# Patient Record
Sex: FEMALE | Race: WHITE | NOT HISPANIC OR LATINO | Employment: FULL TIME | URBAN - METROPOLITAN AREA
[De-identification: names, ages, dates, MRNs, and addresses within clinical notes are randomized per-mention and may not be internally consistent; named-entity substitution may affect disease eponyms.]

---

## 2023-02-18 PROBLEM — R12 HEARTBURN: Status: ACTIVE | Noted: 2023-02-18

## 2023-02-18 PROBLEM — H04.123 DRY EYES, BILATERAL: Status: ACTIVE | Noted: 2023-02-18

## 2023-02-18 PROBLEM — H90.3 ASYMMETRICAL SENSORINEURAL HEARING LOSS: Status: ACTIVE | Noted: 2023-02-18

## 2023-02-18 PROBLEM — H25.811 COMBINED FORM OF AGE-RELATED CATARACT, RIGHT EYE: Status: ACTIVE | Noted: 2023-02-18

## 2023-02-18 PROBLEM — H90.3 SENSORINEURAL HEARING LOSS OF BOTH EARS: Status: ACTIVE | Noted: 2023-02-18

## 2023-02-18 PROBLEM — H52.00 HYPEROPIA: Status: ACTIVE | Noted: 2023-02-18

## 2023-02-18 PROBLEM — S16.1XXA NECK STRAIN: Status: ACTIVE | Noted: 2023-02-18

## 2023-02-18 PROBLEM — S60.559A SPLINTER OF HAND: Status: ACTIVE | Noted: 2023-02-18

## 2023-02-18 PROBLEM — L30.9 ECZEMA: Status: ACTIVE | Noted: 2023-02-18

## 2023-02-18 PROBLEM — M25.561 BILATERAL KNEE PAIN: Status: ACTIVE | Noted: 2023-02-18

## 2023-02-18 PROBLEM — H52.209 ASTIGMATISM: Status: ACTIVE | Noted: 2023-02-18

## 2023-02-18 PROBLEM — H52.10 MYOPIA WITH PRESBYOPIA: Status: ACTIVE | Noted: 2023-02-18

## 2023-02-18 PROBLEM — E03.9 HYPOTHYROID: Status: ACTIVE | Noted: 2023-02-18

## 2023-02-18 PROBLEM — I10 BENIGN ESSENTIAL HYPERTENSION: Status: ACTIVE | Noted: 2023-02-18

## 2023-02-18 PROBLEM — I82.409 DVT (DEEP VENOUS THROMBOSIS) (MULTI): Status: ACTIVE | Noted: 2023-02-18

## 2023-02-18 PROBLEM — M25.511 RIGHT SHOULDER PAIN: Status: ACTIVE | Noted: 2023-02-18

## 2023-02-18 PROBLEM — G25.0 BENIGN ESSENTIAL TREMOR: Status: ACTIVE | Noted: 2023-02-18

## 2023-02-18 PROBLEM — J45.30 MILD PERSISTENT ASTHMA WITHOUT COMPLICATION (HHS-HCC): Status: ACTIVE | Noted: 2023-02-18

## 2023-02-18 PROBLEM — H25.812 COMBINED FORM OF AGE-RELATED CATARACT, LEFT EYE: Status: ACTIVE | Noted: 2023-02-18

## 2023-02-18 PROBLEM — R32 URINE INCONTINENCE: Status: ACTIVE | Noted: 2023-02-18

## 2023-02-18 PROBLEM — M25.562 BILATERAL KNEE PAIN: Status: ACTIVE | Noted: 2023-02-18

## 2023-02-18 PROBLEM — M17.12 ARTHRITIS OF KNEE, LEFT: Status: ACTIVE | Noted: 2023-02-18

## 2023-02-18 PROBLEM — J06.9 VIRAL URI WITH COUGH: Status: ACTIVE | Noted: 2023-02-18

## 2023-02-18 PROBLEM — F32.5 MAJOR DEPRESSION IN REMISSION (CMS-HCC): Status: ACTIVE | Noted: 2023-02-18

## 2023-02-18 PROBLEM — G62.9 POLYNEUROPATHY: Status: ACTIVE | Noted: 2023-02-18

## 2023-02-18 PROBLEM — R26.89 LOSS OF BALANCE: Status: ACTIVE | Noted: 2023-02-18

## 2023-02-18 PROBLEM — R68.89 HEAT INTOLERANCE: Status: ACTIVE | Noted: 2023-02-18

## 2023-02-18 PROBLEM — H47.399 CUP TO DISC ASYMMETRY: Status: ACTIVE | Noted: 2023-02-18

## 2023-02-18 PROBLEM — J30.9 ALLERGIC RHINITIS: Status: ACTIVE | Noted: 2023-02-18

## 2023-02-18 PROBLEM — S06.0XAA CONCUSSION: Status: ACTIVE | Noted: 2023-02-18

## 2023-02-18 PROBLEM — F32.9 MDD (MAJOR DEPRESSIVE DISORDER), SINGLE EPISODE: Status: ACTIVE | Noted: 2023-02-18

## 2023-02-18 PROBLEM — M19.049 CMC ARTHRITIS: Status: ACTIVE | Noted: 2023-02-18
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
## 2023-02-18 PROBLEM — S72.009A HIP FRACTURE (MULTI): Status: ACTIVE | Noted: 2023-02-18

## 2023-02-18 PROBLEM — M54.50 LOW BACK PAIN: Status: ACTIVE | Noted: 2023-02-18

## 2023-02-18 PROBLEM — S20.219A CONTUSION, CHEST WALL: Status: ACTIVE | Noted: 2023-02-18

## 2023-02-18 PROBLEM — F41.9 ANXIETY: Status: ACTIVE | Noted: 2023-02-18

## 2023-02-18 PROBLEM — E55.9 VITAMIN D DEFICIENCY: Status: ACTIVE | Noted: 2023-02-18

## 2023-02-18 PROBLEM — H52.4 MYOPIA WITH PRESBYOPIA: Status: ACTIVE | Noted: 2023-02-18

## 2023-02-18 PROBLEM — E78.5 HYPERLIPIDEMIA: Status: ACTIVE | Noted: 2023-02-18

## 2023-02-18 PROBLEM — M17.11 ARTHRITIS OF KNEE, RIGHT: Status: ACTIVE | Noted: 2023-02-18

## 2023-02-18 PROBLEM — H93.12 TINNITUS OF LEFT EAR: Status: ACTIVE | Noted: 2023-02-18

## 2023-02-18 PROBLEM — S69.90XA WRIST INJURY: Status: ACTIVE | Noted: 2023-02-18

## 2023-02-18 PROBLEM — H43.813 PVD (POSTERIOR VITREOUS DETACHMENT), BOTH EYES: Status: ACTIVE | Noted: 2023-02-18

## 2023-02-18 PROBLEM — M18.12 LOCALIZED PRIMARY OSTEOARTHRITIS OF CARPOMETACARPAL JOINT OF LEFT THUMB: Status: ACTIVE | Noted: 2023-02-18

## 2023-02-18 PROBLEM — S72.23XA: Status: ACTIVE | Noted: 2023-02-18

## 2023-02-18 RX ORDER — PROPRANOLOL HYDROCHLORIDE 10 MG/1
TABLET ORAL
COMMUNITY
Start: 2021-11-01 | End: 2023-04-17 | Stop reason: ALTCHOICE

## 2023-02-18 RX ORDER — AZELASTINE HYDROCHLORIDE, FLUTICASONE PROPIONATE 137; 50 UG/1; UG/1
SPRAY, METERED NASAL
COMMUNITY
End: 2023-04-17 | Stop reason: ALTCHOICE

## 2023-02-18 RX ORDER — TRIAMCINOLONE ACETONIDE 1 MG/G
CREAM TOPICAL
COMMUNITY
Start: 2021-11-08 | End: 2023-04-17 | Stop reason: ALTCHOICE

## 2023-02-18 RX ORDER — CITALOPRAM 20 MG/1
1 TABLET, FILM COATED ORAL DAILY
COMMUNITY
Start: 2014-11-05 | End: 2023-04-17 | Stop reason: SDUPTHER

## 2023-02-18 RX ORDER — BISOPROLOL FUMARATE AND HYDROCHLOROTHIAZIDE 10; 6.25 MG/1; MG/1
2 TABLET ORAL
COMMUNITY
Start: 2014-10-25 | End: 2023-04-17 | Stop reason: SDUPTHER

## 2023-02-18 RX ORDER — PHENOL 1.4 %
AEROSOL, SPRAY (ML) MUCOUS MEMBRANE
COMMUNITY
Start: 2016-09-01

## 2023-02-18 RX ORDER — SIMVASTATIN 20 MG/1
1 TABLET, FILM COATED ORAL DAILY
COMMUNITY
Start: 2014-11-05 | End: 2023-04-17 | Stop reason: ALTCHOICE

## 2023-02-18 RX ORDER — FLUTICASONE PROPIONATE 50 MCG
SPRAY, SUSPENSION (ML) NASAL
COMMUNITY
Start: 2021-08-23

## 2023-02-18 RX ORDER — BUDESONIDE AND FORMOTEROL FUMARATE DIHYDRATE 160; 4.5 UG/1; UG/1
2 AEROSOL RESPIRATORY (INHALATION)
COMMUNITY
End: 2024-04-15 | Stop reason: ALTCHOICE

## 2023-02-18 RX ORDER — HYALURONATE SODIUM 16.8MG/2ML
16.8 SYRINGE (ML) INTRAARTICULAR
COMMUNITY
Start: 2022-07-12 | End: 2024-04-12 | Stop reason: ALTCHOICE

## 2023-02-18 RX ORDER — LEVOTHYROXINE SODIUM 25 UG/1
1 TABLET ORAL DAILY
COMMUNITY
Start: 2018-07-16 | End: 2024-04-15 | Stop reason: SDUPTHER

## 2023-02-18 RX ORDER — HYDROGEN PEROXIDE 3 %
1 SOLUTION, NON-ORAL MISCELLANEOUS DAILY
COMMUNITY

## 2023-03-06 ENCOUNTER — APPOINTMENT (OUTPATIENT)
Dept: PRIMARY CARE | Facility: CLINIC | Age: 73
End: 2023-03-06
Payer: MEDICARE

## 2023-03-06 LAB
ALANINE AMINOTRANSFERASE (SGPT) (U/L) IN SER/PLAS: 10 U/L (ref 7–45)
ALBUMIN (G/DL) IN SER/PLAS: 4.2 G/DL (ref 3.4–5)
ALKALINE PHOSPHATASE (U/L) IN SER/PLAS: 92 U/L (ref 33–136)
ANION GAP IN SER/PLAS: 14 MMOL/L (ref 10–20)
ASPARTATE AMINOTRANSFERASE (SGOT) (U/L) IN SER/PLAS: 13 U/L (ref 9–39)
BASOPHILS (10*3/UL) IN BLOOD BY AUTOMATED COUNT: 0.08 X10E9/L (ref 0–0.1)
BASOPHILS/100 LEUKOCYTES IN BLOOD BY AUTOMATED COUNT: 1.3 % (ref 0–2)
BILIRUBIN TOTAL (MG/DL) IN SER/PLAS: 0.5 MG/DL (ref 0–1.2)
CALCIUM (MG/DL) IN SER/PLAS: 9.3 MG/DL (ref 8.6–10.6)
CARBON DIOXIDE, TOTAL (MMOL/L) IN SER/PLAS: 32 MMOL/L (ref 21–32)
CHLORIDE (MMOL/L) IN SER/PLAS: 102 MMOL/L (ref 98–107)
CHOLESTEROL (MG/DL) IN SER/PLAS: 191 MG/DL (ref 0–199)
CHOLESTEROL IN HDL (MG/DL) IN SER/PLAS: 53.8 MG/DL
CHOLESTEROL/HDL RATIO: 3.6
CREATININE (MG/DL) IN SER/PLAS: 0.68 MG/DL (ref 0.5–1.05)
EOSINOPHILS (10*3/UL) IN BLOOD BY AUTOMATED COUNT: 0.18 X10E9/L (ref 0–0.4)
EOSINOPHILS/100 LEUKOCYTES IN BLOOD BY AUTOMATED COUNT: 2.9 % (ref 0–6)
ERYTHROCYTE DISTRIBUTION WIDTH (RATIO) BY AUTOMATED COUNT: 14 % (ref 11.5–14.5)
ERYTHROCYTE MEAN CORPUSCULAR HEMOGLOBIN CONCENTRATION (G/DL) BY AUTOMATED: 30.3 G/DL (ref 32–36)
ERYTHROCYTE MEAN CORPUSCULAR VOLUME (FL) BY AUTOMATED COUNT: 88 FL (ref 80–100)
ERYTHROCYTES (10*6/UL) IN BLOOD BY AUTOMATED COUNT: 4.51 X10E12/L (ref 4–5.2)
GFR FEMALE: >90 ML/MIN/1.73M2
GLUCOSE (MG/DL) IN SER/PLAS: 97 MG/DL (ref 74–99)
HEMATOCRIT (%) IN BLOOD BY AUTOMATED COUNT: 39.6 % (ref 36–46)
HEMOGLOBIN (G/DL) IN BLOOD: 12 G/DL (ref 12–16)
IMMATURE GRANULOCYTES/100 LEUKOCYTES IN BLOOD BY AUTOMATED COUNT: 0.3 % (ref 0–0.9)
LDL: 115 MG/DL (ref 0–99)
LEUKOCYTES (10*3/UL) IN BLOOD BY AUTOMATED COUNT: 6.3 X10E9/L (ref 4.4–11.3)
LYMPHOCYTES (10*3/UL) IN BLOOD BY AUTOMATED COUNT: 1.36 X10E9/L (ref 0.8–3)
LYMPHOCYTES/100 LEUKOCYTES IN BLOOD BY AUTOMATED COUNT: 21.7 % (ref 13–44)
MONOCYTES (10*3/UL) IN BLOOD BY AUTOMATED COUNT: 0.5 X10E9/L (ref 0.05–0.8)
MONOCYTES/100 LEUKOCYTES IN BLOOD BY AUTOMATED COUNT: 8 % (ref 2–10)
NEUTROPHILS (10*3/UL) IN BLOOD BY AUTOMATED COUNT: 4.12 X10E9/L (ref 1.6–5.5)
NEUTROPHILS/100 LEUKOCYTES IN BLOOD BY AUTOMATED COUNT: 65.8 % (ref 40–80)
NRBC (PER 100 WBCS) BY AUTOMATED COUNT: 0 /100 WBC (ref 0–0)
PLATELETS (10*3/UL) IN BLOOD AUTOMATED COUNT: 326 X10E9/L (ref 150–450)
POTASSIUM (MMOL/L) IN SER/PLAS: 3.8 MMOL/L (ref 3.5–5.3)
PROTEIN TOTAL: 6.5 G/DL (ref 6.4–8.2)
SODIUM (MMOL/L) IN SER/PLAS: 144 MMOL/L (ref 136–145)
THYROTROPIN (MIU/L) IN SER/PLAS BY DETECTION LIMIT <= 0.05 MIU/L: 3.35 MIU/L (ref 0.44–3.98)
TRIGLYCERIDE (MG/DL) IN SER/PLAS: 109 MG/DL (ref 0–149)
UREA NITROGEN (MG/DL) IN SER/PLAS: 16 MG/DL (ref 6–23)
VLDL: 22 MG/DL (ref 0–40)

## 2023-04-17 ENCOUNTER — OFFICE VISIT (OUTPATIENT)
Dept: PRIMARY CARE | Facility: CLINIC | Age: 73
End: 2023-04-17
Payer: MEDICARE

## 2023-04-17 VITALS
WEIGHT: 219 LBS | DIASTOLIC BLOOD PRESSURE: 72 MMHG | OXYGEN SATURATION: 98 % | HEART RATE: 63 BPM | TEMPERATURE: 96.7 F | BODY MASS INDEX: 31.35 KG/M2 | SYSTOLIC BLOOD PRESSURE: 138 MMHG | HEIGHT: 70 IN

## 2023-04-17 DIAGNOSIS — M54.31 SCIATICA OF RIGHT SIDE: ICD-10-CM

## 2023-04-17 DIAGNOSIS — G89.29 CHRONIC LEFT SHOULDER PAIN: ICD-10-CM

## 2023-04-17 DIAGNOSIS — F32.5 MAJOR DEPRESSION IN REMISSION (CMS-HCC): ICD-10-CM

## 2023-04-17 DIAGNOSIS — Z00.00 ROUTINE GENERAL MEDICAL EXAMINATION AT HEALTH CARE FACILITY: Primary | ICD-10-CM

## 2023-04-17 DIAGNOSIS — M25.512 CHRONIC LEFT SHOULDER PAIN: ICD-10-CM

## 2023-04-17 DIAGNOSIS — E78.5 HYPERLIPIDEMIA, UNSPECIFIED HYPERLIPIDEMIA TYPE: ICD-10-CM

## 2023-04-17 DIAGNOSIS — I10 BENIGN ESSENTIAL HYPERTENSION: ICD-10-CM

## 2023-04-17 DIAGNOSIS — E03.9 HYPOTHYROIDISM, UNSPECIFIED TYPE: ICD-10-CM

## 2023-04-17 DIAGNOSIS — G62.9 POLYNEUROPATHY: ICD-10-CM

## 2023-04-17 DIAGNOSIS — F41.9 ANXIETY: ICD-10-CM

## 2023-04-17 DIAGNOSIS — G25.0 BENIGN ESSENTIAL TREMOR: ICD-10-CM

## 2023-04-17 DIAGNOSIS — Z12.31 BREAST CANCER SCREENING BY MAMMOGRAM: ICD-10-CM

## 2023-04-17 PROBLEM — S72.23XA: Status: RESOLVED | Noted: 2023-02-18 | Resolved: 2023-04-17

## 2023-04-17 PROBLEM — S72.009A HIP FRACTURE (MULTI): Status: RESOLVED | Noted: 2023-02-18 | Resolved: 2023-04-17

## 2023-04-17 PROBLEM — I82.409 DVT (DEEP VENOUS THROMBOSIS) (MULTI): Status: RESOLVED | Noted: 2023-02-18 | Resolved: 2023-04-17

## 2023-04-17 PROBLEM — F32.9 MDD (MAJOR DEPRESSIVE DISORDER), SINGLE EPISODE: Status: RESOLVED | Noted: 2023-02-18 | Resolved: 2023-04-17

## 2023-04-17 PROCEDURE — 1170F FXNL STATUS ASSESSED: CPT | Performed by: FAMILY MEDICINE

## 2023-04-17 PROCEDURE — 1159F MED LIST DOCD IN RCRD: CPT | Performed by: FAMILY MEDICINE

## 2023-04-17 PROCEDURE — 3075F SYST BP GE 130 - 139MM HG: CPT | Performed by: FAMILY MEDICINE

## 2023-04-17 PROCEDURE — 1157F ADVNC CARE PLAN IN RCRD: CPT | Performed by: FAMILY MEDICINE

## 2023-04-17 PROCEDURE — G0439 PPPS, SUBSEQ VISIT: HCPCS | Performed by: FAMILY MEDICINE

## 2023-04-17 PROCEDURE — 1160F RVW MEDS BY RX/DR IN RCRD: CPT | Performed by: FAMILY MEDICINE

## 2023-04-17 PROCEDURE — 99397 PER PM REEVAL EST PAT 65+ YR: CPT | Performed by: FAMILY MEDICINE

## 2023-04-17 PROCEDURE — 3078F DIAST BP <80 MM HG: CPT | Performed by: FAMILY MEDICINE

## 2023-04-17 PROCEDURE — 1036F TOBACCO NON-USER: CPT | Performed by: FAMILY MEDICINE

## 2023-04-17 PROCEDURE — 99214 OFFICE O/P EST MOD 30 MIN: CPT | Performed by: FAMILY MEDICINE

## 2023-04-17 RX ORDER — GABAPENTIN 600 MG/1
TABLET ORAL EVERY 8 HOURS
COMMUNITY
End: 2024-04-15 | Stop reason: SDUPTHER

## 2023-04-17 RX ORDER — BISOPROLOL FUMARATE AND HYDROCHLOROTHIAZIDE 10; 6.25 MG/1; MG/1
2 TABLET ORAL
Qty: 180 TABLET | Refills: 1 | Status: SHIPPED | OUTPATIENT
Start: 2023-04-17 | End: 2024-03-11 | Stop reason: SDUPTHER

## 2023-04-17 RX ORDER — CITALOPRAM 20 MG/1
20 TABLET, FILM COATED ORAL DAILY
Qty: 90 TABLET | Refills: 1 | Status: SHIPPED | OUTPATIENT
Start: 2023-04-17 | End: 2024-04-15 | Stop reason: SDUPTHER

## 2023-04-17 RX ORDER — GABAPENTIN 600 MG/1
TABLET ORAL
Qty: 270 TABLET | Refills: 1 | Status: SHIPPED | OUTPATIENT
Start: 2023-04-17 | End: 2023-04-17 | Stop reason: SDUPTHER

## 2023-04-17 RX ORDER — FLUTICASONE FUROATE, UMECLIDINIUM BROMIDE AND VILANTEROL TRIFENATATE 100; 62.5; 25 UG/1; UG/1; UG/1
POWDER RESPIRATORY (INHALATION)
COMMUNITY
Start: 2023-03-30

## 2023-04-17 RX ORDER — GABAPENTIN 600 MG/1
TABLET ORAL
COMMUNITY
Start: 2023-03-22 | End: 2023-04-17 | Stop reason: SDUPTHER

## 2023-04-17 ASSESSMENT — PATIENT HEALTH QUESTIONNAIRE - PHQ9
1. LITTLE INTEREST OR PLEASURE IN DOING THINGS: NOT AT ALL
2. FEELING DOWN, DEPRESSED OR HOPELESS: NOT AT ALL
SUM OF ALL RESPONSES TO PHQ9 QUESTIONS 1 AND 2: 0

## 2023-04-17 ASSESSMENT — ACTIVITIES OF DAILY LIVING (ADL)
MANAGING_FINANCES: INDEPENDENT
GROCERY_SHOPPING: INDEPENDENT
DRESSING: INDEPENDENT
DOING_HOUSEWORK: INDEPENDENT
TAKING_MEDICATION: INDEPENDENT
BATHING: INDEPENDENT

## 2023-04-17 ASSESSMENT — ENCOUNTER SYMPTOMS
LOSS OF SENSATION IN FEET: 0
OCCASIONAL FEELINGS OF UNSTEADINESS: 1
DEPRESSION: 1

## 2023-04-17 NOTE — PROGRESS NOTES
Subjective   Reason for Visit: Yaritza Bernal is an 72 y.o. female here for a Medicare Wellness visit.     Past Medical, Surgical, and Family History reviewed and updated in chart.    Reviewed all medications by prescribing practitioner or clinical pharmacist (such as prescriptions, OTCs, herbal therapies and supplements) and documented in the medical record.  Medicare Wellness Billing Compliance Satisfied    Review all medications by prescribing practitioner or clinical pharmacist (such as prescriptions, OTCs, herbal therapies and supplements) documented in the medical record    Past Medical, Surgical, and Family History reviewed and updated in chart    Tobacco Use Reviewed    Alcohol Use Reviewed    Illicit Drug Use Reviewed    PHQ2/9    Falls in Last Year Reviewed    Home Safety Risk Factors Reviewed    Cognitive Impairment Reviewed    Patient Self Assessment and Health Status    Current Diet Reviewed    Exercise Frequency    ADL - Hearing Impairment    ADL - Bathing    ADL - Dressing    ADL - Walks in Home    IADL - Managing Finances    IADL - Grocery Shopping    IADL - Taking Medications    IADL - Doing Housework      HPI  She lives by herself with her cat. She works 5 hour shifts for 20-25 hours weekly at [employer].     She requests refills for citalopram, gabapentin, and bisopropolol - hctz. She does not voice any side effects. Bp good range. Anxiety controlled     She recently saw her podiatrist. She also saw her pulmonologist who switched her from symbicort to Trelegy.     She is up to date on blood work. Her last mammogram was in 2021. She declines colonoscopy. She did not complete the Cologuard, as she needed to print and was unable to complete due to tremor and will not asked friends to do it, she declines    She complains of sciatic pain on the right side. She also notes that she has left shoulder pain due to the repetitive nature of her work. She would like to go back to PT for  "both areas.  No falls or injuries, she stands as  a lot at grocery store and pushes items through causing her pain    She drinks mini cans of coke, and notices lower extremity edema. If she goes a couple of days without, she notices it resolves. Ankles get puffy if she drinks coke    She has scaly skin on her ankles. She plans to follow up with dermatology.       Patient Care Team:  Madyson Sandoval DO as PCP - General  Madyson Sandoval DO as PCP - [employer] PCP     Review of Systems    Objective   Vitals:  /72   Pulse 63   Temp 35.9 °C (96.7 °F)   Ht 1.778 m (5' 10\")   Wt 99.3 kg (219 lb)   SpO2 98%   BMI 31.42 kg/m²       Physical Exam  Constitutional:       Appearance: Normal appearance.   Cardiovascular:      Rate and Rhythm: Normal rate and regular rhythm.      Pulses: Normal pulses.      Heart sounds: Normal heart sounds.   Pulmonary:      Effort: Pulmonary effort is normal.      Breath sounds: Normal breath sounds.   Abdominal:      General: Bowel sounds are normal.      Palpations: Abdomen is soft.      Tenderness: There is no abdominal tenderness.   Musculoskeletal:         General: No tenderness.      Cervical back: Normal range of motion and neck supple.      Comments: Uses walking stick, has knee pain and wants to wait for total knee   Skin:     General: Skin is warm and dry.   Neurological:      Mental Status: She is alert and oriented to person, place, and time.   Psychiatric:         Mood and Affect: Mood normal.         Thought Content: Thought content normal.         Judgment: Judgment normal.         Assessment/Plan   Problem List Items Addressed This Visit    Essential Tremor  Continue on Gabapention 600 mg three times daily. Prescription sent to mail order.   OARRS checked. Risk and benefit ratio assessed. No Suspicious activity.     Hypertension   Continue on Bisopropolol - hctz 10 - 6.25 twice daily. Prescription sent to mail order. "     Anxiety and Depression  Continue on Citalopram 20 mg daily. Prescription sent to mail order.     Right Sciatic Pain and Left Shoulder pain.  Provided order for physical therapy.     Health Maintenance  Provided order for Mammogram. Will call with results.   Problem List Items Addressed This Visit          Nervous    Benign essential tremor    Relevant Medications    bisoproloL-hydrochlorothiazide (Ziac) 10-6.25 mg tablet    Polyneuropathy       Circulatory    Benign essential hypertension       Endocrine/Metabolic    Hypothyroid       Other    Anxiety    Relevant Medications    citalopram (CeleXA) 20 mg tablet    Hyperlipidemia    Major depression in remission (CMS/HCC)     On medication, in remission          Other Visit Diagnoses       Routine general medical examination at health care facility    -  Primary    Sciatica of right side        Relevant Orders    Referral to Physical Therapy    Breast cancer screening by mammogram        Relevant Orders    BI mammo bilateral screening tomosynthesis    Chronic left shoulder pain        Relevant Orders    Referral to Physical Therapy          Follow up in 6 months unless a visit is needed prior.      Scribe Attestation  By signing my name below, IAdilene Scribe   attest that this documentation has been prepared under the direction and in the presence of Madyson Sandoval DO.

## 2023-07-20 ENCOUNTER — HOSPITAL ENCOUNTER (OUTPATIENT)
Dept: DATA CONVERSION | Facility: HOSPITAL | Age: 73
End: 2023-07-20
Attending: OPHTHALMOLOGY | Admitting: OPHTHALMOLOGY
Payer: MEDICARE

## 2023-07-20 DIAGNOSIS — H25.812 COMBINED FORMS OF AGE-RELATED CATARACT, LEFT EYE: ICD-10-CM

## 2023-07-20 DIAGNOSIS — Z88.2 ALLERGY STATUS TO SULFONAMIDES: ICD-10-CM
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
## 2023-09-14 ENCOUNTER — OFFICE VISIT (OUTPATIENT)
Dept: PRIMARY CARE | Facility: CLINIC | Age: 73
End: 2023-09-14
Payer: MEDICARE

## 2023-09-14 VITALS
HEART RATE: 97 BPM | BODY MASS INDEX: 30.56 KG/M2 | WEIGHT: 213 LBS | OXYGEN SATURATION: 97 % | TEMPERATURE: 97.9 F | DIASTOLIC BLOOD PRESSURE: 73 MMHG | SYSTOLIC BLOOD PRESSURE: 134 MMHG

## 2023-09-14 DIAGNOSIS — H91.91 HEARING LOSS OF RIGHT EAR, UNSPECIFIED HEARING LOSS TYPE: ICD-10-CM

## 2023-09-14 DIAGNOSIS — S20.211A CONTUSION OF RIB ON RIGHT SIDE, INITIAL ENCOUNTER: Primary | ICD-10-CM

## 2023-09-14 DIAGNOSIS — E03.9 HYPOTHYROIDISM, UNSPECIFIED TYPE: ICD-10-CM

## 2023-09-14 DIAGNOSIS — E78.5 HYPERLIPIDEMIA, UNSPECIFIED HYPERLIPIDEMIA TYPE: ICD-10-CM

## 2023-09-14 DIAGNOSIS — I10 BENIGN ESSENTIAL HYPERTENSION: ICD-10-CM

## 2023-09-14 PROBLEM — Z96.1 PSEUDOPHAKIA OF LEFT EYE: Status: ACTIVE | Noted: 2023-09-14

## 2023-09-14 PROBLEM — M25.562 KNEE PAIN, BILATERAL: Status: ACTIVE | Noted: 2023-09-14

## 2023-09-14 PROBLEM — R07.9 CHEST PAIN: Status: ACTIVE | Noted: 2023-09-14

## 2023-09-14 PROBLEM — L81.4 OTHER MELANIN HYPERPIGMENTATION: Status: ACTIVE | Noted: 2018-10-17

## 2023-09-14 PROBLEM — D48.5 NEOPLASM OF UNCERTAIN BEHAVIOR OF SKIN: Status: ACTIVE | Noted: 2018-10-17

## 2023-09-14 PROBLEM — M25.561 KNEE PAIN, BILATERAL: Status: ACTIVE | Noted: 2023-09-14

## 2023-09-14 PROBLEM — L82.0 INFLAMED SEBORRHEIC KERATOSIS: Status: ACTIVE | Noted: 2018-10-17

## 2023-09-14 PROBLEM — D18.01 HEMANGIOMA OF SKIN AND SUBCUTANEOUS TISSUE: Status: ACTIVE | Noted: 2018-10-17

## 2023-09-14 PROBLEM — M25.562 LEFT KNEE PAIN: Status: ACTIVE | Noted: 2023-09-14

## 2023-09-14 PROBLEM — L82.1 OTHER SEBORRHEIC KERATOSIS: Status: ACTIVE | Noted: 2018-10-17

## 2023-09-14 PROBLEM — F32.9 MDD (MAJOR DEPRESSIVE DISORDER), SINGLE EPISODE: Status: ACTIVE | Noted: 2023-09-14

## 2023-09-14 PROBLEM — L57.0 ACTINIC KERATOSIS: Status: ACTIVE | Noted: 2018-10-17

## 2023-09-14 PROBLEM — T14.8XXA FRACTURE: Status: ACTIVE | Noted: 2023-09-14

## 2023-09-14 PROBLEM — L57.9 SKIN CHANGES DUE TO CHRONIC EXPOSURE TO NONIONIZING RADIATION, UNSPECIFIED: Status: ACTIVE | Noted: 2018-10-17

## 2023-09-14 PROCEDURE — 1159F MED LIST DOCD IN RCRD: CPT | Performed by: FAMILY MEDICINE

## 2023-09-14 PROCEDURE — 1157F ADVNC CARE PLAN IN RCRD: CPT | Performed by: FAMILY MEDICINE

## 2023-09-14 PROCEDURE — 1036F TOBACCO NON-USER: CPT | Performed by: FAMILY MEDICINE

## 2023-09-14 PROCEDURE — 99214 OFFICE O/P EST MOD 30 MIN: CPT | Performed by: FAMILY MEDICINE

## 2023-09-14 PROCEDURE — 1125F AMNT PAIN NOTED PAIN PRSNT: CPT | Performed by: FAMILY MEDICINE

## 2023-09-14 PROCEDURE — 3075F SYST BP GE 130 - 139MM HG: CPT | Performed by: FAMILY MEDICINE

## 2023-09-14 PROCEDURE — 1160F RVW MEDS BY RX/DR IN RCRD: CPT | Performed by: FAMILY MEDICINE

## 2023-09-14 PROCEDURE — 3078F DIAST BP <80 MM HG: CPT | Performed by: FAMILY MEDICINE

## 2023-09-14 ASSESSMENT — ENCOUNTER SYMPTOMS
OCCASIONAL FEELINGS OF UNSTEADINESS: 0
DEPRESSION: 0
LOSS OF SENSATION IN FEET: 0

## 2023-09-14 NOTE — LETTER
September 14, 2023     Patient: Yaritza Bernal   YOB: 1950   Date of Visit: 9/14/2023       To Whom It May Concern:    Yaritza Bernal was seen in my clinic on 9/14/2023 at 9:00 am. Please excuse Yaritza for her absence from work on this day to make the appointment. She will need to be off of work from 9/14/23-9/15/23, can return 9/16/23.    If you have any questions or concerns, please don't hesitate to call.         Sincerely,         Madyson Sandoval,         CC: No Recipients

## 2023-09-18 ENCOUNTER — APPOINTMENT (OUTPATIENT)
Dept: PRIMARY CARE | Facility: CLINIC | Age: 73
End: 2023-09-18
Payer: MEDICARE

## 2023-09-30 NOTE — H&P
History of Present Illness:   History Present Illness:  Reason for surgery: cataract left eye   HPI:    visually significant cataract left eye    Allergies:        Allergies:  ·  sulfa drugs : Hives/Urticaria (Mild)    Home Medication Review:   Home Medications Reviewed: yes     Impression/Procedure:   ·  Impression and Planned Procedure: cataract extraction and intraocular lens insertion left eye       ERAS (Enhanced Recovery After Surgery):  ·  ERAS Patient: no       Physical Exam by System:    Constitutional: Well developed, awake/alert/oriented  x3, no distress, alert and cooperative   Respiratory/Thorax: Patent airways, CTAB, normal  breath sounds with good chest expansion, thorax symmetric   Cardiovascular: Regular, rate and rhythm, no murmurs,  2+ equal pulses of the extremities, normal S 1and S 2     Consent:   COVID-19 Consent:  ·  COVID-19 Risk Consent Surgeon has reviewed key risks related to the risk of dexter COVID-19 and if they contract COVID-19 what the risks are.     Attestation:   Note Completion:  I am a:  Resident/Fellow   Attending Attestation I saw and evaluated the patient.  I personally obtained the key and critical portions of the history and physical exam or was physically present for key and  critical portions performed by the resident/fellow. I reviewed the resident/fellow?s documentation and discussed the patient with the resident/fellow.  I agree with the resident/fellow?s medical decision making as documented in the note.     I personally evaluated the patient on 20-Jul-2023         Electronic Signatures:  Maria Esther Frazier (Resident))  (Signed 20-Jul-2023 04:42)   Authored: History of Present Illness, Allergies, Home  Medication Review, Impression/Procedure, ERAS, Physical Exam, Consent, Note Completion  Celina Carter)  (Signed 20-Jul-2023 08:33)   Authored: Note Completion   Co-Signer: History of Present Illness, Allergies, Home Medication Review,  Impression/Procedure, ERAS, Physical Exam, Consent, Note Completion      Last Updated: 20-Jul-2023 08:33 by Celina Carter)

## 2023-10-02 NOTE — OP NOTE
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 Phacoemulsification of cataract with  intraocular lens implant - LEFT Eye  2. Anterior vitrectomy - Left eye   Surgeon: Celina Carter MD   Resident/Fellow/Other Assistant: Judy Frazier MD   Estimated Blood Loss (mL): none   Specimen: no   Complications: Open posterior capsule   Findings: As above     Operative Report Dictated:  Dictation: not applicable - note contains Operative  Report   Note Recipients: Madyson Sandoval DO  - [phone] [Preferred]   Operative Report:    Patient name: Yaritza Bernal  YOB: 1950  MRN: [id number]  Date of surgery: 07/20/2023  Preoperative diagnosis: Combined cataract of the LEFT eye  Postoperative diagnosis: Combined cataract of the LEFT eye  Procedure: 1. Phacoemulsification of cataract with insertion of intraocular lens, LEFT eye. 2. Anterior vitrectomy, LEFT eye  Surgeon: Celina Carter MD  Resident: Freddie Angulo MD  Anesthesia: MAC  Complications: Open posterior capsule  Lens Inserted: Triston MA60AC with a power of +16.00 D, serial # [id number]. Exp: 04/25/2026.     Procedure description: After the risks, benefits, and alternatives of the planned procedure were discussed with the patient, informed consent was obtained at the preoperative evaluation. On the day of surgery, there were no updates to the consent form  and any patient questions were answered. The patient was correctly identified in the preoperative area and the LEFT eye was marked as the operative eye. Dilating drops were instilled into the LEFT eye in the preoperative area. The patient was then taken  back to the operating room and placed under sedation. The patient was prepped and draped in the standard, sterile ophthalmic fashion in preparation for intraocular surgery.    A lid speculum was placed into the LEFT palpebral  fissure and the operating microscope was brought into position. A paracentesis was made in inferotemporal cornea at the limbus with a 1.0mm side port blade using a cotton tipped applicator to provide countertraction.  Intracameral lidocaine was injected into the anterior chamber followed by Viscoat viscoelastic. Using 0.12 forceps to stabilize the globe, a 2.4mm keratome blade was used to create a limbal clear-corneal incision superotemporally. A bent-needle cystotome  and Utrata forceps were used to create a continuous curvilinear capsulorrhexis. Balanced salt saline solution on a blunt-tipped cannula was used to achieve hydrodissection.    A phacoemulsification device and a Cmaeron spatula were used to remove the nucleus using a divide-and-conquer technique. Residual cortical material was removed with the irrigation and aspiration handpiece. Following cortical removal, a rent in the posterior  capsule was noted. Vitreous was noted to be presenting anteriorly. Viscoat viscoelastic was placed into the capsular bag and posteriorly in order to tamponade the vitreous. A weckcel and vannas scissors were used to check the wounds and excise vitreous  prolapsing external to the wounds. A 10-0 vicryl suture was placed at the main wound, and a second paracentesis was created in the superotemporal cornea.  A vitrectomy tray was assembled, and using cut-IA, a limited anterior vitrectomy was performed.  Once sufficient vitreous was cleared, Provisc viscoelastic was then injected into the eye to reform the anterior chamber and to open the ciliary sulcus. The initial suture at the main wound was removed to allow insertion of an intraocular lens. The intraocular  lens, an Triston MA60AC with a power of +16.00 D was injected into the ciliary sulcus with limited anterior optic capture. A lens positioner was used to center the lens and ensure good position within the sulcus. The remaining viscoelastic was removed using  the vitrector  and additional vitrectomy was performed to remove as much vitreous as possible from the anterior chamber and out of the wounds. Balanced salt saline solution on a blunt-tipped cannula was then used to hydrate the corneal stroma adjacent  to the main wound and paracentesis site as well as to reform the anterior chamber. The temporal main incision was then closed with a single 10-0 vicryl suture in an interrupted fashion. The wound was checked and found to be watertight with normal intraocular  pressure verified using digital palpation. Miostat was instilled into the eye. At the conclusion of the case, a well-centered intraocular lens with a good red reflex was observed. The pupil was noted to be round. Care was taken to make sure no vitreous  was presenting outside the wounds, and a small amount of provisc viscoelastic was injected into every wound opening in an attempt to push any vitreous back into the eye if incarcerated in the wound. Tetracaine, betadine, BSS, and prednisolone acetate  drops were instilled into the LEFT eye. The lid speculum and drapes were removed. A clear plastic shield was then taped over the eye. The patient was taken to the recovery room in stable condition, having tolerated the procedure well.       Attestation:   Note Completion:  Attending Attestation I performed the procedure without a resident   Comments/ Additional Findings    I performed the surgery with the resident as an observer        Electronic Signatures:  Celina Carter)  (Signed 20-Jul-2023 23:55)   Authored: Post Operative Note, Note Completion      Last Updated: 20-Jul-2023 23:55 by Celina Carter)

## 2023-10-13 ENCOUNTER — HOSPITAL ENCOUNTER (OUTPATIENT)
Dept: RADIOLOGY | Facility: EXTERNAL LOCATION | Age: 73
Discharge: HOME | End: 2023-10-13
Payer: MEDICARE

## 2023-10-13 DIAGNOSIS — M25.562 LEFT KNEE PAIN, UNSPECIFIED CHRONICITY: ICD-10-CM

## 2023-10-19 ENCOUNTER — OFFICE VISIT (OUTPATIENT)
Dept: OTOLARYNGOLOGY | Facility: CLINIC | Age: 73
End: 2023-10-19
Payer: MEDICARE

## 2023-10-19 VITALS — BODY MASS INDEX: 31.14 KG/M2 | WEIGHT: 217 LBS

## 2023-10-19 DIAGNOSIS — H91.91 HEARING LOSS OF RIGHT EAR, UNSPECIFIED HEARING LOSS TYPE: ICD-10-CM

## 2023-10-19 DIAGNOSIS — H93.19 TINNITUS, UNSPECIFIED LATERALITY: ICD-10-CM

## 2023-10-19 DIAGNOSIS — H61.21 IMPACTED CERUMEN OF RIGHT EAR: Primary | ICD-10-CM

## 2023-10-19 PROCEDURE — 99203 OFFICE O/P NEW LOW 30 MIN: CPT | Performed by: GENERAL PRACTICE

## 2023-10-19 PROCEDURE — 1160F RVW MEDS BY RX/DR IN RCRD: CPT | Performed by: GENERAL PRACTICE

## 2023-10-19 PROCEDURE — 1125F AMNT PAIN NOTED PAIN PRSNT: CPT | Performed by: GENERAL PRACTICE

## 2023-10-19 PROCEDURE — 1159F MED LIST DOCD IN RCRD: CPT | Performed by: GENERAL PRACTICE

## 2023-10-19 PROCEDURE — 1036F TOBACCO NON-USER: CPT | Performed by: GENERAL PRACTICE

## 2023-10-19 PROCEDURE — 69210 REMOVE IMPACTED EAR WAX UNI: CPT | Performed by: GENERAL PRACTICE
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
## 2023-10-20 ENCOUNTER — OFFICE VISIT (OUTPATIENT)
Dept: OPHTHALMOLOGY | Facility: CLINIC | Age: 73
End: 2023-10-20
Payer: MEDICARE

## 2023-10-20 DIAGNOSIS — H25.811 COMBINED FORM OF AGE-RELATED CATARACT, RIGHT EYE: ICD-10-CM

## 2023-10-20 DIAGNOSIS — H52.12 MYOPIA OF LEFT EYE: ICD-10-CM

## 2023-10-20 DIAGNOSIS — H52.4 PRESBYOPIA: ICD-10-CM

## 2023-10-20 DIAGNOSIS — H47.399 PATHOLOGICAL CUPPING OF OPTIC DISC, UNSPECIFIED LATERALITY: ICD-10-CM

## 2023-10-20 DIAGNOSIS — H43.813 PVD (POSTERIOR VITREOUS DETACHMENT), BOTH EYES: ICD-10-CM

## 2023-10-20 DIAGNOSIS — H52.203 ASTIGMATISM OF BOTH EYES, UNSPECIFIED TYPE: ICD-10-CM

## 2023-10-20 DIAGNOSIS — Z96.1 PSEUDOPHAKIA: Primary | ICD-10-CM

## 2023-10-20 DIAGNOSIS — H52.01 HYPEROPIA OF RIGHT EYE: ICD-10-CM

## 2023-10-20 ASSESSMENT — SLIT LAMP EXAM - LIDS: COMMENTS: GOOD POSITION, DERMATOCHALASIS

## 2023-10-20 ASSESSMENT — EXTERNAL EXAM - LEFT EYE: OS_EXAM: NORMAL

## 2023-10-20 ASSESSMENT — EXTERNAL EXAM - RIGHT EYE: OD_EXAM: NORMAL

## 2023-10-20 NOTE — PROGRESS NOTES
Pseudophakia of left eyeZ96.1 - 7/20/23 - Open capsule, sulcus IOL.  -Doing well. Good vision. IOP good. Residual vs rebound iritis. Stopped prednisolone several days ago.   Restart Prednisolone acetate 1% - QID x 2 weeks, then TID until next visit.   Artificial tears 3-4x/day  -Risk of RD/RT with vitreous prolapse, as well as higher risk of CME. Will refer to retina service as needed.   -F/u 9/27 for iritis check.     Combined form of age-related cataract, right eyeH25.811  -Scheduled for CEIOL OD 10/19/23.     Dry eyes, glvrhqhurD79.123  -Eyes feel dry when air blowing in eyes at work. Currently works at [employer].   -Advised to start warm compresses and aritificial tears PRN    Cup to disc ysschjiwxK44.399  -No FH of glaucoma  -OCT RNFL (12/14/22) - SS: 7/10 OD and 8/10 OS. WNL OU. 93/91.   -Low suspicion for glaucoma given OCT RNFL WNL. Monitor with annual dilated exams.     PVD (posterior vitreous detachment), both eyesH43.813  -OCT macula (8/30/23) - SS: 6/10 and 9/10 OS. Normal thickness and contour OU. Intact IS-OS. No edema OU. 238/245. Stable from 12/14/22.  -No retinal tear or detachment seen on exam. Retinal detachment symptoms discussed.     QfpqvvgjdM52.00  PfmbocfbacfQ93.209  ZvcmofG44.10  PhsszrllfpD88.4  -F/u as scheduled for iritis check.       No history of refractive surgery.   No FH of AMD/glaucoma      Rx 4/20/22:  OD: +1.00  -1.50  x 085 add +2.50    OS: -1.00  -1.00  x 080 add +2.50

## 2023-10-24 ASSESSMENT — CUP TO DISC RATIO
OD_RATIO: 0.35
OS_RATIO: 0.45

## 2023-10-24 ASSESSMENT — SLIT LAMP EXAM - LIDS: COMMENTS: GOOD POSITION, DERMATOCHALASIS

## 2023-10-24 ASSESSMENT — EXTERNAL EXAM - RIGHT EYE: OD_EXAM: NORMAL

## 2023-10-24 ASSESSMENT — EXTERNAL EXAM - LEFT EYE: OS_EXAM: NORMAL

## 2023-10-24 NOTE — PROGRESS NOTES
Pseudophakia of left eyeZ96.1 - 7/20/23 - Open capsule, sulcus IOL.  -Doing well. Good vision. IOP good. Residual vs rebound iritis. History of stopping prednisolone prematurely.   -Stopped prednisolone acetate 1% on own about 2 weeks ago  -Iritis appears to have resolved, patient without redness or pain. Photophobia has improved.   -Recommend observation. Advised to call if any redness, pain, photophobia or change in vision.   -Continue artificial tears 3-4x/day  -Risk of RD/RT with vitreous prolapse, as well as higher risk of CME. Will refer to retina service as needed. No retinal tear or detachment seen on exam. Retinal detachment symptoms discussed.    -Patient has been seeing a silver disc intermittently temporally x 3-4 weeks  -2-3 weeks ago she fell (knee gave out) and hit her head on gravel driveway. No significant ocular sequelae noted except that intraocular lens (IOL) OS may be slightly decentered inferiorly, but optic is still in visual axis and haptics still appear to be in the sulcus. Intraocular lens (IOL) is not tilted or shifted posteriorly or anteriorly.  -F/u 4-6 months - refract OU, dilate OU for cataract evaluation OD, check OCT RNFL - would consider repeat Lenstar/Pentacam/OCT macula when patient is ready to move forward with cataract surgery OD.     Combined form of age-related cataract, right eyeH25.811  -Patient wishes to defer surgery for now. F/u 4-6 months - refract OU, dilate OU for cataract evaluation OD, check OCT RNFL - would consider repeat Lenstar/Pentacam/OCT macula when patient is ready to move forward with cataract surgery OD.     Dry eyes, rxwuwcswzK42.123  -Eyes feel dry when air blowing in eyes at work. Currently works at [employer].   -May use warm compresses and aritificial tears PRN    Cup to disc ozcbikudcI90.399  -No FH of glaucoma  -OCT RNFL (12/14/22) - SS: 7/10 OD and 8/10 OS. WNL OU. 93/91.   -Low suspicion for glaucoma given OCT RNFL WNL. Monitor with annual  dilated exams.     PVD (posterior vitreous detachment), both eyesH43.813  -OCT macula (8/30/23) - SS: 6/10 and 9/10 OS. Normal thickness and contour OU. Intact IS-OS. No edema OU. 238/245. Stable from 12/14/22.  -No retinal tear or detachment seen on exam. Retinal detachment symptoms discussed.     DjnowgoczT32.00  PdgvtjziprlA69.209  IayjpfP30.10  FfhbhgwxjwT03.4  -F/u 4-6 months - refract OU, dilate OU for cataract evaluation OD, check OCT RNFL - would consider repeat Lenstar/Pentacam/OCT macula when patient is ready to move forward with cataract surgery OD.       No history of refractive surgery.   No FH of AMD/glaucoma      Rx 4/20/22:  OD: +1.00  -1.50  x 085 add +2.50    OS: -1.00  -1.00  x 080 add +2.50

## 2023-10-25 ENCOUNTER — OFFICE VISIT (OUTPATIENT)
Dept: OPHTHALMOLOGY | Facility: CLINIC | Age: 73
End: 2023-10-25
Payer: MEDICARE

## 2023-10-25 DIAGNOSIS — H52.203 ASTIGMATISM OF BOTH EYES, UNSPECIFIED TYPE: ICD-10-CM

## 2023-10-25 DIAGNOSIS — Z96.1 PSEUDOPHAKIA: Primary | ICD-10-CM

## 2023-10-25 DIAGNOSIS — H52.01 HYPEROPIA OF RIGHT EYE: ICD-10-CM

## 2023-10-25 DIAGNOSIS — H52.12 MYOPIA OF LEFT EYE: ICD-10-CM

## 2023-10-25 DIAGNOSIS — H47.399 PATHOLOGICAL CUPPING OF OPTIC DISC, UNSPECIFIED LATERALITY: ICD-10-CM

## 2023-10-25 DIAGNOSIS — H43.813 PVD (POSTERIOR VITREOUS DETACHMENT), BOTH EYES: ICD-10-CM

## 2023-10-25 DIAGNOSIS — H25.811 COMBINED FORM OF AGE-RELATED CATARACT, RIGHT EYE: ICD-10-CM

## 2023-10-25 DIAGNOSIS — H52.4 PRESBYOPIA: ICD-10-CM

## 2023-10-25 DIAGNOSIS — H04.123 DRY EYES, BILATERAL: ICD-10-CM

## 2023-10-25 PROCEDURE — 92014 COMPRE OPH EXAM EST PT 1/>: CPT | Performed by: OPHTHALMOLOGY

## 2023-10-25 ASSESSMENT — VISUAL ACUITY
OS_SC: 20/40
METHOD: SNELLEN - LINEAR
OD_SC: 20/60

## 2023-10-25 ASSESSMENT — REFRACTION_MANIFEST
OD_AXIS: 100
OD_CYLINDER: -1.75
OS_AXIS: 080
OS_CYLINDER: -0.50
OD_ADD: +2.50
OS_SPHERE: -0.50
OD_SPHERE: +0.75
OS_ADD: +2.50

## 2023-10-25 ASSESSMENT — ENCOUNTER SYMPTOMS: EYES NEGATIVE: 1

## 2023-10-25 ASSESSMENT — TONOMETRY
OD_IOP_MMHG: 16
IOP_METHOD: GOLDMANN APPLANATION
OS_IOP_MMHG: 15

## 2023-12-22 ENCOUNTER — HOSPITAL ENCOUNTER (OUTPATIENT)
Dept: RADIOLOGY | Facility: EXTERNAL LOCATION | Age: 73
Discharge: HOME | End: 2023-12-22

## 2023-12-22 DIAGNOSIS — M25.562 LEFT KNEE PAIN, UNSPECIFIED CHRONICITY: ICD-10-CM

## 2024-02-26 NOTE — PROGRESS NOTES
Pseudophakia of left eyeZ96.1 - 7/20/23 - Open capsule, sulcus IOL.  -Doing well. Good vision. IOP good. Residual vs rebound iritis. History of stopping prednisolone prematurely.   -Stopped prednisolone acetate 1% on own about 2 weeks ago  -Iritis appears to have resolved, patient without redness or pain. Photophobia has improved.   -Recommend observation. Advised to call if any redness, pain, photophobia or change in vision.   -Continue artificial tears 3-4x/day  -Risk of RD/RT with vitreous prolapse, as well as higher risk of CME. Will refer to retina service as needed. No retinal tear or detachment seen on exam. Retinal detachment symptoms discussed.    -Patient has been seeing a silver disc intermittently temporally x 3-4 weeks  -2-3 weeks ago she fell (knee gave out) and hit her head on gravel driveway. No significant ocular sequelae noted except that intraocular lens (IOL) OS may be slightly decentered inferiorly, but optic is still in visual axis and haptics still appear to be in the sulcus. Intraocular lens (IOL) is not tilted or shifted posteriorly or anteriorly.  -F/u 4-6 months - refract OU, dilate OU for cataract evaluation OD, check OCT RNFL - would consider repeat Lenstar/Pentacam/OCT macula when patient is ready to move forward with cataract surgery OD.     Combined form of age-related cataract, right eyeH25.811  -Patient wishes to defer surgery for now. F/u 4-6 months - refract OU, dilate OU for cataract evaluation OD, check OCT RNFL - would consider repeat Lenstar/Pentacam/OCT macula when patient is ready to move forward with cataract surgery OD.     Dry eyes, dqwympyslV56.123  -Eyes feel dry when air blowing in eyes at work. Currently works at [employer].   -May use warm compresses and aritificial tears PRN    Cup to disc mrtxpugqaG23.399  -No FH of glaucoma  -OCT RNFL (12/14/22) - SS: 7/10 OD and 8/10 OS. WNL OU. 93/91.   -Low suspicion for glaucoma given OCT RNFL WNL. Monitor with annual  dilated exams.     PVD (posterior vitreous detachment), both eyesH43.813  -OCT macula (8/30/23) - SS: 6/10 and 9/10 OS. Normal thickness and contour OU. Intact IS-OS. No edema OU. 238/245. Stable from 12/14/22.  -No retinal tear or detachment seen on exam. Retinal detachment symptoms discussed.     SmbbgnduiF01.00  HyzfjsmnbpbC46.209  GvtksfE52.10  XafozpwiajI41.4  -F/u 4-6 months - refract OU, dilate OU for cataract evaluation OD, check OCT RNFL - would consider repeat Lenstar/Pentacam/OCT macula when patient is ready to move forward with cataract surgery OD.       No history of refractive surgery.   No FH of AMD/glaucoma      Rx 4/20/22:  OD: +1.00  -1.50  x 085 add +2.50    OS: -1.00  -1.00  x 080 add +2.50

## 2024-02-28 ENCOUNTER — APPOINTMENT (OUTPATIENT)
Dept: OPHTHALMOLOGY | Facility: CLINIC | Age: 74
End: 2024-02-28
Payer: MEDICARE

## 2024-03-11 ENCOUNTER — TELEPHONE (OUTPATIENT)
Dept: PRIMARY CARE | Facility: CLINIC | Age: 74
End: 2024-03-11
Payer: MEDICARE

## 2024-03-11 DIAGNOSIS — G25.0 BENIGN ESSENTIAL TREMOR: ICD-10-CM

## 2024-03-11 RX ORDER — BISOPROLOL FUMARATE AND HYDROCHLOROTHIAZIDE 10; 6.25 MG/1; MG/1
2 TABLET ORAL
Qty: 60 TABLET | Refills: 0 | Status: SHIPPED | OUTPATIENT
Start: 2024-03-11 | End: 2024-04-15 | Stop reason: SDUPTHER

## 2024-03-11 NOTE — TELEPHONE ENCOUNTER
Patient needs a short refills (2 weeks) of her Blood pressure medication     She took the last one this morning   (zero left)    Pharmacy : [facility] - [phone]     Next appointment : 03-    Thanks...

## 2024-03-15 ENCOUNTER — OFFICE VISIT (OUTPATIENT)
Dept: PRIMARY CARE | Facility: CLINIC | Age: 74
End: 2024-03-15
Payer: MEDICARE

## 2024-03-15 VITALS
OXYGEN SATURATION: 95 % | WEIGHT: 209 LBS | SYSTOLIC BLOOD PRESSURE: 130 MMHG | TEMPERATURE: 98 F | HEART RATE: 67 BPM | BODY MASS INDEX: 30.96 KG/M2 | DIASTOLIC BLOOD PRESSURE: 80 MMHG | HEIGHT: 69 IN

## 2024-03-15 DIAGNOSIS — R73.9 HYPERGLYCEMIA: ICD-10-CM

## 2024-03-15 DIAGNOSIS — M25.562 CHRONIC PAIN OF BOTH KNEES: ICD-10-CM

## 2024-03-15 DIAGNOSIS — G89.29 CHRONIC BILATERAL LOW BACK PAIN WITHOUT SCIATICA: ICD-10-CM

## 2024-03-15 DIAGNOSIS — M25.561 CHRONIC PAIN OF BOTH KNEES: ICD-10-CM

## 2024-03-15 DIAGNOSIS — S69.92XS INJURY OF LEFT WRIST, SEQUELA: ICD-10-CM

## 2024-03-15 DIAGNOSIS — E78.5 HYPERLIPIDEMIA, UNSPECIFIED HYPERLIPIDEMIA TYPE: ICD-10-CM

## 2024-03-15 DIAGNOSIS — E03.9 HYPOTHYROIDISM, UNSPECIFIED TYPE: ICD-10-CM

## 2024-03-15 DIAGNOSIS — Z12.31 ENCOUNTER FOR SCREENING MAMMOGRAM FOR MALIGNANT NEOPLASM OF BREAST: ICD-10-CM

## 2024-03-15 DIAGNOSIS — R26.89 LOSS OF BALANCE: ICD-10-CM

## 2024-03-15 DIAGNOSIS — M54.50 CHRONIC BILATERAL LOW BACK PAIN WITHOUT SCIATICA: ICD-10-CM

## 2024-03-15 DIAGNOSIS — G89.29 CHRONIC PAIN OF BOTH KNEES: ICD-10-CM

## 2024-03-15 DIAGNOSIS — Z00.00 MEDICARE ANNUAL WELLNESS VISIT, SUBSEQUENT: Primary | ICD-10-CM

## 2024-03-15 DIAGNOSIS — E55.9 VITAMIN D DEFICIENCY: ICD-10-CM

## 2024-03-15 DIAGNOSIS — Z78.0 POSTMENOPAUSAL: ICD-10-CM

## 2024-03-15 DIAGNOSIS — Z00.00 ROUTINE GENERAL MEDICAL EXAMINATION AT HEALTH CARE FACILITY: ICD-10-CM

## 2024-03-15 DIAGNOSIS — I10 BENIGN ESSENTIAL HYPERTENSION: ICD-10-CM

## 2024-03-15 PROCEDURE — 3079F DIAST BP 80-89 MM HG: CPT | Performed by: FAMILY MEDICINE

## 2024-03-15 PROCEDURE — 1158F ADVNC CARE PLAN TLK DOCD: CPT | Performed by: FAMILY MEDICINE

## 2024-03-15 PROCEDURE — 1036F TOBACCO NON-USER: CPT | Performed by: FAMILY MEDICINE

## 2024-03-15 PROCEDURE — 1157F ADVNC CARE PLAN IN RCRD: CPT | Performed by: FAMILY MEDICINE

## 2024-03-15 PROCEDURE — 99397 PER PM REEVAL EST PAT 65+ YR: CPT | Performed by: FAMILY MEDICINE

## 2024-03-15 PROCEDURE — 1170F FXNL STATUS ASSESSED: CPT | Performed by: FAMILY MEDICINE

## 2024-03-15 PROCEDURE — 1123F ACP DISCUSS/DSCN MKR DOCD: CPT | Performed by: FAMILY MEDICINE

## 2024-03-15 PROCEDURE — 3075F SYST BP GE 130 - 139MM HG: CPT | Performed by: FAMILY MEDICINE

## 2024-03-15 PROCEDURE — 99214 OFFICE O/P EST MOD 30 MIN: CPT | Performed by: FAMILY MEDICINE

## 2024-03-15 PROCEDURE — 1160F RVW MEDS BY RX/DR IN RCRD: CPT | Performed by: FAMILY MEDICINE

## 2024-03-15 PROCEDURE — 1126F AMNT PAIN NOTED NONE PRSNT: CPT | Performed by: FAMILY MEDICINE

## 2024-03-15 PROCEDURE — 1159F MED LIST DOCD IN RCRD: CPT | Performed by: FAMILY MEDICINE

## 2024-03-15 PROCEDURE — G0439 PPPS, SUBSEQ VISIT: HCPCS | Performed by: FAMILY MEDICINE

## 2024-03-15 ASSESSMENT — ANXIETY QUESTIONNAIRES
3. WORRYING TOO MUCH ABOUT DIFFERENT THINGS: NOT AT ALL
6. BECOMING EASILY ANNOYED OR IRRITABLE: NOT AT ALL
2. NOT BEING ABLE TO STOP OR CONTROL WORRYING: NOT AT ALL
5. BEING SO RESTLESS THAT IT IS HARD TO SIT STILL: NOT AT ALL
IF YOU CHECKED OFF ANY PROBLEMS ON THIS QUESTIONNAIRE, HOW DIFFICULT HAVE THESE PROBLEMS MADE IT FOR YOU TO DO YOUR WORK, TAKE CARE OF THINGS AT HOME, OR GET ALONG WITH OTHER PEOPLE: NOT DIFFICULT AT ALL
GAD7 TOTAL SCORE: 0
1. FEELING NERVOUS, ANXIOUS, OR ON EDGE: NOT AT ALL
7. FEELING AFRAID AS IF SOMETHING AWFUL MIGHT HAPPEN: NOT AT ALL
4. TROUBLE RELAXING: NOT AT ALL

## 2024-03-15 ASSESSMENT — PATIENT HEALTH QUESTIONNAIRE - PHQ9
10. IF YOU CHECKED OFF ANY PROBLEMS, HOW DIFFICULT HAVE THESE PROBLEMS MADE IT FOR YOU TO DO YOUR WORK, TAKE CARE OF THINGS AT HOME, OR GET ALONG WITH OTHER PEOPLE: NOT DIFFICULT AT ALL
2. FEELING DOWN, DEPRESSED OR HOPELESS: NOT AT ALL
8. MOVING OR SPEAKING SO SLOWLY THAT OTHER PEOPLE COULD HAVE NOTICED. OR THE OPPOSITE, BEING SO FIGETY OR RESTLESS THAT YOU HAVE BEEN MOVING AROUND A LOT MORE THAN USUAL: NOT AT ALL
3. TROUBLE FALLING OR STAYING ASLEEP OR SLEEPING TOO MUCH: MORE THAN HALF THE DAYS
6. FEELING BAD ABOUT YOURSELF - OR THAT YOU ARE A FAILURE OR HAVE LET YOURSELF OR YOUR FAMILY DOWN: NOT AT ALL
SUM OF ALL RESPONSES TO PHQ9 QUESTIONS 1 AND 2: 0
1. LITTLE INTEREST OR PLEASURE IN DOING THINGS: NOT AT ALL
7. TROUBLE CONCENTRATING ON THINGS, SUCH AS READING THE NEWSPAPER OR WATCHING TELEVISION: NOT AT ALL
5. POOR APPETITE OR OVEREATING: NOT AT ALL
SUM OF ALL RESPONSES TO PHQ QUESTIONS 1-9: 3
9. THOUGHTS THAT YOU WOULD BE BETTER OFF DEAD, OR OF HURTING YOURSELF: NOT AT ALL
4. FEELING TIRED OR HAVING LITTLE ENERGY: SEVERAL DAYS

## 2024-03-15 ASSESSMENT — ENCOUNTER SYMPTOMS
LOSS OF SENSATION IN FEET: 1
DEPRESSION: 0
OCCASIONAL FEELINGS OF UNSTEADINESS: 1

## 2024-03-15 ASSESSMENT — ACTIVITIES OF DAILY LIVING (ADL)
MANAGING_FINANCES: INDEPENDENT
BATHING: INDEPENDENT
DRESSING: INDEPENDENT
TAKING_MEDICATION: INDEPENDENT
GROCERY_SHOPPING: INDEPENDENT
DOING_HOUSEWORK: INDEPENDENT

## 2024-03-15 ASSESSMENT — PAIN SCALES - GENERAL: PAINLEVEL: 0-NO PAIN
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
## 2024-03-18 ENCOUNTER — APPOINTMENT (OUTPATIENT)
Dept: RADIOLOGY | Facility: CLINIC | Age: 74
End: 2024-03-18
Payer: MEDICARE

## 2024-03-20 ENCOUNTER — HOSPITAL ENCOUNTER (OUTPATIENT)
Dept: RADIOLOGY | Facility: CLINIC | Age: 74
Discharge: HOME | End: 2024-03-20
Payer: MEDICARE

## 2024-03-20 DIAGNOSIS — Z78.0 POSTMENOPAUSAL: ICD-10-CM

## 2024-03-20 PROCEDURE — 77080 DXA BONE DENSITY AXIAL: CPT

## 2024-03-20 PROCEDURE — 77080 DXA BONE DENSITY AXIAL: CPT | Performed by: RADIOLOGY

## 2024-03-22 ENCOUNTER — HOSPITAL ENCOUNTER (OUTPATIENT)
Dept: RADIOLOGY | Facility: CLINIC | Age: 74
Discharge: HOME | End: 2024-03-22
Payer: MEDICARE

## 2024-03-22 ENCOUNTER — TELEPHONE (OUTPATIENT)
Dept: PRIMARY CARE | Facility: CLINIC | Age: 74
End: 2024-03-22
Payer: MEDICARE

## 2024-03-22 VITALS — BODY MASS INDEX: 30.96 KG/M2 | WEIGHT: 209 LBS | HEIGHT: 69 IN

## 2024-03-22 DIAGNOSIS — Z12.31 ENCOUNTER FOR SCREENING MAMMOGRAM FOR MALIGNANT NEOPLASM OF BREAST: ICD-10-CM

## 2024-03-22 PROCEDURE — 77067 SCR MAMMO BI INCL CAD: CPT | Performed by: RADIOLOGY

## 2024-03-22 PROCEDURE — 77067 SCR MAMMO BI INCL CAD: CPT

## 2024-03-22 PROCEDURE — 77063 BREAST TOMOSYNTHESIS BI: CPT | Performed by: RADIOLOGY

## 2024-03-22 NOTE — TELEPHONE ENCOUNTER
----- Message from Madyson Sandoval DO sent at 3/20/2024  8:46 PM EDT -----  Report to pt her bone density shows osteoporosis, needs to stay on D3 and calcium  can discuss medication for osteoposis as well.  Weight bearing exercise helps also

## 2024-03-27 DIAGNOSIS — R92.8 ABNORMAL MAMMOGRAM OF RIGHT BREAST: Primary | ICD-10-CM

## 2024-03-28 ENCOUNTER — TELEPHONE (OUTPATIENT)
Dept: PRIMARY CARE | Facility: CLINIC | Age: 74
End: 2024-03-28
Payer: MEDICARE

## 2024-03-28 NOTE — TELEPHONE ENCOUNTER
RICHARDM FOR PATIENT WITH HER MAMMOGRAM RESULTS ASKED HER TO CALL TO SCHEDULE MORE VIEWS AND IF SHE HAS ANY QUESTIONS TO PLEASE CALL THE OFFICE.

## 2024-03-28 NOTE — TELEPHONE ENCOUNTER
----- Message from Madyson Sandoval DO sent at 3/27/2024  6:51 PM EDT -----  Report to pt her mammogram has abnormality r breast and radiologist is requesting more views and US to evaluate.  Orders in chart

## 2024-03-29 ENCOUNTER — APPOINTMENT (OUTPATIENT)
Dept: RADIOLOGY | Facility: CLINIC | Age: 74
End: 2024-03-29
Payer: MEDICARE

## 2024-04-06 ASSESSMENT — SLIT LAMP EXAM - LIDS: COMMENTS: GOOD POSITION, DERMATOCHALASIS

## 2024-04-06 ASSESSMENT — EXTERNAL EXAM - RIGHT EYE: OD_EXAM: NORMAL

## 2024-04-06 ASSESSMENT — CUP TO DISC RATIO
OD_RATIO: 0.35
OS_RATIO: 0.45

## 2024-04-06 ASSESSMENT — EXTERNAL EXAM - LEFT EYE: OS_EXAM: NORMAL

## 2024-04-07 NOTE — PROGRESS NOTES
Combined form of age-related cataract, right eyeH25.811  -Visually significant cataract OD. BCVA: 20/60. Symptoms: Gradual worsening of vision over the past few years.  Harder to read very small print. More difficulty seeing small words/numbers on TV. Having more trouble seeing road signs when driving. A change in glasses prescription will not result in significant visual improvement at this time.  Indication/anticipated outcome for cataract surgery: To potentially improve visual acuity and improve quality of life/reduce symptoms. To obtain a better view of the retina/optic nerve. To reduce anisometropia.  Based on a comprehensive eye exam performed 04/10/2024, a visually significant cataract appears to be the source of decreased vision, diminished quality of life, and impairment of activities of daily living. Discussed option of cataract surgery vs observation. Patient can no longer function adequately with current best corrected visual acuity and wishes to have cataract surgery at this time. Discussed surgical procedure with patient. Discussed potential risks, benefits, and complications of cataract surgery including but not limited to pain, bleeding, infection, inflammation, edema, increased eye pressure, retinal tear/detachment, lens dislocation, ptosis, iris damage, need for additional surgery, need for glasses after surgery, loss of vision/loss of eye. Patient understands and wishes to proceed. All questions were answered. Will schedule cataract surgery OD. Lenstar done 12/14/22 and repeated 4/10/24.   -Pentacam (12/14/22) - OD: Regular ATR. 39.1/40.2 @ 179.9. OS: Mildly irregular. 39.5/40.4 @ 162.6.   -Pentacam (4/10/24) - OD: Regular ATR. 39.3/40.2 @ 5.0. OS: Mildly irregular. 39.8/40.3 @ 152.7.   Discussed IOL options (standard monofocal, toric, multifocal). Lens chosen: Standard monofocal. Defer/decline toric/multifocal lens at this time.  Aim: [place] to -0.50. Had thorough discussion with patient re: aim.  Discussed that may potentially need glasses for best vision both at distance and at near. Patient currently keeps glasses on for distance and reading, is not utilizing monovision. Due to patient unstable with walking, recommended similar aim OU to avoid long term anisometropia and loss of depth perception. Patient is amenable to plan. Understands she may need glasses for best vision at distance and near (especially intermediate/near).  Special Considerations: Will plan to place 10-0 vicryl at end of case. Patient with tremor - d/w patient this will likely dampen with anesthesia. No R.   Best contact number: [phone] (home - answering machine)  Drops:   -Ketorolac and Ofloxacin 4x/day starting 1 day prior to surgery; Prednisolone acetate 1% 4x/day starting after surgery    Pseudophakia of left eyeZ96.1 - 7/20/23 - Open capsule, sulcus IOL.  -Doing well. Good vision. IOP good. Residual vs rebound iritis. History of stopping prednisolone prematurely.   -Iritis appears to have resolved, patient without redness or pain. Photophobia has improved.   -Recommend observation. Advised to call if any redness, pain, photophobia or change in vision.   -Continue artificial tears 3-4x/day  -Risk of RD/RT with vitreous prolapse, as well as higher risk of CME. Will refer to retina service as needed. No retinal tear or detachment seen on exam. Retinal detachment symptoms discussed.    -Patient has been seeing a silver disc intermittently temporally x 3-4 weeks  -Oct 2023 - she fell (knee gave out) and hit her head on gravel driveway. No significant ocular sequelae noted except that intraocular lens (IOL) OS may be slightly decentered inferiorly, but optic is still in visual axis and haptics still appear to be in the sulcus. Intraocular lens (IOL) is not tilted or shifted posteriorly or anteriorly.    Dry eyes, orlxiynlaZ21.123  -Eyes feel dry when air blowing in eyes at work. Currently works at [employer].   -May use warm  compresses and aritificial tears PRN    Cup to disc yhrgnaiwrO31.399  -No FH of glaucoma  -OCT RNFL (12/14/22) - SS: 7/10 OD and 8/10 OS. WNL OU. 93/91.   -OCT RNFL (4/10/24) - SS: 3/10 OD and 7/10 OS. OD: Bord S. OS: Thickening. 83/131. Poor images OU. Unreliable.   -Low suspicion for glaucoma given OCT RNFL WNL. Monitor with annual dilated exams.     PVD (posterior vitreous detachment), both eyesH43.813  Epiretinal membrane, left eye  -OCT macula (4/10/24) - SS: 10/10 OS. OD: Unable. OS: Normal thickness and contour. Tr ERM. Intact IS-OS. No edema. 278. ERM OS more apparent compared to 12/14/22.   -No retinal tear or detachment seen on exam. Retinal detachment symptoms discussed.     IuaybrzfgQ93.00  CdwosniiiwtP59.209  DwqjsyC62.10  BvqkefzbmwV30.4  -Defer new Rx. No significant improvement in vision with refraction at this time.       No history of refractive surgery.   No FH of AMD/glaucoma      Rx 4/20/22:  OD: +1.00  -1.50  x 085 add +2.50    OS: -1.00  -1.00  x 080 add +2.50

## 2024-04-10 ENCOUNTER — OFFICE VISIT (OUTPATIENT)
Dept: OPHTHALMOLOGY | Facility: CLINIC | Age: 74
End: 2024-04-10
Payer: MEDICARE

## 2024-04-10 ENCOUNTER — HOSPITAL ENCOUNTER (OUTPATIENT)
Dept: RADIOLOGY | Facility: CLINIC | Age: 74
Discharge: HOME | End: 2024-04-10
Payer: MEDICARE

## 2024-04-10 ENCOUNTER — TELEPHONE (OUTPATIENT)
Dept: PRIMARY CARE | Facility: CLINIC | Age: 74
End: 2024-04-10

## 2024-04-10 DIAGNOSIS — H25.811 COMBINED FORM OF AGE-RELATED CATARACT, RIGHT EYE: ICD-10-CM

## 2024-04-10 DIAGNOSIS — H35.372 EPIRETINAL MEMBRANE, LEFT EYE: ICD-10-CM

## 2024-04-10 DIAGNOSIS — H52.01 HYPEROPIA OF RIGHT EYE: ICD-10-CM

## 2024-04-10 DIAGNOSIS — Z96.1 PSEUDOPHAKIA: Primary | ICD-10-CM

## 2024-04-10 DIAGNOSIS — H52.12 MYOPIA OF LEFT EYE: ICD-10-CM

## 2024-04-10 DIAGNOSIS — R92.8 ABNORMAL MAMMOGRAM OF RIGHT BREAST: ICD-10-CM

## 2024-04-10 DIAGNOSIS — H52.4 PRESBYOPIA: ICD-10-CM

## 2024-04-10 DIAGNOSIS — H47.399 PATHOLOGICAL CUPPING OF OPTIC DISC, UNSPECIFIED LATERALITY: ICD-10-CM

## 2024-04-10 DIAGNOSIS — H43.813 PVD (POSTERIOR VITREOUS DETACHMENT), BOTH EYES: ICD-10-CM

## 2024-04-10 DIAGNOSIS — H04.123 DRY EYES, BILATERAL: ICD-10-CM

## 2024-04-10 DIAGNOSIS — H52.203 ASTIGMATISM OF BOTH EYES, UNSPECIFIED TYPE: ICD-10-CM

## 2024-04-10 PROCEDURE — 1157F ADVNC CARE PLAN IN RCRD: CPT | Performed by: OPHTHALMOLOGY

## 2024-04-10 PROCEDURE — 1159F MED LIST DOCD IN RCRD: CPT | Performed by: OPHTHALMOLOGY

## 2024-04-10 PROCEDURE — 92136 OPHTHALMIC BIOMETRY: CPT | Mod: BILATERAL PROCEDURE | Performed by: OPHTHALMOLOGY

## 2024-04-10 PROCEDURE — 99214 OFFICE O/P EST MOD 30 MIN: CPT | Performed by: OPHTHALMOLOGY

## 2024-04-10 PROCEDURE — 92136 OPHTHALMIC BIOMETRY: CPT | Performed by: OPHTHALMOLOGY

## 2024-04-10 PROCEDURE — 1160F RVW MEDS BY RX/DR IN RCRD: CPT | Performed by: OPHTHALMOLOGY

## 2024-04-10 PROCEDURE — 76642 ULTRASOUND BREAST LIMITED: CPT | Mod: RIGHT SIDE | Performed by: STUDENT IN AN ORGANIZED HEALTH CARE EDUCATION/TRAINING PROGRAM

## 2024-04-10 PROCEDURE — 92134 CPTRZ OPH DX IMG PST SGM RTA: CPT | Performed by: OPHTHALMOLOGY

## 2024-04-10 PROCEDURE — 1123F ACP DISCUSS/DSCN MKR DOCD: CPT | Performed by: OPHTHALMOLOGY

## 2024-04-10 PROCEDURE — 76642 ULTRASOUND BREAST LIMITED: CPT | Mod: RT

## 2024-04-10 RX ORDER — CYCLOPENTOLATE HYDROCHLORIDE 10 MG/ML
1 SOLUTION/ DROPS OPHTHALMIC
Status: CANCELLED | OUTPATIENT
Start: 2024-04-10 | End: 2024-04-10

## 2024-04-10 RX ORDER — TETRACAINE HYDROCHLORIDE 5 MG/ML
1 SOLUTION OPHTHALMIC ONCE
Status: CANCELLED | OUTPATIENT
Start: 2024-04-10 | End: 2024-04-10

## 2024-04-10 RX ORDER — PHENYLEPHRINE HYDROCHLORIDE 100 MG/ML
1 SOLUTION/ DROPS OPHTHALMIC
Status: CANCELLED | OUTPATIENT
Start: 2024-04-10 | End: 2024-04-10

## 2024-04-10 RX ORDER — BETAMETHASONE VALERATE 1 MG/G
CREAM TOPICAL
COMMUNITY
Start: 2023-11-08

## 2024-04-10 RX ORDER — BENZONATATE 200 MG/1
CAPSULE ORAL
COMMUNITY
Start: 2023-12-01 | End: 2024-04-12 | Stop reason: ALTCHOICE

## 2024-04-10 ASSESSMENT — ENCOUNTER SYMPTOMS
ALLERGIC/IMMUNOLOGIC NEGATIVE: 0
CONSTITUTIONAL NEGATIVE: 0
HEMATOLOGIC/LYMPHATIC NEGATIVE: 0
PSYCHIATRIC NEGATIVE: 0
EYES NEGATIVE: 1
GASTROINTESTINAL NEGATIVE: 0
RESPIRATORY NEGATIVE: 0
NEUROLOGICAL NEGATIVE: 0
CARDIOVASCULAR NEGATIVE: 0
ENDOCRINE NEGATIVE: 0
MUSCULOSKELETAL NEGATIVE: 0

## 2024-04-10 ASSESSMENT — REFRACTION_MANIFEST
OS_SPHERE: PLANO
OS_ADD: +2.50
OS_AXIS: 080
OD_AXIS: 100
OD_CYLINDER: -1.75
OD_ADD: +2.50
OD_SPHERE: +0.75
OS_CYLINDER: -0.50

## 2024-04-10 ASSESSMENT — VISUAL ACUITY
METHOD: SNELLEN - LINEAR
OD_SC: 20/100
OS_SC: 20/30

## 2024-04-10 ASSESSMENT — TONOMETRY
IOP_METHOD: GOLDMANN APPLANATION
OS_IOP_MMHG: 13
OD_IOP_MMHG: 13

## 2024-04-10 NOTE — TELEPHONE ENCOUNTER
----- Message from Madyson Sandoval DO sent at 4/10/2024  3:10 PM EDT -----  Report to pt her follow up breast studies are wnl and she can repeat screening mamm in 1 year

## 2024-04-11 PROBLEM — H35.372 EPIRETINAL MEMBRANE, LEFT EYE: Status: ACTIVE | Noted: 2024-04-11

## 2024-04-11 RX ORDER — OFLOXACIN 3 MG/ML
SOLUTION/ DROPS OPHTHALMIC
Qty: 5 ML | Refills: 2 | Status: SHIPPED | OUTPATIENT
Start: 2024-04-11

## 2024-04-11 RX ORDER — PREDNISOLONE ACETATE 10 MG/ML
SUSPENSION/ DROPS OPHTHALMIC
Qty: 5 ML | Refills: 2 | Status: SHIPPED | OUTPATIENT
Start: 2024-04-11

## 2024-04-11 RX ORDER — KETOROLAC TROMETHAMINE 5 MG/ML
SOLUTION OPHTHALMIC
Qty: 5 ML | Refills: 2 | Status: SHIPPED | OUTPATIENT
Start: 2024-04-11

## 2024-04-15 ENCOUNTER — OFFICE VISIT (OUTPATIENT)
Dept: PRIMARY CARE | Facility: CLINIC | Age: 74
End: 2024-04-15
Payer: MEDICARE

## 2024-04-15 VITALS
DIASTOLIC BLOOD PRESSURE: 76 MMHG | SYSTOLIC BLOOD PRESSURE: 136 MMHG | HEIGHT: 69 IN | BODY MASS INDEX: 30.96 KG/M2 | RESPIRATION RATE: 18 BRPM | TEMPERATURE: 97.9 F | HEART RATE: 74 BPM | OXYGEN SATURATION: 93 % | WEIGHT: 209 LBS

## 2024-04-15 DIAGNOSIS — J45.30 MILD PERSISTENT ASTHMA WITHOUT COMPLICATION (HHS-HCC): ICD-10-CM

## 2024-04-15 DIAGNOSIS — E78.5 HYPERLIPIDEMIA, UNSPECIFIED HYPERLIPIDEMIA TYPE: ICD-10-CM

## 2024-04-15 DIAGNOSIS — F32.5 MAJOR DEPRESSION IN REMISSION (CMS-HCC): ICD-10-CM

## 2024-04-15 DIAGNOSIS — G62.9 POLYNEUROPATHY: ICD-10-CM

## 2024-04-15 DIAGNOSIS — E55.9 VITAMIN D DEFICIENCY: ICD-10-CM

## 2024-04-15 DIAGNOSIS — F41.9 ANXIETY: ICD-10-CM

## 2024-04-15 DIAGNOSIS — G25.0 BENIGN ESSENTIAL TREMOR: ICD-10-CM

## 2024-04-15 DIAGNOSIS — E03.9 HYPOTHYROIDISM, UNSPECIFIED TYPE: Primary | ICD-10-CM

## 2024-04-15 DIAGNOSIS — I10 BENIGN ESSENTIAL HYPERTENSION: ICD-10-CM

## 2024-04-15 PROCEDURE — 1123F ACP DISCUSS/DSCN MKR DOCD: CPT | Performed by: FAMILY MEDICINE

## 2024-04-15 PROCEDURE — 1036F TOBACCO NON-USER: CPT | Performed by: FAMILY MEDICINE

## 2024-04-15 PROCEDURE — 3078F DIAST BP <80 MM HG: CPT | Performed by: FAMILY MEDICINE

## 2024-04-15 PROCEDURE — 1126F AMNT PAIN NOTED NONE PRSNT: CPT | Performed by: FAMILY MEDICINE

## 2024-04-15 PROCEDURE — 1159F MED LIST DOCD IN RCRD: CPT | Performed by: FAMILY MEDICINE

## 2024-04-15 PROCEDURE — 1160F RVW MEDS BY RX/DR IN RCRD: CPT | Performed by: FAMILY MEDICINE

## 2024-04-15 PROCEDURE — 1158F ADVNC CARE PLAN TLK DOCD: CPT | Performed by: FAMILY MEDICINE

## 2024-04-15 PROCEDURE — 3075F SYST BP GE 130 - 139MM HG: CPT | Performed by: FAMILY MEDICINE

## 2024-04-15 PROCEDURE — 1157F ADVNC CARE PLAN IN RCRD: CPT | Performed by: FAMILY MEDICINE

## 2024-04-15 PROCEDURE — 99214 OFFICE O/P EST MOD 30 MIN: CPT | Performed by: FAMILY MEDICINE

## 2024-04-15 RX ORDER — GABAPENTIN 600 MG/1
600 TABLET ORAL 3 TIMES DAILY
Qty: 270 TABLET | Refills: 0 | Status: SHIPPED | OUTPATIENT
Start: 2024-04-15

## 2024-04-15 RX ORDER — LEVOTHYROXINE SODIUM 25 UG/1
25 TABLET ORAL DAILY
Qty: 30 TABLET | Refills: 0 | Status: SHIPPED | OUTPATIENT
Start: 2024-04-15

## 2024-04-15 RX ORDER — BISOPROLOL FUMARATE AND HYDROCHLOROTHIAZIDE 10; 6.25 MG/1; MG/1
2 TABLET ORAL
Qty: 180 TABLET | Refills: 1 | Status: SHIPPED | OUTPATIENT
Start: 2024-04-15 | End: 2024-06-03 | Stop reason: SDUPTHER

## 2024-04-15 RX ORDER — BISOPROLOL FUMARATE AND HYDROCHLOROTHIAZIDE 10; 6.25 MG/1; MG/1
2 TABLET ORAL
Qty: 60 TABLET | Refills: 0 | Status: SHIPPED | OUTPATIENT
Start: 2024-04-15 | End: 2024-04-15 | Stop reason: SDUPTHER

## 2024-04-15 RX ORDER — CITALOPRAM 20 MG/1
20 TABLET, FILM COATED ORAL DAILY
Qty: 30 TABLET | Refills: 0 | Status: SHIPPED | OUTPATIENT
Start: 2024-04-15 | End: 2024-04-15 | Stop reason: SDUPTHER

## 2024-04-15 RX ORDER — CITALOPRAM 20 MG/1
20 TABLET, FILM COATED ORAL DAILY
Qty: 90 TABLET | Refills: 1 | Status: SHIPPED | OUTPATIENT
Start: 2024-04-15

## 2024-04-15 ASSESSMENT — PATIENT HEALTH QUESTIONNAIRE - PHQ9
SUM OF ALL RESPONSES TO PHQ9 QUESTIONS 1 AND 2: 0
2. FEELING DOWN, DEPRESSED OR HOPELESS: NOT AT ALL
1. LITTLE INTEREST OR PLEASURE IN DOING THINGS: NOT AT ALL

## 2024-04-15 ASSESSMENT — PAIN SCALES - GENERAL: PAINLEVEL: 0-NO PAIN

## 2024-04-15 NOTE — PROGRESS NOTES
Subjective   Patient ID: Yaritza Bernal is a 73 y.o. female who presents for one month f/up.    HPI   The patient presents to the clinic for a 1-month follow-up to review her labs.. She has past medical history of hypertension, hyperlipidemia, chest pain, sensorineural hearing loss (bilateral), hypothyroidism, vitamin D deficiency, astigmatism, urinary incontinence, anxiety, depression, arthritis (bilateral knees), tremors, polyneuropathy, persistent asthma, eczema, seborrheic keratosis, and heat intolerance.    The patient recalls that she had an ultrasound of the right breast on 04/10/2024 due to concerns with a right breast mass. Imaging revealed that the mass was a benign sebaceous cyst. She was advised to have a repeat mammogram screening in 1 year.    She is scheduled for cataract surgery (right eye) on 04/25/2024.    She did not have labs drawn.  She needs meds refilled so decided to keep this appt.    The patient reports chronic sinus drainage, prior onset several months ago. She has not been taking any medication to treat this condition. She does note that her cat sleeps on her bed (she has mild allergies to cats). She denies any acid reflux since nexium controls it.    The patient reports symptoms of fatigue.    Her blood pressure (136/76) was slightly elevated when checked within the clinic today. She continues on bisprolol-HCTZ 10-6.25 mg (2 times daily) for treatment of hypertension. She denies any side-effects to her bisprolol-HCTZ medication.    She continues on citalopram 20 mg daily for treatment of depression/anxiety. She states that her mood has been good. Her anxiety/depression symptoms have been controlled with medication. She denies any side-effects to citalopram medication.    She also continues on gabapentin 600 mg (3 times daily) for treatment of neuropathy/back pain. She states that gabapentin medication is working well to control her symptoms. She denies any side-effects to her gabapentin  "medication.    She has been taking levothyroxine 25 mcg for treatment of hypothyroidism. She has been tolerating this dose of levothyroxine medication well. She denies any side-effects to levothyroxine medication.    She is off symbicort and now on Trelegy from pulmonology, she states works even better, rare use of rescue mdi    Will have labs drawn tomorrow morning    Review of Systems    Objective   /76   Pulse 74   Temp 36.6 °C (97.9 °F)   Resp 18   Ht 1.753 m (5' 9\")   Wt 94.8 kg (209 lb)   SpO2 93%   BMI 30.86 kg/m²     Physical Exam  HENT:      Head: Normocephalic.      Right Ear: Tympanic membrane normal.      Left Ear: Tympanic membrane normal.      Nose: Nose normal.      Mouth/Throat:      Comments: PND, no erythema  Neck:      Vascular: No carotid bruit.   Cardiovascular:      Rate and Rhythm: Normal rate and regular rhythm.      Pulses: Normal pulses.      Heart sounds: Normal heart sounds.   Pulmonary:      Effort: Pulmonary effort is normal.      Breath sounds: Normal breath sounds.   Musculoskeletal:         General: Normal range of motion.      Cervical back: Normal range of motion.      Right lower leg: No edema.      Left lower leg: No edema.   Lymphadenopathy:      Cervical: No cervical adenopathy.   Skin:     General: Skin is warm and dry.   Neurological:      Mental Status: She is alert and oriented to person, place, and time.   Psychiatric:         Mood and Affect: Mood normal.         Thought Content: Thought content normal.         Judgment: Judgment normal.         Assessment/Plan   Problem List Items Addressed This Visit             ICD-10-CM    Anxiety F41.9    Relevant Medications    citalopram (CeleXA) 20 mg tablet    Benign essential hypertension I10    Benign essential tremor G25.0    Relevant Medications    bisoproloL-hydrochlorothiazide (Ziac) 10-6.25 mg tablet    Hyperlipidemia E78.5    Hypothyroid - Primary E03.9    Relevant Medications    levothyroxine (Synthroid, " Levoxyl) 25 mcg tablet    Major depression in remission ([facility]) F32.5    Mild persistent asthma without complication ([facility]) J45.30    Polyneuropathy G62.9    Relevant Medications    gabapentin (Neurontin) 600 mg tablet    Vitamin D deficiency E55.9          Labs (CBC, CMP, A1C, lipid panel, TSH, vitamin D) were ordered for the patient on 03/15/2024. She intends to complete her labs soon. The clinic will contact the patient upon receiving her lab results.    The patient received refills for current medication (bisprolol-HCTZ 10-6.25 mg, citalopram 20 mg, gabapentin 600 mg, levothyroxine 25 mcg). She was instructed to continue taking medication as previously directed. In regards to her gabapentin prescription, OARRS were reviewed. Risk and benefit ratio was assessed. No suspicious activity was observed.    In regards to concerns with chronic sinus drainage, the patient was informed that she is likely suffering from allergies. The patient was advised to try Flonase Sensimist (2 puffs per nostril in the AM) for treatment of this condition. If her symptoms continue to persist, she was advised to add Xyzal 5 mg (once daily in the PM). Continue monitoring symptoms for   improvement/exacerbation.    In regards to concerns with fatigue, the patient was advised that she will be re-evaluated upon receiving her lab results. Until then, continue monitoring symptoms for improvement/exacerbation.  She is working a lot of hours at [employer] and thinks that may be source of fatigue    Follow up 2-3 months or prn sooner    Scribe Attestation  By signing my name below, I, Faisal Benítez   attest that this documentation has been prepared under the direction and in the presence of Madyson Sandoval DO.

## 2024-04-19 ENCOUNTER — LAB (OUTPATIENT)
Dept: LAB | Facility: LAB | Age: 74
End: 2024-04-19
Payer: MEDICARE

## 2024-04-19 DIAGNOSIS — I10 BENIGN ESSENTIAL HYPERTENSION: ICD-10-CM

## 2024-04-19 DIAGNOSIS — E55.9 VITAMIN D DEFICIENCY: ICD-10-CM

## 2024-04-19 DIAGNOSIS — R73.9 HYPERGLYCEMIA: ICD-10-CM

## 2024-04-19 DIAGNOSIS — E78.5 HYPERLIPIDEMIA, UNSPECIFIED HYPERLIPIDEMIA TYPE: ICD-10-CM

## 2024-04-19 DIAGNOSIS — E03.9 HYPOTHYROIDISM, UNSPECIFIED TYPE: ICD-10-CM

## 2024-04-19 LAB
25(OH)D3 SERPL-MCNC: 38 NG/ML (ref 30–100)
ALBUMIN SERPL BCP-MCNC: 3.9 G/DL (ref 3.4–5)
ALP SERPL-CCNC: 90 U/L (ref 33–136)
ALT SERPL W P-5'-P-CCNC: 10 U/L (ref 7–45)
ANION GAP SERPL CALC-SCNC: 12 MMOL/L (ref 10–20)
AST SERPL W P-5'-P-CCNC: 14 U/L (ref 9–39)
BASOPHILS # BLD AUTO: 0.07 X10*3/UL (ref 0–0.1)
BASOPHILS NFR BLD AUTO: 1.1 %
BILIRUB SERPL-MCNC: 0.4 MG/DL (ref 0–1.2)
BUN SERPL-MCNC: 24 MG/DL (ref 6–23)
CALCIUM SERPL-MCNC: 9.2 MG/DL (ref 8.6–10.6)
CHLORIDE SERPL-SCNC: 104 MMOL/L (ref 98–107)
CHOLEST SERPL-MCNC: 184 MG/DL (ref 0–199)
CHOLESTEROL/HDL RATIO: 3.6
CO2 SERPL-SCNC: 32 MMOL/L (ref 21–32)
CREAT SERPL-MCNC: 0.77 MG/DL (ref 0.5–1.05)
EGFRCR SERPLBLD CKD-EPI 2021: 81 ML/MIN/1.73M*2
EOSINOPHIL # BLD AUTO: 0.46 X10*3/UL (ref 0–0.4)
EOSINOPHIL NFR BLD AUTO: 7.2 %
ERYTHROCYTE [DISTWIDTH] IN BLOOD BY AUTOMATED COUNT: 13.5 % (ref 11.5–14.5)
EST. AVERAGE GLUCOSE BLD GHB EST-MCNC: 120 MG/DL
GLUCOSE SERPL-MCNC: 108 MG/DL (ref 74–99)
HBA1C MFR BLD: 5.8 %
HCT VFR BLD AUTO: 39.6 % (ref 36–46)
HDLC SERPL-MCNC: 51 MG/DL
HGB BLD-MCNC: 12.3 G/DL (ref 12–16)
IMM GRANULOCYTES # BLD AUTO: 0.01 X10*3/UL (ref 0–0.5)
IMM GRANULOCYTES NFR BLD AUTO: 0.2 % (ref 0–0.9)
LDLC SERPL CALC-MCNC: 111 MG/DL
LYMPHOCYTES # BLD AUTO: 2.19 X10*3/UL (ref 0.8–3)
LYMPHOCYTES NFR BLD AUTO: 34.3 %
MCH RBC QN AUTO: 27.2 PG (ref 26–34)
MCHC RBC AUTO-ENTMCNC: 31.1 G/DL (ref 32–36)
MCV RBC AUTO: 87 FL (ref 80–100)
MONOCYTES # BLD AUTO: 0.67 X10*3/UL (ref 0.05–0.8)
MONOCYTES NFR BLD AUTO: 10.5 %
NEUTROPHILS # BLD AUTO: 2.98 X10*3/UL (ref 1.6–5.5)
NEUTROPHILS NFR BLD AUTO: 46.7 %
NON HDL CHOLESTEROL: 133 MG/DL (ref 0–149)
NRBC BLD-RTO: 0 /100 WBCS (ref 0–0)
PLATELET # BLD AUTO: 292 X10*3/UL (ref 150–450)
POTASSIUM SERPL-SCNC: 4.4 MMOL/L (ref 3.5–5.3)
PROT SERPL-MCNC: 6.4 G/DL (ref 6.4–8.2)
RBC # BLD AUTO: 4.53 X10*6/UL (ref 4–5.2)
SODIUM SERPL-SCNC: 144 MMOL/L (ref 136–145)
T4 FREE SERPL-MCNC: 1.03 NG/DL (ref 0.78–1.48)
TRIGL SERPL-MCNC: 111 MG/DL (ref 0–149)
TSH SERPL-ACNC: 5.24 MIU/L (ref 0.44–3.98)
VLDL: 22 MG/DL (ref 0–40)
WBC # BLD AUTO: 6.4 X10*3/UL (ref 4.4–11.3)

## 2024-04-19 PROCEDURE — 80061 LIPID PANEL: CPT

## 2024-04-19 PROCEDURE — 85025 COMPLETE CBC W/AUTO DIFF WBC: CPT

## 2024-04-19 PROCEDURE — 80053 COMPREHEN METABOLIC PANEL: CPT

## 2024-04-19 PROCEDURE — 83036 HEMOGLOBIN GLYCOSYLATED A1C: CPT

## 2024-04-19 PROCEDURE — 82306 VITAMIN D 25 HYDROXY: CPT

## 2024-04-19 PROCEDURE — 84443 ASSAY THYROID STIM HORMONE: CPT

## 2024-04-19 PROCEDURE — 36415 COLL VENOUS BLD VENIPUNCTURE: CPT

## 2024-04-19 PROCEDURE — 84439 ASSAY OF FREE THYROXINE: CPT

## 2024-04-23 ENCOUNTER — ANESTHESIA EVENT (OUTPATIENT)
Dept: OPERATING ROOM | Facility: CLINIC | Age: 74
End: 2024-04-23
Payer: MEDICARE

## 2024-04-25 ENCOUNTER — HOSPITAL ENCOUNTER (OUTPATIENT)
Facility: CLINIC | Age: 74
Setting detail: OUTPATIENT SURGERY
Discharge: HOME | End: 2024-04-25
Attending: OPHTHALMOLOGY | Admitting: OPHTHALMOLOGY
Payer: MEDICARE

## 2024-04-25 ENCOUNTER — ANESTHESIA (OUTPATIENT)
Dept: OPERATING ROOM | Facility: CLINIC | Age: 74
End: 2024-04-25
Payer: MEDICARE

## 2024-04-25 DIAGNOSIS — Z96.1 PSEUDOPHAKIA: Primary | ICD-10-CM

## 2024-04-25 PROCEDURE — 2500000001 HC RX 250 WO HCPCS SELF ADMINISTERED DRUGS (ALT 637 FOR MEDICARE OP): Performed by: OPHTHALMOLOGY

## 2024-04-25 RX ORDER — METOCLOPRAMIDE HYDROCHLORIDE 5 MG/ML
10 INJECTION INTRAMUSCULAR; INTRAVENOUS ONCE AS NEEDED
Status: DISCONTINUED | OUTPATIENT
Start: 2024-04-25 | End: 2024-04-29 | Stop reason: HOSPADM

## 2024-04-25 RX ORDER — ONDANSETRON HYDROCHLORIDE 2 MG/ML
4 INJECTION, SOLUTION INTRAVENOUS ONCE AS NEEDED
Status: DISCONTINUED | OUTPATIENT
Start: 2024-04-25 | End: 2024-04-29 | Stop reason: HOSPADM

## 2024-04-25 RX ORDER — FENTANYL CITRATE 50 UG/ML
25 INJECTION, SOLUTION INTRAMUSCULAR; INTRAVENOUS EVERY 5 MIN PRN
Status: DISCONTINUED | OUTPATIENT
Start: 2024-04-25 | End: 2024-04-29 | Stop reason: HOSPADM

## 2024-04-25 RX ORDER — LIDOCAINE IN NACL,ISO-OSMOT/PF 30 MG/3 ML
0.1 SYRINGE (ML) INJECTION ONCE
Status: DISCONTINUED | OUTPATIENT
Start: 2024-04-25 | End: 2024-04-29 | Stop reason: HOSPADM

## 2024-04-25 RX ORDER — FENTANYL CITRATE 50 UG/ML
50 INJECTION, SOLUTION INTRAMUSCULAR; INTRAVENOUS EVERY 5 MIN PRN
Status: DISCONTINUED | OUTPATIENT
Start: 2024-04-25 | End: 2024-04-29 | Stop reason: HOSPADM

## 2024-04-25 RX ORDER — ACETAMINOPHEN 325 MG/1
650 TABLET ORAL EVERY 4 HOURS PRN
Status: DISCONTINUED | OUTPATIENT
Start: 2024-04-25 | End: 2024-04-29 | Stop reason: HOSPADM

## 2024-04-25 RX ORDER — ALBUTEROL SULFATE 0.83 MG/ML
2.5 SOLUTION RESPIRATORY (INHALATION) ONCE AS NEEDED
Status: DISCONTINUED | OUTPATIENT
Start: 2024-04-25 | End: 2024-04-29 | Stop reason: HOSPADM

## 2024-04-25 RX ORDER — CYCLOPENTOLATE HYDROCHLORIDE 10 MG/ML
1 SOLUTION/ DROPS OPHTHALMIC
Status: COMPLETED | OUTPATIENT
Start: 2024-04-25 | End: 2024-04-25

## 2024-04-25 RX ORDER — PHENYLEPHRINE HYDROCHLORIDE 100 MG/ML
1 SOLUTION/ DROPS OPHTHALMIC
Status: COMPLETED | OUTPATIENT
Start: 2024-04-25 | End: 2024-04-25

## 2024-04-25 RX ORDER — LABETALOL HYDROCHLORIDE 5 MG/ML
5 INJECTION, SOLUTION INTRAVENOUS ONCE AS NEEDED
Status: DISCONTINUED | OUTPATIENT
Start: 2024-04-25 | End: 2024-04-29 | Stop reason: HOSPADM

## 2024-04-25 RX ORDER — TETRACAINE HYDROCHLORIDE 5 MG/ML
1 SOLUTION OPHTHALMIC ONCE
Status: COMPLETED | OUTPATIENT
Start: 2024-04-25 | End: 2024-04-25

## 2024-04-25 RX ADMIN — PHENYLEPHRINE HYDROCHLORIDE 1 DROP: 100 SOLUTION/ DROPS OPHTHALMIC at 11:40

## 2024-04-25 RX ADMIN — CYCLOPENTOLATE HYDROCHLORIDE 1 DROP: 10 SOLUTION/ DROPS OPHTHALMIC at 11:30

## 2024-04-25 RX ADMIN — CYCLOPENTOLATE HYDROCHLORIDE 1 DROP: 10 SOLUTION/ DROPS OPHTHALMIC at 11:25

## 2024-04-25 RX ADMIN — TETRACAINE HYDROCHLORIDE 1 DROP: 5 SOLUTION OPHTHALMIC at 11:25

## 2024-04-25 RX ADMIN — PHENYLEPHRINE HYDROCHLORIDE 1 DROP: 100 SOLUTION/ DROPS OPHTHALMIC at 11:25

## 2024-04-25 RX ADMIN — PHENYLEPHRINE HYDROCHLORIDE 1 DROP: 100 SOLUTION/ DROPS OPHTHALMIC at 11:30

## 2024-04-25 RX ADMIN — CYCLOPENTOLATE HYDROCHLORIDE 1 DROP: 10 SOLUTION/ DROPS OPHTHALMIC at 11:40

## 2024-04-25 ASSESSMENT — COLUMBIA-SUICIDE SEVERITY RATING SCALE - C-SSRS
1. IN THE PAST MONTH, HAVE YOU WISHED YOU WERE DEAD OR WISHED YOU COULD GO TO SLEEP AND NOT WAKE UP?: NO
6. HAVE YOU EVER DONE ANYTHING, STARTED TO DO ANYTHING, OR PREPARED TO DO ANYTHING TO END YOUR LIFE?: NO
2. HAVE YOU ACTUALLY HAD ANY THOUGHTS OF KILLING YOURSELF?: NO

## 2024-04-26 ENCOUNTER — APPOINTMENT (OUTPATIENT)
Dept: OPHTHALMOLOGY | Facility: CLINIC | Age: 74
End: 2024-04-26
Payer: MEDICARE

## 2024-04-29 ENCOUNTER — DOCUMENTATION (OUTPATIENT)
Dept: OPHTHALMOLOGY | Facility: CLINIC | Age: 74
End: 2024-04-29
Payer: MEDICARE

## 2024-04-29 ENCOUNTER — TELEPHONE (OUTPATIENT)
Dept: PRIMARY CARE | Facility: CLINIC | Age: 74
End: 2024-04-29
Payer: MEDICARE

## 2024-04-29 NOTE — TELEPHONE ENCOUNTER
----- Message from Madyson Sandoval DO sent at 4/28/2024  9:15 PM EDT -----  Report to patient her blood count is normal her chemistries are normal except her blood sugar is high and hemoglobin A1c is elevated into prediabetic range.  Her cholesterol is mildly elevated with LDL/bad cholesterol at 111 and ideally should be under 100.  Vitamin D level is in good range and she can stay on same D3 supplement.  TSH for thyroid is abnormal, please find out if she missed doses of medication prior to her lab draw and if not I need to increase her medication.

## 2024-05-01 ENCOUNTER — APPOINTMENT (OUTPATIENT)
Dept: OPHTHALMOLOGY | Facility: CLINIC | Age: 74
End: 2024-05-01
Payer: MEDICARE

## 2024-05-01 NOTE — TELEPHONE ENCOUNTER
LVM FOR PATIENT TO CALL ME BACK TO LET ME KNOW IF SHE MISSED ANY DOSES OR DO WE NEED TO INCREASE DOSE

## 2024-05-08 ENCOUNTER — TELEPHONE (OUTPATIENT)
Dept: PRIMARY CARE | Facility: CLINIC | Age: 74
End: 2024-05-08
Payer: MEDICARE

## 2024-05-23 ENCOUNTER — HOSPITAL ENCOUNTER (EMERGENCY)
Facility: HOSPITAL | Age: 74
Discharge: HOME | End: 2024-05-23
Attending: EMERGENCY MEDICINE
Payer: MEDICARE

## 2024-05-23 ENCOUNTER — APPOINTMENT (OUTPATIENT)
Dept: RADIOLOGY | Facility: HOSPITAL | Age: 74
End: 2024-05-23
Payer: MEDICARE

## 2024-05-23 VITALS
TEMPERATURE: 98.1 F | HEART RATE: 59 BPM | DIASTOLIC BLOOD PRESSURE: 83 MMHG | SYSTOLIC BLOOD PRESSURE: 160 MMHG | WEIGHT: 200 LBS | OXYGEN SATURATION: 97 % | BODY MASS INDEX: 29.53 KG/M2

## 2024-05-23 DIAGNOSIS — I82.812 GREATER SAPHENOUS VEIN EMBOLISM, LEFT: Primary | ICD-10-CM

## 2024-05-23 LAB
ALBUMIN SERPL BCP-MCNC: 3.8 G/DL (ref 3.4–5)
ALP SERPL-CCNC: 86 U/L (ref 33–136)
ALT SERPL W P-5'-P-CCNC: 5 U/L (ref 7–45)
ANION GAP SERPL CALC-SCNC: 14 MMOL/L (ref 10–20)
APTT PPP: 30 SECONDS (ref 27–38)
AST SERPL W P-5'-P-CCNC: 11 U/L (ref 9–39)
BASOPHILS # BLD AUTO: 0.05 X10*3/UL (ref 0–0.1)
BASOPHILS NFR BLD AUTO: 0.8 %
BILIRUB SERPL-MCNC: 0.6 MG/DL (ref 0–1.2)
BUN SERPL-MCNC: 22 MG/DL (ref 6–23)
CALCIUM SERPL-MCNC: 8.5 MG/DL (ref 8.6–10.3)
CHLORIDE SERPL-SCNC: 102 MMOL/L (ref 98–107)
CO2 SERPL-SCNC: 28 MMOL/L (ref 21–32)
CREAT SERPL-MCNC: 0.67 MG/DL (ref 0.5–1.05)
EGFRCR SERPLBLD CKD-EPI 2021: >90 ML/MIN/1.73M*2
EOSINOPHIL # BLD AUTO: 0.23 X10*3/UL (ref 0–0.4)
EOSINOPHIL NFR BLD AUTO: 3.6 %
ERYTHROCYTE [DISTWIDTH] IN BLOOD BY AUTOMATED COUNT: 13.1 % (ref 11.5–14.5)
GLUCOSE SERPL-MCNC: 101 MG/DL (ref 74–99)
HCT VFR BLD AUTO: 38.6 % (ref 36–46)
HGB BLD-MCNC: 12.4 G/DL (ref 12–16)
IMM GRANULOCYTES # BLD AUTO: 0.01 X10*3/UL (ref 0–0.5)
IMM GRANULOCYTES NFR BLD AUTO: 0.2 % (ref 0–0.9)
INR PPP: 1 (ref 0.9–1.1)
LYMPHOCYTES # BLD AUTO: 1.59 X10*3/UL (ref 0.8–3)
LYMPHOCYTES NFR BLD AUTO: 24.9 %
MCH RBC QN AUTO: 27.7 PG (ref 26–34)
MCHC RBC AUTO-ENTMCNC: 32.1 G/DL (ref 32–36)
MCV RBC AUTO: 86 FL (ref 80–100)
MONOCYTES # BLD AUTO: 0.52 X10*3/UL (ref 0.05–0.8)
MONOCYTES NFR BLD AUTO: 8.1 %
NEUTROPHILS # BLD AUTO: 3.99 X10*3/UL (ref 1.6–5.5)
NEUTROPHILS NFR BLD AUTO: 62.4 %
NRBC BLD-RTO: 0 /100 WBCS (ref 0–0)
PLATELET # BLD AUTO: 264 X10*3/UL (ref 150–450)
POTASSIUM SERPL-SCNC: 3.2 MMOL/L (ref 3.5–5.3)
PROT SERPL-MCNC: 6.5 G/DL (ref 6.4–8.2)
PROTHROMBIN TIME: 11.7 SECONDS (ref 9.8–12.8)
RBC # BLD AUTO: 4.48 X10*6/UL (ref 4–5.2)
SODIUM SERPL-SCNC: 141 MMOL/L (ref 136–145)
WBC # BLD AUTO: 6.4 X10*3/UL (ref 4.4–11.3)

## 2024-05-23 PROCEDURE — 85025 COMPLETE CBC W/AUTO DIFF WBC: CPT | Performed by: PHYSICIAN ASSISTANT

## 2024-05-23 PROCEDURE — 85730 THROMBOPLASTIN TIME PARTIAL: CPT | Performed by: PHYSICIAN ASSISTANT

## 2024-05-23 PROCEDURE — 93971 EXTREMITY STUDY: CPT | Performed by: RADIOLOGY

## 2024-05-23 PROCEDURE — 93971 EXTREMITY STUDY: CPT

## 2024-05-23 PROCEDURE — 2500000001 HC RX 250 WO HCPCS SELF ADMINISTERED DRUGS (ALT 637 FOR MEDICARE OP): Performed by: PHYSICIAN ASSISTANT

## 2024-05-23 PROCEDURE — 80053 COMPREHEN METABOLIC PANEL: CPT | Performed by: PHYSICIAN ASSISTANT

## 2024-05-23 PROCEDURE — 99284 EMERGENCY DEPT VISIT MOD MDM: CPT | Mod: 25

## 2024-05-23 PROCEDURE — 85610 PROTHROMBIN TIME: CPT | Performed by: PHYSICIAN ASSISTANT

## 2024-05-23 PROCEDURE — 36415 COLL VENOUS BLD VENIPUNCTURE: CPT | Performed by: PHYSICIAN ASSISTANT

## 2024-05-23 RX ADMIN — APIXABAN 10 MG: 5 TABLET, FILM COATED ORAL at 19:41

## 2024-05-23 ASSESSMENT — COLUMBIA-SUICIDE SEVERITY RATING SCALE - C-SSRS
2. HAVE YOU ACTUALLY HAD ANY THOUGHTS OF KILLING YOURSELF?: NO
1. IN THE PAST MONTH, HAVE YOU WISHED YOU WERE DEAD OR WISHED YOU COULD GO TO SLEEP AND NOT WAKE UP?: NO
6. HAVE YOU EVER DONE ANYTHING, STARTED TO DO ANYTHING, OR PREPARED TO DO ANYTHING TO END YOUR LIFE?: NO

## 2024-05-23 ASSESSMENT — PAIN - FUNCTIONAL ASSESSMENT: PAIN_FUNCTIONAL_ASSESSMENT: 0-10

## 2024-05-23 ASSESSMENT — PAIN SCALES - GENERAL: PAINLEVEL_OUTOF10: 6

## 2024-05-23 NOTE — ED PROVIDER NOTES
HPI   Chief Complaint   Patient presents with    Calf Tenderness       73-year-old female presents with several days of left calf pain now radiating into the medial thigh.  No trauma or injury.  Prior history of femur fracture with titanium anum ORIF.  Had postop DVT was anticoagulated 2 years ago.  No longer on anticoagulants.  No chest pain or shortness of breath.  No pleuritic pain.  No palpitations.      History provided by:  Patient   used: No                        No data recorded                   Patient History   Past Medical History:   Diagnosis Date    Bladder disorder, unspecified 10/21/2015    Bladder disorder    Cataract     Cutaneous abscess, unspecified 03/16/2017    Abscess    Disorder of the skin and subcutaneous tissue, unspecified 07/14/2016    Hand lesion    Dry eyes     DVT (deep venous thrombosis) (Multi)     after femur fx surgery    Encounter for immunization 02/17/2017    Need for Tdap vaccination    Encounter for immunization 10/04/2016    Need for vaccination with 13-polyvalent pneumococcal conjugate vaccine    Hyperlipidemia     Hypertension     Hypothyroidism     Impacted cerumen, bilateral 10/21/2015    Impacted cerumen of both ears    Impaired fasting glucose 09/17/2018    Abnormal fasting glucose    Medial epicondylitis, right elbow 10/04/2016    Medial epicondylitis, right    Other conditions influencing health status 03/16/2017    Cyst    Other specified abnormal findings of blood chemistry 10/04/2018    Abnormal TSH    Other specified abnormal findings of blood chemistry 09/17/2018    Abnormal TSH    Other specified symptoms and signs involving the circulatory and respiratory systems 09/28/2017    Chest congestion    Pain in left hand 09/28/2017    Pain of left hand    Pain in left hip 10/04/2018    Left hip pain    Pain in right hip 11/01/2017    Bilateral hip pain    Pain in right knee 03/13/2017    Right knee pain    Pain in unspecified knee 03/13/2017     Knee pain    Personal history of other diseases of the circulatory system 05/09/2018    History of hypertension    Personal history of other diseases of the circulatory system 06/27/2018    History of hypertension    Personal history of other diseases of the circulatory system 01/18/2018    History of hypertension    Personal history of other diseases of the circulatory system 08/17/2017    History of hypertension    Personal history of other diseases of the nervous system and sense organs 09/17/2018    History of benign essential tremor    Personal history of other diseases of the nervous system and sense organs 10/20/2016    History of acute otitis media    Personal history of other diseases of the respiratory system 03/09/2018    History of acute bacterial sinusitis    Personal history of other drug therapy 05/09/2018    History of pneumococcal vaccination    Personal history of other endocrine, nutritional and metabolic disease 10/30/2017    History of hyperlipidemia    Personal history of other mental and behavioral disorders 07/16/2018    History of anxiety    Personal history of other specified conditions 06/27/2018    History of fatigue    Strain of muscle, fascia and tendon of the posterior muscle group at thigh level, unspecified thigh, initial encounter 01/15/2019    Hamstring strain, initial encounter    Strain of unspecified muscles, fascia and tendons at thigh level, left thigh, initial encounter 05/01/2018    Muscle strain of left thigh    Unspecified injury of unspecified elbow, initial encounter 01/15/2019    Elbow injury, initial encounter     Past Surgical History:   Procedure Laterality Date    BLADDER SURGERY  10/21/2015    Bladder Surgery    BREAST BIOPSY Left     LF benign biopsy, >20 years ago    EXTRACORPOREAL SHOCK WAVE LITHOTRIPSY      FEMUR FRACTURE SURGERY      KNEE SURGERY      VEIN LIGATION AND STRIPPING       Family History   Problem Relation Name Age of Onset    Other (cardiac  disorder) Mother      Other (emphysema lung) Father      Breast cancer Neg Hx       Social History     Tobacco Use    Smoking status: Never    Smokeless tobacco: Never    Tobacco comments:     No history of anesthesia reactions. No family history of MH.     A bronchial infection 3 weeks ago, only a lingering cough.     Does not get SOB with a flight of stairs.     Uses 2 walking sticks post knee surgery.     Is able to lie flat for 30 minutes.    Vaping Use    Vaping status: Never Used   Substance Use Topics    Alcohol use: Not Currently    Drug use: Never       Physical Exam   ED Triage Vitals [05/23/24 1442]   Temperature Heart Rate Resp BP   36.7 °C (98.1 °F) 71 -- 166/90      Pulse Ox Temp src Heart Rate Source Patient Position   95 % -- -- --      BP Location FiO2 (%)     -- --       Physical Exam  Vitals and nursing note reviewed.   Constitutional:       General: She is not in acute distress.     Appearance: Normal appearance. She is obese. She is not ill-appearing, toxic-appearing or diaphoretic.   Cardiovascular:      Rate and Rhythm: Normal rate.   Pulmonary:      Effort: Pulmonary effort is normal.   Musculoskeletal:         General: Normal range of motion.      Comments: Left lower extremity with chronic swelling unchanged per patient.  Mild TTP posterior calf and medial thigh.  No palpable cords.   Skin:     General: Skin is warm and dry.      Capillary Refill: Capillary refill takes less than 2 seconds.   Neurological:      General: No focal deficit present.      Mental Status: She is alert and oriented to person, place, and time.   Psychiatric:         Mood and Affect: Mood normal.         Behavior: Behavior normal.         Thought Content: Thought content normal.         Judgment: Judgment normal.         ED Course & MDM   Diagnoses as of 05/23/24 1913   Greater saphenous vein embolism, left     Lower extremity venous duplex left   Final Result   Thrombosis of the course of left greater saphenous vein  which is   believed to be both acute and chronic.        The deep venous system is patent.        MACRO:   None        Signed by: Ivan Lyon 5/23/2024 4:57 PM   Dictation workstation:   [id number]         Labs Reviewed   COMPREHENSIVE METABOLIC PANEL - Abnormal       Result Value    Glucose 101 (*)     Sodium 141      Potassium 3.2 (*)     Chloride 102      Bicarbonate 28      Anion Gap 14      Urea Nitrogen 22      Creatinine 0.67      eGFR >90      Calcium 8.5 (*)     Albumin 3.8      Alkaline Phosphatase 86      Total Protein 6.5      AST 11      Bilirubin, Total 0.6      ALT 5 (*)    APTT - Normal    aPTT 30      Narrative:     The APTT is no longer used for monitoring Unfractionated Heparin Therapy. For monitoring Heparin Therapy, use the Heparin Assay.   PROTIME-INR - Normal    Protime 11.7      INR 1.0     CBC WITH AUTO DIFFERENTIAL    WBC 6.4      nRBC 0.0      RBC 4.48      Hemoglobin 12.4      Hematocrit 38.6      MCV 86      MCH 27.7      MCHC 32.1      RDW 13.1      Platelets 264      Neutrophils % 62.4      Immature Granulocytes %, Automated 0.2      Lymphocytes % 24.9      Monocytes % 8.1      Eosinophils % 3.6      Basophils % 0.8      Neutrophils Absolute 3.99      Immature Granulocytes Absolute, Automated 0.01      Lymphocytes Absolute 1.59      Monocytes Absolute 0.52      Eosinophils Absolute 0.23      Basophils Absolute 0.05        Medical Decision Making  History of DVT left lower extremity after orthopedic surgery.  No longer on anticoagulants.  Complaining of pain left calf and medial thigh area.  Will rule out DVT.  Ultrasound: Report reviewed.  EMR  Labs: Per EMR    The patient has also been seen and evaluated by the ER physician.  Will reevaluate and consider outpatient anticoagulation.  1905: Prescribe Eliquis for outpatient management.        Amount and/or Complexity of Data Reviewed  Labs: ordered. Decision-making details documented in ED Course.     Details: As above  Radiology:  ordered. Decision-making details documented in ED Course.     Details: As above    Risk  Prescription drug management.        Procedure  Procedures     Jeramy Reed PA-C  05/23/24 1918

## 2024-05-28 ENCOUNTER — TELEPHONE (OUTPATIENT)
Dept: PRIMARY CARE | Facility: CLINIC | Age: 74
End: 2024-05-28
Payer: MEDICARE

## 2024-05-28 NOTE — TELEPHONE ENCOUNTER
PATIENT CALLED WAS IN ER SHE WILL NEED TO GET ASAP WITH YOU FOR F/UP SHE HAS A BLOOD CLOT AND SHE WILL RUN OUT OF HER ELIQUIS .  PLEASE ADVISE WHERE TO ADD HER.

## 2024-05-31 ENCOUNTER — PREP FOR PROCEDURE (OUTPATIENT)
Dept: OPHTHALMOLOGY | Facility: CLINIC | Age: 74
End: 2024-05-31
Payer: MEDICARE

## 2024-05-31 DIAGNOSIS — H25.811 COMBINED FORM OF AGE-RELATED CATARACT, RIGHT EYE: Primary | ICD-10-CM

## 2024-05-31 RX ORDER — PHENYLEPHRINE HYDROCHLORIDE 100 MG/ML
1 SOLUTION/ DROPS OPHTHALMIC
Status: CANCELLED | OUTPATIENT
Start: 2024-05-31 | End: 2024-05-31

## 2024-05-31 RX ORDER — TETRACAINE HYDROCHLORIDE 5 MG/ML
1 SOLUTION OPHTHALMIC ONCE
Status: CANCELLED | OUTPATIENT
Start: 2024-05-31 | End: 2024-05-31

## 2024-05-31 RX ORDER — CYCLOPENTOLATE HYDROCHLORIDE 10 MG/ML
1 SOLUTION/ DROPS OPHTHALMIC
Status: CANCELLED | OUTPATIENT
Start: 2024-05-31 | End: 2024-05-31

## 2024-06-03 ENCOUNTER — OFFICE VISIT (OUTPATIENT)
Dept: PRIMARY CARE | Facility: CLINIC | Age: 74
End: 2024-06-03
Payer: MEDICARE

## 2024-06-03 VITALS
TEMPERATURE: 98.3 F | WEIGHT: 206 LBS | RESPIRATION RATE: 18 BRPM | DIASTOLIC BLOOD PRESSURE: 65 MMHG | SYSTOLIC BLOOD PRESSURE: 106 MMHG | OXYGEN SATURATION: 92 % | HEIGHT: 70 IN | HEART RATE: 72 BPM | BODY MASS INDEX: 29.49 KG/M2

## 2024-06-03 DIAGNOSIS — E78.5 HYPERLIPIDEMIA, UNSPECIFIED HYPERLIPIDEMIA TYPE: ICD-10-CM

## 2024-06-03 DIAGNOSIS — G25.0 BENIGN ESSENTIAL TREMOR: ICD-10-CM

## 2024-06-03 DIAGNOSIS — Z79.899 LONG TERM USE OF DRUG: ICD-10-CM

## 2024-06-03 DIAGNOSIS — I10 BENIGN ESSENTIAL HYPERTENSION: ICD-10-CM

## 2024-06-03 DIAGNOSIS — I82.402 ACUTE DEEP VEIN THROMBOSIS (DVT) OF LEFT LOWER EXTREMITY, UNSPECIFIED VEIN (MULTI): Primary | ICD-10-CM

## 2024-06-03 DIAGNOSIS — G62.9 NEUROPATHY: ICD-10-CM

## 2024-06-03 DIAGNOSIS — E03.9 HYPOTHYROIDISM, UNSPECIFIED TYPE: ICD-10-CM

## 2024-06-03 DIAGNOSIS — I82.812 GREATER SAPHENOUS VEIN EMBOLISM, LEFT: ICD-10-CM

## 2024-06-03 PROCEDURE — 80307 DRUG TEST PRSMV CHEM ANLYZR: CPT

## 2024-06-03 PROCEDURE — 3078F DIAST BP <80 MM HG: CPT | Performed by: FAMILY MEDICINE

## 2024-06-03 PROCEDURE — 3074F SYST BP LT 130 MM HG: CPT | Performed by: FAMILY MEDICINE

## 2024-06-03 PROCEDURE — 99214 OFFICE O/P EST MOD 30 MIN: CPT | Performed by: FAMILY MEDICINE

## 2024-06-03 PROCEDURE — 1157F ADVNC CARE PLAN IN RCRD: CPT | Performed by: FAMILY MEDICINE

## 2024-06-03 PROCEDURE — 1159F MED LIST DOCD IN RCRD: CPT | Performed by: FAMILY MEDICINE

## 2024-06-03 PROCEDURE — 1125F AMNT PAIN NOTED PAIN PRSNT: CPT | Performed by: FAMILY MEDICINE

## 2024-06-03 PROCEDURE — 1123F ACP DISCUSS/DSCN MKR DOCD: CPT | Performed by: FAMILY MEDICINE

## 2024-06-03 PROCEDURE — 1036F TOBACCO NON-USER: CPT | Performed by: FAMILY MEDICINE

## 2024-06-03 RX ORDER — BISOPROLOL FUMARATE AND HYDROCHLOROTHIAZIDE 10; 6.25 MG/1; MG/1
2 TABLET ORAL
Qty: 180 TABLET | Refills: 1 | Status: SHIPPED | OUTPATIENT
Start: 2024-06-03

## 2024-06-03 ASSESSMENT — PAIN SCALES - GENERAL: PAINLEVEL: 2

## 2024-06-04 LAB
AMPHETAMINES UR QL SCN: NORMAL
BARBITURATES UR QL SCN: NORMAL
BENZODIAZ UR QL SCN: NORMAL
BZE UR QL SCN: NORMAL
CANNABINOIDS UR QL SCN: NORMAL
FENTANYL+NORFENTANYL UR QL SCN: NORMAL
METHADONE UR QL SCN: NORMAL
OPIATES UR QL SCN: NORMAL
OXYCODONE+OXYMORPHONE UR QL SCN: NORMAL
PCP UR QL SCN: NORMAL

## 2024-06-05 ENCOUNTER — ANESTHESIA EVENT (OUTPATIENT)
Dept: OPERATING ROOM | Facility: CLINIC | Age: 74
End: 2024-06-05
Payer: MEDICARE

## 2024-06-05 RX ORDER — LIDOCAINE IN NACL,ISO-OSMOT/PF 30 MG/3 ML
0.1 SYRINGE (ML) INJECTION ONCE
Status: CANCELLED | OUTPATIENT
Start: 2024-06-05 | End: 2024-06-05

## 2024-06-05 RX ORDER — ONDANSETRON HYDROCHLORIDE 2 MG/ML
4 INJECTION, SOLUTION INTRAVENOUS ONCE AS NEEDED
Status: CANCELLED | OUTPATIENT
Start: 2024-06-05

## 2024-06-05 RX ORDER — SODIUM CHLORIDE, SODIUM LACTATE, POTASSIUM CHLORIDE, CALCIUM CHLORIDE 600; 310; 30; 20 MG/100ML; MG/100ML; MG/100ML; MG/100ML
100 INJECTION, SOLUTION INTRAVENOUS CONTINUOUS
Status: CANCELLED | OUTPATIENT
Start: 2024-06-05

## 2024-06-06 ENCOUNTER — HOSPITAL ENCOUNTER (OUTPATIENT)
Facility: CLINIC | Age: 74
Setting detail: OUTPATIENT SURGERY
Discharge: HOME | End: 2024-06-06
Attending: OPHTHALMOLOGY | Admitting: OPHTHALMOLOGY
Payer: MEDICARE

## 2024-06-06 ENCOUNTER — ANESTHESIA (OUTPATIENT)
Dept: OPERATING ROOM | Facility: CLINIC | Age: 74
End: 2024-06-06
Payer: MEDICARE

## 2024-06-06 VITALS
HEIGHT: 70 IN | RESPIRATION RATE: 16 BRPM | SYSTOLIC BLOOD PRESSURE: 147 MMHG | TEMPERATURE: 97.3 F | OXYGEN SATURATION: 96 % | DIASTOLIC BLOOD PRESSURE: 73 MMHG | BODY MASS INDEX: 30.02 KG/M2 | WEIGHT: 209.66 LBS | HEART RATE: 61 BPM

## 2024-06-06 DIAGNOSIS — H25.811 COMBINED FORM OF AGE-RELATED CATARACT, RIGHT EYE: Primary | ICD-10-CM

## 2024-06-06 DIAGNOSIS — R26.89 LOSS OF BALANCE: ICD-10-CM

## 2024-06-06 LAB — GABAPENTIN UR-MCNC: >500 UG/ML

## 2024-06-06 PROCEDURE — 3700000002 HC GENERAL ANESTHESIA TIME - EACH INCREMENTAL 1 MINUTE: Performed by: OPHTHALMOLOGY

## 2024-06-06 PROCEDURE — 3600000003 HC OR TIME - INITIAL BASE CHARGE - PROCEDURE LEVEL THREE: Performed by: OPHTHALMOLOGY

## 2024-06-06 PROCEDURE — 7100000010 HC PHASE TWO TIME - EACH INCREMENTAL 1 MINUTE: Performed by: OPHTHALMOLOGY

## 2024-06-06 PROCEDURE — 2500000001 HC RX 250 WO HCPCS SELF ADMINISTERED DRUGS (ALT 637 FOR MEDICARE OP): Performed by: OPHTHALMOLOGY

## 2024-06-06 PROCEDURE — 66984 XCAPSL CTRC RMVL W/O ECP: CPT | Performed by: OPHTHALMOLOGY

## 2024-06-06 PROCEDURE — 3700000001 HC GENERAL ANESTHESIA TIME - INITIAL BASE CHARGE: Performed by: OPHTHALMOLOGY

## 2024-06-06 PROCEDURE — 7100000009 HC PHASE TWO TIME - INITIAL BASE CHARGE: Performed by: OPHTHALMOLOGY

## 2024-06-06 PROCEDURE — 2500000005 HC RX 250 GENERAL PHARMACY W/O HCPCS: Performed by: OPHTHALMOLOGY

## 2024-06-06 PROCEDURE — C1780 LENS, INTRAOCULAR (NEW TECH): HCPCS | Performed by: OPHTHALMOLOGY

## 2024-06-06 PROCEDURE — 2500000004 HC RX 250 GENERAL PHARMACY W/ HCPCS (ALT 636 FOR OP/ED): Performed by: OPHTHALMOLOGY

## 2024-06-06 PROCEDURE — 2500000004 HC RX 250 GENERAL PHARMACY W/ HCPCS (ALT 636 FOR OP/ED)

## 2024-06-06 PROCEDURE — 3600000008 HC OR TIME - EACH INCREMENTAL 1 MINUTE - PROCEDURE LEVEL THREE: Performed by: OPHTHALMOLOGY

## 2024-06-06 DEVICE — STERILE UV AND BLUE LIGHT FILTERING ACRYLIC FOLDABLE SINGLE-PIECE POSTERIOR CHAMBER LENSES WITH THE ULTRASERT® PRE-LOADED DELIVERY SYSTEM
Type: IMPLANTABLE DEVICE | Site: EYE | Status: FUNCTIONAL
Brand: ACRYSOF® ULTRASERT®

## 2024-06-06 RX ORDER — PHENYLEPHRINE HYDROCHLORIDE 100 MG/ML
1 SOLUTION/ DROPS OPHTHALMIC
Status: COMPLETED | OUTPATIENT
Start: 2024-06-06 | End: 2024-06-06

## 2024-06-06 RX ORDER — POVIDONE-IODINE 5 %
SOLUTION, NON-ORAL OPHTHALMIC (EYE) AS NEEDED
Status: DISCONTINUED | OUTPATIENT
Start: 2024-06-06 | End: 2024-06-06 | Stop reason: HOSPADM

## 2024-06-06 RX ORDER — TETRACAINE HYDROCHLORIDE 5 MG/ML
SOLUTION OPHTHALMIC AS NEEDED
Status: DISCONTINUED | OUTPATIENT
Start: 2024-06-06 | End: 2024-06-06 | Stop reason: HOSPADM

## 2024-06-06 RX ORDER — SODIUM CHLORIDE, SODIUM LACTATE, POTASSIUM CHLORIDE, CALCIUM CHLORIDE 600; 310; 30; 20 MG/100ML; MG/100ML; MG/100ML; MG/100ML
100 INJECTION, SOLUTION INTRAVENOUS CONTINUOUS
Status: DISCONTINUED | OUTPATIENT
Start: 2024-06-06 | End: 2024-06-06 | Stop reason: HOSPADM

## 2024-06-06 RX ORDER — TETRACAINE HYDROCHLORIDE 5 MG/ML
1 SOLUTION OPHTHALMIC ONCE
Status: COMPLETED | OUTPATIENT
Start: 2024-06-06 | End: 2024-06-06

## 2024-06-06 RX ORDER — PREDNISOLONE ACETATE 10 MG/ML
1 SUSPENSION/ DROPS OPHTHALMIC 4 TIMES DAILY
Qty: 5 ML | Refills: 1 | Status: SHIPPED | OUTPATIENT
Start: 2024-06-06 | End: 2024-07-06

## 2024-06-06 RX ORDER — FENTANYL CITRATE 50 UG/ML
INJECTION, SOLUTION INTRAMUSCULAR; INTRAVENOUS AS NEEDED
Status: DISCONTINUED | OUTPATIENT
Start: 2024-06-06 | End: 2024-06-06

## 2024-06-06 RX ORDER — CYCLOPENTOLATE HYDROCHLORIDE 10 MG/ML
1 SOLUTION/ DROPS OPHTHALMIC
Status: COMPLETED | OUTPATIENT
Start: 2024-06-06 | End: 2024-06-06

## 2024-06-06 RX ORDER — LIDOCAINE HYDROCHLORIDE 10 MG/ML
INJECTION, SOLUTION EPIDURAL; INFILTRATION; INTRACAUDAL; PERINEURAL AS NEEDED
Status: DISCONTINUED | OUTPATIENT
Start: 2024-06-06 | End: 2024-06-06 | Stop reason: HOSPADM

## 2024-06-06 RX ORDER — MIDAZOLAM HYDROCHLORIDE 1 MG/ML
INJECTION, SOLUTION INTRAMUSCULAR; INTRAVENOUS AS NEEDED
Status: DISCONTINUED | OUTPATIENT
Start: 2024-06-06 | End: 2024-06-06

## 2024-06-06 RX ORDER — EPINEPHRINE 1 MG/ML
INJECTION, SOLUTION, CONCENTRATE INTRAVENOUS AS NEEDED
Status: DISCONTINUED | OUTPATIENT
Start: 2024-06-06 | End: 2024-06-06 | Stop reason: HOSPADM

## 2024-06-06 RX ADMIN — PHENYLEPHRINE HYDROCHLORIDE 1 DROP: 100 SOLUTION/ DROPS OPHTHALMIC at 10:55

## 2024-06-06 RX ADMIN — CYCLOPENTOLATE HYDROCHLORIDE 1 DROP: 10 SOLUTION/ DROPS OPHTHALMIC at 10:50

## 2024-06-06 RX ADMIN — FENTANYL CITRATE 50 MCG: 50 INJECTION, SOLUTION INTRAMUSCULAR; INTRAVENOUS at 12:17

## 2024-06-06 RX ADMIN — TETRACAINE HYDROCHLORIDE 1 DROP: 5 SOLUTION OPHTHALMIC at 10:45

## 2024-06-06 RX ADMIN — MIDAZOLAM 2 MG: 1 INJECTION INTRAMUSCULAR; INTRAVENOUS at 12:02

## 2024-06-06 RX ADMIN — FENTANYL CITRATE 50 MCG: 50 INJECTION, SOLUTION INTRAMUSCULAR; INTRAVENOUS at 12:05

## 2024-06-06 RX ADMIN — PHENYLEPHRINE HYDROCHLORIDE 1 DROP: 100 SOLUTION/ DROPS OPHTHALMIC at 10:45

## 2024-06-06 RX ADMIN — PHENYLEPHRINE HYDROCHLORIDE 1 DROP: 100 SOLUTION/ DROPS OPHTHALMIC at 10:50

## 2024-06-06 RX ADMIN — CYCLOPENTOLATE HYDROCHLORIDE 1 DROP: 10 SOLUTION/ DROPS OPHTHALMIC at 10:55

## 2024-06-06 RX ADMIN — CYCLOPENTOLATE HYDROCHLORIDE 1 DROP: 10 SOLUTION/ DROPS OPHTHALMIC at 10:45

## 2024-06-06 SDOH — HEALTH STABILITY: MENTAL HEALTH: CURRENT SMOKER: 0

## 2024-06-06 ASSESSMENT — PAIN - FUNCTIONAL ASSESSMENT
PAIN_FUNCTIONAL_ASSESSMENT: 0-10
PAIN_FUNCTIONAL_ASSESSMENT: 0-10

## 2024-06-06 ASSESSMENT — ENCOUNTER SYMPTOMS
RESPIRATORY NEGATIVE: 1
GASTROINTESTINAL NEGATIVE: 1
CONSTITUTIONAL NEGATIVE: 1
CARDIOVASCULAR NEGATIVE: 1
PSYCHIATRIC NEGATIVE: 1

## 2024-06-06 ASSESSMENT — COLUMBIA-SUICIDE SEVERITY RATING SCALE - C-SSRS
2. HAVE YOU ACTUALLY HAD ANY THOUGHTS OF KILLING YOURSELF?: NO
6. HAVE YOU EVER DONE ANYTHING, STARTED TO DO ANYTHING, OR PREPARED TO DO ANYTHING TO END YOUR LIFE?: NO
1. IN THE PAST MONTH, HAVE YOU WISHED YOU WERE DEAD OR WISHED YOU COULD GO TO SLEEP AND NOT WAKE UP?: NO

## 2024-06-06 ASSESSMENT — PAIN SCALES - GENERAL
PAINLEVEL_OUTOF10: 4
PAINLEVEL_OUTOF10: 0 - NO PAIN
PAINLEVEL_OUTOF10: 0 - NO PAIN

## 2024-06-06 NOTE — ANESTHESIA PREPROCEDURE EVALUATION
Patient: Yaritza Bernal    Procedure Information       Date/Time: 06/06/24 1200    Procedure: Phacoemulsification Cataract with Insertion Intraocular Lens (Right: Eye)    Location: [facility] [place] [facility] OR 03 / [facility] [place] [facility]    Surgeons: Celina Carter MD          Vitals:    06/06/24 1044   BP: 177/79   Pulse: 59   Resp: 16   Temp: 36.2 °C (97.2 °F)   SpO2: 97%       Past Surgical History:   Procedure Laterality Date   • BLADDER SURGERY  10/21/2015    Bladder Surgery   • BREAST BIOPSY Left     LF benign biopsy, >20 years ago   • EXTRACORPOREAL SHOCK WAVE LITHOTRIPSY     • FEMUR FRACTURE SURGERY     • KNEE SURGERY     • VEIN LIGATION AND STRIPPING       Past Medical History:   Diagnosis Date   • Bladder disorder, unspecified 10/21/2015    Bladder disorder   • Cataract    • Clotting disorder (Multi)    • Cutaneous abscess, unspecified 03/16/2017    Abscess   • Disorder of the skin and subcutaneous tissue, unspecified 07/14/2016    Hand lesion   • Dry eyes    • DVT (deep venous thrombosis) (Multi)     after femur fx surgery   • Encounter for immunization 02/17/2017    Need for Tdap vaccination   • Encounter for immunization 10/04/2016    Need for vaccination with 13-polyvalent pneumococcal conjugate vaccine   • GERD (gastroesophageal reflux disease)    • HL (hearing loss)    • Hyperlipidemia    • Hypertension    • Hypothyroidism    • Impacted cerumen, bilateral 10/21/2015    Impacted cerumen of both ears   • Impaired fasting glucose 09/17/2018    Abnormal fasting glucose   • Irritable bowel syndrome    • Medial epicondylitis, right elbow 10/04/2016    Medial epicondylitis, right   • Nephrolithiasis    • Other conditions influencing health status 03/16/2017    Cyst   • Other specified abnormal findings of blood chemistry 10/04/2018    Abnormal TSH   • Other specified abnormal findings of blood chemistry 09/17/2018    Abnormal TSH   • Other specified symptoms and signs involving the circulatory and respiratory systems  09/28/2017    Chest congestion   • Pain in left hand 09/28/2017    Pain of left hand   • Pain in left hip 10/04/2018    Left hip pain   • Pain in right hip 11/01/2017    Bilateral hip pain   • Pain in right knee 03/13/2017    Right knee pain   • Pain in unspecified knee 03/13/2017    Knee pain   • Personal history of other diseases of the circulatory system 05/09/2018    History of hypertension   • Personal history of other diseases of the circulatory system 06/27/2018    History of hypertension   • Personal history of other diseases of the circulatory system 01/18/2018    History of hypertension   • Personal history of other diseases of the circulatory system 08/17/2017    History of hypertension   • Personal history of other diseases of the nervous system and sense organs 09/17/2018    History of benign essential tremor   • Personal history of other diseases of the nervous system and sense organs 10/20/2016    History of acute otitis media   • Personal history of other diseases of the respiratory system 03/09/2018    History of acute bacterial sinusitis   • Personal history of other drug therapy 05/09/2018    History of pneumococcal vaccination   • Personal history of other endocrine, nutritional and metabolic disease 10/30/2017    History of hyperlipidemia   • Personal history of other mental and behavioral disorders 07/16/2018    History of anxiety   • Personal history of other specified conditions 06/27/2018    History of fatigue   • Strain of muscle, fascia and tendon of the posterior muscle group at thigh level, unspecified thigh, initial encounter 01/15/2019    Hamstring strain, initial encounter   • Strain of unspecified muscles, fascia and tendons at thigh level, left thigh, initial encounter 05/01/2018    Muscle strain of left thigh   • Unspecified injury of unspecified elbow, initial encounter 01/15/2019    Elbow injury, initial encounter       Current Facility-Administered Medications:   •  lactated  Ringer's infusion, 100 mL/hr, intravenous, Continuous, Hanane Gorman MD  •  povidone-iodine 5 % ophthalmic solution, , , PRN, Celina Carter MD, 1 Application at 06/06/24 1207  •  tetracaine (PF) 0.5 % ophthalmic solution, , , PRN, Celina Carter MD, 2 drop at 06/06/24 1207    Facility-Administered Medications Ordered in Other Encounters:   •  midazolam (Versed) injection, , intravenous, PRN, NATHAN Mackey, 2 mg at 06/06/24 1202  Prior to Admission medications    Medication Sig Start Date End Date Taking? Authorizing Provider   apixaban (Eliquis) 5 mg tablet Take 1 tablet (5 mg) by mouth 2 times a day. 6/3/24  Yes Madyson Sandoval DO   bisoproloL-hydrochlorothiazide (Ziac) 10-6.25 mg tablet Take 2 tablets by mouth once daily with breakfast. 6/3/24  Yes Madyson Sandoval DO   citalopram (CeleXA) 20 mg tablet Take 1 tablet (20 mg) by mouth once daily. 4/15/24  Yes Madyson Sandoval DO   esomeprazole (NexIUM) 20 mg DR capsule Take 1 capsule (20 mg) by mouth once daily.   Yes Historical Provider, MD   fluticasone (Flonase) 50 mcg/actuation nasal spray Administer into affected nostril(s). 8/23/21  Yes Historical Provider, MD   gabapentin (Neurontin) 600 mg tablet Take 1 tablet (600 mg) by mouth 3 times a day. 4/15/24  Yes Madyson Sandoval DO   ketorolac (Acular) 0.5 % ophthalmic solution 1 drop to surgical eye 4 times a day starting 1 day before surgery 4/11/24  Yes Celina Carter MD   levothyroxine (Synthroid, Levoxyl) 25 mcg tablet Take 1 tablet (25 mcg) by mouth once daily. 4/15/24  Yes Madyson Sandoval DO   mv-mn-iron-FA-Ca carb-vit K (Women's Multivitamin) 18 mg-400 mcg- 500 mg-50 mcg tablet Take by mouth. 9/1/16  Yes Historical Provider, MD   ofloxacin (Ocuflox) 0.3 % ophthalmic solution 1 drop to operative eye 4 times a day starting 1 day before surgery 4/11/24  Yes MD Violette Escalante Ellipta 100-62.5-25 mcg blister with  device  3/30/23  Yes Historical Provider, MD   apixaban (Eliquis) 5 mg (74 tabs) tablet Take 2 tablets (10 mg) by mouth 2 times a day for 7 days, then take 1 tablet (5 mg) by mouth 2 times a day. 5/23/24 6/3/24 Yes Jeramy Reed PA-C   bisoproloL-hydrochlorothiazide (Ziac) 10-6.25 mg tablet Take 2 tablets by mouth once daily with breakfast. 4/15/24 6/3/24 Yes Madyson Sandoval DO   betamethasone valerate (Valisone) 0.1 % cream Apply twice daily to rash on legs 11/8/23   Historical Provider, MD   prednisoLONE acetate (Pred-Forte) 1 % ophthalmic suspension 1 drop to operative eye 4 times a day starting the day of surgery (after surgery is done)  Patient not taking: Reported on 6/6/2024 4/11/24   Celina Carter MD     Allergies   Allergen Reactions   • Amoxicillin Other   • Erythromycin Unknown   • Lisinopril Cough   • Sulfa (Sulfonamide Antibiotics) Unknown     Social History     Tobacco Use   • Smoking status: Never   • Smokeless tobacco: Never   • Tobacco comments:     Denies any illness in past 30 days     Denies personal/family reaction or problems with anesthesia     Denies any SOB with stairs or daily activity     Uses bilat walking sticks to ambulate     Able to lay flat x 30 minutes             Substance Use Topics   • Alcohol use: Not Currently         Chemistry    Lab Results   Component Value Date/Time     05/23/2024 1612    K 3.2 (L) 05/23/2024 1612     05/23/2024 1612    CO2 28 05/23/2024 1612    BUN 22 05/23/2024 1612    CREATININE 0.67 05/23/2024 1612    Lab Results   Component Value Date/Time    CALCIUM 8.5 (L) 05/23/2024 1612    ALKPHOS 86 05/23/2024 1612    AST 11 05/23/2024 1612    ALT 5 (L) 05/23/2024 1612    BILITOT 0.6 05/23/2024 1612          Lab Results   Component Value Date/Time    WBC 6.4 05/23/2024 1612    HGB 12.4 05/23/2024 1612    HCT 38.6 05/23/2024 1612     05/23/2024 1612     Lab Results   Component Value Date/Time    PROTIME 11.7 05/23/2024  1612    INR 1.0 05/23/2024 1612     No results found for this or any previous visit (from the past 4464 hour(s)).  No results found for this or any previous visit from the past 1095 days.        Relevant Problems   Cardiac   (+) Benign essential hypertension   (+) Chest pain   (+) Hyperlipidemia   (+) Hypertension      Pulmonary   (+) Mild persistent asthma without complication (HHS-HCC)      Neuro   (+) Anxiety   (+) MDD (major depressive disorder), single episode   (+) Major depression in remission (CMS-HCC)   (+) Polyneuropathy      Endocrine   (+) Hypothyroid      Musculoskeletal   (+) Localized primary osteoarthritis of carpometacarpal joint of left thumb      HEENT   (+) Asymmetrical sensorineural hearing loss   (+) Sensorineural hearing loss of both ears      ID   (+) Viral URI with cough      Skin   (+) Eczema       Clinical information reviewed:   Tobacco  Allergies  Meds   Med Hx  Surg Hx   Fam Hx  Soc Hx        NPO Detail:  NPO/Void Status  Date of Last Liquid: 06/06/24  Time of Last Liquid: 0900  Date of Last Solid: 06/05/24  Time of Last Solid: 2355         Physical Exam    Airway  Mallampati: III  Neck ROM: full     Cardiovascular   Rhythm: regular  Rate: normal     Dental - normal exam     Pulmonary   Breath sounds clear to auscultation     Abdominal        Anesthesia Plan    History of general anesthesia?: yes  History of complications of general anesthesia?: no    ASA 3     MAC   (DVT on Eliquis)  The patient is not a current smoker.    intravenous induction   Anesthetic plan and risks discussed with patient.  Use of blood products discussed with patient who.    Plan discussed with CAA and attending.

## 2024-06-06 NOTE — ANESTHESIA POSTPROCEDURE EVALUATION
Patient: Yaritza Bernal    Procedure Summary       Date: 06/06/24 Room / Location: [facility] [place] [facility] 03 / [facility] [place] [facility]    Anesthesia Start: 1202 Anesthesia Stop: 1236    Procedure: Phacoemulsification Cataract with Insertion Intraocular Lens (Right: Eye) Diagnosis:       Combined form of age-related cataract, right eye      (Combined form of age-related cataract, right eye [H25.811])    Surgeons: Celina Carter MD Responsible Provider: Hanane Gorman MD    Anesthesia Type: MAC ASA Status: 3            Anesthesia Type: MAC    Vitals Value Taken Time   /73 06/06/24 1300   Temp 36.3 °C (97.3 °F) 06/06/24 1300   Pulse 61 06/06/24 1300   Resp 16 06/06/24 1300   SpO2 96 % 06/06/24 1300       Anesthesia Post Evaluation    Patient participation: complete - patient participated  Level of consciousness: awake and alert  Pain management: adequate  Airway patency: patent  Cardiovascular status: acceptable  Respiratory status: acceptable  Hydration status: acceptable  Postoperative Nausea and Vomiting: none    There were no known notable events for this encounter.

## 2024-06-06 NOTE — H&P
History Of Present Illness  Yaritza Bernal is a 74 y.o. female presenting with right eye combined cataract. Here for right eye cataract extraction and intraocular lens placement.     Past Medical History  Past Medical History:   Diagnosis Date    Bladder disorder, unspecified 10/21/2015    Bladder disorder    Cataract     Clotting disorder (Multi)     Cutaneous abscess, unspecified 03/16/2017    Abscess    Disorder of the skin and subcutaneous tissue, unspecified 07/14/2016    Hand lesion    Dry eyes     DVT (deep venous thrombosis) (Multi)     after femur fx surgery    Encounter for immunization 02/17/2017    Need for Tdap vaccination    Encounter for immunization 10/04/2016    Need for vaccination with 13-polyvalent pneumococcal conjugate vaccine    GERD (gastroesophageal reflux disease)     HL (hearing loss)     Hyperlipidemia     Hypertension     Hypothyroidism     Impacted cerumen, bilateral 10/21/2015    Impacted cerumen of both ears    Impaired fasting glucose 09/17/2018    Abnormal fasting glucose    Irritable bowel syndrome     Medial epicondylitis, right elbow 10/04/2016    Medial epicondylitis, right    Nephrolithiasis     Other conditions influencing health status 03/16/2017    Cyst    Other specified abnormal findings of blood chemistry 10/04/2018    Abnormal TSH    Other specified abnormal findings of blood chemistry 09/17/2018    Abnormal TSH    Other specified symptoms and signs involving the circulatory and respiratory systems 09/28/2017    Chest congestion    Pain in left hand 09/28/2017    Pain of left hand    Pain in left hip 10/04/2018    Left hip pain    Pain in right hip 11/01/2017    Bilateral hip pain    Pain in right knee 03/13/2017    Right knee pain    Pain in unspecified knee 03/13/2017    Knee pain    Personal history of other diseases of the circulatory system 05/09/2018    History of hypertension    Personal history of other diseases of the circulatory system 06/27/2018    History of  hypertension    Personal history of other diseases of the circulatory system 01/18/2018    History of hypertension    Personal history of other diseases of the circulatory system 08/17/2017    History of hypertension    Personal history of other diseases of the nervous system and sense organs 09/17/2018    History of benign essential tremor    Personal history of other diseases of the nervous system and sense organs 10/20/2016    History of acute otitis media    Personal history of other diseases of the respiratory system 03/09/2018    History of acute bacterial sinusitis    Personal history of other drug therapy 05/09/2018    History of pneumococcal vaccination    Personal history of other endocrine, nutritional and metabolic disease 10/30/2017    History of hyperlipidemia    Personal history of other mental and behavioral disorders 07/16/2018    History of anxiety    Personal history of other specified conditions 06/27/2018    History of fatigue    Strain of muscle, fascia and tendon of the posterior muscle group at thigh level, unspecified thigh, initial encounter 01/15/2019    Hamstring strain, initial encounter    Strain of unspecified muscles, fascia and tendons at thigh level, left thigh, initial encounter 05/01/2018    Muscle strain of left thigh    Unspecified injury of unspecified elbow, initial encounter 01/15/2019    Elbow injury, initial encounter       Surgical History  Past Surgical History:   Procedure Laterality Date    BLADDER SURGERY  10/21/2015    Bladder Surgery    BREAST BIOPSY Left     LF benign biopsy, >20 years ago    EXTRACORPOREAL SHOCK WAVE LITHOTRIPSY      FEMUR FRACTURE SURGERY      KNEE SURGERY      VEIN LIGATION AND STRIPPING          Social History  She reports that she has never smoked. She has never used smokeless tobacco. She reports that she does not currently use alcohol. She reports that she does not use drugs.    Family History  Family History   Problem Relation Name Age of  Onset    Other (cardiac disorder) Mother      Other (emphysema lung) Father      Breast cancer Neg Hx          Allergies  Amoxicillin, Erythromycin, Lisinopril, and Sulfa (sulfonamide antibiotics)    Review of Systems   Constitutional: Negative.    Respiratory: Negative.     Cardiovascular: Negative.    Gastrointestinal: Negative.    Psychiatric/Behavioral: Negative.          Physical Exam  Constitutional:       Appearance: Normal appearance.   HENT:      Head: Normocephalic and atraumatic.   Cardiovascular:      Rate and Rhythm: Regular rhythm.   Pulmonary:      Effort: Pulmonary effort is normal.   Abdominal:      General: Abdomen is flat.      Palpations: Abdomen is soft.   Neurological:      Mental Status: She is alert.          Last Recorded Vitals  Weight 90.5 kg (199 lb 6.5 oz).    Relevant Results        right eye combined cataract. Here for right eye cataract extraction and intraocular lens placement.     Assessment/Plan   Principal Problem:    Combined form of age-related cataract, right eye      right eye combined cataract. Here for right eye cataract extraction and intraocular lens placement.           Antonio Kumar MD

## 2024-06-06 NOTE — OP NOTE
Phacoemulsification Cataract with Insertion Intraocular Lens (R) Operative Note     Date: 2024  OR Location: [place] OR    Name: Yaritza Bernal, : 1950, Age: 74 y.o., MRN: [id number], Sex: female    Diagnosis  Pre-op Diagnosis     * Combined form of age-related cataract, right eye [H25.811] Post-op Diagnosis     * Combined form of age-related cataract, right eye [H25.811]     Procedures  Phacoemulsification Cataract with Insertion Intraocular Lens  50107 - NV XCAPSL CTRC RMVL INSJ IO LENS PROSTH W/O ECP      Surgeons      * Celina Carter - Primary    Resident/Fellow/Other Assistant:  Surgeons and Role:  * No surgeons found with a matching role *    Procedure Summary  Anesthesia: Monitor Anesthesia Care  ASA: III  Anesthesia Staff: Anesthesiologist: MD GLEN Bond-AA: NATHAN Mackey  Estimated Blood Loss: 0 mL  Intra-op Medications:   Administrations occurring from 1200 to 1250 on 24:   Medication Name Total Dose   lidocaine PF (Xylocaine) 10 mg/mL (1 %) injection 0.5 mL   povidone-iodine 5 % ophthalmic solution 1 Application   tetracaine (PF) 0.5 % ophthalmic solution 4 drop   EPINEPHrine HCl (PF) (Adrenalin) injection 0.3 mg   chondroitin sulf-sod hyaluron (Duovisc) intraocular kit 0.5 mL              Anesthesia Record               Intraprocedure I/O Totals       None           Specimen: No specimens collected     Staff:   Circulator: Amber  Scrub Person: Mariya  Scrub Person: Srinivas  Relief Circulator: Srinivas         Drains and/or Catheters: * None in log *    Tourniquet Times:         Implants:  Implants       Type Name Action Serial No.       18.5 DIOPTER, ACRYSOF IQ UV AND BLUE LIGHT FILTERING ACRYLIC FOLDABLE SINGLE-PIECE POSTERIOR CHAMBER LENSES W/ THE ULTRASERT PRE-LOADED DELIVERY SYSTEM, MODEL  Implanted [id number]              Findings: as below    Indications: Yaritza Bernal is an 74 y.o. female who is having surgery for Combined form of age-related cataract,  right eye [H25.811].     Procedure Details: Patient name: Yaritza Bernal   YOB: 1950   MRN: [id number]   Date of surgery: June 6, 2024  Preoperative diagnosis: Combined cataract of the RIGHT eye  Postoperative diagnosis: Combined cataract of the RIGHT eye  Procedure: Phacoemulsification of cataract with insertion of intraocular lens, RIGHT eye   Surgeon: Celina Carter MD  Resident: Antonio Kumar MD  Anesthesia: MAC  Complications: None  Lens Inserted: Triston [id number] with a power of +18.50 D. Serial #: [id number]. Exp date: 10/02/2025.    Procedure description: After the risks, benefits, and alternatives of the planned procedure were discussed with the patient, informed consent was obtained at the preoperative evaluation. On the day of surgery, there were no updates to the consent form and any patient questions were answered. The patient was correctly identified in the preoperative area and the RIGHT eye was marked as the operative eye. Dilating drops were instilled into the RIGHT eye in the preoperative area. The patient was then taken back to the operating room and placed under sedation. The patient was prepped and draped in the standard, sterile ophthalmic fashion in preparation for intraocular surgery.    A lid speculum was placed into the RIGHT palpebral fissure and the operating microscope was brought into position. A paracentesis was made in superotemporal cornea at the limbus with a 1.0mm side port blade using a cotton tipped applicator to provide countertraction. Intracameral lidocaine was injected into the anterior chamber followed by Viscoat viscoelastic. Using 0.12 forceps to stabilize the globe, a 2.4mm keratome blade was used to create a limbal clear-corneal incision inferotemporally. A bent-needle cystotome and Utrata forceps were used to create a continuous curvilinear capsulorrhexis. Balanced salt saline solution on a blunt-tipped cannula was used to achieve hydrodissection.    A  phacoemulsification device and a [place]burnett spatula were used to remove the nucleus using a divide-and-conquer technique. Residual cortical material was removed with the irrigation and aspiration handpiece. Provisc viscoelastic was then injected into the eye to reform the anterior chamber and to open the capsular bag. The posterior capsule was inspected and found to be clean and intact. The intraocular lens, Triston [id number] with a power of +18.50 diopters was injected into the capsular bag. A lens positioner was used to center the lens and ensure good position within the bag. The remaining viscoelastic was removed using irrigation and aspiration. Balanced salt saline solution on a blunt-tipped cannula was then used to hydrate the corneal stroma adjacent to the main wound and paracentesis site as well as to reform the anterior chamber. The temporal main incision was then closed with a single 10-0 vicryl suture in an interrupted fashion. The wound was checked and found to be watertight with normal intraocular pressure verified using digital palpation. At the conclusion of the case, a well-centered intraocular lens with a good red reflex was observed. Tetracaine, betadine, and BSS were instilled into the RIGHT eye. The lid speculum and drapes were removed. A clear plastic shield was then taped over the eye. The patient was taken to the recovery room in stable condition, having tolerated the procedure well. There were no complications.      Complications:  None; patient tolerated the procedure well.    Disposition: PACU - hemodynamically stable.  Condition: stable         Additional Details: none    Attending Attestation: I performed the procedure.    Celina Carter  Phone Number: [phone]

## 2024-06-06 NOTE — DISCHARGE INSTRUCTIONS
Surgeon: Celina Carter MD     Patient name: Yaritza Bernal    Date of surgery: June 6, 2024        DROP INSTRUCTIONS:    Prednisolone acetate 1% (pink or white cap) - One drop 4 times a day to operative eye    Ofloxacin (tan cap) - One drop 4 times a day to operative eye    Ketorolac (gray cap) - One drop 4 times a day to operative eye    When putting different drops in around the same administration time, please wait 1-2 minutes between drops  Avoid sleeping on side of operative eye  No heavy lifting over 10-15lbs and no bending over  Do not get eye wet, no eye rubbing  Wear sunglasses or glasses during the day and the shield when sleeping at night  Please call immediately if you develop any redness, pain, decreased vision, flashes, or floaters      During office hours (8:30a-4:30p): [phone]  After office hours: [phone]  Hospital : [phone]   Pager: [phone] for Dr. Rashid

## 2024-06-07 ENCOUNTER — OFFICE VISIT (OUTPATIENT)
Dept: OPHTHALMOLOGY | Facility: CLINIC | Age: 74
End: 2024-06-07
Payer: MEDICARE

## 2024-06-07 DIAGNOSIS — H35.372 EPIRETINAL MEMBRANE, LEFT EYE: ICD-10-CM

## 2024-06-07 DIAGNOSIS — H52.203 ASTIGMATISM OF BOTH EYES, UNSPECIFIED TYPE: ICD-10-CM

## 2024-06-07 DIAGNOSIS — Z96.1 PSEUDOPHAKIA: Primary | ICD-10-CM

## 2024-06-07 DIAGNOSIS — H52.4 PRESBYOPIA: ICD-10-CM

## 2024-06-07 DIAGNOSIS — H47.399 PATHOLOGICAL CUPPING OF OPTIC DISC, UNSPECIFIED LATERALITY: ICD-10-CM

## 2024-06-07 DIAGNOSIS — H52.12 MYOPIA OF LEFT EYE: ICD-10-CM

## 2024-06-07 DIAGNOSIS — H04.123 DRY EYES, BILATERAL: ICD-10-CM

## 2024-06-07 DIAGNOSIS — H43.813 PVD (POSTERIOR VITREOUS DETACHMENT), BOTH EYES: ICD-10-CM

## 2024-06-07 DIAGNOSIS — H52.01 HYPEROPIA OF RIGHT EYE: ICD-10-CM

## 2024-06-07 PROCEDURE — 99024 POSTOP FOLLOW-UP VISIT: CPT | Performed by: OPHTHALMOLOGY

## 2024-06-07 ASSESSMENT — TONOMETRY
OD_IOP_MMHG: 22
OD_IOP_MMHG: 18
IOP_METHOD: GOLDMANN APPLANATION
IOP_METHOD: GOLDMANN APPLANATION

## 2024-06-07 ASSESSMENT — ENCOUNTER SYMPTOMS
CONSTITUTIONAL NEGATIVE: 0
CARDIOVASCULAR NEGATIVE: 0
RESPIRATORY NEGATIVE: 0
ENDOCRINE NEGATIVE: 0
NEUROLOGICAL NEGATIVE: 0
PSYCHIATRIC NEGATIVE: 0
ALLERGIC/IMMUNOLOGIC NEGATIVE: 0
EYES NEGATIVE: 1
GASTROINTESTINAL NEGATIVE: 0
HEMATOLOGIC/LYMPHATIC NEGATIVE: 0
MUSCULOSKELETAL NEGATIVE: 0

## 2024-06-07 ASSESSMENT — VISUAL ACUITY
METHOD: SNELLEN - LINEAR
OD_SC: 20/50

## 2024-06-07 ASSESSMENT — SLIT LAMP EXAM - LIDS: COMMENTS: GOOD POSITION, DERMATOCHALASIS

## 2024-06-07 ASSESSMENT — EXTERNAL EXAM - RIGHT EYE: OD_EXAM: NORMAL

## 2024-06-07 ASSESSMENT — EXTERNAL EXAM - LEFT EYE: OS_EXAM: NORMAL

## 2024-06-07 NOTE — PATIENT INSTRUCTIONS
Surgeon: Celina Carter MD      Patient name: Yaritza Bernal    Date of visit: June 7, 2024      right eye    Prednisolone acetate (pink or white cap) - 4 times a day    Ofloxacin (tan cap) - 4 times a day    Ketorolac (gray cap) - 4 times a day      No heavy lifting over 10-15 lbs, no bending over, do not get eye wet, no eye rubbing. Wear eye shield at bedtime and sunglasses/glasses during the day. Please call immediately if you develop any redness, pain, decreased vision, flashes, floaters.       Surgical Scheduler (during office hours): [place]: [phone]  Office phone (after hours): [phone] [phone]                     Pager: [phone] for Dr. Rashid

## 2024-06-07 NOTE — PROGRESS NOTES
Pseudophakia of right eyeZ96.1 - 6/6/24  -Doing well. Good vision. IOP good.  Prednisolone acetate 1% - 4 times a day  Ofloxacin - 4 times a day  Ketorolac - 4 times a day  -Took off shield when sleeping last night because made eye hurt more - pressure of shield on globe? Discussed how to position shield so it is not touching eye.   No heavy lifting over 10-15 lbs, no bending over, do not get eye wet, no eye rubbing. Wear eye shield at bedtime and sunglasses/glasses during the day. Advised to call if any redness, pain, decreased vision, flashes, floaters. F/u 1 week - refract both eyes (autorefract first).     Pseudophakia of left eyeZ96.1 - 7/20/23 - Open capsule, sulcus IOL.  -Doing well. Good vision. IOP good. Residual vs rebound iritis. History of stopping prednisolone prematurely.   -Iritis appears to have resolved, patient without redness or pain. Photophobia has improved.   -Recommend observation. Advised to call if any redness, pain, photophobia or change in vision.   -Continue artificial tears 3-4x/day  -Risk of RD/RT with vitreous prolapse, as well as higher risk of CME. Will refer to retina service as needed. No retinal tear or detachment seen on exam. Retinal detachment symptoms discussed.    -Patient has been seeing a silver disc intermittently temporally x 3-4 weeks  -Oct 2023 - she fell (knee gave out) and hit her head on gravel driveway. No significant ocular sequelae noted except that intraocular lens (IOL) OS may be slightly decentered inferiorly, but optic is still in visual axis and haptics still appear to be in the sulcus. Intraocular lens (IOL) is not tilted or shifted posteriorly or anteriorly.    Dry eyes, kumsmrzsuC76.123  -Eyes feel dry when air blowing in eyes at work. Currently works at [employer].   -May use warm compresses and aritificial tears PRN    Cup to disc qyfncrnofS57.399  -No FH of glaucoma  -OCT RNFL (12/14/22) - SS: 7/10 OD and 8/10 OS. WNL OU. 93/91.   -OCT RNFL  (4/10/24) - SS: 3/10 OD and 7/10 OS. OD: Bord S. OS: Thickening. 83/131. Poor images OU. Unreliable.   -Low suspicion for glaucoma given OCT RNFL WNL. Monitor with annual dilated exams.     PVD (posterior vitreous detachment), both eyesH43.813  Epiretinal membrane, left eye  -OCT macula (4/10/24) - SS: 10/10 OS. OD: Unable. OS: Normal thickness and contour. Tr ERM. Intact IS-OS. No edema. 278. ERM OS more apparent compared to 12/14/22.   -No retinal tear or detachment seen on exam. Retinal detachment symptoms discussed.     DcenokzeuP94.00  UerkrgekzztF19.209  XmwulmB70.10  CcizgqfdvvA59.4  -F/u 1 week - refract both eyes (autorefract first).       No history of refractive surgery.   No FH of AMD/glaucoma      Rx 4/20/22:  OD: +1.00  -1.50  x 085 add +2.50    OS: -1.00  -1.00  x 080 add +2.50      Attending Attestation Statement for Evaluation and Management Services:    I was physically present and/or personally examined the patient during the evaluation of this patient. I reviewed the documentation and confirm the resident's note.

## 2024-06-10 ASSESSMENT — SLIT LAMP EXAM - LIDS: COMMENTS: GOOD POSITION, DERMATOCHALASIS

## 2024-06-10 ASSESSMENT — EXTERNAL EXAM - LEFT EYE: OS_EXAM: NORMAL

## 2024-06-10 ASSESSMENT — EXTERNAL EXAM - RIGHT EYE: OD_EXAM: NORMAL

## 2024-06-10 NOTE — PROGRESS NOTES
Pseudophakia of right eyeZ96.1 - 6/6/24  -Doing well. Good vision. IOP good.  Prednisolone acetate 1% - 4/3/2/1 weekly taper  Ofloxacin - 4 times a day x 7 more days, then stop  Ketorolac - 4 times a day x 7 more days, then stop  -No heavy lifting over 15-20 lbs, do not get eye wet, no eye rubbing. Discontinue eye shield at bedtime but wear sunglasses/glasses during the day. Advised to call if any redness, pain, decreased vision, flashes, floaters. F/u 6-8 weeks - refract both eyes and dilate right eye.     Pseudophakia of left eyeZ96.1 - 7/20/23 - Open capsule, sulcus IOL.  -Doing well. Good vision. IOP good. Residual vs rebound iritis. History of stopping prednisolone prematurely.   -Iritis appears to have resolved, patient without redness or pain. Photophobia has improved.   -Recommend observation. Advised to call if any redness, pain, photophobia or change in vision.   -Continue artificial tears 3-4x/day  -Risk of RD/RT with vitreous prolapse, as well as higher risk of CME. Will refer to retina service as needed. No retinal tear or detachment seen on exam. Retinal detachment symptoms discussed.    -Patient has been seeing a silver disc intermittently temporally x 3-4 weeks  -Oct 2023 - she fell (knee gave out) and hit her head on gravel driveway. No significant ocular sequelae noted except that intraocular lens (IOL) OS may be slightly decentered inferiorly, but optic is still in visual axis and haptics still appear to be in the sulcus. Intraocular lens (IOL) is not tilted or shifted posteriorly or anteriorly.    Dry eyes, mawbjkfnsB84.123  -Eyes feel dry when air blowing in eyes at work. Currently works at [employer].   -May use warm compresses and aritificial tears PRN    Cup to disc vnnmswzrdP79.399  -No FH of glaucoma  -OCT RNFL (12/14/22) - SS: 7/10 OD and 8/10 OS. WNL OU. 93/91.   -OCT RNFL (4/10/24) - SS: 3/10 OD and 7/10 OS. OD: Bord S. OS: Thickening. 83/131. Poor images OU. Unreliable.   -Low  suspicion for glaucoma given OCT RNFL WNL. Monitor with annual dilated exams.     PVD (posterior vitreous detachment), both eyesH43.813  Epiretinal membrane, left eye  -OCT macula (4/10/24) - SS: 10/10 OS. OD: Unable. OS: Normal thickness and contour. Tr ERM. Intact IS-OS. No edema. 278. ERM OS more apparent compared to 12/14/22.   -No retinal tear or detachment seen on exam. Retinal detachment symptoms discussed.     YzxuaqwrdP62.00  LpmfhkjdzuqZ06.209  HcrzjgD19.10  NpnfedwmnvE17.4  -F/u 6-8 weeks - refract both eyes and dilate right eye.       No history of refractive surgery.   No FH of AMD/glaucoma      Rx 4/20/22:  OD: +1.00  -1.50  x 085 add +2.50    OS: -1.00  -1.00  x 080 add +2.50

## 2024-06-10 NOTE — PATIENT INSTRUCTIONS
Surgeon: Celina Carter MD                   [phone]      Patient name: Yaritza Bernal    Date of visit: 6/12/24      right eye    Prednisolone acetate (pink or white cap) - 4 times a day for 1 week, 3 times a day for 1 week, 2 times a day for 1 week, once a day for 1 week, then stop    Ofloxacin (tan cap) - 4 times a day for 1 more week, then stop    Ketorolac (gray cap) - 4 times a day for 1 more week, then stop      No heavy lifting over 15-20 lbs, try not to get eye wet, no eye rubbing. You may stop wearing the eye shield at night. Please continue wearing glasses or sunglasses during the day to protect your eyes. Please call immediately if you develop any redness, pain, decreased vision, flashes, floaters.     Surgical Scheduler (during office hours) - [place]: [phone]

## 2024-06-12 ENCOUNTER — OFFICE VISIT (OUTPATIENT)
Dept: OPHTHALMOLOGY | Facility: CLINIC | Age: 74
End: 2024-06-12
Payer: MEDICARE

## 2024-06-12 DIAGNOSIS — Z96.1 PSEUDOPHAKIA: Primary | ICD-10-CM

## 2024-06-12 DIAGNOSIS — H43.813 PVD (POSTERIOR VITREOUS DETACHMENT), BOTH EYES: ICD-10-CM

## 2024-06-12 DIAGNOSIS — H35.372 EPIRETINAL MEMBRANE, LEFT EYE: ICD-10-CM

## 2024-06-12 DIAGNOSIS — H52.01 HYPEROPIA OF RIGHT EYE: ICD-10-CM

## 2024-06-12 DIAGNOSIS — H04.123 DRY EYES, BILATERAL: ICD-10-CM

## 2024-06-12 DIAGNOSIS — H47.399 PATHOLOGICAL CUPPING OF OPTIC DISC, UNSPECIFIED LATERALITY: ICD-10-CM

## 2024-06-12 DIAGNOSIS — H52.203 ASTIGMATISM OF BOTH EYES, UNSPECIFIED TYPE: ICD-10-CM

## 2024-06-12 DIAGNOSIS — H52.4 PRESBYOPIA: ICD-10-CM

## 2024-06-12 DIAGNOSIS — H52.12 MYOPIA OF LEFT EYE: ICD-10-CM

## 2024-06-12 PROCEDURE — 99024 POSTOP FOLLOW-UP VISIT: CPT | Performed by: OPHTHALMOLOGY

## 2024-06-12 ASSESSMENT — TONOMETRY
OS_IOP_MMHG: 15
IOP_METHOD: GOLDMANN APPLANATION
OD_IOP_MMHG: 16

## 2024-06-12 ASSESSMENT — REFRACTION_MANIFEST
OD_ADD: +2.75
OD_CYLINDER: -1.50
OD_SPHERE: +0.25
OS_CYLINDER: -1.00
OD_CYLINDER: -1.25
OS_SPHERE: +0.50
OD_AXIS: 095
OS_AXIS: 070
OS_ADD: +2.75
OS_CYLINDER: -1.25
METHOD_AUTOREFRACTION: 1
OD_SPHERE: +0.50
OS_SPHERE: +0.75
OS_AXIS: 070
OD_AXIS: 095

## 2024-06-12 ASSESSMENT — VISUAL ACUITY
OD_SC: 20/30
METHOD: SNELLEN - LINEAR
OS_SC+: +1
OD_SC+: +1
OS_SC: 20/30

## 2024-06-12 ASSESSMENT — CONF VISUAL FIELD
OS_INFERIOR_NASAL_RESTRICTION: 0
OS_SUPERIOR_TEMPORAL_RESTRICTION: 0
OS_SUPERIOR_NASAL_RESTRICTION: 0
OS_INFERIOR_TEMPORAL_RESTRICTION: 0

## 2024-06-12 ASSESSMENT — ENCOUNTER SYMPTOMS
HEMATOLOGIC/LYMPHATIC NEGATIVE: 0
ENDOCRINE NEGATIVE: 0
ALLERGIC/IMMUNOLOGIC NEGATIVE: 0
CONSTITUTIONAL NEGATIVE: 0
MUSCULOSKELETAL NEGATIVE: 0
EYES NEGATIVE: 1
CARDIOVASCULAR NEGATIVE: 0
PSYCHIATRIC NEGATIVE: 0
GASTROINTESTINAL NEGATIVE: 0
NEUROLOGICAL NEGATIVE: 0
RESPIRATORY NEGATIVE: 0

## 2024-06-12 ASSESSMENT — CUP TO DISC RATIO: OD_RATIO: 0.35

## 2024-06-28 ENCOUNTER — APPOINTMENT (OUTPATIENT)
Dept: OPHTHALMOLOGY | Facility: CLINIC | Age: 74
End: 2024-06-28
Payer: MEDICARE

## 2024-07-15 ENCOUNTER — APPOINTMENT (OUTPATIENT)
Dept: PRIMARY CARE | Facility: CLINIC | Age: 74
End: 2024-07-15
Payer: MEDICARE

## 2024-07-22 ENCOUNTER — PATIENT MESSAGE (OUTPATIENT)
Dept: PRIMARY CARE | Facility: CLINIC | Age: 74
End: 2024-07-22
Payer: MEDICARE

## 2024-07-24 ENCOUNTER — APPOINTMENT (OUTPATIENT)
Dept: HEMATOLOGY/ONCOLOGY | Facility: CLINIC | Age: 74
End: 2024-07-24
Payer: MEDICARE

## 2024-07-24 ENCOUNTER — APPOINTMENT (OUTPATIENT)
Dept: OPHTHALMOLOGY | Facility: CLINIC | Age: 74
End: 2024-07-24
Payer: MEDICARE

## 2024-07-24 DIAGNOSIS — G62.9 NEUROPATHY: Primary | ICD-10-CM

## 2024-07-24 DIAGNOSIS — R26.89 LOSS OF BALANCE: ICD-10-CM

## 2024-07-24 DIAGNOSIS — M54.50 CHRONIC BILATERAL LOW BACK PAIN WITHOUT SCIATICA: ICD-10-CM

## 2024-07-24 DIAGNOSIS — G89.29 CHRONIC BILATERAL LOW BACK PAIN WITHOUT SCIATICA: ICD-10-CM

## 2024-07-24 RX ORDER — CALCIUM CARBONATE 160(400)MG
TABLET,CHEWABLE ORAL
Qty: 1 EACH | Refills: 0 | Status: SHIPPED | OUTPATIENT
Start: 2024-07-24

## 2024-07-25 ENCOUNTER — TELEPHONE (OUTPATIENT)
Dept: PRIMARY CARE | Facility: CLINIC | Age: 74
End: 2024-07-25
Payer: MEDICARE

## 2024-08-14 ENCOUNTER — APPOINTMENT (OUTPATIENT)
Dept: OPHTHALMOLOGY | Facility: CLINIC | Age: 74
End: 2024-08-14
Payer: MEDICARE

## 2024-09-03 ENCOUNTER — OFFICE VISIT (OUTPATIENT)
Dept: HEMATOLOGY/ONCOLOGY | Facility: CLINIC | Age: 74
End: 2024-09-03
Payer: MEDICARE

## 2024-09-03 VITALS
SYSTOLIC BLOOD PRESSURE: 110 MMHG | HEIGHT: 70 IN | TEMPERATURE: 97.7 F | RESPIRATION RATE: 16 BRPM | BODY MASS INDEX: 29.83 KG/M2 | OXYGEN SATURATION: 96 % | DIASTOLIC BLOOD PRESSURE: 72 MMHG | WEIGHT: 208.34 LBS | HEART RATE: 61 BPM

## 2024-09-03 DIAGNOSIS — D50.9 NORMOCYTIC HYPOCHROMIC ANEMIA: ICD-10-CM

## 2024-09-03 DIAGNOSIS — I82.402 ACUTE DEEP VEIN THROMBOSIS (DVT) OF LEFT LOWER EXTREMITY, UNSPECIFIED VEIN (MULTI): ICD-10-CM

## 2024-09-03 LAB
ALBUMIN SERPL BCP-MCNC: 3.9 G/DL (ref 3.4–5)
ALP SERPL-CCNC: 72 U/L (ref 33–136)
ALT SERPL W P-5'-P-CCNC: 10 U/L (ref 7–45)
ANION GAP SERPL CALC-SCNC: 12 MMOL/L (ref 10–20)
APTT PPP: 35 SECONDS (ref 27–38)
AST SERPL W P-5'-P-CCNC: 10 U/L (ref 9–39)
B2 GLYCOPROT1 IGA SER-ACNC: 11.6 U/ML
B2 GLYCOPROT1 IGG SER-ACNC: <1.4 U/ML
B2 GLYCOPROT1 IGM SER-ACNC: 0.6 U/ML
BASOPHILS # BLD AUTO: 0.06 X10*3/UL (ref 0–0.1)
BASOPHILS NFR BLD AUTO: 1 %
BILIRUB SERPL-MCNC: 0.4 MG/DL (ref 0–1.2)
BUN SERPL-MCNC: 22 MG/DL (ref 6–23)
CALCIUM SERPL-MCNC: 8.4 MG/DL (ref 8.6–10.3)
CARDIOLIPIN IGA SERPL-ACNC: 14.7 APL U/ML
CARDIOLIPIN IGG SER IA-ACNC: <1.6 GPL U/ML
CARDIOLIPIN IGM SER IA-ACNC: 0.8 MPL U/ML
CHLORIDE SERPL-SCNC: 101 MMOL/L (ref 98–107)
CO2 SERPL-SCNC: 31 MMOL/L (ref 21–32)
CREAT SERPL-MCNC: 0.69 MG/DL (ref 0.5–1.05)
EGFRCR SERPLBLD CKD-EPI 2021: >90 ML/MIN/1.73M*2
EOSINOPHIL # BLD AUTO: 0.19 X10*3/UL (ref 0–0.4)
EOSINOPHIL NFR BLD AUTO: 3.1 %
ERYTHROCYTE [DISTWIDTH] IN BLOOD BY AUTOMATED COUNT: 12.6 % (ref 11.5–14.5)
FERRITIN SERPL-MCNC: 14 NG/ML (ref 8–150)
GLUCOSE SERPL-MCNC: 91 MG/DL (ref 74–99)
HCT VFR BLD AUTO: 39 % (ref 36–46)
HGB BLD-MCNC: 12.1 G/DL (ref 12–16)
IMM GRANULOCYTES # BLD AUTO: 0.01 X10*3/UL (ref 0–0.5)
IMM GRANULOCYTES NFR BLD AUTO: 0.2 % (ref 0–0.9)
INR PPP: 1.3 (ref 0.9–1.1)
IRON SATN MFR SERPL: 8 % (ref 25–45)
IRON SERPL-MCNC: 30 UG/DL (ref 35–150)
LYMPHOCYTES # BLD AUTO: 1.61 X10*3/UL (ref 0.8–3)
LYMPHOCYTES NFR BLD AUTO: 26.2 %
MCH RBC QN AUTO: 27.1 PG (ref 26–34)
MCHC RBC AUTO-ENTMCNC: 31 G/DL (ref 32–36)
MCV RBC AUTO: 87 FL (ref 80–100)
MONOCYTES # BLD AUTO: 0.51 X10*3/UL (ref 0.05–0.8)
MONOCYTES NFR BLD AUTO: 8.3 %
NEUTROPHILS # BLD AUTO: 3.76 X10*3/UL (ref 1.6–5.5)
NEUTROPHILS NFR BLD AUTO: 61.2 %
PLATELET # BLD AUTO: 299 X10*3/UL (ref 150–450)
POTASSIUM SERPL-SCNC: 3.3 MMOL/L (ref 3.5–5.3)
PROT SERPL-MCNC: 6.7 G/DL (ref 6.4–8.2)
PROTHROMBIN TIME: 15.2 SECONDS (ref 9.8–12.8)
RBC # BLD AUTO: 4.46 X10*6/UL (ref 4–5.2)
SODIUM SERPL-SCNC: 141 MMOL/L (ref 136–145)
TIBC SERPL-MCNC: 393 UG/DL (ref 240–445)
UIBC SERPL-MCNC: 363 UG/DL (ref 110–370)
VIT B12 SERPL-MCNC: 302 PG/ML (ref 211–911)
WBC # BLD AUTO: 6.1 X10*3/UL (ref 4.4–11.3)

## 2024-09-03 PROCEDURE — 83540 ASSAY OF IRON: CPT | Performed by: PHYSICIAN ASSISTANT

## 2024-09-03 PROCEDURE — 3074F SYST BP LT 130 MM HG: CPT | Performed by: PHYSICIAN ASSISTANT

## 2024-09-03 PROCEDURE — 99205 OFFICE O/P NEW HI 60 MIN: CPT | Performed by: PHYSICIAN ASSISTANT

## 2024-09-03 PROCEDURE — 86146 BETA-2 GLYCOPROTEIN ANTIBODY: CPT | Mod: GEALAB | Performed by: PHYSICIAN ASSISTANT

## 2024-09-03 PROCEDURE — 36415 COLL VENOUS BLD VENIPUNCTURE: CPT | Performed by: PHYSICIAN ASSISTANT

## 2024-09-03 PROCEDURE — 80053 COMPREHEN METABOLIC PANEL: CPT | Performed by: PHYSICIAN ASSISTANT

## 2024-09-03 PROCEDURE — 99215 OFFICE O/P EST HI 40 MIN: CPT | Performed by: PHYSICIAN ASSISTANT

## 2024-09-03 PROCEDURE — 82607 VITAMIN B-12: CPT | Mod: GEALAB | Performed by: PHYSICIAN ASSISTANT

## 2024-09-03 PROCEDURE — 86147 CARDIOLIPIN ANTIBODY EA IG: CPT | Mod: GEALAB | Performed by: PHYSICIAN ASSISTANT

## 2024-09-03 PROCEDURE — 3078F DIAST BP <80 MM HG: CPT | Performed by: PHYSICIAN ASSISTANT

## 2024-09-03 PROCEDURE — 85613 RUSSELL VIPER VENOM DILUTED: CPT | Mod: GEALAB | Performed by: PHYSICIAN ASSISTANT

## 2024-09-03 PROCEDURE — 85610 PROTHROMBIN TIME: CPT | Performed by: PHYSICIAN ASSISTANT

## 2024-09-03 PROCEDURE — 82728 ASSAY OF FERRITIN: CPT | Performed by: PHYSICIAN ASSISTANT

## 2024-09-03 PROCEDURE — 81240 F2 GENE: CPT | Performed by: PHYSICIAN ASSISTANT

## 2024-09-03 PROCEDURE — 1123F ACP DISCUSS/DSCN MKR DOCD: CPT | Performed by: PHYSICIAN ASSISTANT

## 2024-09-03 PROCEDURE — 1157F ADVNC CARE PLAN IN RCRD: CPT | Performed by: PHYSICIAN ASSISTANT

## 2024-09-03 PROCEDURE — G0452 MOLECULAR PATHOLOGY INTERPR: HCPCS | Performed by: PHYSICIAN ASSISTANT

## 2024-09-03 PROCEDURE — 85730 THROMBOPLASTIN TIME PARTIAL: CPT | Performed by: PHYSICIAN ASSISTANT

## 2024-09-03 PROCEDURE — 85302 CLOT INHIBIT PROT C ANTIGEN: CPT | Performed by: PHYSICIAN ASSISTANT

## 2024-09-03 PROCEDURE — 85025 COMPLETE CBC W/AUTO DIFF WBC: CPT | Performed by: PHYSICIAN ASSISTANT

## 2024-09-03 PROCEDURE — 81241 F5 GENE: CPT | Performed by: PHYSICIAN ASSISTANT

## 2024-09-03 PROCEDURE — 1126F AMNT PAIN NOTED NONE PRSNT: CPT | Performed by: PHYSICIAN ASSISTANT

## 2024-09-03 PROCEDURE — 85306 CLOT INHIBIT PROT S FREE: CPT | Mod: GEALAB | Performed by: PHYSICIAN ASSISTANT

## 2024-09-03 ASSESSMENT — PAIN SCALES - GENERAL: PAINLEVEL: 0-NO PAIN

## 2024-09-04 ENCOUNTER — APPOINTMENT (OUTPATIENT)
Dept: PRIMARY CARE | Facility: CLINIC | Age: 74
End: 2024-09-04
Payer: MEDICARE

## 2024-09-04 VITALS
BODY MASS INDEX: 29.78 KG/M2 | HEART RATE: 62 BPM | HEIGHT: 70 IN | OXYGEN SATURATION: 97 % | TEMPERATURE: 97.3 F | RESPIRATION RATE: 18 BRPM | DIASTOLIC BLOOD PRESSURE: 68 MMHG | WEIGHT: 208 LBS | SYSTOLIC BLOOD PRESSURE: 122 MMHG

## 2024-09-04 DIAGNOSIS — G89.29 CHRONIC BILATERAL LOW BACK PAIN WITHOUT SCIATICA: ICD-10-CM

## 2024-09-04 DIAGNOSIS — Z12.11 COLON CANCER SCREENING: ICD-10-CM

## 2024-09-04 DIAGNOSIS — R26.89 LOSS OF BALANCE: ICD-10-CM

## 2024-09-04 DIAGNOSIS — G89.29 CHRONIC PAIN OF BOTH KNEES: ICD-10-CM

## 2024-09-04 DIAGNOSIS — S29.011D INTERCOSTAL MUSCLE STRAIN, SUBSEQUENT ENCOUNTER: ICD-10-CM

## 2024-09-04 DIAGNOSIS — J45.30 MILD PERSISTENT ASTHMA WITHOUT COMPLICATION (HHS-HCC): ICD-10-CM

## 2024-09-04 DIAGNOSIS — G25.0 BENIGN ESSENTIAL TREMOR: ICD-10-CM

## 2024-09-04 DIAGNOSIS — E03.9 HYPOTHYROIDISM, UNSPECIFIED TYPE: Primary | ICD-10-CM

## 2024-09-04 DIAGNOSIS — M25.561 CHRONIC PAIN OF BOTH KNEES: ICD-10-CM

## 2024-09-04 DIAGNOSIS — M25.562 CHRONIC PAIN OF BOTH KNEES: ICD-10-CM

## 2024-09-04 DIAGNOSIS — G62.9 POLYNEUROPATHY: ICD-10-CM

## 2024-09-04 DIAGNOSIS — F41.9 ANXIETY: ICD-10-CM

## 2024-09-04 DIAGNOSIS — M54.50 CHRONIC BILATERAL LOW BACK PAIN WITHOUT SCIATICA: ICD-10-CM

## 2024-09-04 DIAGNOSIS — F32.5 MAJOR DEPRESSION IN REMISSION (CMS-HCC): ICD-10-CM

## 2024-09-04 DIAGNOSIS — E78.5 HYPERLIPIDEMIA, UNSPECIFIED HYPERLIPIDEMIA TYPE: ICD-10-CM

## 2024-09-04 DIAGNOSIS — I10 BENIGN ESSENTIAL HYPERTENSION: ICD-10-CM

## 2024-09-04 PROCEDURE — 1157F ADVNC CARE PLAN IN RCRD: CPT | Performed by: FAMILY MEDICINE

## 2024-09-04 PROCEDURE — 3078F DIAST BP <80 MM HG: CPT | Performed by: FAMILY MEDICINE

## 2024-09-04 PROCEDURE — 1123F ACP DISCUSS/DSCN MKR DOCD: CPT | Performed by: FAMILY MEDICINE

## 2024-09-04 PROCEDURE — 1159F MED LIST DOCD IN RCRD: CPT | Performed by: FAMILY MEDICINE

## 2024-09-04 PROCEDURE — 3074F SYST BP LT 130 MM HG: CPT | Performed by: FAMILY MEDICINE

## 2024-09-04 PROCEDURE — G2211 COMPLEX E/M VISIT ADD ON: HCPCS | Performed by: FAMILY MEDICINE

## 2024-09-04 PROCEDURE — 1036F TOBACCO NON-USER: CPT | Performed by: FAMILY MEDICINE

## 2024-09-04 PROCEDURE — 99214 OFFICE O/P EST MOD 30 MIN: CPT | Performed by: FAMILY MEDICINE

## 2024-09-04 PROCEDURE — 1160F RVW MEDS BY RX/DR IN RCRD: CPT | Performed by: FAMILY MEDICINE

## 2024-09-04 PROCEDURE — 1126F AMNT PAIN NOTED NONE PRSNT: CPT | Performed by: FAMILY MEDICINE

## 2024-09-04 RX ORDER — LEVOTHYROXINE SODIUM 25 UG/1
25 TABLET ORAL DAILY
Qty: 90 TABLET | Refills: 0 | Status: SHIPPED | OUTPATIENT
Start: 2024-09-04

## 2024-09-04 RX ORDER — CITALOPRAM 20 MG/1
20 TABLET, FILM COATED ORAL DAILY
Qty: 90 TABLET | Refills: 0 | Status: SHIPPED | OUTPATIENT
Start: 2024-09-04

## 2024-09-04 RX ORDER — GABAPENTIN 600 MG/1
600 TABLET ORAL 3 TIMES DAILY
Qty: 270 TABLET | Refills: 0 | Status: SHIPPED | OUTPATIENT
Start: 2024-09-04

## 2024-09-04 ASSESSMENT — PATIENT HEALTH QUESTIONNAIRE - PHQ9
SUM OF ALL RESPONSES TO PHQ9 QUESTIONS 1 AND 2: 0
1. LITTLE INTEREST OR PLEASURE IN DOING THINGS: NOT AT ALL
2. FEELING DOWN, DEPRESSED OR HOPELESS: NOT AT ALL

## 2024-09-04 ASSESSMENT — PAIN SCALES - GENERAL: PAINLEVEL: 0-NO PAIN

## 2024-09-04 NOTE — PROGRESS NOTES
Subjective   Patient ID: Yaritza Bernal is a 74 y.o. female who presents for 3 MONTH FR/UP.    HPI   The patient presents to the clinic for a 3-month follow-up. She has past medical history of HTN, chest pain, HLD, SNHL bilateral ears, allergic rhinitis, hypothyroidism, vitamin D deficiency, heartburn, urine incontinence, anxiety, MDD, bilateral knee arthritis, benign essential tremor, polyneuropathy, mild persistent asthma, and eczema.    Pt states she bought a [employer] rollator walker, was able to walk 4.5 hours at one of the fairs recently and feels more secure when going to places with uneven terrain.  Still uses wlaking sticks when short walks when she is out    Her blood pressure (122/68) was within good range when checked in the clinic today. She continues on bisoprolol-HCTZ 10-6.25 mg (2 tablet daily) for treatment of hypertension. She denies any side-effects to her bisoprolol-HCTZ medication. She denies any chest pain and/or SOB symptoms.    She is on levothyroxine 25 mcg daily for treatment of hypothyroidism. She is tolerating this dose of levothyroxine medication well. Her most recent TSH (5.24), drawn in April 2024, was slightly elevated.  She has not done recheck.  She states does not want to do today since often misses her medication    The patient inquires about receiving a referral to physical therapy sessions for strained intercostal muscles. She has been receiving additional treatment (pulsed ultrasound towards the rib cage) for this condition. She also continues working on balance and strength so can walk more and help her with being fall risk    The patient reports that she has been suffering from sinus drainage for ~1 year. She has been using a steroid nasal spray (1 spray per nostril) for treatment of this condition.  She is allergic to cats and cat sleeps in her bed.  She has not tried other allergy meds.  She has not had any problems with asthma    Her most recent mammogram screening and DEXA  "bone density scan were done in March 2024.    She declines to have a colonoscopy screening at this time. However, she is open to having a Cologuard screening at this time.    Review of Systems    Objective   /68   Pulse 62   Temp 36.3 °C (97.3 °F)   Resp 18   Ht 1.778 m (5' 10\")   Wt 94.3 kg (208 lb)   SpO2 97%   BMI 29.84 kg/m²     Physical Exam  Neck:      Vascular: No carotid bruit.   Cardiovascular:      Rate and Rhythm: Normal rate and regular rhythm.      Pulses: Normal pulses.      Heart sounds: Normal heart sounds.   Pulmonary:      Effort: Pulmonary effort is normal.      Breath sounds: Normal breath sounds.   Musculoskeletal:         General: Normal range of motion.      Cervical back: Normal range of motion.      Right lower leg: No edema.      Left lower leg: No edema.   Lymphadenopathy:      Cervical: No cervical adenopathy.   Skin:     General: Skin is warm and dry.   Neurological:      Mental Status: She is alert and oriented to person, place, and time.   Psychiatric:         Mood and Affect: Mood normal.         Thought Content: Thought content normal.         Judgment: Judgment normal.         Assessment/Plan   Problem List Items Addressed This Visit             ICD-10-CM    Anxiety F41.9    Benign essential hypertension I10    Benign essential tremor G25.0    Bilateral knee pain M25.561, M25.562    Hyperlipidemia E78.5    Hypothyroid - Primary E03.9    Relevant Orders    TSH with reflex to Free T4 if abnormal    Loss of balance R26.89    Relevant Orders    Referral to Physical Therapy    Low back pain M54.50    Relevant Orders    Referral to Physical Therapy    Major depression in remission ([facility]) F32.5    Mild persistent asthma without complication ([facility]) J45.30     Other Visit Diagnoses         Codes    Intercostal muscle strain, subsequent encounter     S29.011D    Relevant Orders    Referral to Physical Therapy    Colon cancer screening     Z12.11    Relevant Orders    " Cologuard® colon cancer screening               A Cologuard cancer screening was ordered for the patient. The clinic will contact the patient upon receiving her screening results.    Meds needed refilled today    In regards to concerns with hypothyroidism, a repeat TSH was ordered for the patient. The patient intends to complete this screening in the near future after taking med regularly for 4-6 weeks. The clinic will contact the patient upon receiving her TSH result. Her levothyroxine medication may be adjusted depending on her TSH result. For now, she was instructed to continue taking levothyroxine 25 mcg daily  as previously directed.    In regards to concerns with strained intercostal muscles, the patient received a referral to physical therapy for further treatment of this condition. Continue monitoring symptoms for improvement.    In regards to concerns with sinus drainage, the patient was instructed to increase use of steroid nasal spray to 2 puffs per nostril once daily (rather than 1 puff per nostril daily). In addition, she was advised to try taking either OTC Xyzal or Zyrtec at night for further treatment of this condition. Continue monitoring symptoms for improvement/exacerbation.    She will follow-up in 3 months, unless otherwise needed.    Scribe Attestation  By signing my name below, IVirginia Scribe   attest that this documentation has been prepared under the direction and in the presence of Madyson Sandoval DO.

## 2024-09-05 ENCOUNTER — SOCIAL WORK (OUTPATIENT)
Dept: CASE MANAGEMENT | Facility: HOSPITAL | Age: 74
End: 2024-09-05
Payer: MEDICARE

## 2024-09-05 LAB
DRVVT SCREEN TO CONFIRM RATIO: 1.28 RATIO
DRVVT/DRVVT CFM NRMLZD PPP-RTO: 1.8 RATIO
DRVVT/DRVVT CFM P DOAC NEUT NORM PPP-RTO: 0.71 RATIO
LA 2 SCREEN W REFLEX-IMP: NORMAL
NORMALIZED SCT PPP-RTO: 0.8 RATIO
SILICA CLOTTING TIME CONFIRMATION: 1.2 RATIO
SILICA CLOTTING TIME SCREEN: 0.96 RATIO

## 2024-09-05 NOTE — PROGRESS NOTES
Received referral from BEBE Archibald saying pt was having a hard time affording her Eliquis prescription. Called pt to discuss. Pt reports she is paying about $200 a month for her Eliquis which is difficult for her to afford. Per pt, she meets the income requirement for [facility] free drug program. Pt wished to apply for free drug. She is also having trouble affording her asthma medicine. She was on Trelegy but was switched by her Pulmonologist to Symbicort as it is slightly cheaper. Writer found that Trelegy does have a free-drug program through [employer], but since pt's Pulmonologist is an outside provider, pt would have to follow up with them to get a script on her own. Pt indicated understanding and asked that applications be emailed to her at [email or web address]. Writer emailed both applications to pt along with instructions on getting pharmacy print out from [facility] and prescription from her Pulmonologist. Pt denied any other SW needs at this time. Awaiting completion of applications and collecting of documents by pt. Encouraged pt to reach out should additional questions or concerns arise.   Cherelle Hunter, MSW, KEVIN

## 2024-09-06 LAB — PROT C AG ACT/NOR PPP IA: 93 % (ref 63–153)

## 2024-09-08 NOTE — PROGRESS NOTES
Reason for Visit  Yaritza Bernal is a 74 y.o. female with PMH s/f LLE DVT after femur fracture referred by Dr. 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    PMH/PSH: HTN, HLD, SNHL bilateral ears, allergic rhinitis, hypothyroidism, vitamin D deficiency, heartburn, urine incontinence, anxiety, MDD, bilateral knee arthritis, benign essential tremor, polyneuropathy, mild persistent asthma, eczema, left leg vein stripping for varicose vein,   FH: Thyroid cancer  Soc Hx: Denies smoking, ETOH, illicit drugs; Works part time at [employer] as , , 1 son.    History of Present Illness:  Patient with a history of provoked of LLE DVT s/p fracture repair after mechanical fall in 2022. Patient states that she only received 2 months of Eliquis.     In 5/2024, presented to ED with left calf pain. The pain in her left leg started several days prior to her hospital visit. She had severe swelling in her left leg during this time. Ultrasound c/w thrombosis of left greater saphenous vein (believed to be both acute and chronic). Consequently, she was started on Eliquis BID.     On assessment, she notes her left leg swelling has improved, does report intermittent leg swelling. She has had left leg stripping for varicose veins years ago. She has no family history of clots. She notes no provoking factor prior to this clot. She denies traveling, surgeries, illness/COVID, or any form of immobility. She is up to date with her mammograms but had c-scope years ago. She continues on Eliquis 5 mg BID, denies any bleeding. Feels well aside from fatigue. Denies B symptoms, SOB/VOGT/CP, n/v/d/c/abd pain, rash or any other complaints at this time.     Review of Systems: All of the systems have been reviewed and are negative for complaints except what is stated in the HPI and/or past medical history.    Allergies and  Intolerances:  Allergies   Allergen Reactions    Amoxicillin Other    Erythromycin Unknown    Lisinopril Cough    Sulfa (Sulfonamide Antibiotics) Unknown          Outpatient Medication Profile:  Current Outpatient Medications   Medication Sig Dispense Refill    apixaban (Eliquis) 5 mg tablet Take 1 tablet (5 mg) by mouth 2 times a day. 180 tablet 1    betamethasone valerate (Valisone) 0.1 % cream Apply twice daily to rash on legs      bisoproloL-hydrochlorothiazide (Ziac) 10-6.25 mg tablet Take 2 tablets by mouth once daily with breakfast. 180 tablet 1    esomeprazole (NexIUM) 20 mg DR capsule Take 1 capsule (20 mg) by mouth once daily.      fluticasone (Flonase) 50 mcg/actuation nasal spray Administer into affected nostril(s).      ketorolac (Acular) 0.5 % ophthalmic solution 1 drop to surgical eye 4 times a day starting 1 day before surgery 5 mL 2    mv-mn-iron-FA-Ca carb-vit K (Women's Multivitamin) 18 mg-400 mcg- 500 mg-50 mcg tablet Take by mouth.      ofloxacin (Ocuflox) 0.3 % ophthalmic solution 1 drop to operative eye 4 times a day starting 1 day before surgery 5 mL 2    prednisoLONE acetate (Pred-Forte) 1 % ophthalmic suspension 1 drop to operative eye 4 times a day starting the day of surgery (after surgery is done) 5 mL 2    Trelegy Ellipta 100-62.5-25 mcg blister with device       walker (Ultra-Light Rollator) misc Use when walking to prevent falls 1 each 0    citalopram (CeleXA) 20 mg tablet Take 1 tablet (20 mg) by mouth once daily. 90 tablet 0    gabapentin (Neurontin) 600 mg tablet Take 1 tablet (600 mg) by mouth 3 times a day. 270 tablet 0    levothyroxine (Synthroid, Levoxyl) 25 mcg tablet Take 1 tablet (25 mcg) by mouth once daily. 90 tablet 0     No current facility-administered medications for this visit.      Vitals and Measurements:       6/6/2024    10:44 AM 6/6/2024    12:35 PM 6/6/2024     1:00 PM 7/2/2024    12:45 PM 7/22/2024     3:46 PM 9/3/2024    12:04 PM 9/4/2024     4:02 PM   Vitals  "  Systolic 177 137 147 164 132 110 122   Diastolic 79 79 73 78 82 72 68   Heart Rate 59 58 61 67 64 61 62   Temp 36.2 °C (97.2 °F) 36.3 °C (97.3 °F) 36.3 °C (97.3 °F)  36.8 °C (98.2 °F) 36.5 °C (97.7 °F) 36.3 °C (97.3 °F)   Resp 16 16 16 16 16 16 18   Height (in) 1.778 m (5' 10\")    1.778 m (5' 10\") 1.778 m (5' 10\") 1.778 m (5' 10\")   Weight (lb) 209.66   199.96 199.96 208.34 208   BMI 30.08 kg/m2   28.69 kg/m2 28.69 kg/m2 29.89 kg/m2 29.84 kg/m2   BSA (m2) 2.17 m2   2.12 m2 2.12 m2 2.16 m2 2.16 m2   Visit Report      Report Report     Physical Exam:   Constitutional: alert, awake and oriented, not in acute distress   HEENT: moist mucous membranes, normal nose   Neck: supple, no lymphadenopathy   EYES: PERRL, EOM intact, conjunctiva normal  Skin: no jaundice, rash or erythema  Neurological: AAOx3, no gross focal deficit   Psychiatric: normal mood and behavior   Ext: left leg swollen >right; no pitting edema    Lab Results:  Lab Results   Component Value Date    WBC 6.1 09/03/2024    NEUTROABS 3.76 09/03/2024    IGABSOL 0.01 09/03/2024    LYMPHSABS 1.61 09/03/2024    MONOSABS 0.51 09/03/2024    EOSABS 0.19 09/03/2024    BASOSABS 0.06 09/03/2024    RBC 4.46 09/03/2024    MCV 87 09/03/2024    MCHC 31.0 (L) 09/03/2024    HGB 12.1 09/03/2024    HCT 39.0 09/03/2024     09/03/2024     Lab Results   Component Value Date    CREATININE 0.69 09/03/2024    BUN 22 09/03/2024    EGFR >90 09/03/2024     09/03/2024    K 3.3 (L) 09/03/2024     09/03/2024    CO2 31 09/03/2024      Lab Results   Component Value Date    ALT 10 09/03/2024    AST 10 09/03/2024    ALKPHOS 72 09/03/2024    BILITOT 0.4 09/03/2024      Lab Results   Component Value Date    TSH 5.24 (H) 04/19/2024     Lab Results   Component Value Date    TSH 5.24 (H) 04/19/2024    THYROIDPAB 172 (H) 01/18/2018     Lab Results   Component Value Date    IRON 30 (L) 09/03/2024    TIBC 393 09/03/2024    FERRITIN 14 09/03/2024      Lab Results   Component Value " Date    [id number] 302 09/03/2024      Assessment:    74 y.o. 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      Plan:    Reviewed and discussed lab, imaging, and pathology results with patient in detail as well as diagnosis, prognosis, and treatment options.    Discussed that recurrent unprovoked DVT would require lifelong AC.     Discussed sending hypercoagulable work up to ensure no APS.    Recommend c-scope to complete screening studies.    Will refer to  for cost    Nutritional studies for fatigue    F/U in 2-3 weeks    Patient verbalized understanding, and all her questions were answered to her satisfaction    60 min spent with patient greater than 50 % of which was spent in consultation and coordination of care.

## 2024-09-09 LAB
ELECTRONICALLY SIGNED BY: NORMAL
ELECTRONICALLY SIGNED BY: NORMAL
FACTOR II-PROTHROMBIN INTERPRETATION: NORMAL
FACTOR II-PROTHROMBIN RESULT: NORMAL
FACTOR V LEIDEN INTERPRETATION: NORMAL
FACTOR V LEIDEN RESULT: NORMAL

## 2024-09-10 LAB — PROT S FREE AG ACT/NOR PPP IA: 75 % (ref 55–124)

## 2024-09-23 ENCOUNTER — OFFICE VISIT (OUTPATIENT)
Dept: HEMATOLOGY/ONCOLOGY | Facility: CLINIC | Age: 74
End: 2024-09-23
Payer: MEDICARE

## 2024-09-23 ENCOUNTER — SOCIAL WORK (OUTPATIENT)
Dept: CASE MANAGEMENT | Facility: HOSPITAL | Age: 74
End: 2024-09-23

## 2024-09-23 VITALS
OXYGEN SATURATION: 96 % | WEIGHT: 208.78 LBS | TEMPERATURE: 97.5 F | RESPIRATION RATE: 16 BRPM | SYSTOLIC BLOOD PRESSURE: 152 MMHG | BODY MASS INDEX: 29.89 KG/M2 | HEIGHT: 70 IN | HEART RATE: 74 BPM | DIASTOLIC BLOOD PRESSURE: 83 MMHG

## 2024-09-23 DIAGNOSIS — D50.8 IRON DEFICIENCY ANEMIA SECONDARY TO INADEQUATE DIETARY IRON INTAKE: ICD-10-CM

## 2024-09-23 DIAGNOSIS — K90.9 IDIOPATHIC STEATORRHEA (HHS-HCC): Primary | ICD-10-CM

## 2024-09-23 DIAGNOSIS — I82.402 ACUTE DEEP VEIN THROMBOSIS (DVT) OF LEFT LOWER EXTREMITY, UNSPECIFIED VEIN (MULTI): ICD-10-CM

## 2024-09-23 PROCEDURE — 1159F MED LIST DOCD IN RCRD: CPT | Performed by: PHYSICIAN ASSISTANT

## 2024-09-23 PROCEDURE — 1123F ACP DISCUSS/DSCN MKR DOCD: CPT | Performed by: PHYSICIAN ASSISTANT

## 2024-09-23 PROCEDURE — 99214 OFFICE O/P EST MOD 30 MIN: CPT | Performed by: PHYSICIAN ASSISTANT

## 2024-09-23 PROCEDURE — 3077F SYST BP >= 140 MM HG: CPT | Performed by: PHYSICIAN ASSISTANT

## 2024-09-23 PROCEDURE — 1157F ADVNC CARE PLAN IN RCRD: CPT | Performed by: PHYSICIAN ASSISTANT

## 2024-09-23 PROCEDURE — 3079F DIAST BP 80-89 MM HG: CPT | Performed by: PHYSICIAN ASSISTANT

## 2024-09-23 PROCEDURE — 1126F AMNT PAIN NOTED NONE PRSNT: CPT | Performed by: PHYSICIAN ASSISTANT

## 2024-09-23 RX ORDER — EPINEPHRINE 0.3 MG/.3ML
0.3 INJECTION SUBCUTANEOUS EVERY 5 MIN PRN
OUTPATIENT
Start: 2024-10-02

## 2024-09-23 RX ORDER — ALBUTEROL SULFATE 0.83 MG/ML
3 SOLUTION RESPIRATORY (INHALATION) AS NEEDED
OUTPATIENT
Start: 2024-10-02

## 2024-09-23 RX ORDER — HEPARIN 100 UNIT/ML
500 SYRINGE INTRAVENOUS AS NEEDED
OUTPATIENT
Start: 2024-09-23

## 2024-09-23 RX ORDER — HEPARIN SODIUM,PORCINE/PF 10 UNIT/ML
50 SYRINGE (ML) INTRAVENOUS AS NEEDED
OUTPATIENT
Start: 2024-09-23

## 2024-09-23 RX ORDER — DIPHENHYDRAMINE HYDROCHLORIDE 50 MG/ML
50 INJECTION INTRAMUSCULAR; INTRAVENOUS AS NEEDED
OUTPATIENT
Start: 2024-10-02

## 2024-09-23 RX ORDER — FAMOTIDINE 10 MG/ML
20 INJECTION INTRAVENOUS ONCE AS NEEDED
OUTPATIENT
Start: 2024-10-02

## 2024-09-23 ASSESSMENT — PAIN SCALES - GENERAL: PAINLEVEL: 0-NO PAIN

## 2024-09-23 NOTE — PROGRESS NOTES
Writer met with pt in exam room to follow up on Eliquis application. Pt was unable to recall speaking with writer a few weeks ago or what she was supposed to do to apply for free drug. Discussed Eliquis free drug program through [facility] [employer] [facility] and had pt sign the application. Provided pt with instructions on getting pharmacy print outs to submit with application and asked her to have the pharmacy's fax them directly to writer; pt indicated understanding. Also provided pt with copy of Trelegy free drug and instructed her to follow up with her allergist on this, as they are not a  provider. Pt denied any other SW needs at this time. Awaiting pharmacy printouts to complete pt's application. SW will continue to follow.   YUE Gatica, KEVIN    9/30/24: Called pt to follow up on Eliquis application as writer has not yet received pt's pharmacy print outs. Pt did not answer the phone, left voicemail asking for call back. SW will remain available.   YUE Gatica LISW

## 2024-09-23 NOTE — PROGRESS NOTES
Reason for Visit  Yaritza Bernal is a 74 y.o. female with PMH s/f LLE DVT after femur fracture referred by Dr. 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    Patient with a history of provoked of LLE DVT s/p fracture repair after mechanical fall in 2022. Patient states that she only received 2 months of Eliquis.     In 5/2024, presented to ED with left calf pain. The pain in her left leg started several days prior to her hospital visit. She had severe swelling in her left leg during this time. Ultrasound c/w thrombosis of left greater saphenous vein (believed to be both acute and chronic). Consequently, she was started on Eliquis BID.     On assessment, she notes her left leg swelling has improved, does report intermittent leg swelling. She has had left leg stripping for varicose veins years ago. She has no family history of clots. She notes no provoking factor prior to this clot. She denies traveling, surgeries, illness/COVID, or any form of immobility. She is up to date with her mammograms but had c-scope years ago. She continues on Eliquis 5 mg BID, denies any bleeding. Feels well aside from fatigue. Denies B symptoms, SOB/VOGT/CP, n/v/d/c/abd pain, rash or any other complaints at this time.     PMH/PSH: HTN, HLD, SNHL bilateral ears, allergic rhinitis, hypothyroidism, vitamin D deficiency, heartburn, urine incontinence, anxiety, MDD, bilateral knee arthritis, benign essential tremor, polyneuropathy, mild persistent asthma, eczema, left leg vein stripping for varicose vein,   FH: Thyroid cancer  Soc Hx: Denies smoking, ETOH, illicit drugs; Works part time at [employer] as , , 1 son.    History of Present Illness:  Continues to have fatigue. Continues on Eliquis 5 mg BID. Denies any bleeding from any source. Otherwise doing well.     Review of Systems: All of the systems have been reviewed and are  negative for complaints except what is stated in the HPI and/or past medical history.    Allergies and Intolerances:  Allergies   Allergen Reactions    Amoxicillin Other    Erythromycin Unknown    Lisinopril Cough    Sulfa (Sulfonamide Antibiotics) Unknown          Outpatient Medication Profile:  Current Outpatient Medications   Medication Sig Dispense Refill    apixaban (Eliquis) 5 mg tablet Take 1 tablet (5 mg) by mouth 2 times a day. 180 tablet 1    betamethasone valerate (Valisone) 0.1 % cream Apply twice daily to rash on legs      bisoproloL-hydrochlorothiazide (Ziac) 10-6.25 mg tablet Take 2 tablets by mouth once daily with breakfast. 180 tablet 1    citalopram (CeleXA) 20 mg tablet Take 1 tablet (20 mg) by mouth once daily. 90 tablet 0    esomeprazole (NexIUM) 20 mg DR capsule Take 1 capsule (20 mg) by mouth once daily.      fluticasone (Flonase) 50 mcg/actuation nasal spray Administer into affected nostril(s).      gabapentin (Neurontin) 600 mg tablet Take 1 tablet (600 mg) by mouth 3 times a day. 270 tablet 0    ketorolac (Acular) 0.5 % ophthalmic solution 1 drop to surgical eye 4 times a day starting 1 day before surgery 5 mL 2    levothyroxine (Synthroid, Levoxyl) 25 mcg tablet Take 1 tablet (25 mcg) by mouth once daily. 90 tablet 0    mv-mn-iron-FA-Ca carb-vit K (Women's Multivitamin) 18 mg-400 mcg- 500 mg-50 mcg tablet Take by mouth.      ofloxacin (Ocuflox) 0.3 % ophthalmic solution 1 drop to operative eye 4 times a day starting 1 day before surgery 5 mL 2    prednisoLONE acetate (Pred-Forte) 1 % ophthalmic suspension 1 drop to operative eye 4 times a day starting the day of surgery (after surgery is done) 5 mL 2    Trelegy Ellipta 100-62.5-25 mcg blister with device       walker (Ultra-Light Rollator) misc Use when walking to prevent falls 1 each 0     No current facility-administered medications for this visit.      Vitals and Measurements:       6/6/2024    12:35 PM 6/6/2024     1:00 PM 7/2/2024     "12:45 PM 7/22/2024     3:46 PM 9/3/2024    12:04 PM 9/4/2024     4:02 PM 9/23/2024    11:46 AM   Vitals   Systolic 137 147 164 132 110 122 152   Diastolic 79 73 78 82 72 68 83   Heart Rate 58 61 67 64 61 62 74   Temp 36.3 °C (97.3 °F) 36.3 °C (97.3 °F)  36.8 °C (98.2 °F) 36.5 °C (97.7 °F) 36.3 °C (97.3 °F) 36.4 °C (97.5 °F)   Resp 16 16 16 16 16 18 16   Height (in)    1.778 m (5' 10\") 1.778 m (5' 10\") 1.778 m (5' 10\") 1.773 m (5' 9.8\")    Weight (lb)   199.96 199.96 208.34 208 208.78   BMI   28.69 kg/m2 28.69 kg/m2 29.89 kg/m2 29.84 kg/m2 30.13 kg/m2   BSA (m2)   2.12 m2 2.12 m2 2.16 m2 2.16 m2 2.16 m2   Visit Report     Report Report Report       Significant value     Physical Exam:   Constitutional: alert, awake and oriented, not in acute distress   HEENT: moist mucous membranes, normal nose   Neck: supple, no lymphadenopathy   EYES: PERRL, EOM intact, conjunctiva normal  Skin: no jaundice, rash or erythema  Neurological: AAOx3, no gross focal deficit   Psychiatric: normal mood and behavior   Ext: left leg swollen >right; no pitting edema    Lab Results:  Lab Results   Component Value Date    WBC 6.1 09/03/2024    NEUTROABS 3.76 09/03/2024    IGABSOL 0.01 09/03/2024    LYMPHSABS 1.61 09/03/2024    MONOSABS 0.51 09/03/2024    EOSABS 0.19 09/03/2024    BASOSABS 0.06 09/03/2024    RBC 4.46 09/03/2024    MCV 87 09/03/2024    MCHC 31.0 (L) 09/03/2024    HGB 12.1 09/03/2024    HCT 39.0 09/03/2024     09/03/2024     Lab Results   Component Value Date    CREATININE 0.69 09/03/2024    BUN 22 09/03/2024    EGFR >90 09/03/2024     09/03/2024    K 3.3 (L) 09/03/2024     09/03/2024    CO2 31 09/03/2024      Lab Results   Component Value Date    ALT 10 09/03/2024    AST 10 09/03/2024    ALKPHOS 72 09/03/2024    BILITOT 0.4 09/03/2024      Lab Results   Component Value Date    TSH 5.24 (H) 04/19/2024     Lab Results   Component Value Date    TSH 5.24 (H) 04/19/2024    THYROIDPAB 172 (H) 01/18/2018     Lab " Results   Component Value Date    IRON 30 (L) 09/03/2024    TIBC 393 09/03/2024    FERRITIN 14 09/03/2024      Lab Results   Component Value Date    [id number] 302 09/03/2024     Assessment:    74 y.o. 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    Fatigue    Plan:    Reviewed and discussed lab, imaging, and pathology results with patient in detail as well as diagnosis, prognosis, and treatment options.    Discussed that recurrent unprovoked DVT would require lifelong AC.     Hypercoagulable work up to ensure no APS unremarkable    Noted to have significant EUGENIA; discussed infusional iron will precert for Venofer x 3 doses. Notes diet not very rich in iron or nutritional.    Recommend oral vitamin B12    Recommend c-scope to complete screening studies and now for EUGENIA but prefers to do cologuard.     F/U w/PCP    RTC in 2 months    Patient verbalized understanding, and all her questions were answered to her satisfaction    30 min spent with patient greater than 50 % of which was spent in consultation and coordination of care.

## 2024-10-01 ENCOUNTER — INFUSION (OUTPATIENT)
Dept: HEMATOLOGY/ONCOLOGY | Facility: CLINIC | Age: 74
End: 2024-10-01
Payer: MEDICARE

## 2024-10-01 ENCOUNTER — SOCIAL WORK (OUTPATIENT)
Dept: CASE MANAGEMENT | Facility: HOSPITAL | Age: 74
End: 2024-10-01
Payer: MEDICARE

## 2024-10-01 VITALS
BODY MASS INDEX: 30.16 KG/M2 | OXYGEN SATURATION: 97 % | SYSTOLIC BLOOD PRESSURE: 122 MMHG | RESPIRATION RATE: 16 BRPM | DIASTOLIC BLOOD PRESSURE: 77 MMHG | TEMPERATURE: 97.5 F | WEIGHT: 209 LBS | HEART RATE: 66 BPM

## 2024-10-01 DIAGNOSIS — G25.0 BENIGN ESSENTIAL TREMOR: ICD-10-CM

## 2024-10-01 DIAGNOSIS — K90.9 IDIOPATHIC STEATORRHEA (HHS-HCC): ICD-10-CM

## 2024-10-01 DIAGNOSIS — D50.8 IRON DEFICIENCY ANEMIA SECONDARY TO INADEQUATE DIETARY IRON INTAKE: ICD-10-CM

## 2024-10-01 PROCEDURE — 2500000004 HC RX 250 GENERAL PHARMACY W/ HCPCS (ALT 636 FOR OP/ED): Performed by: PHYSICIAN ASSISTANT

## 2024-10-01 PROCEDURE — 96365 THER/PROPH/DIAG IV INF INIT: CPT | Mod: INF

## 2024-10-01 RX ORDER — HEPARIN SODIUM,PORCINE/PF 10 UNIT/ML
50 SYRINGE (ML) INTRAVENOUS AS NEEDED
Status: DISCONTINUED | OUTPATIENT
Start: 2024-10-01 | End: 2024-10-01 | Stop reason: HOSPADM

## 2024-10-01 RX ORDER — EPINEPHRINE 0.3 MG/.3ML
0.3 INJECTION SUBCUTANEOUS EVERY 5 MIN PRN
Status: DISCONTINUED | OUTPATIENT
Start: 2024-10-01 | End: 2024-10-01 | Stop reason: HOSPADM

## 2024-10-01 RX ORDER — FAMOTIDINE 10 MG/ML
20 INJECTION INTRAVENOUS ONCE AS NEEDED
Status: DISCONTINUED | OUTPATIENT
Start: 2024-10-01 | End: 2024-10-01 | Stop reason: HOSPADM

## 2024-10-01 RX ORDER — ALBUTEROL SULFATE 0.83 MG/ML
3 SOLUTION RESPIRATORY (INHALATION) AS NEEDED
Status: DISCONTINUED | OUTPATIENT
Start: 2024-10-01 | End: 2024-10-01 | Stop reason: HOSPADM

## 2024-10-01 RX ORDER — EPINEPHRINE 0.3 MG/.3ML
0.3 INJECTION SUBCUTANEOUS EVERY 5 MIN PRN
OUTPATIENT
Start: 2024-10-08

## 2024-10-01 RX ORDER — HEPARIN 100 UNIT/ML
500 SYRINGE INTRAVENOUS AS NEEDED
OUTPATIENT
Start: 2024-10-01

## 2024-10-01 RX ORDER — DIPHENHYDRAMINE HYDROCHLORIDE 50 MG/ML
50 INJECTION INTRAMUSCULAR; INTRAVENOUS AS NEEDED
Status: DISCONTINUED | OUTPATIENT
Start: 2024-10-01 | End: 2024-10-01 | Stop reason: HOSPADM

## 2024-10-01 RX ORDER — HEPARIN 100 UNIT/ML
500 SYRINGE INTRAVENOUS AS NEEDED
Status: DISCONTINUED | OUTPATIENT
Start: 2024-10-01 | End: 2024-10-01 | Stop reason: HOSPADM

## 2024-10-01 RX ORDER — HEPARIN SODIUM,PORCINE/PF 10 UNIT/ML
50 SYRINGE (ML) INTRAVENOUS AS NEEDED
OUTPATIENT
Start: 2024-10-01

## 2024-10-01 RX ORDER — DIPHENHYDRAMINE HYDROCHLORIDE 50 MG/ML
50 INJECTION INTRAMUSCULAR; INTRAVENOUS AS NEEDED
OUTPATIENT
Start: 2024-10-08

## 2024-10-01 RX ORDER — FAMOTIDINE 10 MG/ML
20 INJECTION INTRAVENOUS ONCE AS NEEDED
OUTPATIENT
Start: 2024-10-08

## 2024-10-01 RX ORDER — ALBUTEROL SULFATE 0.83 MG/ML
3 SOLUTION RESPIRATORY (INHALATION) AS NEEDED
OUTPATIENT
Start: 2024-10-08

## 2024-10-01 ASSESSMENT — PAIN SCALES - GENERAL: PAINLEVEL: 0-NO PAIN

## 2024-10-01 NOTE — PROGRESS NOTES
Met with pt in infusion room to follow up on Eliquis application. Pt is getting her first dose of Venofer today and reports being quite anxious about it; provided supportive counseling.  Pt says she has not yet gotten the pharmacy print outs, saying she has been busy at work and dealing with fatigue from her anemia. Assured pt that the application is on her schedule and can wait as long as she needs it to. Pt denied any other SW needs at this time. Encouraged pt to reach out should additional questions or concerns arise.   Cheerlle Hunter, MSW, KEVIN

## 2024-10-01 NOTE — PROGRESS NOTES
Yaritza Bernal tolerated iron infusion without incident, observed upon completion of infusion for 30 minutes. Patient is aware of follow-up appointments.

## 2024-10-09 ENCOUNTER — INFUSION (OUTPATIENT)
Dept: HEMATOLOGY/ONCOLOGY | Facility: CLINIC | Age: 74
End: 2024-10-09
Payer: MEDICARE

## 2024-10-09 VITALS
WEIGHT: 211.86 LBS | HEART RATE: 68 BPM | TEMPERATURE: 97.5 F | SYSTOLIC BLOOD PRESSURE: 134 MMHG | DIASTOLIC BLOOD PRESSURE: 79 MMHG | RESPIRATION RATE: 18 BRPM | BODY MASS INDEX: 30.57 KG/M2 | OXYGEN SATURATION: 95 %

## 2024-10-09 DIAGNOSIS — D50.8 IRON DEFICIENCY ANEMIA SECONDARY TO INADEQUATE DIETARY IRON INTAKE: ICD-10-CM

## 2024-10-09 DIAGNOSIS — K90.9 IDIOPATHIC STEATORRHEA (HHS-HCC): ICD-10-CM

## 2024-10-09 PROCEDURE — 96365 THER/PROPH/DIAG IV INF INIT: CPT | Mod: INF

## 2024-10-09 PROCEDURE — 2500000004 HC RX 250 GENERAL PHARMACY W/ HCPCS (ALT 636 FOR OP/ED): Performed by: PHYSICIAN ASSISTANT

## 2024-10-09 RX ORDER — HEPARIN SODIUM,PORCINE/PF 10 UNIT/ML
50 SYRINGE (ML) INTRAVENOUS AS NEEDED
OUTPATIENT
Start: 2024-10-09

## 2024-10-09 RX ORDER — EPINEPHRINE 0.3 MG/.3ML
0.3 INJECTION SUBCUTANEOUS EVERY 5 MIN PRN
OUTPATIENT
Start: 2024-10-15

## 2024-10-09 RX ORDER — ALBUTEROL SULFATE 0.83 MG/ML
3 SOLUTION RESPIRATORY (INHALATION) AS NEEDED
OUTPATIENT
Start: 2024-10-15

## 2024-10-09 RX ORDER — FAMOTIDINE 10 MG/ML
20 INJECTION INTRAVENOUS ONCE AS NEEDED
OUTPATIENT
Start: 2024-10-15

## 2024-10-09 RX ORDER — DIPHENHYDRAMINE HYDROCHLORIDE 50 MG/ML
50 INJECTION INTRAMUSCULAR; INTRAVENOUS AS NEEDED
OUTPATIENT
Start: 2024-10-15

## 2024-10-09 RX ORDER — HEPARIN 100 UNIT/ML
500 SYRINGE INTRAVENOUS AS NEEDED
OUTPATIENT
Start: 2024-10-09

## 2024-10-09 ASSESSMENT — PAIN SCALES - GENERAL: PAINLEVEL: 0-NO PAIN

## 2024-10-09 NOTE — PROGRESS NOTES
Patient received dose 2 of 3 Venofer, tolerated treatment well, left ambulatory in stable condition.

## 2024-10-11 ENCOUNTER — PATIENT MESSAGE (OUTPATIENT)
Dept: PRIMARY CARE | Facility: CLINIC | Age: 74
End: 2024-10-11
Payer: MEDICARE

## 2024-10-11 RX ORDER — BISOPROLOL FUMARATE AND HYDROCHLOROTHIAZIDE 10; 6.25 MG/1; MG/1
2 TABLET ORAL
Qty: 180 TABLET | Refills: 0 | Status: SHIPPED | OUTPATIENT
Start: 2024-10-11

## 2024-10-14 ENCOUNTER — HOSPITAL ENCOUNTER (EMERGENCY)
Facility: HOSPITAL | Age: 74
Discharge: HOME | End: 2024-10-14
Payer: MEDICARE

## 2024-10-14 ENCOUNTER — APPOINTMENT (OUTPATIENT)
Dept: RADIOLOGY | Facility: HOSPITAL | Age: 74
End: 2024-10-14
Payer: MEDICARE

## 2024-10-14 VITALS
RESPIRATION RATE: 18 BRPM | SYSTOLIC BLOOD PRESSURE: 101 MMHG | DIASTOLIC BLOOD PRESSURE: 70 MMHG | OXYGEN SATURATION: 98 % | HEART RATE: 60 BPM | HEIGHT: 70 IN | TEMPERATURE: 97.6 F | WEIGHT: 208 LBS | BODY MASS INDEX: 29.78 KG/M2

## 2024-10-14 DIAGNOSIS — M79.604 LEG PAIN, RIGHT: Primary | ICD-10-CM

## 2024-10-14 PROCEDURE — 99284 EMERGENCY DEPT VISIT MOD MDM: CPT | Mod: 25

## 2024-10-14 PROCEDURE — 93971 EXTREMITY STUDY: CPT

## 2024-10-14 PROCEDURE — 93971 EXTREMITY STUDY: CPT | Performed by: RADIOLOGY

## 2024-10-14 ASSESSMENT — PAIN DESCRIPTION - ORIENTATION: ORIENTATION: RIGHT

## 2024-10-14 ASSESSMENT — COLUMBIA-SUICIDE SEVERITY RATING SCALE - C-SSRS
6. HAVE YOU EVER DONE ANYTHING, STARTED TO DO ANYTHING, OR PREPARED TO DO ANYTHING TO END YOUR LIFE?: NO
1. IN THE PAST MONTH, HAVE YOU WISHED YOU WERE DEAD OR WISHED YOU COULD GO TO SLEEP AND NOT WAKE UP?: NO
2. HAVE YOU ACTUALLY HAD ANY THOUGHTS OF KILLING YOURSELF?: NO

## 2024-10-14 ASSESSMENT — PAIN DESCRIPTION - DESCRIPTORS: DESCRIPTORS: DULL

## 2024-10-14 ASSESSMENT — PAIN DESCRIPTION - PROGRESSION: CLINICAL_PROGRESSION: GRADUALLY IMPROVING

## 2024-10-14 ASSESSMENT — PAIN - FUNCTIONAL ASSESSMENT: PAIN_FUNCTIONAL_ASSESSMENT: 0-10

## 2024-10-14 ASSESSMENT — PAIN SCALES - GENERAL: PAINLEVEL_OUTOF10: 1

## 2024-10-14 ASSESSMENT — PAIN DESCRIPTION - LOCATION: LOCATION: LEG

## 2024-10-14 ASSESSMENT — PAIN DESCRIPTION - FREQUENCY: FREQUENCY: CONSTANT/CONTINUOUS

## 2024-10-14 NOTE — ED PROVIDER NOTES
HPI   Chief Complaint   Patient presents with   • Leg Pain       Ms. Bernal is a 74-year-old female with a past medical history of clotting disorder and previous left lower extremity DVT on Eliquis, GERD, hyperlipidemia, hypertension, hypothyroidism, IBS who presents to the emergency department with pain in her right calf and behind her right knee that began yesterday evening.  No obvious trauma or overuse.  Patient denies chest pain or shortness of breath.  She has not missed doses of her Eliquis.  She went to her regularly scheduled physical therapy session for her shoulders today, and her physical therapist noticed that she was tender in her right calf.  The leg has not been red hot or swollen.  No associated fever, chills, nausea, vomiting.  Physical therapist recommended patient come in to rule out DVT.        History provided by:  Patient          Patient History   Past Medical History:   Diagnosis Date   • Bladder disorder, unspecified 10/21/2015    Bladder disorder   • Cataract    • Clotting disorder (Multi)    • Cutaneous abscess, unspecified 03/16/2017    Abscess   • Disorder of the skin and subcutaneous tissue, unspecified 07/14/2016    Hand lesion   • Dry eyes    • DVT (deep venous thrombosis) (Multi)     after femur fx surgery   • Encounter for immunization 02/17/2017    Need for Tdap vaccination   • Encounter for immunization 10/04/2016    Need for vaccination with 13-polyvalent pneumococcal conjugate vaccine   • GERD (gastroesophageal reflux disease)    • HL (hearing loss)    • Hyperlipidemia    • Hypertension    • Hypothyroidism    • Impacted cerumen, bilateral 10/21/2015    Impacted cerumen of both ears   • Impaired fasting glucose 09/17/2018    Abnormal fasting glucose   • Irritable bowel syndrome    • Medial epicondylitis, right elbow 10/04/2016    Medial epicondylitis, right   • Nephrolithiasis    • Other conditions influencing health status 03/16/2017    Cyst   • Other specified abnormal findings  of blood chemistry 10/04/2018    Abnormal TSH   • Other specified abnormal findings of blood chemistry 09/17/2018    Abnormal TSH   • Other specified symptoms and signs involving the circulatory and respiratory systems 09/28/2017    Chest congestion   • Pain in left hand 09/28/2017    Pain of left hand   • Pain in left hip 10/04/2018    Left hip pain   • Pain in right hip 11/01/2017    Bilateral hip pain   • Pain in right knee 03/13/2017    Right knee pain   • Pain in unspecified knee 03/13/2017    Knee pain   • Personal history of other diseases of the circulatory system 05/09/2018    History of hypertension   • Personal history of other diseases of the circulatory system 06/27/2018    History of hypertension   • Personal history of other diseases of the circulatory system 01/18/2018    History of hypertension   • Personal history of other diseases of the circulatory system 08/17/2017    History of hypertension   • Personal history of other diseases of the nervous system and sense organs 09/17/2018    History of benign essential tremor   • Personal history of other diseases of the nervous system and sense organs 10/20/2016    History of acute otitis media   • Personal history of other diseases of the respiratory system 03/09/2018    History of acute bacterial sinusitis   • Personal history of other drug therapy 05/09/2018    History of pneumococcal vaccination   • Personal history of other endocrine, nutritional and metabolic disease 10/30/2017    History of hyperlipidemia   • Personal history of other mental and behavioral disorders 07/16/2018    History of anxiety   • Personal history of other specified conditions 06/27/2018    History of fatigue   • Strain of muscle, fascia and tendon of the posterior muscle group at thigh level, unspecified thigh, initial encounter 01/15/2019    Hamstring strain, initial encounter   • Strain of unspecified muscles, fascia and tendons at thigh level, left thigh, initial  encounter 05/01/2018    Muscle strain of left thigh   • Unspecified injury of unspecified elbow, initial encounter 01/15/2019    Elbow injury, initial encounter     Past Surgical History:   Procedure Laterality Date   • BLADDER SURGERY  10/21/2015    Bladder Surgery   • BREAST BIOPSY Left     LF benign biopsy, >20 years ago   • CATARACT EXTRACTION W/  INTRAOCULAR LENS IMPLANT Right 06/06/2024    Dr. Rashid   • CATARACT EXTRACTION W/  INTRAOCULAR LENS IMPLANT Left 07/20/2023    Dr. Rashid   • EXTRACORPOREAL SHOCK WAVE LITHOTRIPSY     • FEMUR FRACTURE SURGERY     • KNEE SURGERY     • VEIN LIGATION AND STRIPPING       Family History   Problem Relation Name Age of Onset   • Other (cardiac disorder) Mother     • Other (emphysema lung) Father     • Breast cancer Neg Hx       Social History     Tobacco Use   • Smoking status: Never   • Smokeless tobacco: Never   • Tobacco comments:     Denies any illness in past 30 days     Denies personal/family reaction or problems with anesthesia     Denies any SOB with stairs or daily activity     Uses bilat walking sticks to ambulate     Able to lay flat x 30 minutes             Vaping Use   • Vaping status: Never Used   Substance Use Topics   • Alcohol use: Not Currently   • Drug use: Never       Physical Exam   ED Triage Vitals [10/14/24 1545]   Temperature Heart Rate Respirations BP   36.4 °C (97.6 °F) 60 18 101/70      Pulse Ox Temp src Heart Rate Source Patient Position   98 % -- -- --      BP Location FiO2 (%)     -- --       Physical Exam  Constitutional:       Appearance: Normal appearance.   HENT:      Head: Normocephalic and atraumatic.   Eyes:      Pupils: Pupils are equal, round, and reactive to light.   Cardiovascular:      Rate and Rhythm: Normal rate and regular rhythm.   Pulmonary:      Comments: Clear to auscultation without wheezing rhonchi rales  Musculoskeletal:      Comments: Patient has chronic swelling in her left lower extremity in comparison to the right.  Left  lower extremity is 3+ pitting compared to 1+ pitting in the right lower extremity.  Pedal pulses 2+ bounding symmetric.  No redness or significant skin change in the bilateral lower extremities.  Patient reports pain when I palpate the right posterior calf and right posterior knee.  Full range of motion of the right knee, ankle, foot.  Good sensation in all dermatomes of the right lower extremity.   Skin:     General: Skin is warm and dry.   Neurological:      General: No focal deficit present.      Mental Status: She is alert and oriented to person, place, and time.   Psychiatric:         Mood and Affect: Mood normal.           ED Course & MDM   Diagnoses as of 10/14/24 1738   Leg pain, right                 No data recorded                                 Medical Decision Making  Patient presents to the emergency department to rule out DVT of the right lower extremity under the recommendation of her physical therapist.  Patient has not missed doses of her Eliquis and therefore the chances of new DVT and Eliquis failure are low.  Patient has no history of CHF and lung fields are clear.  She does not have chest pain or shortness of breath.  Vital signs are stable.  No evidence of cellulitis.  Patient does have pain with palpation of the right calf muscle.  No effusion in the right knee.  Little concern for septic joint.  Doppler ultrasound of the right lower extremity is obtained and negative for acute DVT of the right lower extremity.  I have little concern for arterial occlusion.  Symptoms are most likely muscle skeletal.  Patient will take over-the-counter Tylenol.  An Ace wrap was applied to the leg.  I recommend close follow-up with her PCP within a week.  Return to the ED if symptoms change or worsen.  Patient discharged home in stable condition.        Procedure  Procedures     Chelle Almaguer PA-C  10/14/24 1736

## 2024-10-14 NOTE — ED TRIAGE NOTES
Woke up feeling some weakness in the rt leg, with pain from the calf to the ankle, Pt has a history of DVTs in her left leg and is worried it might be a blood clot in her leg. Denies PEs, SOB or chest pain

## 2024-10-15 ENCOUNTER — INFUSION (OUTPATIENT)
Dept: HEMATOLOGY/ONCOLOGY | Facility: CLINIC | Age: 74
End: 2024-10-15
Payer: MEDICARE

## 2024-10-15 VITALS
WEIGHT: 209.11 LBS | HEART RATE: 71 BPM | SYSTOLIC BLOOD PRESSURE: 111 MMHG | OXYGEN SATURATION: 96 % | DIASTOLIC BLOOD PRESSURE: 72 MMHG | RESPIRATION RATE: 16 BRPM | BODY MASS INDEX: 30 KG/M2 | TEMPERATURE: 98.8 F

## 2024-10-15 DIAGNOSIS — D50.8 IRON DEFICIENCY ANEMIA SECONDARY TO INADEQUATE DIETARY IRON INTAKE: ICD-10-CM

## 2024-10-15 DIAGNOSIS — K90.9 IDIOPATHIC STEATORRHEA (HHS-HCC): ICD-10-CM

## 2024-10-15 PROCEDURE — 2500000004 HC RX 250 GENERAL PHARMACY W/ HCPCS (ALT 636 FOR OP/ED): Performed by: PHYSICIAN ASSISTANT

## 2024-10-15 PROCEDURE — 96374 THER/PROPH/DIAG INJ IV PUSH: CPT | Mod: INF

## 2024-10-15 RX ORDER — DIPHENHYDRAMINE HYDROCHLORIDE 50 MG/ML
50 INJECTION INTRAMUSCULAR; INTRAVENOUS AS NEEDED
OUTPATIENT
Start: 2024-12-15

## 2024-10-15 RX ORDER — ALBUTEROL SULFATE 0.83 MG/ML
3 SOLUTION RESPIRATORY (INHALATION) AS NEEDED
OUTPATIENT
Start: 2024-12-15

## 2024-10-15 RX ORDER — EPINEPHRINE 0.3 MG/.3ML
0.3 INJECTION SUBCUTANEOUS EVERY 5 MIN PRN
OUTPATIENT
Start: 2024-12-15

## 2024-10-15 RX ORDER — FAMOTIDINE 10 MG/ML
20 INJECTION INTRAVENOUS ONCE AS NEEDED
OUTPATIENT
Start: 2024-12-15

## 2024-10-15 RX ORDER — HEPARIN 100 UNIT/ML
500 SYRINGE INTRAVENOUS AS NEEDED
OUTPATIENT
Start: 2024-10-15

## 2024-10-15 RX ORDER — HEPARIN SODIUM,PORCINE/PF 10 UNIT/ML
50 SYRINGE (ML) INTRAVENOUS AS NEEDED
OUTPATIENT
Start: 2024-10-15

## 2024-10-15 ASSESSMENT — PAIN SCALES - GENERAL: PAINLEVEL: 0-NO PAIN

## 2024-10-20 ASSESSMENT — CUP TO DISC RATIO: OD_RATIO: 0.35

## 2024-10-20 ASSESSMENT — SLIT LAMP EXAM - LIDS: COMMENTS: GOOD POSITION, DERMATOCHALASIS

## 2024-10-20 ASSESSMENT — EXTERNAL EXAM - RIGHT EYE: OD_EXAM: NORMAL

## 2024-10-20 ASSESSMENT — EXTERNAL EXAM - LEFT EYE: OS_EXAM: NORMAL

## 2024-10-20 NOTE — PROGRESS NOTES
Pseudophakia of right eyeZ96.1 - 6/6/24  -Doing well. Good vision. IOP good. Off all gtts.   -F/u 1 year for comprehensive exam with OCT RNFL and OCT macula.     Pseudophakia of left eyeZ96.1 - 7/20/23 - Open capsule, sulcus IOL.  -Doing well. Good vision. IOP good. History of residual vs rebound iritis. History of stopping prednisolone prematurely.   -Iritis appears to have resolved, patient without redness or pain. Photophobia has improved.   -Recommend observation. Advised to call if any redness, pain, photophobia or change in vision.   -Continue artificial tears 3-4x/day  -Risk of RD/RT with vitreous prolapse, as well as higher risk of CME. Will refer to retina service as needed. No retinal tear or detachment seen on exam. Retinal detachment symptoms discussed.    -History of seeing a silver disc intermittently temporally   -Oct 2023 - she fell (knee gave out) and hit her head on gravel driveway. No significant ocular sequelae noted except that intraocular lens (IOL) OS may be slightly decentered inferiorly, but optic is still in visual axis and haptics still appear to be in the sulcus. Intraocular lens (IOL) is not tilted or shifted posteriorly or anteriorly.    Dry eyes, [id number]  -Eyes feel dry when air blowing in eyes at work. Currently works at [employer].   -May use warm compresses and aritificial tears PRN    Cup to disc [id number]  -No FH of glaucoma  -OCT RNFL (12/14/22) - SS: 7/10 OD and 8/10 OS. WNL OU. 93/91.   -OCT RNFL (4/10/24) - SS: 3/10 OD and 7/10 OS. OD: Bord S. OS: Thickening. 83/131. Poor images OU. Unreliable.   -Low suspicion for glaucoma given OCT RNFL WNL. Monitor with annual dilated exams.   -F/u 1 year for comprehensive exam with OCT RNFL and OCT macula.     PVD (posterior vitreous detachment), both eyesH43.813  Epiretinal membrane, left eye  -OCT macula (4/10/24) - SS: 10/10 OS. OD: Unable. OS: Normal thickness and contour. Tr ERM. Intact IS-OS. No edema. 278. ERM  OS more apparent compared to 12/14/22.   -No retinal tear or detachment seen on exam. Retinal detachment symptoms discussed.   -F/u 1 year for comprehensive exam with OCT RNFL and OCT macula.     GvnnvwooaW26.00  QvrdiyodkunB43.209  PyfqihS58.10  RzylsxfmprI53.4  -New Rx for glasses given per patient request. Patient's signature obtained to acknowledge and confirm that a paper copy of glasses Rx was given to patient in compliance with [facility]lass Rule. Electronic copy of Rx will also be available via [facility].         No history of refractive surgery.   No FH of AMD/glaucoma      Rx 4/20/22:  OD: +1.00  -1.50  x 085 add +2.50    OS: -1.00  -1.00  x 080 add +2.50

## 2024-10-23 ENCOUNTER — APPOINTMENT (OUTPATIENT)
Dept: OPHTHALMOLOGY | Age: 74
End: 2024-10-23
Payer: MEDICARE

## 2024-10-23 DIAGNOSIS — H47.399 PATHOLOGICAL CUPPING OF OPTIC DISC, UNSPECIFIED LATERALITY: ICD-10-CM

## 2024-10-23 DIAGNOSIS — H35.372 EPIRETINAL MEMBRANE, LEFT EYE: ICD-10-CM

## 2024-10-23 DIAGNOSIS — H52.203 ASTIGMATISM OF BOTH EYES, UNSPECIFIED TYPE: ICD-10-CM

## 2024-10-23 DIAGNOSIS — H52.01 HYPEROPIA OF RIGHT EYE: ICD-10-CM

## 2024-10-23 DIAGNOSIS — H52.12 MYOPIA OF LEFT EYE: ICD-10-CM

## 2024-10-23 DIAGNOSIS — H04.123 DRY EYES, BILATERAL: ICD-10-CM

## 2024-10-23 DIAGNOSIS — H43.813 PVD (POSTERIOR VITREOUS DETACHMENT), BOTH EYES: ICD-10-CM

## 2024-10-23 DIAGNOSIS — Z96.1 PSEUDOPHAKIA: Primary | ICD-10-CM

## 2024-10-23 DIAGNOSIS — H52.4 PRESBYOPIA: ICD-10-CM

## 2024-10-23 PROCEDURE — 92014 COMPRE OPH EXAM EST PT 1/>: CPT | Performed by: OPHTHALMOLOGY

## 2024-10-23 ASSESSMENT — REFRACTION_WEARINGRX
OS_SPHERE: OTC READERS
OD_SPHERE: OTC READERS

## 2024-10-23 ASSESSMENT — REFRACTION_MANIFEST
OD_ADD: +2.50
OD_AXIS: 095
OD_SPHERE: +0.50
OD_CYLINDER: -1.50
OS_CYLINDER: -1.25
OS_ADD: +2.50
OS_SPHERE: +0.50
OS_AXIS: 070

## 2024-10-23 ASSESSMENT — VISUAL ACUITY
OD_SC+: -2
OD_SC: 20/30
OS_SC: 20/30
METHOD: SNELLEN - LINEAR
OS_SC+: -1

## 2024-10-23 ASSESSMENT — CUP TO DISC RATIO: OS_RATIO: 0.45

## 2024-10-23 ASSESSMENT — ENCOUNTER SYMPTOMS: EYES NEGATIVE: 1

## 2024-10-23 ASSESSMENT — TONOMETRY
OS_IOP_MMHG: 12
OD_IOP_MMHG: 12
IOP_METHOD: GOLDMANN APPLANATION

## 2024-10-24 DIAGNOSIS — I82.402 ACUTE DEEP VEIN THROMBOSIS (DVT) OF LEFT LOWER EXTREMITY, UNSPECIFIED VEIN (MULTI): ICD-10-CM

## 2024-11-06 ENCOUNTER — OFFICE VISIT (OUTPATIENT)
Dept: URGENT CARE | Age: 74
End: 2024-11-06
Payer: MEDICARE

## 2024-11-06 VITALS
RESPIRATION RATE: 16 BRPM | TEMPERATURE: 98.3 F | HEART RATE: 76 BPM | DIASTOLIC BLOOD PRESSURE: 71 MMHG | SYSTOLIC BLOOD PRESSURE: 138 MMHG

## 2024-11-06 DIAGNOSIS — J40 BRONCHITIS: Primary | ICD-10-CM

## 2024-11-06 PROCEDURE — 1123F ACP DISCUSS/DSCN MKR DOCD: CPT | Performed by: PHYSICIAN ASSISTANT

## 2024-11-06 PROCEDURE — 99213 OFFICE O/P EST LOW 20 MIN: CPT | Performed by: PHYSICIAN ASSISTANT

## 2024-11-06 PROCEDURE — 3078F DIAST BP <80 MM HG: CPT | Performed by: PHYSICIAN ASSISTANT

## 2024-11-06 PROCEDURE — 3075F SYST BP GE 130 - 139MM HG: CPT | Performed by: PHYSICIAN ASSISTANT

## 2024-11-06 PROCEDURE — 94640 AIRWAY INHALATION TREATMENT: CPT | Performed by: PHYSICIAN ASSISTANT

## 2024-11-06 PROCEDURE — 1159F MED LIST DOCD IN RCRD: CPT | Performed by: PHYSICIAN ASSISTANT

## 2024-11-06 PROCEDURE — 1160F RVW MEDS BY RX/DR IN RCRD: CPT | Performed by: PHYSICIAN ASSISTANT

## 2024-11-06 PROCEDURE — 1157F ADVNC CARE PLAN IN RCRD: CPT | Performed by: PHYSICIAN ASSISTANT

## 2024-11-06 PROCEDURE — 1036F TOBACCO NON-USER: CPT | Performed by: PHYSICIAN ASSISTANT

## 2024-11-06 RX ORDER — METHYLPREDNISOLONE 4 MG/1
TABLET ORAL
Qty: 21 TABLET | Refills: 0 | Status: SHIPPED | OUTPATIENT
Start: 2024-11-06 | End: 2024-11-13

## 2024-11-06 RX ORDER — IPRATROPIUM BROMIDE AND ALBUTEROL SULFATE 2.5; .5 MG/3ML; MG/3ML
3 SOLUTION RESPIRATORY (INHALATION) ONCE
Status: COMPLETED | OUTPATIENT
Start: 2024-11-06 | End: 2024-11-06

## 2024-11-06 RX ORDER — AZITHROMYCIN 250 MG/1
TABLET, FILM COATED ORAL
Qty: 6 TABLET | Refills: 0 | Status: SHIPPED | OUTPATIENT
Start: 2024-11-06 | End: 2024-11-11

## 2024-11-06 RX ORDER — ALBUTEROL SULFATE 90 UG/1
2 INHALANT RESPIRATORY (INHALATION) EVERY 4 HOURS PRN
Qty: 6.7 G | Refills: 0 | Status: SHIPPED | OUTPATIENT
Start: 2024-11-06 | End: 2025-11-06

## 2024-11-06 RX ORDER — BENZONATATE 100 MG/1
100 CAPSULE ORAL 3 TIMES DAILY PRN
Qty: 42 CAPSULE | Refills: 0 | Status: SHIPPED | OUTPATIENT
Start: 2024-11-06 | End: 2024-12-06

## 2024-11-06 ASSESSMENT — ENCOUNTER SYMPTOMS
SHORTNESS OF BREATH: 1
FATIGUE: 1
COUGH: 1
FEVER: 0
CHEST TIGHTNESS: 1

## 2024-11-06 NOTE — PROGRESS NOTES
Subjective   Patient ID: Yaritza Bernal is a 74 y.o. female. They present today with a chief complaint of Cough (C/o cough and sinus drainage for x1 day. ).    History of Present Illness  Patient has had cough and congestion for 1 day.      History provided by:  Patient   used: No    Cough  Associated symptoms include shortness of breath. Pertinent negatives include no fever.       Past Medical History  Allergies as of 11/06/2024 - Reviewed 11/06/2024   Allergen Reaction Noted    Amoxicillin Other 11/16/2016    Erythromycin Unknown 03/30/2023    Lisinopril Cough 08/03/2011    Sulfa (sulfonamide antibiotics) Unknown 02/18/2023       (Not in a hospital admission)       Past Medical History:   Diagnosis Date    Bladder disorder, unspecified 10/21/2015    Bladder disorder    Cataract     Clotting disorder (Multi)     Cutaneous abscess, unspecified 03/16/2017    Abscess    Disorder of the skin and subcutaneous tissue, unspecified 07/14/2016    Hand lesion    Dry eyes     DVT (deep venous thrombosis) (Multi)     after femur fx surgery    Encounter for immunization 02/17/2017    Need for Tdap vaccination    Encounter for immunization 10/04/2016    Need for vaccination with 13-polyvalent pneumococcal conjugate vaccine    GERD (gastroesophageal reflux disease)     HL (hearing loss)     Hyperlipidemia     Hypertension     Hypothyroidism     Impacted cerumen, bilateral 10/21/2015    Impacted cerumen of both ears    Impaired fasting glucose 09/17/2018    Abnormal fasting glucose    Irritable bowel syndrome     Medial epicondylitis, right elbow 10/04/2016    Medial epicondylitis, right    Nephrolithiasis     Other conditions influencing health status 03/16/2017    Cyst    Other specified abnormal findings of blood chemistry 10/04/2018    Abnormal TSH    Other specified abnormal findings of blood chemistry 09/17/2018    Abnormal TSH    Other specified symptoms and signs involving the circulatory and respiratory  systems 09/28/2017    Chest congestion    Pain in left hand 09/28/2017    Pain of left hand    Pain in left hip 10/04/2018    Left hip pain    Pain in right hip 11/01/2017    Bilateral hip pain    Pain in right knee 03/13/2017    Right knee pain    Pain in unspecified knee 03/13/2017    Knee pain    Personal history of other diseases of the circulatory system 05/09/2018    History of hypertension    Personal history of other diseases of the circulatory system 06/27/2018    History of hypertension    Personal history of other diseases of the circulatory system 01/18/2018    History of hypertension    Personal history of other diseases of the circulatory system 08/17/2017    History of hypertension    Personal history of other diseases of the nervous system and sense organs 09/17/2018    History of benign essential tremor    Personal history of other diseases of the nervous system and sense organs 10/20/2016    History of acute otitis media    Personal history of other diseases of the respiratory system 03/09/2018    History of acute bacterial sinusitis    Personal history of other drug therapy 05/09/2018    History of pneumococcal vaccination    Personal history of other endocrine, nutritional and metabolic disease 10/30/2017    History of hyperlipidemia    Personal history of other mental and behavioral disorders 07/16/2018    History of anxiety    Personal history of other specified conditions 06/27/2018    History of fatigue    Strain of muscle, fascia and tendon of the posterior muscle group at thigh level, unspecified thigh, initial encounter 01/15/2019    Hamstring strain, initial encounter    Strain of unspecified muscles, fascia and tendons at thigh level, left thigh, initial encounter 05/01/2018    Muscle strain of left thigh    Unspecified injury of unspecified elbow, initial encounter 01/15/2019    Elbow injury, initial encounter       Past Surgical History:   Procedure Laterality Date    BLADDER SURGERY   10/21/2015    Bladder Surgery    BREAST BIOPSY Left     LF benign biopsy, >20 years ago    CATARACT EXTRACTION W/  INTRAOCULAR LENS IMPLANT Right 06/06/2024    Dr. Rashid    CATARACT EXTRACTION W/  INTRAOCULAR LENS IMPLANT Left 07/20/2023    Dr. Rashid    EXTRACORPOREAL SHOCK WAVE LITHOTRIPSY      FEMUR FRACTURE SURGERY      KNEE SURGERY      VEIN LIGATION AND STRIPPING          reports that she has never smoked. She has never used smokeless tobacco. She reports that she does not currently use alcohol. She reports that she does not use drugs.    Review of Systems  Review of Systems   Constitutional:  Positive for fatigue. Negative for fever.   HENT:  Positive for congestion.    Respiratory:  Positive for cough, chest tightness and shortness of breath.                                   Objective    Vitals:    11/06/24 1322   BP: 138/71   BP Location: Left arm   Patient Position: Sitting   BP Cuff Size: Adult   Pulse: 76   Resp: 16   Temp: 36.8 °C (98.3 °F)   TempSrc: Oral     No LMP recorded. Patient is postmenopausal.    Physical Exam  Vitals and nursing note reviewed.   Constitutional:       Appearance: Normal appearance.      Comments: Very pleasant talkative 74-year-old female in no acute distress   HENT:      Head: Normocephalic and atraumatic.      Nose: Nose normal.      Mouth/Throat:      Mouth: Mucous membranes are moist.   Eyes:      Extraocular Movements: Extraocular movements intact.      Conjunctiva/sclera: Conjunctivae normal.      Pupils: Pupils are equal, round, and reactive to light.   Cardiovascular:      Rate and Rhythm: Normal rate and regular rhythm.   Pulmonary:      Effort: Pulmonary effort is normal.      Comments: Breath sounds somewhat diminished bilaterally.  Bronchospastic cough noted  Skin:     General: Skin is warm and dry.   Neurological:      General: No focal deficit present.      Mental Status: She is alert and oriented to person, place, and time.   Psychiatric:         Mood and  Affect: Mood normal.         Behavior: Behavior normal.         Procedures    Point of Care Test & Imaging Results from this visit  No results found for this visit on 11/06/24.   No results found.    Diagnostic study results (if any) were reviewed by Maria Eugenia Ulloa PA-C.    Assessment/Plan   Allergies, medications, history, and pertinent labs/EKGs/Imaging reviewed by Maria Eugenia Ulloa PA-C.     Medical Decision Making  History and physical exam are consistent with a lower respiratory infection.  Currently medicates is of bronchitis and pneumonia in this area which has been responding to Zithromax.  Breath sounds are little bit diminished so we will give her a DuoNeb treatment here.  She is on respiratory meds at home but we will also give her a prescription for albuterol to use as a rescue inhaler in case she needs it.  Will put her on Zithromax as well.  Steroids and Tessalon Perles for the anti-inflammatory and for symptom relief.  If she worsens, especially trouble breathing chest pain high fevers etc. to go to the emergency department.  She requested and was given off work for the seventh and eighth of this month.    Orders and Diagnoses  Diagnoses and all orders for this visit:  Bronchitis  -     ipratropium-albuteroL (Duo-Neb) 0.5-2.5 mg/3 mL nebulizer solution 3 mL  -     azithromycin (Zithromax) 250 mg tablet; Take 2 tabs (500 mg) by mouth today, than 1 daily for 4 days.  -     methylPREDNISolone (Medrol Dospak) 4 mg tablets; Follow schedule on package instructions  -     albuterol (Proventil HFA) 90 mcg/actuation inhaler; Inhale 2 puffs every 4 hours if needed for wheezing or shortness of breath.  -     benzonatate (Tessalon) 100 mg capsule; Take 1 capsule (100 mg) by mouth 3 times a day as needed for cough. Do not crush or chew.      Medical Admin Record      Patient disposition: Home    Electronically signed by Maria Eugenia Ulloa PA-C  2:09 PM

## 2024-11-06 NOTE — LETTER
November 6, 2024     Patient: Yaritza Bernal   YOB: 1950   Date of Visit: 11/6/2024       To Whom It May Concern:    Yaritza Bernal was seen in my clinic on 11/6/2024 at 1:15 pm. Please excuse Yaritza for her absence from work on this day to make the appointment.  Off work November 7 and 8, 2024    If you have any questions or concerns, please don't hesitate to call.         Sincerely,         Maria Eugenia Ulloa PA-C        CC: No Recipients

## 2024-11-08 DIAGNOSIS — Z12.11 ENCOUNTER FOR SCREENING FOR MALIGNANT NEOPLASM OF COLON: ICD-10-CM

## 2024-12-04 ENCOUNTER — APPOINTMENT (OUTPATIENT)
Dept: PRIMARY CARE | Facility: CLINIC | Age: 74
End: 2024-12-04
Payer: MEDICARE

## 2024-12-04 ENCOUNTER — LAB (OUTPATIENT)
Dept: LAB | Facility: LAB | Age: 74
End: 2024-12-04
Payer: MEDICARE

## 2024-12-04 VITALS
OXYGEN SATURATION: 95 % | RESPIRATION RATE: 20 BRPM | BODY MASS INDEX: 30.06 KG/M2 | WEIGHT: 210 LBS | TEMPERATURE: 97.5 F | HEIGHT: 70 IN | DIASTOLIC BLOOD PRESSURE: 60 MMHG | SYSTOLIC BLOOD PRESSURE: 130 MMHG | HEART RATE: 68 BPM

## 2024-12-04 DIAGNOSIS — M25.561 CHRONIC PAIN OF BOTH KNEES: ICD-10-CM

## 2024-12-04 DIAGNOSIS — E66.01 MORBID (SEVERE) OBESITY DUE TO EXCESS CALORIES (MULTI): ICD-10-CM

## 2024-12-04 DIAGNOSIS — J45.20 MILD INTERMITTENT ASTHMA WITHOUT COMPLICATION (HHS-HCC): ICD-10-CM

## 2024-12-04 DIAGNOSIS — E03.9 HYPOTHYROIDISM, UNSPECIFIED TYPE: ICD-10-CM

## 2024-12-04 DIAGNOSIS — F41.9 ANXIETY: ICD-10-CM

## 2024-12-04 DIAGNOSIS — I10 BENIGN ESSENTIAL HYPERTENSION: Primary | ICD-10-CM

## 2024-12-04 DIAGNOSIS — G25.0 BENIGN ESSENTIAL TREMOR: ICD-10-CM

## 2024-12-04 DIAGNOSIS — G62.9 POLYNEUROPATHY: ICD-10-CM

## 2024-12-04 DIAGNOSIS — G89.29 CHRONIC PAIN OF BOTH KNEES: ICD-10-CM

## 2024-12-04 DIAGNOSIS — M25.562 CHRONIC PAIN OF BOTH KNEES: ICD-10-CM

## 2024-12-04 DIAGNOSIS — D50.8 IRON DEFICIENCY ANEMIA SECONDARY TO INADEQUATE DIETARY IRON INTAKE: ICD-10-CM

## 2024-12-04 DIAGNOSIS — J43.1 PANLOBULAR EMPHYSEMA (MULTI): ICD-10-CM

## 2024-12-04 DIAGNOSIS — E78.5 HYPERLIPIDEMIA, UNSPECIFIED HYPERLIPIDEMIA TYPE: ICD-10-CM

## 2024-12-04 DIAGNOSIS — K90.9 IDIOPATHIC STEATORRHEA (HHS-HCC): ICD-10-CM

## 2024-12-04 DIAGNOSIS — F32.5 MAJOR DEPRESSIVE DISORDER WITH SINGLE EPISODE, IN FULL REMISSION (CMS-HCC): ICD-10-CM

## 2024-12-04 PROCEDURE — 3075F SYST BP GE 130 - 139MM HG: CPT | Performed by: FAMILY MEDICINE

## 2024-12-04 PROCEDURE — 36415 COLL VENOUS BLD VENIPUNCTURE: CPT

## 2024-12-04 PROCEDURE — 3078F DIAST BP <80 MM HG: CPT | Performed by: FAMILY MEDICINE

## 2024-12-04 PROCEDURE — 1036F TOBACCO NON-USER: CPT | Performed by: FAMILY MEDICINE

## 2024-12-04 PROCEDURE — 1123F ACP DISCUSS/DSCN MKR DOCD: CPT | Performed by: FAMILY MEDICINE

## 2024-12-04 PROCEDURE — 1157F ADVNC CARE PLAN IN RCRD: CPT | Performed by: FAMILY MEDICINE

## 2024-12-04 PROCEDURE — 85027 COMPLETE CBC AUTOMATED: CPT

## 2024-12-04 PROCEDURE — 1125F AMNT PAIN NOTED PAIN PRSNT: CPT | Performed by: FAMILY MEDICINE

## 2024-12-04 PROCEDURE — 83540 ASSAY OF IRON: CPT

## 2024-12-04 PROCEDURE — 1160F RVW MEDS BY RX/DR IN RCRD: CPT | Performed by: FAMILY MEDICINE

## 2024-12-04 PROCEDURE — 82607 VITAMIN B-12: CPT

## 2024-12-04 PROCEDURE — G2211 COMPLEX E/M VISIT ADD ON: HCPCS | Performed by: FAMILY MEDICINE

## 2024-12-04 PROCEDURE — 1159F MED LIST DOCD IN RCRD: CPT | Performed by: FAMILY MEDICINE

## 2024-12-04 PROCEDURE — 99214 OFFICE O/P EST MOD 30 MIN: CPT | Performed by: FAMILY MEDICINE

## 2024-12-04 PROCEDURE — 82728 ASSAY OF FERRITIN: CPT

## 2024-12-04 PROCEDURE — 84443 ASSAY THYROID STIM HORMONE: CPT

## 2024-12-04 PROCEDURE — 83550 IRON BINDING TEST: CPT

## 2024-12-04 RX ORDER — BISOPROLOL FUMARATE AND HYDROCHLOROTHIAZIDE 10; 6.25 MG/1; MG/1
2 TABLET ORAL
Qty: 180 TABLET | Refills: 0 | Status: SHIPPED | OUTPATIENT
Start: 2024-12-04

## 2024-12-04 RX ORDER — BUDESONIDE AND FORMOTEROL FUMARATE DIHYDRATE 160; 4.5 UG/1; UG/1
2 AEROSOL RESPIRATORY (INHALATION)
Start: 2024-12-04 | End: 2025-12-04

## 2024-12-04 RX ORDER — CITALOPRAM 20 MG/1
20 TABLET, FILM COATED ORAL DAILY
Qty: 90 TABLET | Refills: 0 | Status: SHIPPED | OUTPATIENT
Start: 2024-12-04

## 2024-12-04 RX ORDER — GABAPENTIN 600 MG/1
600 TABLET ORAL 3 TIMES DAILY
Qty: 270 TABLET | Refills: 0 | Status: SHIPPED | OUTPATIENT
Start: 2024-12-04

## 2024-12-04 ASSESSMENT — PAIN SCALES - GENERAL: PAINLEVEL_OUTOF10: 6

## 2024-12-04 NOTE — PROGRESS NOTES
Subjective   Patient ID: Yaritza Bernal is a 74 y.o. female who presents for one month f/up.    HPI   The patient presents to the clinic for a 1-month follow-up. She has past medical history of HTN, chest pain, HLD, SNHL bilateral ears, allergic rhinitis, hypothyroidism, vitamin D deficiency, heartburn, urine incontinence, anxiety, MDD, bilateral knee arthritis, benign essential tremor, polyneuropathy, mild persistent asthma, and eczema.    The patient recalls that she received her annual flu vaccine and annual COVID-19 booster vaccine on 10/07/2024.  She recalls that she suffered from severe infection symptoms approximately ~3 days after receiving these vaccines. She recalls that she had to take 10 days off work due to these symptoms. Now, the patient states that she has been doing well. The patient also recalls that she received a pneumococcal vaccine in October 2024 (patient does not remember exact date but will contact the clinic with exact details in the future).  Prevnar 20 is not recorded from pharmacy in this chart but flu and covid are in the chart    Her blood pressure (130/60) was within good range when checked in the clinic today. She continues on bisoprolol-HCTZ 10-6.25 mg (2 tablets daily) for treatment of hypertension. She denies any side-effects to her bisoprolol-HCTZ medication. She denies any chest pain and/or SOB symptoms. She takes Eliquis 5 mg (2 times daily) due to history of acute DVT in [facility] (under the supervision of BEBE Norman). She denies any side-effects to her Eliquis medication.    She takes levothyroxine 25 mcg daily for treatment of hypothyroidism (somewhat non-compliantly as she often misses doses of this medication). She has been responding well to her levothyroxine medication. She denies any side-effects to her levothyroxine medication. She intends to have a repeat TSH screening later today (active order from 09/04/2024 in the chart).  She states may have missed several doses  "this week.  Discussed better schedule for her to take med so she is more regular with it    She is on esomeprazole 20 mg daily for treatment of GERD. Her GERD has been controlled on esomeprazole medication. She denies any side-effects to her esomeprazole medication.    She also takes citalopram 20 mg daily for treatment of anxiety. Her anxiety has been controlled on citalopram medication and her mood has been good. She denies any side-effects to her citalopram medication.    She continues on Symbicort 160-4.5 mcg/actuation inhaler (2 puff 2 times daily) for treatment of asthma. Her asthma has been controlled on Symbicort. She denies any side-effects to her Symbicort inhaler.  Dr Virk gives her this med    She is on gabapentin 600 mg (3 times daily) for treatment of polyneuropathy. Her neuropathy symptoms have been controlled on gabapentin medication. She denies any side-effects to her gabapentin medication.    She continues to suffer from right shoulder pain and bilateral knee pain. She had been utilizing physical therapy sessions for treatment of pain symptoms previously and she had been responding well to PT sessions before her sessions has been cut off (insurance issues). Her physical therapist had set up the patient with an at-home program but the patient states that she rarely has time to perform at-home exercises due to her long work hours.    Her most recent mammogram screening and DEXA bone density scan were done in March 2024. She has a Cologuard screening kit at home and she intends to complete this screening in the near future.    Review of Systems    Objective   /60   Pulse 68   Temp 36.4 °C (97.5 °F)   Resp 20   Ht 1.778 m (5' 10\")   Wt 95.3 kg (210 lb)   SpO2 95%   BMI 30.13 kg/m²     Physical Exam  Neck:      Vascular: No carotid bruit.   Cardiovascular:      Rate and Rhythm: Normal rate and regular rhythm.      Pulses: Normal pulses.      Heart sounds: Normal heart sounds.   Pulmonary: "      Effort: Pulmonary effort is normal.      Breath sounds: Normal breath sounds.   Musculoskeletal:         General: Normal range of motion.      Cervical back: Normal range of motion.      Right lower leg: No edema.      Left lower leg: No edema.   Skin:     General: Skin is warm and dry.   Neurological:      Mental Status: She is alert and oriented to person, place, and time.   Psychiatric:         Mood and Affect: Mood normal.         Behavior: Behavior normal.         Thought Content: Thought content normal.         Judgment: Judgment normal.         Assessment/Plan   Problem List Items Addressed This Visit             ICD-10-CM    Anxiety F41.9    Relevant Medications    citalopram (CeleXA) 20 mg tablet    Benign essential hypertension - Primary I10    Benign essential tremor G25.0    Relevant Medications    bisoproloL-hydrochlorothiazide (Ziac) 10-6.25 mg tablet    Bilateral knee pain M25.561, M25.562    Relevant Orders    Referral to Orthopaedic Surgery    Hyperlipidemia E78.5    Hypothyroid E03.9    Polyneuropathy G62.9    Relevant Medications    gabapentin (Neurontin) 600 mg tablet    MDD (major depressive disorder), single episode F32.9    Panlobular emphysema (Multi) J43.1     Seeing Dr Virk, compliant with mdi's and helping her         Morbid (severe) obesity due to excess calories (Multi) E66.01     Pt states she is eating better diet, no longer exercising and not going to PT.  Discussed ways to add in physical activity and will reassess in 6 months          Other Visit Diagnoses         Codes    Mild intermittent asthma without complication ([facility])     J45.20    Relevant Medications    budesonide-formoteroL (Symbicort) 160-4.5 mcg/actuation inhaler               The patient received refills for current medication (bisoprolol-HCTZ 10-6.25 mg, Symbicort 160-4.5 mcg/actuation inhaler, citalopram 20 mg, gabapentin 600 mg). She was instructed to continue taking medication as previously directed. In  regards to her gabapentin prescription, OARRS were reviewed. Risk to benefit ratio was assessed. No suspicious activity was observed. Her most recent UDS/CSA was done in June 2024. Medication compliance strategies were also discussed with the patient. She will receive a refill for her levothyroxine medication depending on prospective TSH results.    In regards to concerns with knee pain, the patient received a referral to orthopaedic surgery (Dr. Lockhart) for further evaluation of this condition. In the meantime, she was instructed to continue monitoring symptoms for improvement/exacerbation.    Discussed better mediocation schedule so she is compliant with synthroid and taking her meds correctly    She will follow-up in 3 months, unless otherwise needed.    Scribe Attestation  By signing my name below, I, Faisal Benítez   attest that this documentation has been prepared under the direction and in the presence of Madyson Sandoval DO.

## 2024-12-05 DIAGNOSIS — E03.9 HYPOTHYROIDISM, UNSPECIFIED TYPE: ICD-10-CM

## 2024-12-05 LAB
ERYTHROCYTE [DISTWIDTH] IN BLOOD BY AUTOMATED COUNT: 14.8 % (ref 11.5–14.5)
FERRITIN SERPL-MCNC: 65 NG/ML (ref 8–150)
HCT VFR BLD AUTO: 39.3 % (ref 36–46)
HGB BLD-MCNC: 12 G/DL (ref 12–16)
IRON SATN MFR SERPL: 17 % (ref 25–45)
IRON SERPL-MCNC: 60 UG/DL (ref 35–150)
MCH RBC QN AUTO: 27.9 PG (ref 26–34)
MCHC RBC AUTO-ENTMCNC: 30.5 G/DL (ref 32–36)
MCV RBC AUTO: 91 FL (ref 80–100)
NRBC BLD-RTO: 0 /100 WBCS (ref 0–0)
PLATELET # BLD AUTO: 296 X10*3/UL (ref 150–450)
RBC # BLD AUTO: 4.3 X10*6/UL (ref 4–5.2)
TIBC SERPL-MCNC: 358 UG/DL (ref 240–445)
TSH SERPL-ACNC: 3.15 MIU/L (ref 0.44–3.98)
UIBC SERPL-MCNC: 298 UG/DL (ref 110–370)
VIT B12 SERPL-MCNC: 319 PG/ML (ref 211–911)
WBC # BLD AUTO: 5.5 X10*3/UL (ref 4.4–11.3)

## 2024-12-05 RX ORDER — LEVOTHYROXINE SODIUM 25 UG/1
25 TABLET ORAL DAILY
Qty: 90 TABLET | Refills: 3 | Status: SHIPPED | OUTPATIENT
Start: 2024-12-05

## 2024-12-05 NOTE — ASSESSMENT & PLAN NOTE
Pt states she is eating better diet, no longer exercising and not going to PT.  Discussed ways to add in physical activity and will reassess in 6 months

## 2024-12-06 ENCOUNTER — TELEPHONE (OUTPATIENT)
Dept: PRIMARY CARE | Facility: CLINIC | Age: 74
End: 2024-12-06
Payer: MEDICARE

## 2024-12-06 NOTE — TELEPHONE ENCOUNTER
----- Message from Madyson Sandoval sent at 12/5/2024  2:59 PM EST -----  Report to pt her tsh for thyroid is improved, will leave her on same dose of medication

## 2024-12-14 ENCOUNTER — OFFICE VISIT (OUTPATIENT)
Dept: URGENT CARE | Age: 74
End: 2024-12-14
Payer: MEDICARE

## 2024-12-14 VITALS
DIASTOLIC BLOOD PRESSURE: 76 MMHG | HEART RATE: 77 BPM | SYSTOLIC BLOOD PRESSURE: 121 MMHG | OXYGEN SATURATION: 98 % | WEIGHT: 205 LBS | BODY MASS INDEX: 29.41 KG/M2

## 2024-12-14 DIAGNOSIS — T22.111A SUPERFICIAL BURN OF RIGHT FOREARM, INITIAL ENCOUNTER: Primary | ICD-10-CM

## 2024-12-14 RX ORDER — BACITRACIN ZINC 500 UNIT/G
1 OINTMENT IN PACKET (EA) TOPICAL ONCE
Status: COMPLETED | OUTPATIENT
Start: 2024-12-14 | End: 2024-12-14

## 2024-12-14 RX ADMIN — Medication 1 APPLICATION: at 19:49

## 2024-12-14 ASSESSMENT — ENCOUNTER SYMPTOMS
CONSTITUTIONAL NEGATIVE: 1
COLOR CHANGE: 1
WOUND: 1

## 2024-12-15 NOTE — PROGRESS NOTES
Subjective   Patient ID: Yaritza Bernal is a 74 y.o. female. They present today with a chief complaint of Arm Injury (Burn on right arm).    History of Present Illness  Sustained a burn on her right forearm.  She was sitting too close to a space heater while working on her computer.  Onset just prior to presenting at urgent care      History provided by:  Patient   used: No    Arm Injury      Past Medical History  Allergies as of 12/14/2024 - Reviewed 12/14/2024   Allergen Reaction Noted    Amoxicillin Other 11/16/2016    Erythromycin Unknown 03/30/2023    Lisinopril Cough 08/03/2011    Sulfa (sulfonamide antibiotics) Unknown 02/18/2023       (Not in a hospital admission)       Past Medical History:   Diagnosis Date    Bladder disorder, unspecified 10/21/2015    Bladder disorder    Cataract     Clotting disorder (Multi)     Cutaneous abscess, unspecified 03/16/2017    Abscess    Disorder of the skin and subcutaneous tissue, unspecified 07/14/2016    Hand lesion    Dry eyes     DVT (deep venous thrombosis) (Multi)     after femur fx surgery    Encounter for immunization 02/17/2017    Need for Tdap vaccination    Encounter for immunization 10/04/2016    Need for vaccination with 13-polyvalent pneumococcal conjugate vaccine    GERD (gastroesophageal reflux disease)     HL (hearing loss)     Hyperlipidemia     Hypertension     Hypothyroidism     Impacted cerumen, bilateral 10/21/2015    Impacted cerumen of both ears    Impaired fasting glucose 09/17/2018    Abnormal fasting glucose    Irritable bowel syndrome     Medial epicondylitis, right elbow 10/04/2016    Medial epicondylitis, right    Nephrolithiasis     Other conditions influencing health status 03/16/2017    Cyst    Other specified abnormal findings of blood chemistry 10/04/2018    Abnormal TSH    Other specified abnormal findings of blood chemistry 09/17/2018    Abnormal TSH    Other specified symptoms and signs involving the circulatory and  respiratory systems 09/28/2017    Chest congestion    Pain in left hand 09/28/2017    Pain of left hand    Pain in left hip 10/04/2018    Left hip pain    Pain in right hip 11/01/2017    Bilateral hip pain    Pain in right knee 03/13/2017    Right knee pain    Pain in unspecified knee 03/13/2017    Knee pain    Personal history of other diseases of the circulatory system 05/09/2018    History of hypertension    Personal history of other diseases of the circulatory system 06/27/2018    History of hypertension    Personal history of other diseases of the circulatory system 01/18/2018    History of hypertension    Personal history of other diseases of the circulatory system 08/17/2017    History of hypertension    Personal history of other diseases of the nervous system and sense organs 09/17/2018    History of benign essential tremor    Personal history of other diseases of the nervous system and sense organs 10/20/2016    History of acute otitis media    Personal history of other diseases of the respiratory system 03/09/2018    History of acute bacterial sinusitis    Personal history of other drug therapy 05/09/2018    History of pneumococcal vaccination    Personal history of other endocrine, nutritional and metabolic disease 10/30/2017    History of hyperlipidemia    Personal history of other mental and behavioral disorders 07/16/2018    History of anxiety    Personal history of other specified conditions 06/27/2018    History of fatigue    Strain of muscle, fascia and tendon of the posterior muscle group at thigh level, unspecified thigh, initial encounter 01/15/2019    Hamstring strain, initial encounter    Strain of unspecified muscles, fascia and tendons at thigh level, left thigh, initial encounter 05/01/2018    Muscle strain of left thigh    Unspecified injury of unspecified elbow, initial encounter 01/15/2019    Elbow injury, initial encounter       Past Surgical History:   Procedure Laterality Date     BLADDER SURGERY  10/21/2015    Bladder Surgery    BREAST BIOPSY Left     LF benign biopsy, >20 years ago    CATARACT EXTRACTION W/  INTRAOCULAR LENS IMPLANT Right 06/06/2024    Dr. Rashid    CATARACT EXTRACTION W/  INTRAOCULAR LENS IMPLANT Left 07/20/2023    Dr. Rashid    EXTRACORPOREAL SHOCK WAVE LITHOTRIPSY      FEMUR FRACTURE SURGERY      KNEE SURGERY      VEIN LIGATION AND STRIPPING          reports that she has never smoked. She has never used smokeless tobacco. She reports that she does not currently use alcohol. She reports that she does not use drugs.    Review of Systems  Review of Systems   Constitutional: Negative.    Skin:  Positive for color change and wound.                                  Objective    Vitals:    12/14/24 1906   BP: 121/76   Pulse: 77   SpO2: 98%   Weight: 93 kg (205 lb)     No LMP recorded. Patient is postmenopausal.    Physical Exam  Vitals and nursing note reviewed.   Constitutional:       General: She is not in acute distress.     Appearance: Normal appearance. She is not ill-appearing.      Comments: Pleasant talkative alert oriented well-nourished well-developed and cooperative 74-year-old female in no acute distress   HENT:      Head: Normocephalic and atraumatic.      Mouth/Throat:      Mouth: Mucous membranes are moist.      Pharynx: Oropharynx is clear.   Eyes:      Extraocular Movements: Extraocular movements intact.      Conjunctiva/sclera: Conjunctivae normal.      Pupils: Pupils are equal, round, and reactive to light.   Cardiovascular:      Rate and Rhythm: Normal rate.   Pulmonary:      Effort: Pulmonary effort is normal. No respiratory distress.   Musculoskeletal:         General: Normal range of motion.   Skin:     General: Skin is warm and dry.      Findings: Erythema present.      Comments: Right forearm is a 3 x 6 cm oval well-demarcated erythematous area in the mid third overlying the ulna.  No blisters.  No loose or sloughing skin.  Positive tender to palpation.    Neurological:      Mental Status: She is alert.      Comments: Moderate tremor   Psychiatric:         Mood and Affect: Mood normal.         Behavior: Behavior normal.         Procedures    Point of Care Test & Imaging Results from this visit  No results found for this visit on 12/14/24.   No results found.    Diagnostic study results (if any) were reviewed by [facility].    Assessment/Plan   Allergies, medications, history, and pertinent labs/EKGs/Imaging reviewed by Maria Eugenia Ulloa PA-C.     Medical Decision Making  History and physical examination is consistent with a first-degree burn.  Put a burn dressing consisting of bacitracin over the wound for her and have her use cool compresses.  She was cautioned about the use of to cold compresses and the potential for frostbite on top of a burn and indicated understanding.  Will update her tetanus.  She was given ER precautions and discharged to home.  If she develops blisters drainage sloughing of tissue etc. she will present to the emergency department    Orders and Diagnoses  There are no diagnoses linked to this encounter.    Medical Admin Record      Patient disposition: Home    Electronically signed by [facility]  7:39 PM

## 2024-12-16 ENCOUNTER — APPOINTMENT (OUTPATIENT)
Dept: HEMATOLOGY/ONCOLOGY | Facility: CLINIC | Age: 74
End: 2024-12-16
Payer: MEDICARE

## 2024-12-16 ENCOUNTER — TELEMEDICINE (OUTPATIENT)
Dept: HEMATOLOGY/ONCOLOGY | Facility: CLINIC | Age: 74
End: 2024-12-16
Payer: MEDICARE

## 2024-12-16 DIAGNOSIS — K90.9 IDIOPATHIC STEATORRHEA (HHS-HCC): ICD-10-CM

## 2024-12-16 DIAGNOSIS — D50.8 IRON DEFICIENCY ANEMIA SECONDARY TO INADEQUATE DIETARY IRON INTAKE: ICD-10-CM

## 2024-12-16 PROCEDURE — 99213 OFFICE O/P EST LOW 20 MIN: CPT | Mod: 95 | Performed by: PHYSICIAN ASSISTANT

## 2024-12-16 PROCEDURE — 1157F ADVNC CARE PLAN IN RCRD: CPT | Performed by: PHYSICIAN ASSISTANT

## 2024-12-16 PROCEDURE — 99213 OFFICE O/P EST LOW 20 MIN: CPT | Performed by: PHYSICIAN ASSISTANT

## 2024-12-16 PROCEDURE — 1123F ACP DISCUSS/DSCN MKR DOCD: CPT | Performed by: PHYSICIAN ASSISTANT

## 2024-12-16 NOTE — PROGRESS NOTES
Visit Type: Benign Heme Follow-up, Virtual Visit:  A Virtual visit (telephone only) between the patient (at the originating site) and the provider (at the distant site) was utilized to provide this telehealth service.   Verbal Consent for Encounter: Verbal consent was requested and obtained from patient, or from parent/guardian if minor, on this date for a telehealth visit.     Reason for Visit  Yaritza Bernal is a 74 y.o. female with PMH s/f LLE DVT after femur fracture referred by Dr. 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    Patient with a history of provoked of LLE DVT s/p fracture repair after mechanical fall in 2022. Patient states that she only received 2 months of Eliquis.     In 5/2024, presented to ED with left calf pain. The pain in her left leg started several days prior to her hospital visit. She had severe swelling in her left leg during this time. Ultrasound c/w thrombosis of left greater saphenous vein (believed to be both acute and chronic). Consequently, she was started on Eliquis BID.     On assessment, she notes her left leg swelling has improved, does report intermittent leg swelling. She has had left leg stripping for varicose veins years ago. She has no family history of clots. She notes no provoking factor prior to this clot. She denies traveling, surgeries, illness/COVID, or any form of immobility. She is up to date with her mammograms but had c-scope years ago. She continues on Eliquis 5 mg BID, denies any bleeding. Feels well aside from fatigue. Denies B symptoms, SOB/VOGT/CP, n/v/d/c/abd pain, rash or any other complaints at this time.     PMH/PSH: HTN, HLD, SNHL bilateral ears, allergic rhinitis, hypothyroidism, vitamin D deficiency, heartburn, urine incontinence, anxiety, MDD, bilateral knee arthritis, benign essential tremor, polyneuropathy, mild persistent asthma,  eczema, left leg vein stripping for varicose vein,   FH: Thyroid cancer  Soc Hx: Denies smoking, ETOH, illicit drugs; Works part time at [employer] as , , 1 son.    History of Present Illness:  Patient presents for follow up. S/P 3 doses for Venofer. Reports feeling better. Continues to work. Continues on Eliquis 5 mg BID. Denies any bleeding from any source. Otherwise doing well.     Review of Systems: All of the systems have been reviewed and are negative for complaints except what is stated in the HPI and/or past medical history.    Allergies and Intolerances:  Allergies   Allergen Reactions    Amoxicillin Other    Erythromycin Unknown    Lisinopril Cough    Sulfa (Sulfonamide Antibiotics) Unknown          Outpatient Medication Profile:  Current Outpatient Medications   Medication Sig Dispense Refill    albuterol (Proventil HFA) 90 mcg/actuation inhaler Inhale 2 puffs every 4 hours if needed for wheezing or shortness of breath. 6.7 g 0    apixaban (Eliquis) 5 mg tablet Take 1 tablet (5 mg) by mouth 2 times a day. 180 tablet 1    bisoproloL-hydrochlorothiazide (Ziac) 10-6.25 mg tablet Take 2 tablets by mouth once daily with breakfast. 180 tablet 0    budesonide-formoteroL (Symbicort) 160-4.5 mcg/actuation inhaler Inhale 2 puffs 2 times a day. Rinse mouth with water after use to reduce aftertaste and incidence of candidiasis. Do not swallow.      citalopram (CeleXA) 20 mg tablet Take 1 tablet (20 mg) by mouth once daily. 90 tablet 0    esomeprazole (NexIUM) 20 mg DR capsule Take 1 capsule (20 mg) by mouth once daily.      fluticasone (Flonase) 50 mcg/actuation nasal spray Administer into affected nostril(s).      gabapentin (Neurontin) 600 mg tablet Take 1 tablet (600 mg) by mouth 3 times a day. 270 tablet 0    levothyroxine (Synthroid, Levoxyl) 25 mcg tablet Take 1 tablet (25 mcg) by mouth once daily. 90 tablet 3    mv-mn-iron-FA-Ca carb-vit K (Women's Multivitamin) 18 mg-400 mcg- 500 mg-50 mcg tablet Take  "by mouth.      prednisoLONE acetate (Pred-Forte) 1 % ophthalmic suspension 1 drop to operative eye 4 times a day starting the day of surgery (after surgery is done) 5 mL 2     No current facility-administered medications for this visit.      Vitals and Measurements:       10/9/2024     3:10 PM 10/14/2024     3:45 PM 10/15/2024    12:30 PM 10/15/2024     1:49 PM 11/6/2024     1:22 PM 12/4/2024     2:15 PM 12/14/2024     7:06 PM   Vitals   Systolic 134 101 130 111 138 130 121   Diastolic 79 70 82 72 71 60 76   BP Location   Right arm Right arm Left arm     Heart Rate 68 60 62 71 76 68 77   Temp  36.4 °C (97.6 °F) 36.2 °C (97.2 °F) 37.1 °C (98.8 °F) 36.8 °C (98.3 °F) 36.4 °C (97.5 °F)    Resp  18 17 16 16 20    Height  1.778 m (5' 10\")    1.778 m (5' 10\")    Weight (lb)  208 209.11   210 205   BMI  29.84 kg/m2 30 kg/m2   30.13 kg/m2 29.41 kg/m2   BSA (m2)  2.16 m2 2.16 m2   2.17 m2 2.14 m2   Visit Report     Report Report Report     Physical Exam:   Deferred due to telehealth     Lab Results:  Lab Results   Component Value Date    WBC 5.5 12/04/2024    NEUTROABS 3.76 09/03/2024    IGABSOL 0.01 09/03/2024    LYMPHSABS 1.61 09/03/2024    MONOSABS 0.51 09/03/2024    EOSABS 0.19 09/03/2024    BASOSABS 0.06 09/03/2024    RBC 4.30 12/04/2024    MCV 91 12/04/2024    MCHC 30.5 (L) 12/04/2024    HGB 12.0 12/04/2024    HCT 39.3 12/04/2024     12/04/2024     Lab Results   Component Value Date    CREATININE 0.69 09/03/2024    BUN 22 09/03/2024    EGFR >90 09/03/2024     09/03/2024    K 3.3 (L) 09/03/2024     09/03/2024    CO2 31 09/03/2024      Lab Results   Component Value Date    ALT 10 09/03/2024    AST 10 09/03/2024    ALKPHOS 72 09/03/2024    BILITOT 0.4 09/03/2024      Lab Results   Component Value Date    TSH 3.15 12/04/2024     Lab Results   Component Value Date    TSH 3.15 12/04/2024    THYROIDPAB 172 (H) 01/18/2018     Lab Results   Component Value Date    IRON 60 12/04/2024    TIBC 358 12/04/2024    " FERRITIN 65 12/04/2024      Lab Results   Component Value Date    EARLSFAP26 319 12/04/2024     Assessment:    74 y.o. 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    Fatigue    Hypercoagulable work up normal  Plan:    Reviewed and discussed lab, imaging, and pathology results with patient in detail as well as diagnosis, prognosis, and treatment options.    Discussed that recurrent unprovoked DVT would require lifelong AC. Continue Eliquis 5 mg BID.    EUGENIA improved with IV iron.     Recommend oral vitamin B12    Advised completing cologuard and/or c-scope    F/U w/PCP    RTC in 6 months    Patient verbalized understanding, and all her questions were answered to her satisfaction    30 min spent with patient greater than 50 % of which was spent in consultation and coordination of care.

## 2024-12-17 ENCOUNTER — APPOINTMENT (OUTPATIENT)
Dept: ORTHOPEDIC SURGERY | Facility: CLINIC | Age: 74
End: 2024-12-17
Payer: MEDICARE

## 2024-12-17 ENCOUNTER — HOSPITAL ENCOUNTER (OUTPATIENT)
Dept: RADIOLOGY | Facility: CLINIC | Age: 74
End: 2024-12-17
Payer: MEDICARE

## 2024-12-17 DIAGNOSIS — M25.562 BILATERAL CHRONIC KNEE PAIN: ICD-10-CM

## 2024-12-17 DIAGNOSIS — M25.561 BILATERAL CHRONIC KNEE PAIN: ICD-10-CM

## 2024-12-17 DIAGNOSIS — G89.29 BILATERAL CHRONIC KNEE PAIN: ICD-10-CM

## 2024-12-30 ENCOUNTER — HOSPITAL ENCOUNTER (OUTPATIENT)
Dept: RADIOLOGY | Facility: CLINIC | Age: 74
Discharge: HOME | End: 2024-12-30
Payer: MEDICARE

## 2024-12-30 ENCOUNTER — APPOINTMENT (OUTPATIENT)
Facility: CLINIC | Age: 74
End: 2024-12-30
Payer: MEDICARE

## 2024-12-30 VITALS — WEIGHT: 211 LBS | HEIGHT: 70 IN | BODY MASS INDEX: 30.21 KG/M2

## 2024-12-30 DIAGNOSIS — M25.561 BILATERAL CHRONIC KNEE PAIN: ICD-10-CM

## 2024-12-30 DIAGNOSIS — G89.29 CHRONIC PAIN OF BOTH KNEES: ICD-10-CM

## 2024-12-30 DIAGNOSIS — M25.561 CHRONIC PAIN OF BOTH KNEES: ICD-10-CM

## 2024-12-30 DIAGNOSIS — M25.562 BILATERAL CHRONIC KNEE PAIN: ICD-10-CM

## 2024-12-30 DIAGNOSIS — G89.29 BILATERAL CHRONIC KNEE PAIN: ICD-10-CM

## 2024-12-30 DIAGNOSIS — M17.0 PRIMARY OSTEOARTHRITIS OF BOTH KNEES: Primary | ICD-10-CM

## 2024-12-30 DIAGNOSIS — M25.562 CHRONIC PAIN OF BOTH KNEES: ICD-10-CM

## 2024-12-30 PROCEDURE — 73562 X-RAY EXAM OF KNEE 3: CPT | Mod: RT

## 2024-12-30 PROCEDURE — 1157F ADVNC CARE PLAN IN RCRD: CPT | Performed by: ORTHOPAEDIC SURGERY

## 2024-12-30 PROCEDURE — 20610 DRAIN/INJ JOINT/BURSA W/O US: CPT | Performed by: ORTHOPAEDIC SURGERY

## 2024-12-30 PROCEDURE — 99214 OFFICE O/P EST MOD 30 MIN: CPT | Performed by: ORTHOPAEDIC SURGERY

## 2024-12-30 PROCEDURE — 1125F AMNT PAIN NOTED PAIN PRSNT: CPT | Performed by: ORTHOPAEDIC SURGERY

## 2024-12-30 PROCEDURE — 73560 X-RAY EXAM OF KNEE 1 OR 2: CPT | Mod: LT

## 2024-12-30 PROCEDURE — 1123F ACP DISCUSS/DSCN MKR DOCD: CPT | Performed by: ORTHOPAEDIC SURGERY

## 2024-12-30 PROCEDURE — 73564 X-RAY EXAM KNEE 4 OR MORE: CPT | Mod: 50

## 2024-12-30 PROCEDURE — 1159F MED LIST DOCD IN RCRD: CPT | Performed by: ORTHOPAEDIC SURGERY

## 2024-12-30 RX ORDER — TRIAMCINOLONE ACETONIDE 40 MG/ML
40 INJECTION, SUSPENSION INTRA-ARTICULAR; INTRAMUSCULAR
Status: COMPLETED | OUTPATIENT
Start: 2024-12-30 | End: 2024-12-30

## 2024-12-30 RX ORDER — TRIAMCINOLONE ACETONIDE 40 MG/ML
80 INJECTION, SUSPENSION INTRA-ARTICULAR; INTRAMUSCULAR
Status: COMPLETED | OUTPATIENT
Start: 2024-12-30 | End: 2024-12-30

## 2024-12-30 RX ORDER — LIDOCAINE HYDROCHLORIDE 10 MG/ML
4 INJECTION, SOLUTION INFILTRATION; PERINEURAL
Status: COMPLETED | OUTPATIENT
Start: 2024-12-30 | End: 2024-12-30

## 2024-12-30 RX ADMIN — LIDOCAINE HYDROCHLORIDE 4 ML: 10 INJECTION, SOLUTION INFILTRATION; PERINEURAL at 13:59

## 2024-12-30 RX ADMIN — TRIAMCINOLONE ACETONIDE 80 MG: 40 INJECTION, SUSPENSION INTRA-ARTICULAR; INTRAMUSCULAR at 13:59

## 2024-12-30 RX ADMIN — TRIAMCINOLONE ACETONIDE 40 MG: 40 INJECTION, SUSPENSION INTRA-ARTICULAR; INTRAMUSCULAR at 13:59

## 2024-12-30 ASSESSMENT — PAIN SCALES - GENERAL: PAINLEVEL_OUTOF10: 2

## 2024-12-30 ASSESSMENT — PAIN - FUNCTIONAL ASSESSMENT: PAIN_FUNCTIONAL_ASSESSMENT: 0-10

## 2024-12-30 NOTE — PROGRESS NOTES
PRIMARY CARE PHYSICIAN: Madyson Sandoval DO  REFERRING PROVIDER: Madyson Sandoval DO  [address], [address]  [place] [address]     CONSULT ORTHOPAEDIC: Knee Evaluation        ASSESSMENT & PLAN    IMPRESSION:   Primary osteoarthritis, bilateral knees    PLAN:   Discussed the patient findings above.  Reviewed her current x-rays with her.  She does know that she needs bilateral knee replacements but wants to try injections 1 more time prior to proceeding in addition wants to discuss this with her employer and had a proceed with surgical scheduling.  Will proceed with injections as described below, repeat every 3 to 4 months.  Postinjection instructions will be given.    L Inj/Asp: L knee on 12/30/2024 1:59 PM  Indications: pain  Details: 22 G needle, anterolateral (Inferolateral) approach  Medications: 40 mg triamcinolone acetonide 40 mg/mL; 4 mL lidocaine 10 mg/mL (1 %)  Outcome: tolerated well, no immediate complications    Prepped with alcohol. Patient tolerated injection well. Band-aid applied to injection site.   Procedure, treatment alternatives, risks and benefits explained, specific risks discussed. Consent was given by the patient. Immediately prior to procedure a time out was called to verify the correct patient, procedure, equipment, support staff and site/side marked as required.       L Inj/Asp: R knee on 12/30/2024 1:59 PM  Indications: pain  Details: 25 G needle (Inferolateral) approach  Medications: 80 mg triamcinolone acetonide 40 mg/mL  Outcome: tolerated well, no immediate complications    Prepped with alcohol, bandaid applied to injection site.   Procedure, treatment alternatives, risks and benefits explained, specific risks discussed. Consent was given by the patient.             SUBJECTIVE  CHIEF COMPLAINT:   Chief Complaint   Patient presents with    Right Knee - Pain    Left Knee - Pain        HPI: Yaritza Bernal is a 74 y.o. patient. Yaritza WHITMORE  Gabe has had progressive problems with their both knees over the past 4-5 years with the left knee and  3 months for the right. They do not report any trauma. They do not report any constant or progressive numbness or tingling in their legs. Their symptoms are interfering with activities which include pain on the lateral side of her right knee and feels like it catches and locks.  She has pain on the medial side of her left knee and feels like constant aching pain.  She is here today to get a second opinion for surgery.  She usually sees Dr Willie FOOTE. Of note had a previous left femur fracture two years ago. Normally using walking poles for ambulation and occasionally walker. Still works as a  at [employer].     FUNCTIONAL STATUS: occasionally limited.  AMBULATORY STATUS: Househould ambulation independent with walker  PREVIOUS TREATMENTS: she has tried several cortisone injections in her left knee but the last one was 2 years ago, and she has also tried Gel injections which are no longer helping. Unable to take NSAIDs as she is on eliquis due to clots in the past. Previous vein stripping on LLE with vascular.   HISTORY OF SURGERY ON AFFECTED KNEE(S): No       REVIEW OF SYSTEMS  Constitutional: See HPI for pain assessment, No significant weight loss, recent trauma  Cardiovascular: No chest pain, shortness of breath  Respiratory: No difficulty breathing, cough  Gastrointestinal: No nausea, vomiting, diarrhea, constipation  Musculoskeletal: Noted in HPI, positive for pain, restricted motion, stiffness  Integumentary: No rashes, easy bruising, redness   Neurological: no numbness or tingling in extremities, no gait disturbances   Psychiatric: No mood changes, memory changes, social issues  Heme/Lymph: no excessive swelling, easy bruising, excessive bleeding  ENT: No hearing changes  Eyes: No vision changes    Past Medical History:   Diagnosis Date    Bladder disorder, unspecified 10/21/2015    Bladder disorder     Cataract     Clotting disorder (Multi)     Cutaneous abscess, unspecified 03/16/2017    Abscess    Disorder of the skin and subcutaneous tissue, unspecified 07/14/2016    Hand lesion    Dry eyes     DVT (deep venous thrombosis) (Multi)     after femur fx surgery    Encounter for immunization 02/17/2017    Need for Tdap vaccination    Encounter for immunization 10/04/2016    Need for vaccination with 13-polyvalent pneumococcal conjugate vaccine    GERD (gastroesophageal reflux disease)     HL (hearing loss)     Hyperlipidemia     Hypertension     Hypothyroidism     Impacted cerumen, bilateral 10/21/2015    Impacted cerumen of both ears    Impaired fasting glucose 09/17/2018    Abnormal fasting glucose    Irritable bowel syndrome     Medial epicondylitis, right elbow 10/04/2016    Medial epicondylitis, right    Nephrolithiasis     Other conditions influencing health status 03/16/2017    Cyst    Other specified abnormal findings of blood chemistry 10/04/2018    Abnormal TSH    Other specified abnormal findings of blood chemistry 09/17/2018    Abnormal TSH    Other specified symptoms and signs involving the circulatory and respiratory systems 09/28/2017    Chest congestion    Pain in left hand 09/28/2017    Pain of left hand    Pain in left hip 10/04/2018    Left hip pain    Pain in right hip 11/01/2017    Bilateral hip pain    Pain in right knee 03/13/2017    Right knee pain    Pain in unspecified knee 03/13/2017    Knee pain    Personal history of other diseases of the circulatory system 05/09/2018    History of hypertension    Personal history of other diseases of the circulatory system 06/27/2018    History of hypertension    Personal history of other diseases of the circulatory system 01/18/2018    History of hypertension    Personal history of other diseases of the circulatory system 08/17/2017    History of hypertension    Personal history of other diseases of the nervous system and sense organs 09/17/2018     History of benign essential tremor    Personal history of other diseases of the nervous system and sense organs 10/20/2016    History of acute otitis media    Personal history of other diseases of the respiratory system 03/09/2018    History of acute bacterial sinusitis    Personal history of other drug therapy 05/09/2018    History of pneumococcal vaccination    Personal history of other endocrine, nutritional and metabolic disease 10/30/2017    History of hyperlipidemia    Personal history of other mental and behavioral disorders 07/16/2018    History of anxiety    Personal history of other specified conditions 06/27/2018    History of fatigue    Strain of muscle, fascia and tendon of the posterior muscle group at thigh level, unspecified thigh, initial encounter 01/15/2019    Hamstring strain, initial encounter    Strain of unspecified muscles, fascia and tendons at thigh level, left thigh, initial encounter 05/01/2018    Muscle strain of left thigh    Unspecified injury of unspecified elbow, initial encounter 01/15/2019    Elbow injury, initial encounter        Allergies   Allergen Reactions    Amoxicillin Other    Erythromycin Unknown    Lisinopril Cough    Sulfa (Sulfonamide Antibiotics) Unknown        Past Surgical History:   Procedure Laterality Date    BLADDER SURGERY  10/21/2015    Bladder Surgery    BREAST BIOPSY Left     LF benign biopsy, >20 years ago    CATARACT EXTRACTION W/  INTRAOCULAR LENS IMPLANT Right 06/06/2024    Dr. Rashid    CATARACT EXTRACTION W/  INTRAOCULAR LENS IMPLANT Left 07/20/2023    Dr. Rashid    EXTRACORPOREAL SHOCK WAVE LITHOTRIPSY      FEMUR FRACTURE SURGERY      KNEE SURGERY      VEIN LIGATION AND STRIPPING          Family History   Problem Relation Name Age of Onset    Other (cardiac disorder) Mother      Other (emphysema lung) Father      Breast cancer Neg Hx          Social History     Socioeconomic History    Marital status:      Spouse name: Not on file    Number of  children: Not on file    Years of education: Not on file    Highest education level: Not on file   Occupational History    Not on file   Tobacco Use    Smoking status: Never    Smokeless tobacco: Never    Tobacco comments:     Denies any illness in past 30 days     Denies personal/family reaction or problems with anesthesia     Denies any SOB with stairs or daily activity     Uses bilat walking sticks to ambulate     Able to lay flat x 30 minutes             Vaping Use    Vaping status: Never Used   Substance and Sexual Activity    Alcohol use: Not Currently    Drug use: Never    Sexual activity: Defer   Other Topics Concern    Not on file   Social History Narrative    Not on file     Social Drivers of Health     Financial Resource Strain: Not on file   Food Insecurity: Not on file   Transportation Needs: Not on file   Physical Activity: Not on file   Stress: Not on file   Social Connections: Not on file   Intimate Partner Violence: Not on file   Housing Stability: Not on file        CURRENT MEDICATIONS:   Current Outpatient Medications   Medication Sig Dispense Refill    albuterol (Proventil HFA) 90 mcg/actuation inhaler Inhale 2 puffs every 4 hours if needed for wheezing or shortness of breath. 6.7 g 0    apixaban (Eliquis) 5 mg tablet Take 1 tablet (5 mg) by mouth 2 times a day. 180 tablet 1    bisoproloL-hydrochlorothiazide (Ziac) 10-6.25 mg tablet Take 2 tablets by mouth once daily with breakfast. 180 tablet 0    budesonide-formoteroL (Symbicort) 160-4.5 mcg/actuation inhaler Inhale 2 puffs 2 times a day. Rinse mouth with water after use to reduce aftertaste and incidence of candidiasis. Do not swallow.      citalopram (CeleXA) 20 mg tablet Take 1 tablet (20 mg) by mouth once daily. 90 tablet 0    esomeprazole (NexIUM) 20 mg DR capsule Take 1 capsule (20 mg) by mouth once daily.      fluticasone (Flonase) 50 mcg/actuation nasal spray Administer into affected nostril(s).      gabapentin (Neurontin) 600 mg tablet  "Take 1 tablet (600 mg) by mouth 3 times a day. 270 tablet 0    levothyroxine (Synthroid, Levoxyl) 25 mcg tablet Take 1 tablet (25 mcg) by mouth once daily. 90 tablet 3    mv-mn-iron-FA-Ca carb-vit K (Women's Multivitamin) 18 mg-400 mcg- 500 mg-50 mcg tablet Take by mouth.      prednisoLONE acetate (Pred-Forte) 1 % ophthalmic suspension 1 drop to operative eye 4 times a day starting the day of surgery (after surgery is done) 5 mL 2     No current facility-administered medications for this visit.        OBJECTIVE    PHYSICAL EXAM      10/9/2024     3:10 PM 10/14/2024     3:45 PM 10/15/2024    12:30 PM 10/15/2024     1:49 PM 11/6/2024     1:22 PM 12/4/2024     2:15 PM 12/14/2024     7:06 PM   Vitals   Systolic 134 101 130 111 138 130 121   Diastolic 79 70 82 72 71 60 76   BP Location   Right arm Right arm Left arm     Heart Rate 68 60 62 71 76 68 77   Temp  36.4 °C (97.6 °F) 36.2 °C (97.2 °F) 37.1 °C (98.8 °F) 36.8 °C (98.3 °F) 36.4 °C (97.5 °F)    Resp  18 17 16 16 20    Height  1.778 m (5' 10\")    1.778 m (5' 10\")    Weight (lb)  208 209.11   210 205   BMI  29.84 kg/m2 30 kg/m2   30.13 kg/m2 29.41 kg/m2   BSA (m2)  2.16 m2 2.16 m2   2.17 m2 2.14 m2   Visit Report     Report Report Report      There is no height or weight on file to calculate BMI.    GENERAL: A/Ox3, NAD. Appears healthy, well nourished  PSYCHIATRIC: Mood stable, appropriate memory recall  EYES: EOM intact, no scleral icterus  CARDIAC: regular rate  LUNGS: Breathing non-labored  SKIN: no erythema, rashes, or ecchymoses     MUSCULOSKELETAL:  Laterality: bilateral Knee Exam  - Alignment: partial correctible valgus deformity right knee and partial correctable varus deformity of left knee  - ROM:  5 - 115 bilateral knees  - Effusion: none  - Strength: knee extension and flexion 5/5, EHL/PF/DF motor intact  - Palpation: TTP along  medial and lateral  joint line of bilateral knees  - Stability: Anterior/Posterior stable, varus/valgus stable  - Gait: " normal  - Hip Exam: flexion to 100+ degrees, full extension, internal/external rotation adequate, and no pain with log roll  - Special Tests: none performed      NEUROVASCULAR:  - Neurovascular Status: sensation intact to light touch distally  - Capillary refill brisk at extremities, Bilateral dorsalis pedis pulse 2+     IMAGING:  Multiple views of the affected bilateral knee(s) demonstrate: Severe thrice of bilateral knees, right knee with complete lateral compartment joint space narrowing, subchondral sclerosis and valgus deformity, left knee with noted retained intramedullary nail from femur fracture, severe arthritis in medial compartment complete joint space narrowing, subchondral sclerosis, varus deformity.   X-rays were personally reviewed and interpreted by me.  Radiology reports were reviewed by me as well, if readily available at the time.        Brant Lockhart DO  Attending Surgeon  Joint Replacement and Adult Reconstructive Surgery  [place], [place]

## 2024-12-30 NOTE — LETTER
December 30, 2024     Madyson Sandoval DO  [address] [facility], [address] [address]    Patient: Yaritza Bernal   YOB: 1950   Date of Visit: 12/30/2024       Dear Dr. Madyson Sandoval DO:    Thank you for referring Yaritza Bernal to me for evaluation. Below are my notes for this consultation.  If you have questions, please do not hesitate to call me. I look forward to following your patient along with you.       Sincerely,     Brant Lockhart DO      CC: No Recipients  ______________________________________________________________________________________    PRIMARY CARE PHYSICIAN: Madyson Sandoval DO  REFERRING PROVIDER: Madyson Sandoval DO  [address] [facility], [place] [id number]     CONSULT ORTHOPAEDIC: Knee Evaluation        ASSESSMENT & PLAN    IMPRESSION:   Primary osteoarthritis, bilateral knees    PLAN:   Discussed the patient findings above.  Reviewed her current x-rays with her.  She does know that she needs bilateral knee replacements but wants to try injections 1 more time prior to proceeding in addition wants to discuss this with her employer and had a proceed with surgical scheduling.  Will proceed with injections as described below, repeat every 3 to 4 months.  Postinjection instructions will be given.    L Inj/Asp: L knee on 12/30/2024 1:59 PM  Indications: pain  Details: 22 G needle, anterolateral (Inferolateral) approach  Medications: 40 mg triamcinolone acetonide 40 mg/mL; 4 mL lidocaine 10 mg/mL (1 %)  Outcome: tolerated well, no immediate complications    Prepped with alcohol. Patient tolerated injection well. Band-aid applied to injection site.   Procedure, treatment alternatives, risks and benefits explained, specific risks discussed. Consent was given by the patient. Immediately prior to procedure a time out was called to verify the correct patient, procedure, equipment, support  staff and site/side marked as required.       L Inj/Asp: R knee on 12/30/2024 1:59 PM  Indications: pain  Details: 25 G needle (Inferolateral) approach  Medications: 80 mg triamcinolone acetonide 40 mg/mL  Outcome: tolerated well, no immediate complications    Prepped with alcohol, bandaid applied to injection site.   Procedure, treatment alternatives, risks and benefits explained, specific risks discussed. Consent was given by the patient.             SUBJECTIVE  CHIEF COMPLAINT:   Chief Complaint   Patient presents with   • Right Knee - Pain   • Left Knee - Pain        HPI: Yaritza Bernal is a 74 y.o. patient. Yaritza Bernal has had progressive problems with their both knees over the past 4-5 years with the left knee and  3 months for the right. They do not report any trauma. They do not report any constant or progressive numbness or tingling in their legs. Their symptoms are interfering with activities which include pain on the lateral side of her right knee and feels like it catches and locks.  She has pain on the medial side of her left knee and feels like constant aching pain.  She is here today to get a second opinion for surgery.  She usually sees Dr Willie FOOTE. Of note had a previous left femur fracture two years ago. Normally using walking poles for ambulation and occasionally walker. Still works as a  at [employer].     FUNCTIONAL STATUS: occasionally limited.  AMBULATORY STATUS: Househould ambulation independent with walker  PREVIOUS TREATMENTS: she has tried several cortisone injections in her left knee but the last one was 2 years ago, and she has also tried Gel injections which are no longer helping. Unable to take NSAIDs as she is on eliquis due to clots in the past. Previous vein stripping on LLE with vascular.   HISTORY OF SURGERY ON AFFECTED KNEE(S): No       REVIEW OF SYSTEMS  Constitutional: See HPI for pain assessment, No significant weight loss, recent trauma  Cardiovascular: No chest  pain, shortness of breath  Respiratory: No difficulty breathing, cough  Gastrointestinal: No nausea, vomiting, diarrhea, constipation  Musculoskeletal: Noted in HPI, positive for pain, restricted motion, stiffness  Integumentary: No rashes, easy bruising, redness   Neurological: no numbness or tingling in extremities, no gait disturbances   Psychiatric: No mood changes, memory changes, social issues  Heme/Lymph: no excessive swelling, easy bruising, excessive bleeding  ENT: No hearing changes  Eyes: No vision changes    Past Medical History:   Diagnosis Date   • Bladder disorder, unspecified 10/21/2015    Bladder disorder   • Cataract    • Clotting disorder (Multi)    • Cutaneous abscess, unspecified 03/16/2017    Abscess   • Disorder of the skin and subcutaneous tissue, unspecified 07/14/2016    Hand lesion   • Dry eyes    • DVT (deep venous thrombosis) (Multi)     after femur fx surgery   • Encounter for immunization 02/17/2017    Need for Tdap vaccination   • Encounter for immunization 10/04/2016    Need for vaccination with 13-polyvalent pneumococcal conjugate vaccine   • GERD (gastroesophageal reflux disease)    • HL (hearing loss)    • Hyperlipidemia    • Hypertension    • Hypothyroidism    • Impacted cerumen, bilateral 10/21/2015    Impacted cerumen of both ears   • Impaired fasting glucose 09/17/2018    Abnormal fasting glucose   • Irritable bowel syndrome    • Medial epicondylitis, right elbow 10/04/2016    Medial epicondylitis, right   • Nephrolithiasis    • Other conditions influencing health status 03/16/2017    Cyst   • Other specified abnormal findings of blood chemistry 10/04/2018    Abnormal TSH   • Other specified abnormal findings of blood chemistry 09/17/2018    Abnormal TSH   • Other specified symptoms and signs involving the circulatory and respiratory systems 09/28/2017    Chest congestion   • Pain in left hand 09/28/2017    Pain of left hand   • Pain in left hip 10/04/2018    Left hip pain   •  Pain in right hip 11/01/2017    Bilateral hip pain   • Pain in right knee 03/13/2017    Right knee pain   • Pain in unspecified knee 03/13/2017    Knee pain   • Personal history of other diseases of the circulatory system 05/09/2018    History of hypertension   • Personal history of other diseases of the circulatory system 06/27/2018    History of hypertension   • Personal history of other diseases of the circulatory system 01/18/2018    History of hypertension   • Personal history of other diseases of the circulatory system 08/17/2017    History of hypertension   • Personal history of other diseases of the nervous system and sense organs 09/17/2018    History of benign essential tremor   • Personal history of other diseases of the nervous system and sense organs 10/20/2016    History of acute otitis media   • Personal history of other diseases of the respiratory system 03/09/2018    History of acute bacterial sinusitis   • Personal history of other drug therapy 05/09/2018    History of pneumococcal vaccination   • Personal history of other endocrine, nutritional and metabolic disease 10/30/2017    History of hyperlipidemia   • Personal history of other mental and behavioral disorders 07/16/2018    History of anxiety   • Personal history of other specified conditions 06/27/2018    History of fatigue   • Strain of muscle, fascia and tendon of the posterior muscle group at thigh level, unspecified thigh, initial encounter 01/15/2019    Hamstring strain, initial encounter   • Strain of unspecified muscles, fascia and tendons at thigh level, left thigh, initial encounter 05/01/2018    Muscle strain of left thigh   • Unspecified injury of unspecified elbow, initial encounter 01/15/2019    Elbow injury, initial encounter        Allergies   Allergen Reactions   • Amoxicillin Other   • Erythromycin Unknown   • Lisinopril Cough   • Sulfa (Sulfonamide Antibiotics) Unknown        Past Surgical History:   Procedure Laterality  Date   • BLADDER SURGERY  10/21/2015    Bladder Surgery   • BREAST BIOPSY Left     LF benign biopsy, >20 years ago   • CATARACT EXTRACTION W/  INTRAOCULAR LENS IMPLANT Right 06/06/2024    Dr. Rashid   • CATARACT EXTRACTION W/  INTRAOCULAR LENS IMPLANT Left 07/20/2023    Dr. Rashid   • EXTRACORPOREAL SHOCK WAVE LITHOTRIPSY     • FEMUR FRACTURE SURGERY     • KNEE SURGERY     • VEIN LIGATION AND STRIPPING          Family History   Problem Relation Name Age of Onset   • Other (cardiac disorder) Mother     • Other (emphysema lung) Father     • Breast cancer Neg Hx          Social History     Socioeconomic History   • Marital status:      Spouse name: Not on file   • Number of children: Not on file   • Years of education: Not on file   • Highest education level: Not on file   Occupational History   • Not on file   Tobacco Use   • Smoking status: Never   • Smokeless tobacco: Never   • Tobacco comments:     Denies any illness in past 30 days     Denies personal/family reaction or problems with anesthesia     Denies any SOB with stairs or daily activity     Uses bilat walking sticks to ambulate     Able to lay flat x 30 minutes             Vaping Use   • Vaping status: Never Used   Substance and Sexual Activity   • Alcohol use: Not Currently   • Drug use: Never   • Sexual activity: Defer   Other Topics Concern   • Not on file   Social History Narrative   • Not on file     Social Drivers of Health     Financial Resource Strain: Not on file   Food Insecurity: Not on file   Transportation Needs: Not on file   Physical Activity: Not on file   Stress: Not on file   Social Connections: Not on file   Intimate Partner Violence: Not on file   Housing Stability: Not on file        CURRENT MEDICATIONS:   Current Outpatient Medications   Medication Sig Dispense Refill   • albuterol (Proventil HFA) 90 mcg/actuation inhaler Inhale 2 puffs every 4 hours if needed for wheezing or shortness of breath. 6.7 g 0   • apixaban (Eliquis) 5 mg  "tablet Take 1 tablet (5 mg) by mouth 2 times a day. 180 tablet 1   • bisoproloL-hydrochlorothiazide (Ziac) 10-6.25 mg tablet Take 2 tablets by mouth once daily with breakfast. 180 tablet 0   • budesonide-formoteroL (Symbicort) 160-4.5 mcg/actuation inhaler Inhale 2 puffs 2 times a day. Rinse mouth with water after use to reduce aftertaste and incidence of candidiasis. Do not swallow.     • citalopram (CeleXA) 20 mg tablet Take 1 tablet (20 mg) by mouth once daily. 90 tablet 0   • esomeprazole (NexIUM) 20 mg DR capsule Take 1 capsule (20 mg) by mouth once daily.     • fluticasone (Flonase) 50 mcg/actuation nasal spray Administer into affected nostril(s).     • gabapentin (Neurontin) 600 mg tablet Take 1 tablet (600 mg) by mouth 3 times a day. 270 tablet 0   • levothyroxine (Synthroid, Levoxyl) 25 mcg tablet Take 1 tablet (25 mcg) by mouth once daily. 90 tablet 3   • mv-mn-iron-FA-Ca carb-vit K (Women's Multivitamin) 18 mg-400 mcg- 500 mg-50 mcg tablet Take by mouth.     • prednisoLONE acetate (Pred-Forte) 1 % ophthalmic suspension 1 drop to operative eye 4 times a day starting the day of surgery (after surgery is done) 5 mL 2     No current facility-administered medications for this visit.        OBJECTIVE    PHYSICAL EXAM      10/9/2024     3:10 PM 10/14/2024     3:45 PM 10/15/2024    12:30 PM 10/15/2024     1:49 PM 11/6/2024     1:22 PM 12/4/2024     2:15 PM 12/14/2024     7:06 PM   Vitals   Systolic 134 101 130 111 138 130 121   Diastolic 79 70 82 72 71 60 76   BP Location   Right arm Right arm Left arm     Heart Rate 68 60 62 71 76 68 77   Temp  36.4 °C (97.6 °F) 36.2 °C (97.2 °F) 37.1 °C (98.8 °F) 36.8 °C (98.3 °F) 36.4 °C (97.5 °F)    Resp  18 17 16 16 20    Height  1.778 m (5' 10\")    1.778 m (5' 10\")    Weight (lb)  208 209.11   210 205   BMI  29.84 kg/m2 30 kg/m2   30.13 kg/m2 29.41 kg/m2   BSA (m2)  2.16 m2 2.16 m2   2.17 m2 2.14 m2   Visit Report     Report Report Report      There is no height or weight " on file to calculate BMI.    GENERAL: A/Ox3, NAD. Appears healthy, well nourished  PSYCHIATRIC: Mood stable, appropriate memory recall  EYES: EOM intact, no scleral icterus  CARDIAC: regular rate  LUNGS: Breathing non-labored  SKIN: no erythema, rashes, or ecchymoses     MUSCULOSKELETAL:  Laterality: bilateral Knee Exam  - Alignment: partial correctible valgus deformity right knee and partial correctable varus deformity of left knee  - ROM:  5 - 115 bilateral knees  - Effusion: none  - Strength: knee extension and flexion 5/5, EHL/PF/DF motor intact  - Palpation: TTP along  medial and lateral  joint line of bilateral knees  - Stability: Anterior/Posterior stable, varus/valgus stable  - Gait: normal  - Hip Exam: flexion to 100+ degrees, full extension, internal/external rotation adequate, and no pain with log roll  - Special Tests: none performed      NEUROVASCULAR:  - Neurovascular Status: sensation intact to light touch distally  - Capillary refill brisk at extremities, Bilateral dorsalis pedis pulse 2+     IMAGING:  Multiple views of the affected bilateral knee(s) demonstrate: Severe thrice of bilateral knees, right knee with complete lateral compartment joint space narrowing, subchondral sclerosis and valgus deformity, left knee with noted retained intramedullary nail from femur fracture, severe arthritis in medial compartment complete joint space narrowing, subchondral sclerosis, varus deformity.   X-rays were personally reviewed and interpreted by me.  Radiology reports were reviewed by me as well, if readily available at the time.        Brant Lockhart DO  Attending Surgeon  [facility] Replacement and Adult Reconstructive Surgery  [place], [place]

## 2025-02-03 ENCOUNTER — TELEPHONE (OUTPATIENT)
Dept: PRIMARY CARE | Facility: CLINIC | Age: 75
End: 2025-02-03
Payer: MEDICARE

## 2025-02-03 NOTE — TELEPHONE ENCOUNTER
2ND VOICEMAIL LEFT ABOUT RESCHEDULING MARCH APPOINTMENT FOR MED FOLLOW UP AND [facility] FOR WHEN PCP RETURNS ALSO GOING TO SEND A MYCHART PATIENT LAST LONGED ON 1.20.25

## 2025-03-04 ENCOUNTER — TELEPHONE (OUTPATIENT)
Dept: PRIMARY CARE | Facility: CLINIC | Age: 75
End: 2025-03-04
Payer: MEDICARE

## 2025-03-04 NOTE — TELEPHONE ENCOUNTER
CALLED PATIENT TO BOOK [facility] LEFT MESSAGE TO EITHER CALL THE OFFICE OR SHE COULD BOOK IT AFTER HER APPT ON 3.5.25 FOR JUNE WHEN PCP HAS OPENINGS.

## 2025-03-05 ENCOUNTER — APPOINTMENT (OUTPATIENT)
Dept: PRIMARY CARE | Facility: CLINIC | Age: 75
End: 2025-03-05
Payer: MEDICARE

## 2025-03-05 ENCOUNTER — OFFICE VISIT (OUTPATIENT)
Dept: PRIMARY CARE | Facility: CLINIC | Age: 75
End: 2025-03-05
Payer: MEDICARE

## 2025-03-05 VITALS
DIASTOLIC BLOOD PRESSURE: 82 MMHG | BODY MASS INDEX: 30.56 KG/M2 | OXYGEN SATURATION: 96 % | SYSTOLIC BLOOD PRESSURE: 134 MMHG | WEIGHT: 213 LBS | HEART RATE: 68 BPM

## 2025-03-05 DIAGNOSIS — G62.9 POLYNEUROPATHY: ICD-10-CM

## 2025-03-05 DIAGNOSIS — S29.011D INTERCOSTAL MUSCLE STRAIN, SUBSEQUENT ENCOUNTER: Primary | ICD-10-CM

## 2025-03-05 PROCEDURE — 1036F TOBACCO NON-USER: CPT | Performed by: FAMILY MEDICINE

## 2025-03-05 PROCEDURE — 3075F SYST BP GE 130 - 139MM HG: CPT | Performed by: FAMILY MEDICINE

## 2025-03-05 PROCEDURE — 3078F DIAST BP <80 MM HG: CPT | Performed by: FAMILY MEDICINE

## 2025-03-05 PROCEDURE — 99214 OFFICE O/P EST MOD 30 MIN: CPT | Performed by: FAMILY MEDICINE

## 2025-03-05 PROCEDURE — 1159F MED LIST DOCD IN RCRD: CPT | Performed by: FAMILY MEDICINE

## 2025-03-05 PROCEDURE — 1160F RVW MEDS BY RX/DR IN RCRD: CPT | Performed by: FAMILY MEDICINE

## 2025-03-05 PROCEDURE — 1123F ACP DISCUSS/DSCN MKR DOCD: CPT | Performed by: FAMILY MEDICINE

## 2025-03-05 PROCEDURE — 1157F ADVNC CARE PLAN IN RCRD: CPT | Performed by: FAMILY MEDICINE

## 2025-03-05 PROCEDURE — G2211 COMPLEX E/M VISIT ADD ON: HCPCS | Performed by: FAMILY MEDICINE

## 2025-03-05 RX ORDER — GABAPENTIN 600 MG/1
600 TABLET ORAL 3 TIMES DAILY
Qty: 270 TABLET | Refills: 0 | Status: SHIPPED | OUTPATIENT
Start: 2025-03-05

## 2025-03-05 ASSESSMENT — ENCOUNTER SYMPTOMS
OCCASIONAL FEELINGS OF UNSTEADINESS: 1
LOSS OF SENSATION IN FEET: 0
DEPRESSION: 0

## 2025-03-05 NOTE — PROGRESS NOTES
Subjective   Patient ID: Yaritza Bernal is a 74 y.o. female who presents for Spasms.    HPI   PCP: Dr. Christine    Pain in right intercostal muscle (h/o intercostal muscle strain).  Onset after twisting awkwardly to bend down and pick something up off the floor.  Described as constant dull ache w/intermittent sharp pain w/movement.  Worse w/certain movements.  Improved w/laying perfectly flat or sitting upright.  Max 8-9/10.  Ave 3-4/10.  Now 3-4/10.  Had physical therapy last year (9/2024-11/2024) which helped.  States the pain wasn't hardly bothering her at all by the time PT stopped (ran out of covered sessions).  Requests referral so she can resume physical therapy since pain has returned (very active at work, which she thinks is contributing---this is not a [facility] claim).    Has Polyneuropathy..  Condition(s) stable.  Taking Gabapentin as directed.  Requests refills.    Review of Systems  No other complaints.     Objective   /82   Pulse 68   Wt 96.6 kg (213 lb)   SpO2 96%   BMI 30.56 kg/m²     Physical Exam  Constitutional:       General: She is not in acute distress.     Appearance: She is obese.   Chest:      Chest wall: No tenderness.      Comments: No overlying skin changes in area of pain  Musculoskeletal:      Comments: Ambulating w/walker   Neurological:      Mental Status: She is oriented to person, place, and time.   Psychiatric:         Mood and Affect: Mood normal.         Behavior: Behavior normal.     Assessment/Plan   Diagnoses and all orders for this visit:  Intercostal muscle strain, subsequent encounter  -     Referral to Physical Therapy; Future  Polyneuropathy  -     gabapentin (Neurontin) 600 mg tablet; Take 1 tablet (600 mg) by mouth 3 times a day.    Referred to physical therapy as requested.  Refilled Gabapentin as requested.    F/U w/PCP.

## 2025-03-06 ENCOUNTER — TELEPHONE (OUTPATIENT)
Dept: HEMATOLOGY/ONCOLOGY | Facility: CLINIC | Age: 75
End: 2025-03-06
Payer: MEDICARE

## 2025-03-06 NOTE — TELEPHONE ENCOUNTER
Was unable to reach patient, no available answering service to leave voice message.  Will continue to try to reach out to patient.

## 2025-03-07 ENCOUNTER — OFFICE VISIT (OUTPATIENT)
Dept: URGENT CARE | Age: 75
End: 2025-03-07
Payer: MEDICARE

## 2025-03-07 VITALS
DIASTOLIC BLOOD PRESSURE: 84 MMHG | TEMPERATURE: 99 F | SYSTOLIC BLOOD PRESSURE: 168 MMHG | RESPIRATION RATE: 16 BRPM | BODY MASS INDEX: 30.06 KG/M2 | OXYGEN SATURATION: 97 % | HEART RATE: 77 BPM | HEIGHT: 70 IN | WEIGHT: 210 LBS

## 2025-03-07 DIAGNOSIS — I82.5Y2 CHRONIC DEEP VEIN THROMBOSIS (DVT) OF PROXIMAL VEIN OF LEFT LOWER EXTREMITY (MULTI): Primary | ICD-10-CM

## 2025-03-07 DIAGNOSIS — M79.10 MUSCLE PAIN: Primary | ICD-10-CM

## 2025-03-07 PROCEDURE — 1125F AMNT PAIN NOTED PAIN PRSNT: CPT

## 2025-03-07 PROCEDURE — 99213 OFFICE O/P EST LOW 20 MIN: CPT

## 2025-03-07 PROCEDURE — 1160F RVW MEDS BY RX/DR IN RCRD: CPT

## 2025-03-07 PROCEDURE — 1157F ADVNC CARE PLAN IN RCRD: CPT

## 2025-03-07 PROCEDURE — 1123F ACP DISCUSS/DSCN MKR DOCD: CPT

## 2025-03-07 PROCEDURE — 3079F DIAST BP 80-89 MM HG: CPT

## 2025-03-07 PROCEDURE — 1159F MED LIST DOCD IN RCRD: CPT

## 2025-03-07 PROCEDURE — 1036F TOBACCO NON-USER: CPT

## 2025-03-07 PROCEDURE — 3077F SYST BP >= 140 MM HG: CPT

## 2025-03-07 RX ORDER — METHYLPREDNISOLONE 4 MG/1
TABLET ORAL
Qty: 21 TABLET | Refills: 0 | Status: SHIPPED | OUTPATIENT
Start: 2025-03-07 | End: 2025-03-13

## 2025-03-07 ASSESSMENT — ENCOUNTER SYMPTOMS
LOSS OF SENSATION IN FEET: 0
OCCASIONAL FEELINGS OF UNSTEADINESS: 1
DEPRESSION: 0

## 2025-03-07 ASSESSMENT — PAIN SCALES - GENERAL: PAINLEVEL_OUTOF10: 7

## 2025-03-07 NOTE — LETTER
March 7, 2025     Patient: Yaritza Bernal   YOB: 1950   Date of Visit: 3/7/2025       To Whom It May Concern:    Yaritza Bernal was seen in my clinic on 3/7/2025 at 4:15 pm. Please excuse Yaritza for her absence from work on this day to make the appointment.    May return to work on 3/10/25    If you have any questions or concerns, please don't hesitate to call.         Sincerely,         Will Conteh PA-C        CC: No Recipients

## 2025-03-07 NOTE — TELEPHONE ENCOUNTER
Called and spoke with patient.  She reports that she was at PT yesterday and they noticed her left leg was swollen and recommended she get it looked at. She does have pain in her calf area.  Denies difficulty with walking. She has a history of DVT in the left leg. She reports that she is on Eliquis. Will reach out to [facility] to see what she advises for the patient.  Patient agreed to plan and verbalized understanding using teach back method.

## 2025-03-07 NOTE — PROGRESS NOTES
Subjective   Patient ID: Yaritza Bernal is a 74 y.o. female. They present today with a chief complaint of Muscle Pain (Per patient starting on yesterday pain elevated from rib cage to back.).  Pain has been going on since last summer.  It flareup about 2 days ago.  Patient sees her PT yesterday.  She denies chest pain, shortness of breath, palpitations, abdominal pain, changes in bowel and urinary habits.      Past Medical History  Allergies as of 03/07/2025 - Reviewed 03/07/2025   Allergen Reaction Noted    Amoxicillin Other 11/16/2016    Erythromycin Unknown 03/30/2023    Lisinopril Cough 08/03/2011    Sulfa (sulfonamide antibiotics) Unknown 02/18/2023       (Not in a hospital admission)       Past Medical History:   Diagnosis Date    Bladder disorder, unspecified 10/21/2015    Bladder disorder    Cataract     Clotting disorder (Multi)     Cutaneous abscess, unspecified 03/16/2017    Abscess    Disorder of the skin and subcutaneous tissue, unspecified 07/14/2016    Hand lesion    Dry eyes     DVT (deep venous thrombosis) (Multi)     after femur fx surgery    Encounter for immunization 02/17/2017    Need for Tdap vaccination    Encounter for immunization 10/04/2016    Need for vaccination with 13-polyvalent pneumococcal conjugate vaccine    GERD (gastroesophageal reflux disease)     HL (hearing loss)     Hyperlipidemia     Hypertension     Hypothyroidism     Impacted cerumen, bilateral 10/21/2015    Impacted cerumen of both ears    Impaired fasting glucose 09/17/2018    Abnormal fasting glucose    Irritable bowel syndrome     Medial epicondylitis, right elbow 10/04/2016    Medial epicondylitis, right    Nephrolithiasis     Other conditions influencing health status 03/16/2017    Cyst    Other specified abnormal findings of blood chemistry 10/04/2018    Abnormal TSH    Other specified abnormal findings of blood chemistry 09/17/2018    Abnormal TSH    Other specified symptoms and signs involving the circulatory and  respiratory systems 09/28/2017    Chest congestion    Pain in left hand 09/28/2017    Pain of left hand    Pain in left hip 10/04/2018    Left hip pain    Pain in right hip 11/01/2017    Bilateral hip pain    Pain in right knee 03/13/2017    Right knee pain    Pain in unspecified knee 03/13/2017    Knee pain    Personal history of other diseases of the circulatory system 05/09/2018    History of hypertension    Personal history of other diseases of the circulatory system 06/27/2018    History of hypertension    Personal history of other diseases of the circulatory system 01/18/2018    History of hypertension    Personal history of other diseases of the circulatory system 08/17/2017    History of hypertension    Personal history of other diseases of the nervous system and sense organs 09/17/2018    History of benign essential tremor    Personal history of other diseases of the nervous system and sense organs 10/20/2016    History of acute otitis media    Personal history of other diseases of the respiratory system 03/09/2018    History of acute bacterial sinusitis    Personal history of other drug therapy 05/09/2018    History of pneumococcal vaccination    Personal history of other endocrine, nutritional and metabolic disease 10/30/2017    History of hyperlipidemia    Personal history of other mental and behavioral disorders 07/16/2018    History of anxiety    Personal history of other specified conditions 06/27/2018    History of fatigue    Strain of muscle, fascia and tendon of the posterior muscle group at thigh level, unspecified thigh, initial encounter 01/15/2019    Hamstring strain, initial encounter    Strain of unspecified muscles, fascia and tendons at thigh level, left thigh, initial encounter 05/01/2018    Muscle strain of left thigh    Unspecified injury of unspecified elbow, initial encounter 01/15/2019    Elbow injury, initial encounter       Past Surgical History:   Procedure Laterality Date     "BLADDER SURGERY  10/21/2015    Bladder Surgery    BREAST BIOPSY Left     LF benign biopsy, >20 years ago    CATARACT EXTRACTION W/  INTRAOCULAR LENS IMPLANT Right 06/06/2024    Dr. Rashid    CATARACT EXTRACTION W/  INTRAOCULAR LENS IMPLANT Left 07/20/2023    Dr. Rashid    EXTRACORPOREAL SHOCK WAVE LITHOTRIPSY      FEMUR FRACTURE SURGERY      KNEE SURGERY      VEIN LIGATION AND STRIPPING          reports that she has never smoked. She has never used smokeless tobacco. She reports that she does not currently use alcohol. She reports that she does not use drugs.    Review of Systems  Review of Systems                               Objective    Vitals:    03/07/25 1620   BP: 168/84   BP Location: Left arm   Patient Position: Sitting   BP Cuff Size: Large adult   Pulse: 77   Resp: 16   Temp: 37.2 °C (99 °F)   TempSrc: Oral   SpO2: 97%   Weight: 95.3 kg (210 lb)   Height: 1.778 m (5' 10\")     No LMP recorded. Patient is postmenopausal.    Physical Exam  Vitals reviewed.   HENT:      Head: Normocephalic and atraumatic.      Nose: Nose normal.      Mouth/Throat:      Mouth: Mucous membranes are moist.   Eyes:      Extraocular Movements: Extraocular movements intact.      Conjunctiva/sclera: Conjunctivae normal.   Pulmonary:      Effort: Pulmonary effort is normal.   Musculoskeletal:      Comments: Tenderness on the R. Side of mid back that radiates to the R. Side of the flank.   Skin:     General: Skin is warm.   Neurological:      Mental Status: She is alert and oriented to person, place, and time.   Psychiatric:         Mood and Affect: Mood normal.         Behavior: Behavior normal.             Point of Care Test & Imaging Results from this visit  No results found for this visit on 03/07/25.   No results found.    Diagnostic study results (if any) were reviewed by Will Conteh PA-C.    Assessment/Plan   Allergies, medications, history, and pertinent labs/EKGs/Imaging reviewed by Will Conteh PA-C.     Medical Decision " Making  Patient will start on Medrol Dosepak.  She will continue with physical therapy.  -         Patient is educated about their diagnoses.     -          Discussed medications benefits and adverse effects.     -          Answered all patient’s questions.     -          Patient will call 911 or go to the nearest ED if worsen symptoms .     -          Patient is agreeable to the plan of care and is deemed stable upon discharge.     -          Follow up with your primary care provider in two days.    Orders and Diagnoses  Diagnoses and all orders for this visit:  Muscle pain  -     methylPREDNISolone (Medrol Dospak) 4 mg tablets; Take as directed on package.      Medical Admin Record      Patient disposition: Home    Electronically signed by Will Conteh PA-C  4:47 PM

## 2025-03-07 NOTE — TELEPHONE ENCOUNTER
Called patient to notify her that Cristela GOMEZ put in an order of her to get an ultrasound of her left leg to rule out DVT.  Let her know that scheduling should reach out to her or she can see it on her mychart to schedule.  Patient agreed to plan and verbalized understanding using teach back method.

## 2025-03-12 ENCOUNTER — APPOINTMENT (OUTPATIENT)
Dept: VASCULAR MEDICINE | Facility: CLINIC | Age: 75
End: 2025-03-12
Payer: MEDICARE

## 2025-03-17 ENCOUNTER — APPOINTMENT (OUTPATIENT)
Facility: CLINIC | Age: 75
End: 2025-03-17
Payer: MEDICARE

## 2025-03-17 VITALS — HEIGHT: 70 IN | WEIGHT: 210 LBS | BODY MASS INDEX: 30.06 KG/M2

## 2025-03-17 DIAGNOSIS — M17.0 PRIMARY OSTEOARTHRITIS OF BOTH KNEES: Primary | ICD-10-CM

## 2025-03-17 DIAGNOSIS — M79.89 LEG SWELLING: ICD-10-CM

## 2025-03-17 PROCEDURE — 20610 DRAIN/INJ JOINT/BURSA W/O US: CPT | Performed by: ORTHOPAEDIC SURGERY

## 2025-03-17 PROCEDURE — 1159F MED LIST DOCD IN RCRD: CPT | Performed by: ORTHOPAEDIC SURGERY

## 2025-03-17 PROCEDURE — 1036F TOBACCO NON-USER: CPT | Performed by: ORTHOPAEDIC SURGERY

## 2025-03-17 PROCEDURE — 1123F ACP DISCUSS/DSCN MKR DOCD: CPT | Performed by: ORTHOPAEDIC SURGERY

## 2025-03-17 PROCEDURE — 99214 OFFICE O/P EST MOD 30 MIN: CPT | Performed by: ORTHOPAEDIC SURGERY

## 2025-03-17 PROCEDURE — 1157F ADVNC CARE PLAN IN RCRD: CPT | Performed by: ORTHOPAEDIC SURGERY

## 2025-03-17 RX ORDER — TRIAMCINOLONE ACETONIDE 40 MG/ML
40 INJECTION, SUSPENSION INTRA-ARTICULAR; INTRAMUSCULAR
Status: COMPLETED | OUTPATIENT
Start: 2025-03-17 | End: 2025-03-17

## 2025-03-17 RX ORDER — LIDOCAINE HYDROCHLORIDE 10 MG/ML
4 INJECTION, SOLUTION INFILTRATION; PERINEURAL
Status: COMPLETED | OUTPATIENT
Start: 2025-03-17 | End: 2025-03-17

## 2025-03-17 RX ADMIN — TRIAMCINOLONE ACETONIDE 40 MG: 40 INJECTION, SUSPENSION INTRA-ARTICULAR; INTRAMUSCULAR at 15:01

## 2025-03-17 RX ADMIN — LIDOCAINE HYDROCHLORIDE 4 ML: 10 INJECTION, SOLUTION INFILTRATION; PERINEURAL at 15:01

## 2025-03-17 ASSESSMENT — PAIN - FUNCTIONAL ASSESSMENT: PAIN_FUNCTIONAL_ASSESSMENT: NO/DENIES PAIN

## 2025-03-17 NOTE — PROGRESS NOTES
ORTHOPEDIC FOLLOW UP      ============================  IMPRESSION/PLAN:  ============================  74 y.o. female with Primary osteoarthritis, bilateral knees    PLAN:   Discussed the patient findings above.  Reviewed her current x-rays with her.  She does know that she needs bilateral knee replacements.  I did discuss some reluctant to proceed at this time given her lower extremity edema and swelling and required that she at least get an evaluation from vascular medicine due to her history of chronic DVTs with an acute DVT 1 year ago.  Will proceed with right knee injection today for pain control as she is more symptomatic on the left knee and does not want to delay surgery if she does get clearance.  See below for injection details primary repeat every 3 to 4 months.  Postinjection instructions verbally given.    L Inj/Asp: R knee on 3/17/2025 3:01 PM  Indications: pain  Details: 25 G needle, anterolateral (Inferolateral) approach  Medications: 40 mg triamcinolone acetonide 40 mg/mL; 4 mL lidocaine 10 mg/mL (1 %)  Outcome: tolerated well, no immediate complications    Prepped with alcohol, bandaid applied to injection site.   Procedure, treatment alternatives, risks and benefits explained, specific risks discussed. Consent was given by the patient.               Yaritza Bernal presents today for follow up of the above condition. She was given B/L knee CSI 12/30/2024. Her right knee has mild relief and her left knee has no improvement. She would like to discuss her options. She does have swelling in the left lower leg and some in the right lower leg. She is taking Eliguis. She has pain sometimes. She is scheduled for an US 3/24/2025. =    FUNCTIONAL STATUS: limited:  unable to perform activities of daily living.  AMBULATORY STATUS:  walker and 2 walking sticks  PREVIOUS TREATMENTS: Cortisone injection B/L knee CSI 12/30/2024 performed last visit, with mild relief for her right knee and no improvement for her  left knee.  Other injection Gel injections a couple years ago with no improvement  Therapy PT many years ago completed  Surgery Vein stripping on left side    HISTORY OF SURGERY ON AFFECTED KNEE(S): Yes     Review of Systems:   Constitutional: See HPI for pain assessment, No significant weight loss, recent trauma. Denies fevers/chills  Cardiovascular: No chest pain, shortness of breath  Respiratory: No difficulty breathing, cough  Gastrointestinal: No nausea, vomiting, diarrhea, constipation  Musculoskeletal: Noted in HPI, no arthralgias   Integumentary: No rashes, easy bruising, redness   Neurological: no numbness or tingling in extremities, no gait disturbances     Patient Active Problem List   Diagnosis    Accidental fall    Anxiety    Arthritis of knee, left    Arthritis of knee, right    Astigmatism    Asymmetrical sensorineural hearing loss    Benign essential hypertension    Benign essential tremor    Bilateral knee pain    CMC arthritis    Combined form of age-related cataract, left eye    Combined form of age-related cataract, right eye    Concussion    Contusion, chest wall    Cup to disc asymmetry    Dry eyes, bilateral    Eczema    Heartburn    Heat intolerance    Hyperlipidemia    Hyperopia    Hypothyroid    Localized primary osteoarthritis of carpometacarpal joint of left thumb    Loss of balance    Low back pain    Major depression in remission (CMS-HCC)    Mild persistent asthma without complication (HHS-HCC)    Myopia of left eye    Neck strain    Polyneuropathy    PVD (posterior vitreous detachment), both eyes    Right shoulder pain    Sensorineural hearing loss of both ears    Splinter of hand    Tinnitus of left ear    Urine incontinence    Viral URI with cough    Vitamin D deficiency    Wrist injury    Allergic rhinitis    Fracture    Chest pain    Hemangioma of skin and subcutaneous tissue    Hypertension    Neoplasm of uncertain behavior of skin    Other melanin hyperpigmentation     Pseudophakia    Actinic keratosis    Inflamed seborrheic keratosis    Other seborrheic keratosis    Skin changes due to chronic exposure to nonionizing radiation, unspecified    Knee pain, bilateral    Left knee pain    MDD (major depressive disorder), single episode    Presbyopia    Epiretinal membrane, left eye    Iron deficiency anemia secondary to inadequate dietary iron intake    Idiopathic steatorrhea (HHS-HCC)    Panlobular emphysema (Multi)    Morbid (severe) obesity due to excess calories (Multi)       Past Medical History:   Diagnosis Date    Bladder disorder, unspecified 10/21/2015    Bladder disorder    Cataract     Clotting disorder (Multi)     Cutaneous abscess, unspecified 03/16/2017    Abscess    Disorder of the skin and subcutaneous tissue, unspecified 07/14/2016    Hand lesion    Dry eyes     DVT (deep venous thrombosis) (Multi)     after femur fx surgery    Encounter for immunization 02/17/2017    Need for Tdap vaccination    Encounter for immunization 10/04/2016    Need for vaccination with 13-polyvalent pneumococcal conjugate vaccine    GERD (gastroesophageal reflux disease)     HL (hearing loss)     Hyperlipidemia     Hypertension     Hypothyroidism     Impacted cerumen, bilateral 10/21/2015    Impacted cerumen of both ears    Impaired fasting glucose 09/17/2018    Abnormal fasting glucose    Irritable bowel syndrome     Medial epicondylitis, right elbow 10/04/2016    Medial epicondylitis, right    Nephrolithiasis     Other conditions influencing health status 03/16/2017    Cyst    Other specified abnormal findings of blood chemistry 10/04/2018    Abnormal TSH    Other specified abnormal findings of blood chemistry 09/17/2018    Abnormal TSH    Other specified symptoms and signs involving the circulatory and respiratory systems 09/28/2017    Chest congestion    Pain in left hand 09/28/2017    Pain of left hand    Pain in left hip 10/04/2018    Left hip pain    Pain in right hip 11/01/2017     Bilateral hip pain    Pain in right knee 03/13/2017    Right knee pain    Pain in unspecified knee 03/13/2017    Knee pain    Personal history of other diseases of the circulatory system 05/09/2018    History of hypertension    Personal history of other diseases of the circulatory system 06/27/2018    History of hypertension    Personal history of other diseases of the circulatory system 01/18/2018    History of hypertension    Personal history of other diseases of the circulatory system 08/17/2017    History of hypertension    Personal history of other diseases of the nervous system and sense organs 09/17/2018    History of benign essential tremor    Personal history of other diseases of the nervous system and sense organs 10/20/2016    History of acute otitis media    Personal history of other diseases of the respiratory system 03/09/2018    History of acute bacterial sinusitis    Personal history of other drug therapy 05/09/2018    History of pneumococcal vaccination    Personal history of other endocrine, nutritional and metabolic disease 10/30/2017    History of hyperlipidemia    Personal history of other mental and behavioral disorders 07/16/2018    History of anxiety    Personal history of other specified conditions 06/27/2018    History of fatigue    Strain of muscle, fascia and tendon of the posterior muscle group at thigh level, unspecified thigh, initial encounter 01/15/2019    Hamstring strain, initial encounter    Strain of unspecified muscles, fascia and tendons at thigh level, left thigh, initial encounter 05/01/2018    Muscle strain of left thigh    Unspecified injury of unspecified elbow, initial encounter 01/15/2019    Elbow injury, initial encounter        Allergies   Allergen Reactions    Amoxicillin Other    Erythromycin Unknown    Lisinopril Cough    Sulfa (Sulfonamide Antibiotics) Unknown        Past Surgical History:   Procedure Laterality Date    BLADDER SURGERY  10/21/2015    Bladder  Surgery    BREAST BIOPSY Left     LF benign biopsy, >20 years ago    CATARACT EXTRACTION W/  INTRAOCULAR LENS IMPLANT Right 06/06/2024    Dr. Rashid    CATARACT EXTRACTION W/  INTRAOCULAR LENS IMPLANT Left 07/20/2023    Dr. Rashid    EXTRACORPOREAL SHOCK WAVE LITHOTRIPSY      FEMUR FRACTURE SURGERY      KNEE SURGERY      VEIN LIGATION AND STRIPPING          Family History   Problem Relation Name Age of Onset    Other (cardiac disorder) Mother      Other (emphysema lung) Father      Breast cancer Neg Hx          Social History     Socioeconomic History    Marital status:      Spouse name: Not on file    Number of children: Not on file    Years of education: Not on file    Highest education level: Not on file   Occupational History    Not on file   Tobacco Use    Smoking status: Never    Smokeless tobacco: Never    Tobacco comments:     Denies any illness in past 30 days     Denies personal/family reaction or problems with anesthesia     Denies any SOB with stairs or daily activity     Uses bilat walking sticks to ambulate     Able to lay flat x 30 minutes             Vaping Use    Vaping status: Never Used   Substance and Sexual Activity    Alcohol use: Not Currently    Drug use: Never    Sexual activity: Defer   Other Topics Concern    Not on file   Social History Narrative    Not on file     Social Drivers of Health     Financial Resource Strain: Not on file   Food Insecurity: Not on file   Transportation Needs: Not on file   Physical Activity: Not on file   Stress: Not on file   Social Connections: Not on file   Intimate Partner Violence: Not on file   Housing Stability: Not on file        CURRENT MEDICATIONS:   Current Outpatient Medications   Medication Sig Dispense Refill    albuterol (Proventil HFA) 90 mcg/actuation inhaler Inhale 2 puffs every 4 hours if needed for wheezing or shortness of breath. 6.7 g 0    apixaban (Eliquis) 5 mg tablet Take 1 tablet (5 mg) by mouth 2 times a day. 180 tablet 1     bisoproloL-hydrochlorothiazide (Ziac) 10-6.25 mg tablet Take 2 tablets by mouth once daily with breakfast. 180 tablet 0    budesonide-formoteroL (Symbicort) 160-4.5 mcg/actuation inhaler Inhale 2 puffs 2 times a day. Rinse mouth with water after use to reduce aftertaste and incidence of candidiasis. Do not swallow.      citalopram (CeleXA) 20 mg tablet Take 1 tablet (20 mg) by mouth once daily. 90 tablet 0    esomeprazole (NexIUM) 20 mg DR capsule Take 1 capsule (20 mg) by mouth once daily.      fluticasone (Flonase) 50 mcg/actuation nasal spray Administer into affected nostril(s).      gabapentin (Neurontin) 600 mg tablet Take 1 tablet (600 mg) by mouth 3 times a day. 270 tablet 0    levothyroxine (Synthroid, Levoxyl) 25 mcg tablet Take 1 tablet (25 mcg) by mouth once daily. 90 tablet 3    mv-mn-iron-FA-Ca carb-vit K (Women's Multivitamin) 18 mg-400 mcg- 500 mg-50 mcg tablet Take by mouth.      prednisoLONE acetate (Pred-Forte) 1 % ophthalmic suspension 1 drop to operative eye 4 times a day starting the day of surgery (after surgery is done) (Patient not taking: Reported on 3/7/2025) 5 mL 2     No current facility-administered medications for this visit.        =================================  EXAM  =================================  GENERAL: A/Ox3, NAD. Appears healthy, well nourished  PSYCHIATRIC: Mood stable, appropriate memory recall  EYES: EOM intact, no scleral icterus  CARDIAC: regular rate  LUNGS: Breathing non-labored  SKIN: no erythema, rashes, or ecchymoses      MUSCULOSKELETAL:  Laterality: bilateral Knee Exam  - Alignment: partial correctible valgus deformity right knee and partial correctable varus deformity of left knee  - ROM:  5 - 115 bilateral knees  - Effusion: none  - Strength: knee extension and flexion 5/5, EHL/PF/DF motor intact  - Palpation: TTP along  medial and lateral  joint line of bilateral knees  - Stability: Anterior/Posterior stable, varus/valgus stable  - Gait: normal  - Hip Exam:  flexion to 100+ degrees, full extension, internal/external rotation adequate, and no pain with log roll  - Special Tests: none performed        NEUROVASCULAR:  - Neurovascular Status: sensation intact to light touch distally  - Capillary refill brisk at extremities, Bilateral dorsalis pedis pulse 2+      IMAGING:  Multiple views of the affected bilateral knee(s) demonstrate: Severe thrice of bilateral knees, right knee with complete lateral compartment joint space narrowing, subchondral sclerosis and valgus deformity, left knee with noted retained intramedullary nail from femur fracture, severe arthritis in medial compartment complete joint space narrowing, subchondral sclerosis, varus deformity.   X-rays were personally reviewed and interpreted by me.  Radiology reports were reviewed by me as well, if readily available at the time.         Body mass index is 30.13 kg/m².      Brant Lcokhart DO  Attending Surgeon  [facility] Adult Reconstructive Surgery  [place], [place]

## 2025-03-24 ENCOUNTER — HOSPITAL ENCOUNTER (OUTPATIENT)
Dept: VASCULAR MEDICINE | Facility: CLINIC | Age: 75
Discharge: HOME | End: 2025-03-24
Payer: MEDICARE

## 2025-03-24 DIAGNOSIS — R60.0 LOCALIZED EDEMA: ICD-10-CM

## 2025-03-24 DIAGNOSIS — I82.5Y2 CHRONIC DEEP VEIN THROMBOSIS (DVT) OF PROXIMAL VEIN OF LEFT LOWER EXTREMITY: ICD-10-CM

## 2025-03-24 PROCEDURE — 93971 EXTREMITY STUDY: CPT | Performed by: INTERNAL MEDICINE

## 2025-03-24 PROCEDURE — 93971 EXTREMITY STUDY: CPT

## 2025-04-01 ENCOUNTER — OFFICE VISIT (OUTPATIENT)
Dept: VASCULAR SURGERY | Facility: HOSPITAL | Age: 75
End: 2025-04-01
Payer: MEDICARE

## 2025-04-01 VITALS
DIASTOLIC BLOOD PRESSURE: 76 MMHG | BODY MASS INDEX: 29.78 KG/M2 | OXYGEN SATURATION: 96 % | HEART RATE: 69 BPM | SYSTOLIC BLOOD PRESSURE: 145 MMHG | RESPIRATION RATE: 18 BRPM | HEIGHT: 70 IN | WEIGHT: 208 LBS

## 2025-04-01 DIAGNOSIS — I73.9 VASCULAR DISEASE, PERIPHERAL (CMS-HCC): ICD-10-CM

## 2025-04-01 DIAGNOSIS — M79.89 LEG SWELLING: ICD-10-CM

## 2025-04-01 PROCEDURE — 1036F TOBACCO NON-USER: CPT | Performed by: STUDENT IN AN ORGANIZED HEALTH CARE EDUCATION/TRAINING PROGRAM

## 2025-04-01 PROCEDURE — 3077F SYST BP >= 140 MM HG: CPT | Performed by: STUDENT IN AN ORGANIZED HEALTH CARE EDUCATION/TRAINING PROGRAM

## 2025-04-01 PROCEDURE — 1159F MED LIST DOCD IN RCRD: CPT | Performed by: STUDENT IN AN ORGANIZED HEALTH CARE EDUCATION/TRAINING PROGRAM

## 2025-04-01 PROCEDURE — 1157F ADVNC CARE PLAN IN RCRD: CPT | Performed by: STUDENT IN AN ORGANIZED HEALTH CARE EDUCATION/TRAINING PROGRAM

## 2025-04-01 PROCEDURE — 3078F DIAST BP <80 MM HG: CPT | Performed by: STUDENT IN AN ORGANIZED HEALTH CARE EDUCATION/TRAINING PROGRAM

## 2025-04-01 PROCEDURE — 1123F ACP DISCUSS/DSCN MKR DOCD: CPT | Performed by: STUDENT IN AN ORGANIZED HEALTH CARE EDUCATION/TRAINING PROGRAM

## 2025-04-01 PROCEDURE — 99214 OFFICE O/P EST MOD 30 MIN: CPT | Performed by: STUDENT IN AN ORGANIZED HEALTH CARE EDUCATION/TRAINING PROGRAM

## 2025-04-01 PROCEDURE — 99204 OFFICE O/P NEW MOD 45 MIN: CPT | Performed by: STUDENT IN AN ORGANIZED HEALTH CARE EDUCATION/TRAINING PROGRAM

## 2025-04-01 ASSESSMENT — PATIENT HEALTH QUESTIONNAIRE - PHQ9
2. FEELING DOWN, DEPRESSED OR HOPELESS: NOT AT ALL
SUM OF ALL RESPONSES TO PHQ9 QUESTIONS 1 AND 2: 0
1. LITTLE INTEREST OR PLEASURE IN DOING THINGS: NOT AT ALL

## 2025-04-01 ASSESSMENT — ENCOUNTER SYMPTOMS
DEPRESSION: 0
OCCASIONAL FEELINGS OF UNSTEADINESS: 1
LOSS OF SENSATION IN FEET: 0

## 2025-04-01 NOTE — PATIENT INSTRUCTIONS
I have ordered a venous reflux ultrasound and ankle blood pressure test ([facility]) these should be done in the next few weeks  I wrote a prescription for compression stockings, these can be obtained at [facility]. Please be sure to ask for the ZIPPERED version  Continue blood thinners as prescribed  Moisturize your legs daily every night after bathing  Elevate legs as able  Walk as able  Call for sooner appointment if you develop a wound

## 2025-04-01 NOTE — PROGRESS NOTES
Primary Care Physician: Madyson Sandoval DO  Referring Provider: Brant Lockhart DO  [address] [address]  Date of Visit: 04/01/2025 11:00 AM EDT  Location of visit: [facility]     Chief Complaint:   No chief complaint on file.       HPI / Summary:   Yaritza Bernal is a 74 y.o. female presents for evaluation of L > R leg swelling. Pt had a femur frx in her LLE about 2 years ago after a fall. She developed a DVT after this and was started on AC. She has remained on AC since this time. She needs a L TKA, states her orthopedic surgeon wants vascular clearance. Pt had recent bilateral venous duplex that were negative for DVT.     Endorses significant LLE swelling, especially after working. Has tried compression in the past but has difficulty with donning. Able to walk with rollator. No venous wounds. Endorses leg heaviness and discomfort on LLE. No h/o PE. No h/o MI or CVA.     PMH: LLE DVT, HTN, hypothyroid  PSH: L femur ORIF, varicose vein surgery   Soc: works almost full time as a   Fam: no family history of blood clots or clotting disorders       Allergies:   Amoxicillin, Erythromycin, Lisinopril, and Sulfa (sulfonamide antibiotics)    Outpatient Medications:  Current Outpatient Medications   Medication Sig Dispense Refill    albuterol (Proventil HFA) 90 mcg/actuation inhaler Inhale 2 puffs every 4 hours if needed for wheezing or shortness of breath. 6.7 g 0    apixaban (Eliquis) 5 mg tablet Take 1 tablet (5 mg) by mouth 2 times a day. 180 tablet 1    bisoproloL-hydrochlorothiazide (Ziac) 10-6.25 mg tablet Take 2 tablets by mouth once daily with breakfast. 180 tablet 0    budesonide-formoteroL (Symbicort) 160-4.5 mcg/actuation inhaler Inhale 2 puffs 2 times a day. Rinse mouth with water after use to reduce aftertaste and incidence of candidiasis. Do not swallow.      citalopram (CeleXA) 20 mg tablet Take 1 tablet (20 mg) by mouth once daily. 90 tablet 0     "esomeprazole (NexIUM) 20 mg DR capsule Take 1 capsule (20 mg) by mouth once daily.      fluticasone (Flonase) 50 mcg/actuation nasal spray Administer into affected nostril(s).      gabapentin (Neurontin) 600 mg tablet Take 1 tablet (600 mg) by mouth 3 times a day. 270 tablet 0    levothyroxine (Synthroid, Levoxyl) 25 mcg tablet Take 1 tablet (25 mcg) by mouth once daily. 90 tablet 3    mv-mn-iron-FA-Ca carb-vit K (Women's Multivitamin) 18 mg-400 mcg- 500 mg-50 mcg tablet Take by mouth.      prednisoLONE acetate (Pred-Forte) 1 % ophthalmic suspension 1 drop to operative eye 4 times a day starting the day of surgery (after surgery is done) (Patient not taking: Reported on 3/5/2025) 5 mL 2     No current facility-administered medications for this visit.     Review of Systems:  Review of Systems     Physical Exam:  Blood pressure 145/76, pulse 69, resp. rate 18, height 1.778 m (5' 10\"), weight 94.3 kg (208 lb), SpO2 96%.  Wt Readings from Last 1 Encounters:   04/01/25 94.3 kg (208 lb)     Physical Exam  Neuro: alert and oriented x3  Resp: comfortable on room air  CV: well perfused  Abd: soft ntnd  Pulse exam:   Palp fem pulses BL  Non palp pedal pulses but has DP/PT signals BL  L >R swelling  L measures 40 cm at calf, 27 cm at ankle  R measures 34 cm at calf, 23 cm at ankle  No venous wounds but has very dry skin on LLE, + hyperkeratosis    Last Labs:  CMP:    Chemistry    Lab Results   Component Value Date/Time     09/03/2024 1335    K 3.3 (L) 09/03/2024 1335     09/03/2024 1335    CO2 31 09/03/2024 1335    BUN 22 09/03/2024 1335    CREATININE 0.69 09/03/2024 1335    Lab Results   Component Value Date/Time    CALCIUM 8.4 (L) 09/03/2024 1335    ALKPHOS 72 09/03/2024 1335    AST 10 09/03/2024 1335    ALT 10 09/03/2024 1335    BILITOT 0.4 09/03/2024 1335          CBC:  Lab Results   Component Value Date    WBC 5.5 12/04/2024    HGB 12.0 12/04/2024    HCT 39.3 12/04/2024    MCV 91 12/04/2024     " 12/04/2024     HEME/ENDO:  Recent Labs     12/04/24  1502 12/04/24  1501 09/03/24  1335 04/19/24  1017   FERRITIN  --  65   < >  --    IRONSAT  --  17*   < >  --    TSH 3.15  --   --  5.24*   HGBA1C  --   --   --  5.8*    < > = values in this interval not displayed.      CARDIAC: No data recorded  Lab Results   Component Value Date    LDLCALC 111 (H) 04/19/2024       Notable Studies:         Assessment/Plan   74F with PMH LLE DVT on AC, HTN who presents for evaluation of LLE edema.     #LLE edema  - pt presentation c/w post-thrombotic syndrome given discrepancy between left and right and h/o LLE DVT  - continue AC as prescribed  - rx given for compression stockings, ordered zippered version so patient can don them appropriately  - venous reflux study ordered  - moisturize legs nightly after bathing  - Explained risk of venous ulceration. Pt high risk given dry skin and hyperkeratosis. Explained importance of compression to prevent ulceration. Pt understands she needs to be seen sooner if she develops a venous wound  - discussed venous pathophysiology in detail. Explained that elevation , compression and ambulation are the mainstays of therapy.     RTC 3 mos    Orders:  Orders Placed This Encounter   Procedures    Compression Stockings 20-30 mmHg              ____________________________________________________________  Kaitlyn M Dunphy, MD  [facility]  [facility]

## 2025-04-23 ENCOUNTER — HOSPITAL ENCOUNTER (OUTPATIENT)
Dept: VASCULAR MEDICINE | Facility: HOSPITAL | Age: 75
Discharge: HOME | End: 2025-04-23
Payer: MEDICARE

## 2025-04-23 DIAGNOSIS — M79.89 LEG SWELLING: ICD-10-CM

## 2025-04-23 DIAGNOSIS — I73.9 VASCULAR DISEASE, PERIPHERAL (CMS-HCC): ICD-10-CM

## 2025-04-23 DIAGNOSIS — R60.0 LOCALIZED EDEMA: ICD-10-CM

## 2025-04-23 PROCEDURE — 93922 UPR/L XTREMITY ART 2 LEVELS: CPT | Performed by: SURGERY

## 2025-04-23 PROCEDURE — 93922 UPR/L XTREMITY ART 2 LEVELS: CPT

## 2025-04-23 PROCEDURE — 93970 EXTREMITY STUDY: CPT | Performed by: SURGERY

## 2025-04-23 PROCEDURE — 93970 EXTREMITY STUDY: CPT

## 2025-04-24 ENCOUNTER — TELEPHONE (OUTPATIENT)
Dept: VASCULAR SURGERY | Facility: HOSPITAL | Age: 75
End: 2025-04-24
Payer: MEDICARE

## 2025-04-24 NOTE — TELEPHONE ENCOUNTER
Discussed results of recent US. Pt with normal ABIs. She has evidence of superficial venous reflux bilaterally but only has symptoms on her left leg where she has had a prior DVT. No evidence of deep venous reflux and no evidence of acute or chronic DVT in her leg. Explained to patient that she is safe to undergo knee replacement surgery from a vascular perspective. Will send note to patient's orthopedic surgeon discussing this.

## 2025-04-29 ENCOUNTER — TELEPHONE (OUTPATIENT)
Dept: PRIMARY CARE | Facility: CLINIC | Age: 75
End: 2025-04-29
Payer: MEDICARE

## 2025-04-29 DIAGNOSIS — G62.9 POLYNEUROPATHY: ICD-10-CM

## 2025-04-30 DIAGNOSIS — G25.0 BENIGN ESSENTIAL TREMOR: ICD-10-CM

## 2025-04-30 RX ORDER — BISOPROLOL FUMARATE AND HYDROCHLOROTHIAZIDE 10; 6.25 MG/1; MG/1
2 TABLET ORAL
Qty: 180 TABLET | Refills: 0 | Status: SHIPPED | OUTPATIENT
Start: 2025-04-30

## 2025-04-30 NOTE — TELEPHONE ENCOUNTER
Medication Name: BISOPROLO HCTZ  Dose: 10/6.25  Frequency: 2 TABS W/BREAKFEAST  Quantity left: 3   Pharmacy: [facility] [place]    Last appointment:12/4/24  Last CPE:  Last MCW:  Next appointment: 6/30/25  Next CPE:  Next MCW:

## 2025-05-13 ENCOUNTER — ANCILLARY PROCEDURE (OUTPATIENT)
Dept: URGENT CARE | Age: 75
End: 2025-05-13
Payer: MEDICARE

## 2025-05-13 ENCOUNTER — OFFICE VISIT (OUTPATIENT)
Dept: URGENT CARE | Age: 75
End: 2025-05-13
Payer: MEDICARE

## 2025-05-13 VITALS
OXYGEN SATURATION: 96 % | SYSTOLIC BLOOD PRESSURE: 144 MMHG | BODY MASS INDEX: 29.41 KG/M2 | RESPIRATION RATE: 16 BRPM | WEIGHT: 205 LBS | HEART RATE: 87 BPM | TEMPERATURE: 98.3 F | DIASTOLIC BLOOD PRESSURE: 82 MMHG
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
## 2025-05-13 DIAGNOSIS — M79.602 LEFT ARM PAIN: ICD-10-CM

## 2025-05-13 DIAGNOSIS — R07.81 RIB PAIN ON LEFT SIDE: ICD-10-CM
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
## 2025-05-13 PROCEDURE — 73060 X-RAY EXAM OF HUMERUS: CPT | Mod: LEFT SIDE | Performed by: NURSE PRACTITIONER

## 2025-05-13 PROCEDURE — 71100 X-RAY EXAM RIBS UNI 2 VIEWS: CPT | Mod: LEFT SIDE | Performed by: NURSE PRACTITIONER

## 2025-05-13 ASSESSMENT — ENCOUNTER SYMPTOMS
FATIGUE: 0
COLOR CHANGE: 0
DEPRESSION: 0
CHEST TIGHTNESS: 0
OCCASIONAL FEELINGS OF UNSTEADINESS: 1
ARTHRALGIAS: 1
SHORTNESS OF BREATH: 0
WOUND: 0
ACTIVITY CHANGE: 1
MYALGIAS: 0
JOINT SWELLING: 0
NUMBNESS: 0
COUGH: 0
FEVER: 0
LOSS OF SENSATION IN FEET: 0

## 2025-05-13 NOTE — LETTER
May 13, 2025     Patient: Yaritza Bernal   YOB: 1950   Date of Visit: 5/13/2025       To Whom It May Concern:    Yaritza Bernal was seen in my clinic on 5/13/2025 at 5:15 pm. Please excuse Yaritza for her absence from work on this day to make the appointment.  Patient may return to work on 5/16/2025 without restrictions.    If you have any questions or concerns, please don't hesitate to call.         Sincerely,         Kristin L Schoenlein, APRN-CNP        CC: No Recipients

## 2025-05-13 NOTE — PROGRESS NOTES
"Subjective   Patient ID: Yaritza Bernal is a 74 y.o. female. They present today with a chief complaint of Fall (Pt states she fell in her home having left arm pain and left side ribcage pain).    History of Present Illness  This is a 74-year-old female with a complicated medical history who is anticoagulated on Eliquis who presents to the clinic today after obtaining a fall 3 days ago.  Patient states it was a mechanical fall as she caught the top of her left sneaker under the vacuum and fell against a vacuum.  Patient denies head trauma.  Reports pain in \"meaty area \"of left upper arm and in ribs on her left side.  States there is no bruising.  Denies shortness of breath, denies pain with breathing, denies decreased range of in right upper extremity.  Denies numbness and tingling.  Patient has baseline tremor in her right arm.  States she works at the [employer] and is concerned about having to return to work as she uses her left upper arm continuously throughout the day.      Trauma  Mechanism of injury: Fall       Past Medical History  Allergies as of 05/13/2025 - Reviewed 05/13/2025   Allergen Reaction Noted    Amoxicillin Other 11/16/2016    Erythromycin Unknown 03/30/2023    Lisinopril Cough 08/03/2011    Sulfa (sulfonamide antibiotics) Unknown 02/18/2023       Prescriptions Prior to Admission[1]     Medical History[2]    Surgical History[3]     reports that she has never smoked. She has never used smokeless tobacco. She reports that she does not currently use alcohol. She reports that she does not use drugs.    Review of Systems  Review of Systems   Constitutional:  Positive for activity change. Negative for fatigue and fever.   Respiratory:  Negative for cough, chest tightness and shortness of breath.    Musculoskeletal:  Positive for arthralgias. Negative for joint swelling and myalgias.   Skin:  Negative for color change and wound.   Neurological:  Negative for numbness.   All other systems reviewed and are " negative.                                 Objective    Vitals:    05/13/25 1710   BP: 144/82   Pulse: 87   Resp: 16   Temp: 36.8 °C (98.3 °F)   SpO2: 96%   Weight: 93 kg (205 lb)     No LMP recorded. Patient is postmenopausal.    Physical Exam  Vitals and nursing note reviewed.   Constitutional:       Appearance: Normal appearance.   HENT:      Head: Normocephalic and atraumatic.   Eyes:      Extraocular Movements: Extraocular movements intact.      Conjunctiva/sclera: Conjunctivae normal.      Pupils: Pupils are equal, round, and reactive to light.   Cardiovascular:      Rate and Rhythm: Normal rate and regular rhythm.      Pulses: Normal pulses.      Heart sounds: Normal heart sounds.   Pulmonary:      Effort: Pulmonary effort is normal.      Breath sounds: Normal breath sounds.   Abdominal:      General: Abdomen is flat. Bowel sounds are normal.      Palpations: Abdomen is soft.   Musculoskeletal:      Left upper arm: Bony tenderness present. No swelling, edema, deformity, lacerations or tenderness.   Skin:     General: Skin is dry.      Capillary Refill: Capillary refill takes less than 2 seconds.   Neurological:      General: No focal deficit present.      Mental Status: She is alert and oriented to person, place, and time.      Cranial Nerves: No cranial nerve deficit.         Procedures    Point of Care Test & Imaging Results from this visit  No results found for this visit on 05/13/25.   Imaging  XR humerus left  Result Date: 5/13/2025  No acute displaced humeral fracture detected.     MACRO: None   Signed by: Adin Vásquez 5/13/2025 6:31 PM Dictation workstation:   [id number]    XR ribs left 2 views  Result Date: 5/13/2025  1.  No evidence of acute cardiopulmonary process. 2. Age-indeterminate angulation irregularity left lateral 7th rib. Correlate for focal point tenderness for exclusion of acute injury 3. Probable large hiatal hernia.     Signed by: Adin Vásquez 5/13/2025 6:30 PM Dictation workstation:    [id number]      Cardiology, Vascular, and Other Imaging  No other imaging results found for the past 2 days      Diagnostic study results (if any) were reviewed by Kristin L Schoenlein, APRN-CNP.    Assessment/Plan   Allergies, medications, history, and pertinent labs/EKGs/Imaging reviewed by Kristin L Schoenlein, APRN-CNP.     Medical Decision Making  VSS, NAD, Nontoxic appearing.  Physical exam as documented above.  Left upper extremity strengths 5/5 equal bilaterally.   5/5 equal bilaterally.  Area is warm to touch, sensation intact, cap refill less than 2 seconds.  Thorax without lesions, masses, abrasions, rashes.  Patient has no tenderness to palpation.  Lungs are CTA.  Will do humeral x-ray due to point tenderness.  Patient requesting rib x-ray as well.  Images have been reviewed by myself and radiology as documented above. Results have been discussed with patient/guardian at this time.  Encourage patient to follow-up with primary care regarding possible rib fracture.  Supportive care reviewed.  Strict ED precautions.    Differential Dx include, however, are not limited to: Fracture, dislocation, soft tissue swelling, contusion, pneumothorax    I have a low suspicion for any acute pathologies requiring emergent evaluation and further workup at this time.  I believe patient is safe to discharge home with a low threshold for emergency room as discussed during visit.  We discussed close follow-up with primary care provider/pediatrician. Supportive care discussed.  Medication(s) profile of OTC and Rxed medication(s) if prescribed was (were) reviewed.  All questions answered and addressed.  Patient verbalized understanding.      Orders and Diagnoses  Diagnoses and all orders for this visit:  Fall, initial encounter  -     XR ribs left 2 views; Future  -     XR humerus left; Future  Left arm pain  -     XR humerus left; Future  Rib pain on left side  -     XR ribs left 2 views; Future      Medical Admin  Record      Patient disposition: Home    Electronically signed by Kristin L Schoenlein, APRN-CNP  5:47 PM    Addendum : Spoke with patient regarding x-ray finding, follow-up, supportive care.  At 2040 5/13/2025.  All questions answered and addressed.  Patient is in agreement of plan of care and verbalized understanding.         [1] (Not in a hospital admission)   [2]   Past Medical History:  Diagnosis Date    Bladder disorder, unspecified 10/21/2015    Bladder disorder    Cataract     Clotting disorder (Multi)     Cutaneous abscess, unspecified 03/16/2017    Abscess    Disorder of the skin and subcutaneous tissue, unspecified 07/14/2016    Hand lesion    Dry eyes     DVT (deep venous thrombosis) (Multi)     after femur fx surgery    Encounter for immunization 02/17/2017    Need for Tdap vaccination    Encounter for immunization 10/04/2016    Need for vaccination with 13-polyvalent pneumococcal conjugate vaccine    GERD (gastroesophageal reflux disease)     HL (hearing loss)     Hyperlipidemia     Hypertension     Hypothyroidism     Impacted cerumen, bilateral 10/21/2015    Impacted cerumen of both ears    Impaired fasting glucose 09/17/2018    Abnormal fasting glucose    Irritable bowel syndrome     Medial epicondylitis, right elbow 10/04/2016    Medial epicondylitis, right    Nephrolithiasis     Other conditions influencing health status 03/16/2017    Cyst    Other specified abnormal findings of blood chemistry 10/04/2018    Abnormal TSH    Other specified abnormal findings of blood chemistry 09/17/2018    Abnormal TSH    Other specified symptoms and signs involving the circulatory and respiratory systems 09/28/2017    Chest congestion    Pain in left hand 09/28/2017    Pain of left hand    Pain in left hip 10/04/2018    Left hip pain    Pain in right hip 11/01/2017    Bilateral hip pain    Pain in right knee 03/13/2017    Right knee pain    Pain in unspecified knee 03/13/2017    Knee pain    Personal history of  other diseases of the circulatory system 05/09/2018    History of hypertension    Personal history of other diseases of the circulatory system 06/27/2018    History of hypertension    Personal history of other diseases of the circulatory system 01/18/2018    History of hypertension    Personal history of other diseases of the circulatory system 08/17/2017    History of hypertension    Personal history of other diseases of the nervous system and sense organs 09/17/2018    History of benign essential tremor    Personal history of other diseases of the nervous system and sense organs 10/20/2016    History of acute otitis media    Personal history of other diseases of the respiratory system 03/09/2018    History of acute bacterial sinusitis    Personal history of other drug therapy 05/09/2018    History of pneumococcal vaccination    Personal history of other endocrine, nutritional and metabolic disease 10/30/2017    History of hyperlipidemia    Personal history of other mental and behavioral disorders 07/16/2018    History of anxiety    Personal history of other specified conditions 06/27/2018    History of fatigue    Strain of muscle, fascia and tendon of the posterior muscle group at thigh level, unspecified thigh, initial encounter 01/15/2019    Hamstring strain, initial encounter    Strain of unspecified muscles, fascia and tendons at thigh level, left thigh, initial encounter 05/01/2018    Muscle strain of left thigh    Unspecified injury of unspecified elbow, initial encounter 01/15/2019    Elbow injury, initial encounter   [3]   Past Surgical History:  Procedure Laterality Date    BLADDER SURGERY  10/21/2015    Bladder Surgery    BREAST BIOPSY Left     LF benign biopsy, >20 years ago    CATARACT EXTRACTION W/  INTRAOCULAR LENS IMPLANT Right 06/06/2024    Dr. Rashid    CATARACT EXTRACTION W/  INTRAOCULAR LENS IMPLANT Left 07/20/2023    Dr. Rashid    EXTRACORPOREAL SHOCK WAVE LITHOTRIPSY      FEMUR FRACTURE SURGERY       KNEE SURGERY      VEIN LIGATION AND STRIPPING

## 2025-05-13 NOTE — PATIENT INSTRUCTIONS
Thank you for letting me care for you today.  You have been seen today for  Please follow up with your primary care provider/pediatrician as directed.   If one is needed, please call [phone].  Please seek care in emergency room for red flags as discussed during visit.

## 2025-05-19 RX ORDER — GABAPENTIN 600 MG/1
600 TABLET ORAL 3 TIMES DAILY
Qty: 270 TABLET | Refills: 3 | OUTPATIENT
Start: 2025-05-19

## 2025-05-22 ENCOUNTER — APPOINTMENT (OUTPATIENT)
Dept: PRIMARY CARE | Facility: CLINIC | Age: 75
End: 2025-05-22
Payer: MEDICARE

## 2025-05-22 VITALS
SYSTOLIC BLOOD PRESSURE: 129 MMHG | WEIGHT: 210 LBS | TEMPERATURE: 98 F | DIASTOLIC BLOOD PRESSURE: 69 MMHG | OXYGEN SATURATION: 94 % | HEART RATE: 76 BPM | BODY MASS INDEX: 30.13 KG/M2

## 2025-05-22 DIAGNOSIS — E11.65 TYPE 2 DIABETES MELLITUS WITH HYPERGLYCEMIA, UNSPECIFIED WHETHER LONG TERM INSULIN USE (MULTI): Primary | ICD-10-CM

## 2025-05-22 DIAGNOSIS — E03.9 HYPOTHYROIDISM, UNSPECIFIED TYPE: ICD-10-CM

## 2025-05-22 DIAGNOSIS — E78.5 HYPERLIPIDEMIA, UNSPECIFIED HYPERLIPIDEMIA TYPE: ICD-10-CM

## 2025-05-22 DIAGNOSIS — J43.1 PANLOBULAR EMPHYSEMA (MULTI): ICD-10-CM

## 2025-05-22 DIAGNOSIS — E55.9 VITAMIN D DEFICIENCY: ICD-10-CM

## 2025-05-22 DIAGNOSIS — Z79.899 HIGH RISK MEDICATION USE: ICD-10-CM

## 2025-05-22 DIAGNOSIS — Z12.31 ENCOUNTER FOR SCREENING MAMMOGRAM FOR MALIGNANT NEOPLASM OF BREAST: ICD-10-CM

## 2025-05-22 DIAGNOSIS — R73.9 HYPERGLYCEMIA: ICD-10-CM

## 2025-05-22 DIAGNOSIS — E66.01 MORBID (SEVERE) OBESITY DUE TO EXCESS CALORIES (MULTI): ICD-10-CM

## 2025-05-22 PROCEDURE — 3078F DIAST BP <80 MM HG: CPT | Performed by: FAMILY MEDICINE

## 2025-05-22 PROCEDURE — 99214 OFFICE O/P EST MOD 30 MIN: CPT | Performed by: FAMILY MEDICINE

## 2025-05-22 PROCEDURE — 1159F MED LIST DOCD IN RCRD: CPT | Performed by: FAMILY MEDICINE

## 2025-05-22 PROCEDURE — G2211 COMPLEX E/M VISIT ADD ON: HCPCS | Performed by: FAMILY MEDICINE

## 2025-05-22 PROCEDURE — 1036F TOBACCO NON-USER: CPT | Performed by: FAMILY MEDICINE

## 2025-05-22 PROCEDURE — 3074F SYST BP LT 130 MM HG: CPT | Performed by: FAMILY MEDICINE

## 2025-05-22 NOTE — PROGRESS NOTES
Subjective   Patient ID: Yaritza Bernal is a 74 y.o. female who presents for Follow-up.    HPI   Patient is in the office today for a follow-up.    She reports that she has chronic left edema on lower extremities with intermittent skin redness. She is having follow-ups with her vascular surgeon who instructed her to perform a vascular US which showed: bilateral lower venous insufficiency.     She has chronic left knee pain and had an evaluation by her orthopedist and she believed that she would have surgery but they told her at that moment that she was not candidate for surgery due to her edema and swelling, she was instructed to have a vascular surgeon evaluation.    She reports that she had a fall and started to feel a tenderness point on her left breast zone. She had a mammogram which was within normal limits. She went to an urgent care in where they performed chest x-ray which was within normal limits. She also had rib x-rays which showed: 1.  No evidence of acute cardiopulmonary process. 2. Age-indeterminate angulation irregularity left lateral 7th rib.  Correlate for focal point tenderness for exclusion of acute injury. 3. Probable large hiatal hernia. She would like to discuss the incidental found of hiatal hernia.     She states that one year ago she felt a pop on her back and started with back pain. She had PT with US therapy which improved her pain a lot, but her insurance stopped covering it, and after she had PT the pain returned.      Her blood pressure (129/69) was within good range when checked in the clinic today. She continues on bisoprolol-HYDROCHLOROTHIAZIDE 10-6.25 mg (2 tablets daily) for treatment of hypertension. She denies any side-effects to her bisoprolol-HYDROCHLOROTHIAZIDE medication. She denies any chest pain and/or SOB symptoms.    She takes Eliquis 5 mg (2 times daily) due to history of acute DVT in E (under the supervision of BEBE Norman). She denies any side-effects to her  Eliquis medication.    She takes levothyroxine 25 mcg daily for treatment of hypothyroidism (somewhat non-compliantly as she often misses doses of this medication). She has been responding well to her levothyroxine medication. She denies any side-effects to her levothyroxine medication.    She is on esomeprazole 20 mg daily for treatment of GERD. Her GERD has been controlled on esomeprazole medication. She denies any side-effects to her esomeprazole medication.    She also takes citalopram 20 mg daily for treatment of anxiety. Her anxiety has been controlled on citalopram medication and her mood has been good. She denies any side-effects to her citalopram medication.    She continues on Symbicort 160-4.5 mcg/actuation inhaler (2 puff 2 times daily) for treatment of asthma. Her asthma has been controlled on Symbicort. She denies any side-effects to her Symbicort inhaler.    She is on gabapentin 600 mg (3 times daily) for treatment of polyneuropathy. Her neuropathy symptoms have been controlled on gabapentin medication. She denies any side-effects to her gabapentin medication.    Her most recent mammogram screening and DEXA bone density scan were done in March 2024. She is ok to have the order a new screening mammogram.     Review of Systems    Objective   /69 (BP Location: Left arm, Patient Position: Sitting, BP Cuff Size: Adult)   Pulse 76   Temp 36.7 °C (98 °F) (Temporal)   Wt 95.3 kg (210 lb)   SpO2 94%   BMI 30.13 kg/m²     Physical Exam  Neck:      Vascular: No carotid bruit.   Cardiovascular:      Rate and Rhythm: Normal rate and regular rhythm.      Pulses: Normal pulses.      Heart sounds: Normal heart sounds.   Pulmonary:      Effort: Pulmonary effort is normal.      Breath sounds: Normal breath sounds.   Musculoskeletal:         General: Normal range of motion.      Cervical back: Normal range of motion.      Right lower leg: Edema present.      Left lower leg: Edema present.   Skin:     General:  Skin is warm and dry.      Findings: Rash present.   Neurological:      Mental Status: She is alert and oriented to person, place, and time.   Psychiatric:         Mood and Affect: Mood normal.         Thought Content: Thought content normal.         Judgment: Judgment normal.         Assessment/Plan   Diagnoses and all orders for this visit:  Type 2 diabetes mellitus with hyperglycemia, unspecified whether long term insulin use (Multi)  Hyperlipidemia, unspecified hyperlipidemia type  -     Lipid Panel; Future  Hypothyroidism, unspecified type  -     TSH with reflex to Free T4 if abnormal; Future  Vitamin D deficiency  -     Vitamin D 25-Hydroxy,Total (for eval of Vitamin D levels); Future  Hyperglycemia  -     Hemoglobin A1C; Future  Encounter for screening mammogram for malignant neoplasm of breast  -     BI mammo bilateral screening tomosynthesis; Future  High risk medication use  -     Drug Screen, Urine With Reflex to Confirmation  -     Gabapentin,Urine  Panlobular emphysema (Multi)  Morbid (severe) obesity due to excess calories (Multi)    Discussed about x-rays performed in her last urgent care evaluation.   Discussed about the incidental found of hiatal hernia in x-rays.  Recommended schedule screening mammogram.     Recommended lab tests. She will schedule her lab tests because she is not fasting today.   Recommended urine drug screen today at the office for her gabapentin. CSA reviewed and signed today.  Follow-up in 3 months, call us if needed sooner.    Scribe Attestation  By signing my name below, ICorona Scribe   attest that this documentation has been prepared under the direction and in the presence of Madyson Sandoval DO.

## 2025-05-25 LAB
AMPHETAMINES UR QL: NEGATIVE NG/ML
BARBITURATES UR QL: NEGATIVE NG/ML
BENZODIAZ UR QL: NEGATIVE NG/ML
BZE UR QL: NEGATIVE NG/ML
CREAT UR-MCNC: 167.2 MG/DL
DRUG SCREEN COMMENT UR-IMP: ABNORMAL
FENTANYL UR QL SCN: NEGATIVE NG/ML
GABAPENTIN UR-MCNC: ABNORMAL NG/ML
METHADONE UR QL: NEGATIVE NG/ML
OPIATES UR QL: NEGATIVE NG/ML
OXIDANTS UR QL: NEGATIVE MCG/ML
OXYCODONE UR QL: NEGATIVE NG/ML
PCP UR QL: NEGATIVE NG/ML
PH UR: 6.2 [PH] (ref 4.5–9)
QUEST NOTES AND COMMENTS: ABNORMAL
THC UR QL: NEGATIVE NG/ML

## 2025-05-27 ENCOUNTER — PREP FOR PROCEDURE (OUTPATIENT)
Dept: ORTHOPEDIC SURGERY | Facility: CLINIC | Age: 75
End: 2025-05-27

## 2025-05-27 ENCOUNTER — APPOINTMENT (OUTPATIENT)
Dept: ORTHOPEDIC SURGERY | Facility: CLINIC | Age: 75
End: 2025-05-27
Payer: MEDICARE

## 2025-05-27 VITALS — WEIGHT: 210.2 LBS | HEIGHT: 68 IN | BODY MASS INDEX: 31.86 KG/M2

## 2025-05-27 DIAGNOSIS — M25.362 INSTABILITY OF LEFT KNEE JOINT: ICD-10-CM

## 2025-05-27 DIAGNOSIS — M17.0 PRIMARY OSTEOARTHRITIS OF BOTH KNEES: Primary | ICD-10-CM

## 2025-05-27 DIAGNOSIS — M17.12 PRIMARY OSTEOARTHRITIS OF LEFT KNEE: Primary | ICD-10-CM

## 2025-05-27 PROCEDURE — 99214 OFFICE O/P EST MOD 30 MIN: CPT | Performed by: ORTHOPAEDIC SURGERY

## 2025-05-27 PROCEDURE — 1036F TOBACCO NON-USER: CPT | Performed by: ORTHOPAEDIC SURGERY

## 2025-05-27 PROCEDURE — 1125F AMNT PAIN NOTED PAIN PRSNT: CPT | Performed by: ORTHOPAEDIC SURGERY

## 2025-05-27 PROCEDURE — 1159F MED LIST DOCD IN RCRD: CPT | Performed by: ORTHOPAEDIC SURGERY

## 2025-05-27 ASSESSMENT — PAIN SCALES - GENERAL: PAINLEVEL_OUTOF10: 2

## 2025-05-27 ASSESSMENT — PAIN - FUNCTIONAL ASSESSMENT: PAIN_FUNCTIONAL_ASSESSMENT: 0-10

## 2025-05-27 ASSESSMENT — PAIN DESCRIPTION - DESCRIPTORS: DESCRIPTORS: SORE

## 2025-05-27 NOTE — PROGRESS NOTES
ORTHOPEDIC FOLLOW UP      ============================  IMPRESSION/PLAN:  ============================  74 y.o. female with Primary osteoarthritis, bilateral knees    PLAN:   Discussed the patient findings above.  Reviewed her current x-rays with her.  Left knee is continue to be problematic.  Patient is gone vascular clearance to proceed with surgery.  Would like to discuss surgical intervention for her left knee.  Continue to reinforce her using regular compression to help reduce the swelling of the lower extremity is reviewed that knee replacements do incur a fair amount lower extremity swelling and she be at risk for venous ulceration.  Patient is understanding of plan of care would like to proceed with surgical intervention.    Yaritza Bernal has radiographic and physical exam evidence of degenerative joint disease and wishes to pursue surgery. The patient appears to have sufficient symptoms to warrant surgical intervention and is an appropriate candidate for LEFT Total Knee Arthroplasty as evidenced by six months of unsuccessful non-operative treatment as outlined in the HPI, progressive symptoms including pain impacting sleep or causing fatigue, pain impacting work, pain worsened with weight bearing, and pain limiting ability to stay fit and healthy.     We had a lengthy discussion regarding the risk and benefit of surgery, the alternatives, limitations, and personnel involved. The include but are not limited to infection, persistent pain, instability, nerve injury, blood clots, and medical complications. We also discussed the pre-operative course, surgery itself, and rehabilitation. Perioperative blood management and transfusion issues were discussed, and options clearly outlined. The patient consented to the use of allogenic blood if medically necessary.     The patient has elected to schedule surgery at this time or intents to call the office with a surgical date. Shared decision making occurred while  obtaining informed consent. The patient with be scheduled for a pre-operative education class. The patient will be ordered appropriate preoperative labs to be completed for preadmission testing.     Robotic Assisted Surgery: Yes. The use of robotic assisted surgery was discussed with the patient.  The risk, benefits were discussed regarding this.  Patient consented for this procedure.  We discussed specifically placement of pins and arrays for navigation during surgery and to help with the robotic assistance.  We discussed the use of an additional bandage to cover the pin sites.  We also reviewed that they may have some slight pain at the placement of pin sites that should improve in the same fashion as their general recovery of the joint replacement.    We discussed the term robot, when used to describe the CHEMA system, refers to the PathAR robotic arm. The CHEMA System is not a robot, but a surgeon-controlled robotic-arm assisted device.    - Preoperative Consults: PAT Clearance, heme-onc clearance for holding Eliquis  - Disposition: Extended recovery  - Anesthesia Plan: Preoperative block, spinal, local  - Implants: [place], [place], CS  - Physical Therapy Plan: Home  - [id number]: Yes  - Surgical Approach: Standard  - DVT PPx: Eliquis    Risk Assessment for Post-Op Antibiotics   - Chronic Anticoagulation Use: Yes    I hereby indicate that these comorbidities may have a detrimental effect on this arthroplasty.   - Chronic anticoagulant use [Z79.01 Long-term (current) use of anticoagulants]        Yaritza HAIM Bernal presents today for follow up of the above condition. They were last given a R knee CSI during their last visit on 3/17/2025, which provided good improvement for 2 weeks. They are a candidate for B/L knee arthroplasty's, but needed clearance from vascular due to a Hx of DVT's. They have gotten a clearance from a vascular provider and would like to schedule Sx.        FUNCTIONAL STATUS: limited:  unable to perform  activities of daily living.  AMBULATORY STATUS: walker and 2 walking sticks  PREVIOUS TREATMENTS: Cortisone injection  R knee on 3/17/2025 performed last visit, with good improvement for 2 weeks.  Other injection Gel injections a couple years ago with no improvement  Therapy PT many years ago completed  Surgery Vein stripping on left side    HISTORY OF SURGERY ON AFFECTED KNEE(S): Yes     Review of Systems:   Constitutional: See HPI for pain assessment, No significant weight loss, recent trauma. Denies fevers/chills  Cardiovascular: No chest pain, shortness of breath  Respiratory: No difficulty breathing, cough  Gastrointestinal: No nausea, vomiting, diarrhea, constipation  Musculoskeletal: Noted in HPI, no arthralgias   Integumentary: No rashes, easy bruising, redness   Neurological: no numbness or tingling in extremities, no gait disturbances     Patient Active Problem List   Diagnosis    Accidental fall    Anxiety    Arthritis of knee, left    Arthritis of knee, right    Astigmatism    Asymmetrical sensorineural hearing loss    Benign essential hypertension    Benign essential tremor    Bilateral knee pain    CMC arthritis    Combined form of age-related cataract, left eye    Combined form of age-related cataract, right eye    Concussion    Contusion, chest wall    Cup to disc asymmetry    Dry eyes, bilateral    Eczema    Heartburn    Heat intolerance    Hyperlipidemia    Hyperopia    Hypothyroid    Localized primary osteoarthritis of carpometacarpal joint of left thumb    Loss of balance    Low back pain    Major depression in remission    Mild persistent asthma without complication (HHS-HCC)    Myopia of left eye    Neck strain    Polyneuropathy    PVD (posterior vitreous detachment), both eyes    Right shoulder pain    Sensorineural hearing loss of both ears    Splinter of hand    Tinnitus of left ear    Urine incontinence    Viral URI with cough    Vitamin D deficiency    Wrist injury    Allergic rhinitis     Fracture    Chest pain    Hemangioma of skin and subcutaneous tissue    Hypertension    Neoplasm of uncertain behavior of skin    Other melanin hyperpigmentation    Pseudophakia    Actinic keratosis    Inflamed seborrheic keratosis    Other seborrheic keratosis    Skin changes due to chronic exposure to nonionizing radiation, unspecified    Knee pain, bilateral    Left knee pain    MDD (major depressive disorder), single episode    Presbyopia    Epiretinal membrane, left eye    Iron deficiency anemia secondary to inadequate dietary iron intake    Idiopathic steatorrhea (HHS-HCC)    Panlobular emphysema (Multi)    Morbid (severe) obesity due to excess calories (Multi)       Past Medical History:   Diagnosis Date    Bladder disorder, unspecified 10/21/2015    Bladder disorder    Cataract     Clotting disorder (Multi)     Cutaneous abscess, unspecified 03/16/2017    Abscess    Disorder of the skin and subcutaneous tissue, unspecified 07/14/2016    Hand lesion    Dry eyes     DVT (deep venous thrombosis) (Multi)     after femur fx surgery    Encounter for immunization 02/17/2017    Need for Tdap vaccination    Encounter for immunization 10/04/2016    Need for vaccination with 13-polyvalent pneumococcal conjugate vaccine    GERD (gastroesophageal reflux disease)     HL (hearing loss)     Hyperlipidemia     Hypertension     Hypothyroidism     Impacted cerumen, bilateral 10/21/2015    Impacted cerumen of both ears    Impaired fasting glucose 09/17/2018    Abnormal fasting glucose    Irritable bowel syndrome     Medial epicondylitis, right elbow 10/04/2016    Medial epicondylitis, right    Nephrolithiasis     Other conditions influencing health status 03/16/2017    Cyst    Other specified abnormal findings of blood chemistry 10/04/2018    Abnormal TSH    Other specified abnormal findings of blood chemistry 09/17/2018    Abnormal TSH    Other specified symptoms and signs involving the circulatory and respiratory systems  09/28/2017    Chest congestion    Pain in left hand 09/28/2017    Pain of left hand    Pain in left hip 10/04/2018    Left hip pain    Pain in right hip 11/01/2017    Bilateral hip pain    Pain in right knee 03/13/2017    Right knee pain    Pain in unspecified knee 03/13/2017    Knee pain    Personal history of other diseases of the circulatory system 05/09/2018    History of hypertension    Personal history of other diseases of the circulatory system 06/27/2018    History of hypertension    Personal history of other diseases of the circulatory system 01/18/2018    History of hypertension    Personal history of other diseases of the circulatory system 08/17/2017    History of hypertension    Personal history of other diseases of the nervous system and sense organs 09/17/2018    History of benign essential tremor    Personal history of other diseases of the nervous system and sense organs 10/20/2016    History of acute otitis media    Personal history of other diseases of the respiratory system 03/09/2018    History of acute bacterial sinusitis    Personal history of other drug therapy 05/09/2018    History of pneumococcal vaccination    Personal history of other endocrine, nutritional and metabolic disease 10/30/2017    History of hyperlipidemia    Personal history of other mental and behavioral disorders 07/16/2018    History of anxiety    Personal history of other specified conditions 06/27/2018    History of fatigue    Strain of muscle, fascia and tendon of the posterior muscle group at thigh level, unspecified thigh, initial encounter 01/15/2019    Hamstring strain, initial encounter    Strain of unspecified muscles, fascia and tendons at thigh level, left thigh, initial encounter 05/01/2018    Muscle strain of left thigh    Unspecified injury of unspecified elbow, initial encounter 01/15/2019    Elbow injury, initial encounter        Allergies   Allergen Reactions    Amoxicillin Other    Erythromycin Unknown     Lisinopril Cough    Sulfa (Sulfonamide Antibiotics) Unknown        Past Surgical History:   Procedure Laterality Date    BLADDER SURGERY  10/21/2015    Bladder Surgery    BREAST BIOPSY Left     LF benign biopsy, >20 years ago    CATARACT EXTRACTION W/  INTRAOCULAR LENS IMPLANT Right 06/06/2024    Dr. Rashid    CATARACT EXTRACTION W/  INTRAOCULAR LENS IMPLANT Left 07/20/2023    Dr. Rashid    EXTRACORPOREAL SHOCK WAVE LITHOTRIPSY      FEMUR FRACTURE SURGERY      KNEE SURGERY      VEIN LIGATION AND STRIPPING          Family History   Problem Relation Name Age of Onset    Other (cardiac disorder) Mother      Other (emphysema lung) Father      Breast cancer Neg Hx          Social History     Socioeconomic History    Marital status:      Spouse name: Not on file    Number of children: Not on file    Years of education: Not on file    Highest education level: Not on file   Occupational History    Not on file   Tobacco Use    Smoking status: Never    Smokeless tobacco: Never    Tobacco comments:     Denies any illness in past 30 days     Denies personal/family reaction or problems with anesthesia     Denies any SOB with stairs or daily activity     Uses bilat walking sticks to ambulate     Able to lay flat x 30 minutes             Vaping Use    Vaping status: Never Used   Substance and Sexual Activity    Alcohol use: Not Currently    Drug use: Never    Sexual activity: Defer   Other Topics Concern    Not on file   Social History Narrative    Not on file     Social Drivers of Health     Financial Resource Strain: Not on file   Food Insecurity: Not on file   Transportation Needs: Not on file   Physical Activity: Not on file   Stress: Not on file   Social Connections: Not on file   Intimate Partner Violence: Not on file   Housing Stability: Not on file        CURRENT MEDICATIONS:   Current Outpatient Medications   Medication Sig Dispense Refill    albuterol (Proventil HFA) 90 mcg/actuation inhaler Inhale 2 puffs every 4  hours if needed for wheezing or shortness of breath. 6.7 g 0    apixaban (Eliquis) 5 mg tablet Take 1 tablet (5 mg) by mouth 2 times a day. 180 tablet 1    bisoproloL-hydrochlorothiazide (Ziac) 10-6.25 mg tablet Take 2 tablets by mouth once daily with breakfast. 180 tablet 0    budesonide-formoteroL (Symbicort) 160-4.5 mcg/actuation inhaler Inhale 2 puffs 2 times a day. Rinse mouth with water after use to reduce aftertaste and incidence of candidiasis. Do not swallow.      citalopram (CeleXA) 20 mg tablet Take 1 tablet (20 mg) by mouth once daily. 90 tablet 0    esomeprazole (NexIUM) 20 mg DR capsule Take 1 capsule (20 mg) by mouth once daily.      fluticasone (Flonase) 50 mcg/actuation nasal spray Administer into affected nostril(s).      gabapentin (Neurontin) 600 mg tablet Take 1 tablet (600 mg) by mouth 3 times a day. 270 tablet 0    levothyroxine (Synthroid, Levoxyl) 25 mcg tablet Take 1 tablet (25 mcg) by mouth once daily. 90 tablet 3    mv-mn-iron-FA-Ca carb-vit K (Women's Multivitamin) 18 mg-400 mcg- 500 mg-50 mcg tablet Take by mouth.       No current facility-administered medications for this visit.        =================================  EXAM  =================================  GENERAL: A/Ox3, NAD. Appears healthy, well nourished  PSYCHIATRIC: Mood stable, appropriate memory recall  EYES: EOM intact, no scleral icterus  CARDIAC: regular rate  LUNGS: Breathing non-labored  SKIN: no erythema, rashes, or ecchymoses      MUSCULOSKELETAL:  Laterality: bilateral Knee Exam  - Alignment: partial correctible valgus deformity right knee and partial correctable varus deformity of left knee  - ROM:  5 - 115 bilateral knees  - Effusion: none  - Strength: knee extension and flexion 5/5, EHL/PF/DF motor intact  - Palpation: TTP along  medial and lateral  joint line of bilateral knees  - Stability: Anterior/Posterior stable, varus/valgus stable  - Gait: normal  - Hip Exam: flexion to 100+ degrees, full extension,  internal/external rotation adequate, and no pain with log roll  - Special Tests: none performed        NEUROVASCULAR:  - Neurovascular Status: sensation intact to light touch distally  - Capillary refill brisk at extremities, Bilateral dorsalis pedis pulse 2+      IMAGING:  Multiple views of the affected bilateral knee(s) demonstrate: Severe thrice of bilateral knees, right knee with complete lateral compartment joint space narrowing, subchondral sclerosis and valgus deformity, left knee with noted retained intramedullary nail from femur fracture, severe arthritis in medial compartment complete joint space narrowing, subchondral sclerosis, varus deformity.   X-rays were personally reviewed and interpreted by me.  Radiology reports were reviewed by me as well, if readily available at the time.         Body mass index is 31.86 kg/m².      Brant Lockhart DO  Attending Surgeon  [facility] Adult [facility] Surgery  [place], [place]

## 2025-05-30 ENCOUNTER — OFFICE VISIT (OUTPATIENT)
Dept: URGENT CARE | Age: 75
End: 2025-05-30
Payer: MEDICARE

## 2025-05-30 VITALS
HEART RATE: 77 BPM | RESPIRATION RATE: 16 BRPM | SYSTOLIC BLOOD PRESSURE: 120 MMHG | OXYGEN SATURATION: 96 % | TEMPERATURE: 97.9 F | DIASTOLIC BLOOD PRESSURE: 64 MMHG

## 2025-05-30 DIAGNOSIS — S99.921A INJURY OF RIGHT TOE, INITIAL ENCOUNTER: Primary | ICD-10-CM

## 2025-05-30 NOTE — PROGRESS NOTES
Subjective   Patient ID: Yaritza Bernal is a 74 y.o. female. They present today with a chief complaint of right great toe pain.  Patient states that she stumped her toe on her desk yesterday.  She thought it was fine but then today when she was standing on her foot and wearing her shoes her toe started to become painful.    Past Medical History  Allergies as of 05/30/2025 - Reviewed 05/30/2025   Allergen Reaction Noted    Amoxicillin Other 11/16/2016    Erythromycin Unknown 03/30/2023    Lisinopril Cough 08/03/2011    Sulfa (sulfonamide antibiotics) Unknown 02/18/2023       Prescriptions Prior to Admission[1]     Medical History[2]    Surgical History[3]     reports that she has never smoked. She has never used smokeless tobacco. She reports that she does not currently use alcohol. She reports that she does not use drugs.    Review of Systems  Review of Systems                               Objective    Vitals:    05/30/25 1330   BP: 120/64   Pulse: 77   Resp: 16   Temp: 36.6 °C (97.9 °F)   SpO2: 96%     No LMP recorded. Patient is postmenopausal.    Physical Exam  Vitals reviewed.   Constitutional:       General: She is not in acute distress.  HENT:      Head: Normocephalic and atraumatic.      Nose: Nose normal.      Mouth/Throat:      Mouth: Mucous membranes are moist.   Eyes:      Extraocular Movements: Extraocular movements intact.      Conjunctiva/sclera: Conjunctivae normal.   Pulmonary:      Effort: Pulmonary effort is normal.   Musculoskeletal:        Feet:    Feet:      Comments: Ecchymosis.  Tenderness to palpation.  Tenderness with ROM.  Skin:     General: Skin is warm.   Neurological:      Mental Status: She is alert and oriented to person, place, and time.   Psychiatric:         Mood and Affect: Mood normal.         Behavior: Behavior normal.             Point of Care Test & Imaging Results from this visit  No results found for this visit on 05/30/25.   Imaging  No results found.    Cardiology,  Vascular, and Other Imaging  No other imaging results found for the past 2 days      Diagnostic study results (if any) were reviewed by Will Conteh PA-C.    Assessment/Plan   Allergies, medications, history, and pertinent labs/EKGs/Imaging reviewed by Will Conteh PA-C.     Medical Decision Making  X-ray is pending.  Postop shoe was provided to patient during visit.  Advised patient to take Tylenol ibuprofen as needed for pain.  -         Patient is educated about their diagnoses.     -          Discussed medications benefits and adverse effects.     -          Answered all patient’s questions.     -          Patient will call 911 or go to the nearest ED if worsen symptoms .     -          Patient is agreeable to the plan of care and is deemed stable upon discharge.     -          Follow up with your primary care provider in two days.    Orders and Diagnoses  Diagnoses and all orders for this visit:  Injury of right toe, initial encounter  -     Post-op shoe  -     XR toe right 2+ views; Future      Medical Admin Record      Patient disposition: Home    Electronically signed by Will Conteh PA-C  2:49 PM           [1] (Not in a hospital admission)   [2]   Past Medical History:  Diagnosis Date    Bladder disorder, unspecified 10/21/2015    Bladder disorder    Cataract     Clotting disorder (Multi)     Cutaneous abscess, unspecified 03/16/2017    Abscess    Disorder of the skin and subcutaneous tissue, unspecified 07/14/2016    Hand lesion    Dry eyes     DVT (deep venous thrombosis) (Multi)     after femur fx surgery    Encounter for immunization 02/17/2017    Need for Tdap vaccination    Encounter for immunization 10/04/2016    Need for vaccination with 13-polyvalent pneumococcal conjugate vaccine    GERD (gastroesophageal reflux disease)     HL (hearing loss)     Hyperlipidemia     Hypertension     Hypothyroidism     Impacted cerumen, bilateral 10/21/2015    Impacted cerumen of both ears    Impaired fasting  glucose 09/17/2018    Abnormal fasting glucose    Irritable bowel syndrome     Medial epicondylitis, right elbow 10/04/2016    Medial epicondylitis, right    Nephrolithiasis     Other conditions influencing health status 03/16/2017    Cyst    Other specified abnormal findings of blood chemistry 10/04/2018    Abnormal TSH    Other specified abnormal findings of blood chemistry 09/17/2018    Abnormal TSH    Other specified symptoms and signs involving the circulatory and respiratory systems 09/28/2017    Chest congestion    Pain in left hand 09/28/2017    Pain of left hand    Pain in left hip 10/04/2018    Left hip pain    Pain in right hip 11/01/2017    Bilateral hip pain    Pain in right knee 03/13/2017    Right knee pain    Pain in unspecified knee 03/13/2017    Knee pain    Personal history of other diseases of the circulatory system 05/09/2018    History of hypertension    Personal history of other diseases of the circulatory system 06/27/2018    History of hypertension    Personal history of other diseases of the circulatory system 01/18/2018    History of hypertension    Personal history of other diseases of the circulatory system 08/17/2017    History of hypertension    Personal history of other diseases of the nervous system and sense organs 09/17/2018    History of benign essential tremor    Personal history of other diseases of the nervous system and sense organs 10/20/2016    History of acute otitis media    Personal history of other diseases of the respiratory system 03/09/2018    History of acute bacterial sinusitis    Personal history of other drug therapy 05/09/2018    History of pneumococcal vaccination    Personal history of other endocrine, nutritional and metabolic disease 10/30/2017    History of hyperlipidemia    Personal history of other mental and behavioral disorders 07/16/2018    History of anxiety    Personal history of other specified conditions 06/27/2018    History of fatigue    Strain of  muscle, fascia and tendon of the posterior muscle group at thigh level, unspecified thigh, initial encounter 01/15/2019    Hamstring strain, initial encounter    Strain of unspecified muscles, fascia and tendons at thigh level, left thigh, initial encounter 05/01/2018    Muscle strain of left thigh    Unspecified injury of unspecified elbow, initial encounter 01/15/2019    Elbow injury, initial encounter   [3]   Past Surgical History:  Procedure Laterality Date    BLADDER SURGERY  10/21/2015    Bladder Surgery    BREAST BIOPSY Left     LF benign biopsy, >20 years ago    CATARACT EXTRACTION W/  INTRAOCULAR LENS IMPLANT Right 06/06/2024    Dr. Rashid    CATARACT EXTRACTION W/  INTRAOCULAR LENS IMPLANT Left 07/20/2023    Dr. Rashid    EXTRACORPOREAL SHOCK WAVE LITHOTRIPSY      FEMUR FRACTURE SURGERY      KNEE SURGERY      VEIN LIGATION AND STRIPPING

## 2025-06-02 ENCOUNTER — HOSPITAL ENCOUNTER (OUTPATIENT)
Dept: RADIOLOGY | Facility: CLINIC | Age: 75
Discharge: HOME | End: 2025-06-02
Payer: MEDICARE

## 2025-06-02 DIAGNOSIS — Z12.31 ENCOUNTER FOR SCREENING MAMMOGRAM FOR MALIGNANT NEOPLASM OF BREAST: ICD-10-CM

## 2025-06-02 PROCEDURE — 77067 SCR MAMMO BI INCL CAD: CPT

## 2025-06-02 PROCEDURE — 77067 SCR MAMMO BI INCL CAD: CPT | Performed by: RADIOLOGY

## 2025-06-02 PROCEDURE — 77063 BREAST TOMOSYNTHESIS BI: CPT | Performed by: RADIOLOGY

## 2025-06-03 LAB
25(OH)D3+25(OH)D2 SERPL-MCNC: 30 NG/ML (ref 30–100)
CHOLEST SERPL-MCNC: 203 MG/DL
CHOLEST/HDLC SERPL: 3.9 (CALC)
EST. AVERAGE GLUCOSE BLD GHB EST-MCNC: 123 MG/DL
EST. AVERAGE GLUCOSE BLD GHB EST-SCNC: 6.8 MMOL/L
HBA1C MFR BLD: 5.9 %
HDLC SERPL-MCNC: 52 MG/DL
LDLC SERPL CALC-MCNC: 129 MG/DL (CALC)
NONHDLC SERPL-MCNC: 151 MG/DL (CALC)
TRIGL SERPL-MCNC: 110 MG/DL
TSH SERPL-ACNC: 2.69 MIU/L (ref 0.4–4.5)

## 2025-06-13 ENCOUNTER — LAB (OUTPATIENT)
Dept: LAB | Facility: HOSPITAL | Age: 75
End: 2025-06-13
Payer: MEDICARE

## 2025-06-13 DIAGNOSIS — D50.8 OTHER IRON DEFICIENCY ANEMIAS: Primary | ICD-10-CM

## 2025-06-13 DIAGNOSIS — I82.402 ACUTE DEEP VEIN THROMBOSIS (DVT) OF LEFT LOWER EXTREMITY, UNSPECIFIED VEIN: ICD-10-CM

## 2025-06-13 DIAGNOSIS — D50.8 IRON DEFICIENCY ANEMIA SECONDARY TO INADEQUATE DIETARY IRON INTAKE: ICD-10-CM

## 2025-06-13 DIAGNOSIS — K90.9 IDIOPATHIC STEATORRHEA (HHS-HCC): ICD-10-CM

## 2025-06-13 LAB
ALBUMIN SERPL BCP-MCNC: 3.9 G/DL (ref 3.4–5)
ALP SERPL-CCNC: 79 U/L (ref 33–136)
ALT SERPL W P-5'-P-CCNC: 10 U/L (ref 7–45)
ANION GAP SERPL CALC-SCNC: 13 MMOL/L (ref 10–20)
AST SERPL W P-5'-P-CCNC: 10 U/L (ref 9–39)
BASOPHILS # BLD AUTO: 0.05 X10*3/UL (ref 0–0.1)
BASOPHILS NFR BLD AUTO: 0.9 %
BILIRUB SERPL-MCNC: 0.6 MG/DL (ref 0–1.2)
BUN SERPL-MCNC: 17 MG/DL (ref 6–23)
CALCIUM SERPL-MCNC: 8.7 MG/DL (ref 8.6–10.6)
CHLORIDE SERPL-SCNC: 102 MMOL/L (ref 98–107)
CO2 SERPL-SCNC: 32 MMOL/L (ref 21–32)
CREAT SERPL-MCNC: 0.72 MG/DL (ref 0.5–1.05)
EGFRCR SERPLBLD CKD-EPI 2021: 87 ML/MIN/1.73M*2
EOSINOPHIL # BLD AUTO: 0.25 X10*3/UL (ref 0–0.4)
EOSINOPHIL NFR BLD AUTO: 4.5 %
ERYTHROCYTE [DISTWIDTH] IN BLOOD BY AUTOMATED COUNT: 13.2 % (ref 11.5–14.5)
FERRITIN SERPL-MCNC: 17 NG/ML (ref 8–150)
GLUCOSE SERPL-MCNC: 117 MG/DL (ref 74–99)
HCT VFR BLD AUTO: 40.1 % (ref 36–46)
HGB BLD-MCNC: 12 G/DL (ref 12–16)
IMM GRANULOCYTES # BLD AUTO: 0.01 X10*3/UL (ref 0–0.5)
IMM GRANULOCYTES NFR BLD AUTO: 0.2 % (ref 0–0.9)
IRON SATN MFR SERPL: 15 % (ref 25–45)
IRON SERPL-MCNC: 63 UG/DL (ref 35–150)
LYMPHOCYTES # BLD AUTO: 1.38 X10*3/UL (ref 0.8–3)
LYMPHOCYTES NFR BLD AUTO: 24.9 %
MCH RBC QN AUTO: 26.8 PG (ref 26–34)
MCHC RBC AUTO-ENTMCNC: 29.9 G/DL (ref 32–36)
MCV RBC AUTO: 90 FL (ref 80–100)
MONOCYTES # BLD AUTO: 0.36 X10*3/UL (ref 0.05–0.8)
MONOCYTES NFR BLD AUTO: 6.5 %
NEUTROPHILS # BLD AUTO: 3.49 X10*3/UL (ref 1.6–5.5)
NEUTROPHILS NFR BLD AUTO: 63 %
NRBC BLD-RTO: 0 /100 WBCS (ref 0–0)
PLATELET # BLD AUTO: 289 X10*3/UL (ref 150–450)
POTASSIUM SERPL-SCNC: 3.4 MMOL/L (ref 3.5–5.3)
PROT SERPL-MCNC: 6.4 G/DL (ref 6.4–8.2)
RBC # BLD AUTO: 4.48 X10*6/UL (ref 4–5.2)
SODIUM SERPL-SCNC: 144 MMOL/L (ref 136–145)
TIBC SERPL-MCNC: 407 UG/DL (ref 240–445)
UIBC SERPL-MCNC: 344 UG/DL (ref 110–370)
VIT B12 SERPL-MCNC: 318 PG/ML (ref 211–911)
WBC # BLD AUTO: 5.5 X10*3/UL (ref 4.4–11.3)

## 2025-06-13 PROCEDURE — 85025 COMPLETE CBC W/AUTO DIFF WBC: CPT

## 2025-06-13 PROCEDURE — 83550 IRON BINDING TEST: CPT

## 2025-06-13 PROCEDURE — 82728 ASSAY OF FERRITIN: CPT

## 2025-06-13 PROCEDURE — 83540 ASSAY OF IRON: CPT

## 2025-06-13 PROCEDURE — 80053 COMPREHEN METABOLIC PANEL: CPT

## 2025-06-13 PROCEDURE — 82607 VITAMIN B-12: CPT

## 2025-06-13 PROCEDURE — 36415 COLL VENOUS BLD VENIPUNCTURE: CPT

## 2025-06-13 RX ORDER — APIXABAN 5 MG/1
5 TABLET, FILM COATED ORAL 2 TIMES DAILY
Qty: 180 TABLET | Refills: 1 | Status: SHIPPED | OUTPATIENT
Start: 2025-06-13

## 2025-06-13 NOTE — TELEPHONE ENCOUNTER
Attached medication its been requested by PT. Can we be able to provide this until her next vst?    Last appointment:5/22/2025  Last CPE:N/A  Last MCW:3/14/2024  Next appointment:6/30/2025  Next CPE:N/A  Next MCW:6/30/2025

## 2025-06-16 ENCOUNTER — OFFICE VISIT (OUTPATIENT)
Dept: HEMATOLOGY/ONCOLOGY | Facility: CLINIC | Age: 75
End: 2025-06-16
Payer: MEDICARE

## 2025-06-16 VITALS
BODY MASS INDEX: 32.01 KG/M2 | SYSTOLIC BLOOD PRESSURE: 145 MMHG | WEIGHT: 211.2 LBS | DIASTOLIC BLOOD PRESSURE: 75 MMHG | HEART RATE: 71 BPM | RESPIRATION RATE: 16 BRPM | TEMPERATURE: 97.5 F | OXYGEN SATURATION: 97 %

## 2025-06-16 DIAGNOSIS — K90.9 IDIOPATHIC STEATORRHEA (HHS-HCC): ICD-10-CM

## 2025-06-16 DIAGNOSIS — D50.8 IRON DEFICIENCY ANEMIA SECONDARY TO INADEQUATE DIETARY IRON INTAKE: Primary | ICD-10-CM

## 2025-06-16 PROCEDURE — 3078F DIAST BP <80 MM HG: CPT | Performed by: PHYSICIAN ASSISTANT

## 2025-06-16 PROCEDURE — 99214 OFFICE O/P EST MOD 30 MIN: CPT | Performed by: PHYSICIAN ASSISTANT

## 2025-06-16 PROCEDURE — 3077F SYST BP >= 140 MM HG: CPT | Performed by: PHYSICIAN ASSISTANT

## 2025-06-16 PROCEDURE — 1125F AMNT PAIN NOTED PAIN PRSNT: CPT | Performed by: PHYSICIAN ASSISTANT

## 2025-06-16 RX ORDER — HEPARIN SODIUM,PORCINE/PF 10 UNIT/ML
50 SYRINGE (ML) INTRAVENOUS AS NEEDED
OUTPATIENT
Start: 2025-06-16

## 2025-06-16 RX ORDER — HEPARIN 100 UNIT/ML
500 SYRINGE INTRAVENOUS AS NEEDED
OUTPATIENT
Start: 2025-06-16

## 2025-06-16 RX ORDER — FAMOTIDINE 10 MG/ML
20 INJECTION, SOLUTION INTRAVENOUS ONCE AS NEEDED
OUTPATIENT
Start: 2025-06-20

## 2025-06-16 RX ORDER — EPINEPHRINE 0.3 MG/.3ML
0.3 INJECTION SUBCUTANEOUS EVERY 5 MIN PRN
OUTPATIENT
Start: 2025-06-20

## 2025-06-16 RX ORDER — DIPHENHYDRAMINE HYDROCHLORIDE 50 MG/ML
50 INJECTION, SOLUTION INTRAMUSCULAR; INTRAVENOUS AS NEEDED
OUTPATIENT
Start: 2025-06-20

## 2025-06-16 RX ORDER — ALBUTEROL SULFATE 0.83 MG/ML
3 SOLUTION RESPIRATORY (INHALATION) AS NEEDED
OUTPATIENT
Start: 2025-06-20

## 2025-06-16 ASSESSMENT — PAIN SCALES - GENERAL: PAINLEVEL_OUTOF10: 4

## 2025-06-16 NOTE — PROGRESS NOTES
Reason for Visit  Yaritza Bernal is a 75 y.o. female with PMH s/f LLE DVT after femur fracture referred by Dr. 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    Patient with a history of provoked of LLE DVT s/p fracture repair after mechanical fall in 2022. Patient states that she only received 2 months of Eliquis.     In 5/2024, presented to ED with left calf pain. The pain in her left leg started several days prior to her hospital visit. She had severe swelling in her left leg during this time. Ultrasound c/w thrombosis of left greater saphenous vein (believed to be both acute and chronic). Consequently, she was started on Eliquis BID.     On assessment, she notes her left leg swelling has improved, does report intermittent leg swelling. She has had left leg stripping for varicose veins years ago. She has no family history of clots. She notes no provoking factor prior to this clot. She denies traveling, surgeries, illness/COVID, or any form of immobility. She is up to date with her mammograms but had c-scope years ago. She continues on Eliquis 5 mg BID, denies any bleeding. Feels well aside from fatigue. Denies B symptoms, SOB/VOGT/CP, n/v/d/c/abd pain, rash or any other complaints at this time.     PMH/PSH: HTN, HLD, SNHL bilateral ears, allergic rhinitis, hypothyroidism, vitamin D deficiency, heartburn, urine incontinence, anxiety, MDD, bilateral knee arthritis, benign essential tremor, polyneuropathy, mild persistent asthma, eczema, left leg vein stripping for varicose vein,   FH: Thyroid cancer  Soc Hx: Denies smoking, ETOH, illicit drugs; Works part time at [employer] as , , 1 son.    History of Present Illness:  Continues to have fatigue. Continues on Eliquis 5 mg BID. Denies any bleeding from any source. Reports continued fatigue. Planning on having knee replacement next month. Otherwise doing  well.     Review of Systems: All of the systems have been reviewed and are negative for complaints except what is stated in the HPI and/or past medical history.    Allergies and Intolerances:  Allergies   Allergen Reactions    Amoxicillin Other    Erythromycin Unknown    Lisinopril Cough    Sulfa (Sulfonamide Antibiotics) Unknown          Outpatient Medication Profile:  Current Outpatient Medications   Medication Sig Dispense Refill    albuterol (Proventil HFA) 90 mcg/actuation inhaler Inhale 2 puffs every 4 hours if needed for wheezing or shortness of breath. 6.7 g 0    bisoproloL-hydrochlorothiazide (Ziac) 10-6.25 mg tablet Take 2 tablets by mouth once daily with breakfast. 180 tablet 0    budesonide-formoteroL (Symbicort) 160-4.5 mcg/actuation inhaler Inhale 2 puffs 2 times a day. Rinse mouth with water after use to reduce aftertaste and incidence of candidiasis. Do not swallow.      citalopram (CeleXA) 20 mg tablet Take 1 tablet (20 mg) by mouth once daily. 90 tablet 0    Eliquis 5 mg tablet Take 1 tablet by mouth 2 times a day. 180 tablet 1    esomeprazole (NexIUM) 20 mg DR capsule Take 1 capsule (20 mg) by mouth once daily.      fluticasone (Flonase) 50 mcg/actuation nasal spray Administer into affected nostril(s).      gabapentin (Neurontin) 600 mg tablet Take 1 tablet (600 mg) by mouth 3 times a day. 270 tablet 0    levothyroxine (Synthroid, Levoxyl) 25 mcg tablet Take 1 tablet (25 mcg) by mouth once daily. 90 tablet 3    mv-mn-iron-FA-Ca carb-vit K (Women's Multivitamin) 18 mg-400 mcg- 500 mg-50 mcg tablet Take by mouth.       No current facility-administered medications for this visit.      Vitals and Measurements:       4/1/2025    11:02 AM 4/1/2025    11:03 AM 5/13/2025     5:10 PM 5/22/2025     8:04 AM 5/27/2025    12:48 PM 5/30/2025     1:30 PM 6/16/2025    12:52 PM   Vitals   Systolic 147 145 144 129  120 145   Diastolic 84 76 82 69  64 75   BP Location Left arm Right arm  Left arm   Left arm   Heart  "Rate 69  87 76  77 71   Temp   36.8 °C (98.3 °F) 36.7 °C (98 °F)  36.6 °C (97.9 °F) 36.4 °C (97.5 °F)   Resp 18  16   16 16   Height 1.778 m (5' 10\")    1.73 m (5' 8.11\")     Weight (lb) 208  205 210 210.2  211.2   BMI 29.84 kg/m2  29.41 kg/m2 30.13 kg/m2 31.86 kg/m2  32.01 kg/m2   BSA (m2) 2.16 m2  2.14 m2 2.17 m2 2.14 m2  2.15 m2   Visit Report Report Report Report Report Report Report Report     Physical Exam:   Constitutional: alert, awake and oriented, not in acute distress   HEENT: moist mucous membranes, normal nose   Neck: supple, no lymphadenopathy   EYES: PERRL, EOM intact, conjunctiva normal  Skin: no jaundice, rash or erythema  Neurological: AAOx3, no gross focal deficit   Psychiatric: normal mood and behavior   Ext: left leg swollen >right; no pitting edema    Lab Results:  Lab Results   Component Value Date    WBC 5.5 06/13/2025    NEUTROABS 3.49 06/13/2025    IGABSOL 0.01 06/13/2025    LYMPHSABS 1.38 06/13/2025    MONOSABS 0.36 06/13/2025    EOSABS 0.25 06/13/2025    BASOSABS 0.05 06/13/2025    RBC 4.48 06/13/2025    MCV 90 06/13/2025    MCHC 29.9 (L) 06/13/2025    HGB 12.0 06/13/2025    HCT 40.1 06/13/2025     06/13/2025     Lab Results   Component Value Date    CREATININE 0.72 06/13/2025    BUN 17 06/13/2025    EGFR 87 06/13/2025     06/13/2025    K 3.4 (L) 06/13/2025     06/13/2025    CO2 32 06/13/2025      Lab Results   Component Value Date    ALT 10 06/13/2025    AST 10 06/13/2025    ALKPHOS 79 06/13/2025    BILITOT 0.6 06/13/2025      Lab Results   Component Value Date    TSH 2.69 06/02/2025     Lab Results   Component Value Date    TSH 2.69 06/02/2025    THYROIDPAB 172 (H) 01/18/2018     Lab Results   Component Value Date    IRON 63 06/13/2025    TIBC 407 06/13/2025    FERRITIN 17 06/13/2025      Lab Results   Component Value Date    [id number] 318 06/13/2025     Assessment:    74 y.o. female with PMH s/f LLE DVT after femur fracture referred for evaluation of newly diagnosed " unprovoked recurrent left greater saphenous vein which is believed to be both acute and chronic in 5/2024.    Fatigue    Hypercoagulable work up to ensure no APS unremarkable  Plan:    Reviewed and discussed lab, imaging, and pathology results with patient in detail as well as diagnosis, prognosis, and treatment options.    Discussed that recurrent unprovoked DVT would require lifelong AC.     Discussed holding Eliquis 2 days prior to any procedures and can restart 24 hour after barring any complications    Noted to have significant EUGENIA; s/p 3 doses Venofer given 10/2024; discussed continue Venofer x 3 doses. Notes diet not very rich in iron or nutritional. Continues to decline scopes; PCP sent colo-guard but patient unable to complete due to IBS flare.    Recommend oral vitamin B12    F/U w/PCP    RTC in 3 months    Patient verbalized understanding, and all her questions were answered to her satisfaction    30 min spent with patient greater than 50 % of which was spent in consultation and coordination of care.

## 2025-06-26 ENCOUNTER — APPOINTMENT (OUTPATIENT)
Dept: HEMATOLOGY/ONCOLOGY | Facility: CLINIC | Age: 75
End: 2025-06-26
Payer: MEDICARE

## 2025-06-27 ENCOUNTER — APPOINTMENT (OUTPATIENT)
Dept: PREADMISSION TESTING | Facility: HOSPITAL | Age: 75
End: 2025-06-27
Payer: MEDICARE

## 2025-06-27 ENCOUNTER — HOSPITAL ENCOUNTER (OUTPATIENT)
Dept: RADIOLOGY | Facility: HOSPITAL | Age: 75
End: 2025-06-27
Payer: MEDICARE

## 2025-06-27 ENCOUNTER — APPOINTMENT (OUTPATIENT)
Dept: ORTHOPEDIC SURGERY | Facility: HOSPITAL | Age: 75
End: 2025-06-27
Payer: MEDICARE

## 2025-06-30 ENCOUNTER — HOSPITAL ENCOUNTER (OUTPATIENT)
Dept: RADIOLOGY | Facility: HOSPITAL | Age: 75
Discharge: HOME | End: 2025-06-30
Payer: MEDICARE

## 2025-06-30 ENCOUNTER — APPOINTMENT (OUTPATIENT)
Dept: PRIMARY CARE | Facility: CLINIC | Age: 75
End: 2025-06-30
Payer: MEDICARE

## 2025-06-30 ENCOUNTER — PRE-ADMISSION TESTING (OUTPATIENT)
Dept: PREADMISSION TESTING | Facility: HOSPITAL | Age: 75
End: 2025-06-30
Payer: MEDICARE

## 2025-06-30 VITALS
TEMPERATURE: 98 F | OXYGEN SATURATION: 95 % | BODY MASS INDEX: 31.52 KG/M2 | DIASTOLIC BLOOD PRESSURE: 80 MMHG | HEART RATE: 70 BPM | HEIGHT: 68 IN | WEIGHT: 208 LBS | SYSTOLIC BLOOD PRESSURE: 129 MMHG

## 2025-06-30 VITALS
DIASTOLIC BLOOD PRESSURE: 74 MMHG | HEIGHT: 70 IN | SYSTOLIC BLOOD PRESSURE: 131 MMHG | BODY MASS INDEX: 29.6 KG/M2 | TEMPERATURE: 98.8 F | WEIGHT: 206.8 LBS | RESPIRATION RATE: 18 BRPM | HEART RATE: 68 BPM

## 2025-06-30 DIAGNOSIS — E78.5 HYPERLIPIDEMIA, UNSPECIFIED HYPERLIPIDEMIA TYPE: ICD-10-CM

## 2025-06-30 DIAGNOSIS — Z00.00 MEDICARE ANNUAL WELLNESS VISIT, SUBSEQUENT: Primary | ICD-10-CM

## 2025-06-30 DIAGNOSIS — M17.12 PRIMARY OSTEOARTHRITIS OF LEFT KNEE: ICD-10-CM

## 2025-06-30 DIAGNOSIS — M25.362 INSTABILITY OF LEFT KNEE JOINT: ICD-10-CM

## 2025-06-30 DIAGNOSIS — G62.9 POLYNEUROPATHY: ICD-10-CM

## 2025-06-30 DIAGNOSIS — F41.9 ANXIETY: ICD-10-CM

## 2025-06-30 DIAGNOSIS — R73.03 PREDIABETES: ICD-10-CM

## 2025-06-30 DIAGNOSIS — Z00.00 ROUTINE GENERAL MEDICAL EXAMINATION AT HEALTH CARE FACILITY: ICD-10-CM

## 2025-06-30 DIAGNOSIS — J45.20 MILD INTERMITTENT ASTHMA WITHOUT COMPLICATION (HHS-HCC): ICD-10-CM

## 2025-06-30 DIAGNOSIS — F32.5 MAJOR DEPRESSION IN REMISSION: ICD-10-CM

## 2025-06-30 DIAGNOSIS — Z01.818 PREOP TESTING: Primary | ICD-10-CM

## 2025-06-30 DIAGNOSIS — I10 BENIGN ESSENTIAL HYPERTENSION: ICD-10-CM

## 2025-06-30 DIAGNOSIS — G25.0 BENIGN ESSENTIAL TREMOR: ICD-10-CM

## 2025-06-30 PROCEDURE — 73700 CT LOWER EXTREMITY W/O DYE: CPT | Mod: LEFT SIDE | Performed by: RADIOLOGY

## 2025-06-30 PROCEDURE — 99214 OFFICE O/P EST MOD 30 MIN: CPT | Performed by: FAMILY MEDICINE

## 2025-06-30 PROCEDURE — G0439 PPPS, SUBSEQ VISIT: HCPCS | Performed by: FAMILY MEDICINE

## 2025-06-30 PROCEDURE — 87081 CULTURE SCREEN ONLY: CPT | Mod: BEALAB

## 2025-06-30 PROCEDURE — 1036F TOBACCO NON-USER: CPT | Performed by: FAMILY MEDICINE

## 2025-06-30 PROCEDURE — 73700 CT LOWER EXTREMITY W/O DYE: CPT | Mod: LT

## 2025-06-30 PROCEDURE — 3079F DIAST BP 80-89 MM HG: CPT | Performed by: FAMILY MEDICINE

## 2025-06-30 PROCEDURE — 1160F RVW MEDS BY RX/DR IN RCRD: CPT | Performed by: FAMILY MEDICINE

## 2025-06-30 PROCEDURE — 3074F SYST BP LT 130 MM HG: CPT | Performed by: FAMILY MEDICINE

## 2025-06-30 PROCEDURE — 1170F FXNL STATUS ASSESSED: CPT | Performed by: FAMILY MEDICINE

## 2025-06-30 PROCEDURE — 99397 PER PM REEVAL EST PAT 65+ YR: CPT | Performed by: FAMILY MEDICINE

## 2025-06-30 PROCEDURE — 1159F MED LIST DOCD IN RCRD: CPT | Performed by: FAMILY MEDICINE

## 2025-06-30 RX ORDER — CHLORHEXIDINE GLUCONATE ORAL RINSE 1.2 MG/ML
15 SOLUTION DENTAL DAILY
Qty: 30 ML | Refills: 0 | Status: SHIPPED | OUTPATIENT
Start: 2025-06-30 | End: 2025-07-02

## 2025-06-30 RX ORDER — GABAPENTIN 600 MG/1
600 TABLET ORAL 3 TIMES DAILY
Qty: 270 TABLET | Refills: 0 | Status: SHIPPED | OUTPATIENT
Start: 2025-06-30

## 2025-06-30 RX ORDER — BISOPROLOL FUMARATE AND HYDROCHLOROTHIAZIDE 10; 6.25 MG/1; MG/1
2 TABLET ORAL
Qty: 180 TABLET | Refills: 1 | Status: SHIPPED | OUTPATIENT
Start: 2025-06-30

## 2025-06-30 RX ORDER — CHLORHEXIDINE GLUCONATE 40 MG/ML
SOLUTION TOPICAL DAILY
Qty: 470 ML | Refills: 0 | Status: SHIPPED | OUTPATIENT
Start: 2025-06-30 | End: 2025-07-05

## 2025-06-30 RX ORDER — CITALOPRAM 20 MG/1
20 TABLET ORAL DAILY
Qty: 90 TABLET | Refills: 1 | Status: SHIPPED | OUTPATIENT
Start: 2025-06-30

## 2025-06-30 ASSESSMENT — ANXIETY QUESTIONNAIRES
4. TROUBLE RELAXING: NOT AT ALL
6. BECOMING EASILY ANNOYED OR IRRITABLE: NOT AT ALL
IF YOU CHECKED OFF ANY PROBLEMS ON THIS QUESTIONNAIRE, HOW DIFFICULT HAVE THESE PROBLEMS MADE IT FOR YOU TO DO YOUR WORK, TAKE CARE OF THINGS AT HOME, OR GET ALONG WITH OTHER PEOPLE: NOT DIFFICULT AT ALL
GAD7 TOTAL SCORE: 0
7. FEELING AFRAID AS IF SOMETHING AWFUL MIGHT HAPPEN: NOT AT ALL
1. FEELING NERVOUS, ANXIOUS, OR ON EDGE: NOT AT ALL
2. NOT BEING ABLE TO STOP OR CONTROL WORRYING: NOT AT ALL
3. WORRYING TOO MUCH ABOUT DIFFERENT THINGS: NOT AT ALL
5. BEING SO RESTLESS THAT IT IS HARD TO SIT STILL: NOT AT ALL

## 2025-06-30 ASSESSMENT — ENCOUNTER SYMPTOMS
DEPRESSION: 0
OCCASIONAL FEELINGS OF UNSTEADINESS: 1
LOSS OF SENSATION IN FEET: 1

## 2025-06-30 ASSESSMENT — ACTIVITIES OF DAILY LIVING (ADL)
TAKING_MEDICATION: INDEPENDENT
DRESSING: INDEPENDENT
GROCERY_SHOPPING: INDEPENDENT
MANAGING_FINANCES: INDEPENDENT
BATHING: INDEPENDENT
DOING_HOUSEWORK: INDEPENDENT

## 2025-06-30 ASSESSMENT — PAIN - FUNCTIONAL ASSESSMENT: PAIN_FUNCTIONAL_ASSESSMENT: 0-10

## 2025-06-30 ASSESSMENT — PAIN SCALES - GENERAL: PAINLEVEL_OUTOF10: 4

## 2025-06-30 NOTE — ASSESSMENT & PLAN NOTE
Recommended continue with citalopram 20 mg once daily.   Orders:    citalopram (CeleXA) 20 mg tablet; Take 1 tablet (20 mg) by mouth once daily.

## 2025-06-30 NOTE — PREPROCEDURE INSTRUCTIONS
Medication List            Accurate as of June 30, 2025  3:21 PM. Always use your most recent med list.                albuterol 90 mcg/actuation inhaler  Commonly known as: Proventil HFA  Inhale 2 puffs every 4 hours if needed for wheezing or shortness of breath.  Medication Adjustments for Surgery: Take/Use as prescribed     bisoproloL-hydrochlorothiazide 10-6.25 mg tablet  Commonly known as: Ziac  Take 2 tablets by mouth once daily with breakfast.  Medication Adjustments for Surgery: Take/Use as prescribed     budesonide-formoterol 160-4.5 mcg/actuation inhaler  Commonly known as: Symbicort  Inhale 2 puffs 2 times a day. Rinse mouth with water after use to reduce aftertaste and incidence of candidiasis. Do not swallow.  Medication Adjustments for Surgery: Take/Use as prescribed     * chlorhexidine 4 % external liquid  Commonly known as: Hibiclens  Apply topically once daily for 5 days.  Medication Adjustments for Surgery: Take/Use as prescribed     * chlorhexidine 0.12 % solution  Commonly known as: Peridex  Use 15 mL in the mouth or throat once daily for 2 doses. Mouthwash, use 15 ml the night before surgery and the morning of surgery. Swish and spit, do not swallow  Medication Adjustments for Surgery: Take/Use as prescribed     citalopram 20 mg tablet  Commonly known as: CeleXA  Take 1 tablet (20 mg) by mouth once daily.  Medication Adjustments for Surgery: Take/Use as prescribed     Eliquis 5 mg tablet  Generic drug: apixaban  Take 1 tablet by mouth 2 times a day.  Additional Medication Adjustments for Surgery: Other (Comment)  Notes to patient: We will need instructions from your provider      esomeprazole 20 mg DR capsule  Commonly known as: NexIUM  Medication Adjustments for Surgery: Take/Use as prescribed     fluticasone 50 mcg/actuation nasal spray  Commonly known as: Flonase  Medication Adjustments for Surgery: Take/Use as prescribed     gabapentin 600 mg tablet  Commonly known as: Neurontin  Take 1  tablet (600 mg) by mouth 3 times a day.  Medication Adjustments for Surgery: Take/Use as prescribed     levothyroxine 25 mcg tablet  Commonly known as: Synthroid, Levoxyl  Take 1 tablet (25 mcg) by mouth once daily.  Medication Adjustments for Surgery: Take/Use as prescribed     Women's Multivitamin 18 mg-400 mcg- 500 mg-50 mcg tablet  Generic drug: mv-mn-iron-FA-Ca carb-vit K  Additional Medication Adjustments for Surgery: Take last dose 7 days before surgery  Notes to patient: last dose preoperatively on 7/9/25           * This list has 2 medication(s) that are the same as other medications prescribed for you. Read the directions carefully, and ask your doctor or other care provider to review them with you.                  NPO Instructions:     Do not eat any food after midnight the night before your surgery/procedure.  You may have clear liquids until TWO hours before surgery/procedure. This includes water, black tea/coffee, (no milk or cream) apple juice and electrolyte drinks (Gatorade).  You may chew gum up to TWO hours before your surgery/procedure.     Additional Instructions:      Seven/Six Days before Surgery:  Review your medication instructions, stop indicated medications  Five Days before Surgery:  Review your medication instructions, stop indicated medications  Begin using your Hibiclens  Three Days before Surgery:  Review your medication instructions, stop indicated medications  The Day before Surgery:  Start using 0.12% CHG mouthwash  No smoking or alcohol use 24 hours before surgery  Review your medication instructions, stop indicated medications  You will be contacted regarding the time of your arrival to facility and surgery time  Do not eat any food after Midnight  Day of Surgery:  Review your medication instructions, take indicated medications  If you have diabetes, please check your fasting blood sugar upon awakening.  If fasting blood sugar is <80 mg/dl, drink 100 ml of apple juice, time  limit of 2 hours before  You may have clear liquids until TWO hours before surgery/procedure.  This includes water, black tea/coffee, (no milk or cream) apple juice and electrolyte drinks (Gatorade)  You may chew gum up to TWO hours before your surgery/procedure  Wear  comfortable loose fitting clothing  Do not use moisturizers, creams, lotions or perfume  All jewelry and valuables should be left at home     CONTACT SURGEON'S OFFICE IF YOU DEVELOP:  * Fever = 100.4 F   * New respiratory symptoms (e.g. cough, shortness of breath, respiratory distress, sore throat)  * Recent loss of taste or smell  *Flu like symptoms such as headache, fatigue or gastrointestinal symptoms  * You develop any open sores, shingles, burning or painful urination   AND/OR:  * You no longer wish to have the surgery.  * Any other personal circumstances change that may lead to the need to cancel or defer this surgery.  *You were admitted to any hospital within one week of your planned procedure.     SMOKING:  *Quitting smoking can make a huge difference to your health and recovery from surgery.    *If you need help with quitting, call 8-098-QUIT-NOW.     THE DAY BEFORE SURGERY:  *Do not eat any food after midnight the night before your surgery.   *You may have up to TEN OUNCES of clear liquids until TWO hours before your instructed ARRIVAL TIME to hospital. This includes water, black tea/coffee, (no milk or cream) apple juice, clear broth and electrolyte drinks (Gatorade). Please avoid clear liquids that are red in color.   *You may chew gum/mints up to TWO hours before your surgery/procedure.     SURGICAL TIME:  *You will be contacted between 2 p.m. and 3 p.m. the business day before your surgery with your arrival time.  *If you haven't received a call by 3pm, call [phone]  *Scheduled surgery times may change and you will be notified if this occurs-check your personal voicemail for any updates.     ON THE MORNING OF SURGERY:  *Wear  comfortable, loose fitting clothing.   *Do not use moisturizers, creams, lotions or perfume.  *All jewelry and valuables should be left at home.  *Prosthetic devices such as contact lenses, hearing aids, dentures, eyelash extensions, hairpins and body piercing must be removed before surgery.     BRING WITH YOU:  *Photo ID and insurance card  *Current list of medications and allergies  *Pacemaker/Defibrillator/Heart stent cards  *CPAP machine and mask  *Slings/splints/crutches  *Copy of your complete Advanced Directive/DHPOA-if applicable  *Neurostimulator implant remote     PARKING AND ARRIVAL:  *Check in at the Main Entrance desk and let them know you are here for surgery.     IF YOU ARE HAVING OUTPATIENT/SAME DAY SURGERY:  *A responsible adult MUST accompany you at the time of discharge and stay with you for 24 hours after your surgery.  *You may NOT drive yourself home after surgery.  *You may use a taxi or ride sharing service ([employer], [employer]) to return home ONLY if you are accompanied by a friend or family member.  *Instructions for resuming your medications will be provided by your surgeon.     Thank you for coming to Pre Admission testing.      If I have prescribed medication please don't forget to  at your pharmacy.      Any questions about today's visit call [phone] and leave a message in the general mailbox.     Patient instructed to ambulate as soon as possible postoperatively to decrease thromboembolic risk.        Thank you for visiting [facility].  If you have any changes to your health condition, please call the surgeons office to alert them and give them details of your symptoms.        Preoperative Fasting Guidelines     Why must I stop eating and drinking near surgery time?  With sedation, food or liquid in your stomach can enter your lungs causing serious complications  Increases nausea and vomiting     When do I need to stop eating and drinking before my  surgery?  Do not eat any food after midnight the night before your surgery/procedure.  You may have up to TEN ounces of clear liquid until TWO hours before your instructed arrival time to the hospital.  This includes water, black tea/coffee, (no milk or cream) apple juice, and electrolyte drinks (Gatorade)  You may chew gum until TWO hours before your surgery/procedure        Additional Instructions:      The Day before Surgery:  -Review your medication instructions, stop indicated medications  -You will be contacted in the evening regarding the time of your arrival to facility and surgery time     Day of Surgery:  -Review your medication instructions, take indicated medications  -Wear comfortable loose fitting clothing  -Do not use moisturizers, creams, lotions or perfume  -All jewelry and valuables should be left at home                   Preoperative Brain Exercises     What are brain exercises?  A brain exercise is any activity that engages your thinking (cognitive) skills.     What types of activities are considered brain exercises?  Jigsaw puzzles, crossword puzzles, word jumble, memory games, word search, and many more.  Many can be found free online or on your phone via a mobile saulo.     Why should I do brain exercises before my surgery?  More recent research has shown brain exercise before surgery can lower the risk of postoperative delirium (confusion) which can be especially important for older adults.  Patients who did brain exercises for 5 to 10 minutes/day in the days before surgery, cut their risk of postoperative delirium in half up to 1 week after surgery.                         [facility]     Preoperative Deep Breathing Exercises     Why it is important to do deep breathing exercises before my surgery?  Deep breathing exercises strengthen your breathing muscles.  This helps you to recover after your surgery and decreases the chance of breathing complications.        How  are the deep breathing exercises done?  Sit straight with your back supported.  Breathe in deeply and slowly through your nose. Your lower rib cage should expand and your abdomen may move forward.  Hold that breath for 3 to 5 seconds.  Breathe out through pursed lips, slowly and completely.  Rest and repeat 10 times every hour while awake.  Rest longer if you become dizzy or lightheaded.                      [facility]     Preoperative Deep Breathing Exercises     Why it is important to do deep breathing exercises before my surgery?  Deep breathing exercises strengthen your breathing muscles.  This helps you to recover after your surgery and decreases the chance of breathing complications.        How are the deep breathing exercises done?  Sit straight with your back supported.  Breathe in deeply and slowly through your nose. Your lower rib cage should expand and your abdomen may move forward.  Hold that breath for 3 to 5 seconds.  Breathe out through pursed lips, slowly and completely.  Rest and repeat 10 times every hour while awake.  Rest longer if you become dizzy or lightheaded.        Patient Information: Incentive Spirometer  What is an incentive spirometer?  An incentive spirometer is a device used before and after surgery to “exercise” your lungs.  It helps you to take deeper breaths to expand your lungs.  Below is an example of a basic incentive spirometer.  The device you receive may differ slightly but they all function the same.    Why do I need to use an incentive spirometer?  Using your incentive spirometer prepares your lungs for surgery and helps prevent lung problems after surgery.  How do I use my incentive spirometer?  When you're using your incentive spirometer, make sure to breathe through your mouth. If you breathe through your nose, the incentive spirometer won't work properly. You can hold your nose if you have trouble.  If you feel dizzy at any time, stop and  rest. Try again at a later time.  Follow the steps below:  Set up your incentive spirometer, expand the flexible tubing and connect to the outlet.  Sit upright in a chair or bed. Hold the incentive spirometer at eye level.   Put the mouthpiece in your mouth and close your lips tightly around it. Slowly breathe out (exhale) completely.  Breathe in (inhale) slowly through your mouth as deeply as you can. As you take a breath, you will see the piston rise inside the large column. While the piston rises, the indicator should move upwards. It should stay in between the 2 arrows (see Figure).  Try to get the piston as high as you can, while keeping the indicator between the arrows.   If the indicator doesn't stay between the arrows, you're breathing either too fast or too slow.  When you get it as high as you can, hold your breath for 10 seconds, or as long as possible. While you're holding your breath, the piston will slowly fall to the base of the spirometer.  Once the piston reaches the bottom of the spirometer, breathe out slowly through your mouth. Rest for a few seconds.  Repeat 10 times. Try to get the piston to the same level with each breath.  Repeat every hour while awake  You can carefully clean the outside of the mouthpiece with an alcohol wipe or soap and water.       Patient and Family Education             Ways You Can Help Prevent Blood Clots                    This handout explains some simple things you can do to help prevent blood clots.      Blood clots are blockages that can form in the body's veins. When a blood clot forms in your deep veins, it may be called a deep vein thrombosis, or DVT for short. Blood clots can happen in any part of the body where blood flows, but they are most common in the arms and legs. If a piece of a blood clot breaks free and travels to the lungs, it is called a pulmonary embolus (PE). A PE can be a very serious problem.         Being in the hospital or having surgery can  raise your chances of getting a blood clot because you may not be well enough to move around as much as you normally do.         Ways you can help prevent blood clots in the hospital           Wearing SCDs. SCDs stands for Sequential Compression Devices.   SCDs are special sleeves that wrap around your legs  They attach to a pump that fills them with air to gently squeeze your legs every few minutes.   This helps return the blood in your legs to your heart.   SCDs should only be taken off when walking or bathing.   SCDs may not be comfortable, but they can help save your life.                                            Wearing compression stockings - if your doctor orders them. These special snug fitting stockings gently squeeze your legs to help blood flow.       Walking. Walking helps move the blood in your legs.   If your doctor says it is ok, try walking the halls at least   5 times a day. Ask us to help you get up, so you don't fall.      Taking any blood thinning medicines your doctor orders.        Page 1 of 2            [facility]; 3/23   Ways you can help prevent blood clots at home         Wearing compression stockings - if your doctor orders them. ? Walking - to help move the blood in your legs.       Taking any blood thinning medicines your doctor orders.      Signs of a blood clot or PE        Tell your doctor or nurse know right away if you have of the problems listed below.    If you are at home, seek medical care right away. Call 911 for chest pain or problems breathing.                Signs of a blood clot (DVT) - such as pain,  swelling, redness or warmth in your arm or leg      Signs of a pulmonary embolism (PE) - such as chest pain or feeling short of breath    Patient Information: Pre-Operative Infection Prevention Measures     Why did I have my nose, under my arms, and groin swabbed?  The purpose of the swab is to identify Staphylococcus aureus inside your nose or on your skin.  The  swab was sent to the laboratory for culture.  A positive swab/culture for Staphylococcus aureus is called colonization or carriage.      What is Staphylococcus aureus?  Staphylococcus aureus, also known as “staph”, is a germ found on the skin or in the nose of healthy people.  Sometimes Staphylococcus aureus can get into the body and cause an infection.  This can be minor (such as pimples, boils, or other skin problems).  It might also be serious (such as a blood infection, pneumonia, or a surgical site infection).    What is Staphylococcus aureus colonization or carriage?  Colonization or carriage means that a person has the germ but is not sick from it.  These bacteria can be spread on the hands or when breathing or sneezing.    How is Staphylococcus aureus spread?  It is most often spread by close contact with a person or item that carries it.    What happens if my culture is positive for Staphylococcus aureus?  Your doctor/medical team will use this information to guide any antibiotic treatment which may be necessary.  Regardless of the culture results, we will clean the inside of your nose with a betadine swab just before you have your surgery.      Will I get an infection if I have Staphylococcus aureus in my nose or on my skin?  Anyone can get an infection with Staphylococcus aureus.  However, the best way to reduce your risk of infection is to follow the instructions provided to you for the use of your CHG soap and dental rinse.        Patient Information: Oral/Dental Rinse    What is oral/dental rinse?   It is a mouthwash. It is a way of cleaning the mouth with a germ-killing solution before your surgery.  The solution contains chlorhexidine, commonly known as CHG.   It is used inside the mouth to kill a bacteria known as Staphylococcus aureus.  Let your doctor know if you are allergic to Chlorhexidine.    We have sent a prescription for CHG 0.12% mouthwash to your preferred pharmacy.  If you have not  already, Please  your prescription and start using the day before before surgery.  Follow the instruction sheet provided to you at your CPM/PAT appointment. Please contact [facility] if you do not receive your CHG mouthwash prescription.     Why do I need to use CHG oral/dental rinse?  The CHG oral/dental rinse helps to kill a bacteria in your mouth known as Staphylococcus aureus.     This reduces the risk of infection at the surgical site.      Using your CHG oral/dental rinse  STEPS:  Use your CHG oral/dental rinse after you brush your teeth the night before (at bedtime) and the morning of your surgery.  Follow all directions on your prescription label.    Use the cap on the container to measure 15ml   Swish (gargle if you can) the mouthwash in your mouth for at least 30 seconds, (do not swallow) and spit out  After you use your CHG rinse, do not rinse your mouth with water, drink or eat.  Please refer to the prescription label for the appropriate time to resume oral intake      What side effects might I have using the CHG oral/dental rinse?  CHG rinse will stick to plaque on the teeth.  Brush and floss just before use.  Teeth brushing will help avoid staining of plaque during use.      Patient Information: Home Preoperative Antibacterial Shower      What is a home preoperative antibacterial shower?  This shower is a way of cleaning the skin with a germ-killing solution before surgery.  The solution contains chlorhexidine, commonly known as CHG.  CHG is a skin cleanser with germ-killing ability.  Let your doctor know if you are allergic to chlorhexidine.    Why do I need to take a preoperative antibacterial shower?  Skin is not sterile.  It is best to try to make your skin as free of germs as possible before surgery.  Proper cleansing with a germ-killing soap before surgery can lower the number of germs on your skin.  This helps to reduce the risk of infection at the surgical site.  Following the instructions  listed below will help you prepare your skin for surgery.      How do I use the solution?  Steps:  Begin using your CHG soap 5 days before your scheduled surgery on 7/13/25.    First, wash and rinse your hair using the CHG soap. Keep CHG soap away from ear canals and eyes.  Rinse completely, do not condition.  Hair extensions should be removed.  Wash your face with your normal soap and rinse.    Apply the CHG solution to a clean wet washcloth.  Turn the water off or move away from the water spray to avoid premature rinsing of the CHG soap as you are applying.   Firmly lather your entire body from the neck down.  Do not use on your face.  Pay special attention to the area(s) where your incision(s) will be located unless they are on your face.  Avoid scrubbing your skin too hard.  The important point is to have the CHG soap sit on your skin for 3 minutes.    When the 3 minutes are up, turn on the water and rinse the CHG solution off your body completely.   DO NOT wash with regular soap after you have used the CHG soap solution  Pat yourself dry with a clean, freshly-laundered towel.  DO NOT apply powders, deodorants, or lotions.  Dress in clean, freshly laundered nightclothes.    Be sure to sleep with clean, freshly laundered sheets.  Be aware that CHG will cause stains on fabrics; if you wash them with bleach after use.  Rinse your washcloth and other linens that have contact with CHG completely.  Use only non-chlorine detergents to launder the items used.   The morning of surgery is the fifth day.  Repeat the above steps and dress in clean comfortable clothing     Whom should I contact if I have any questions regarding the use of CHG soap?  Call the [facility] at [phone] if you have any questions.      Preoperative Clearances  -If you are informed by the preadmission testing team that you need clearance for your surgery, please reach out to  the provider in question to assisting accommodating obtaining your clearance.   -Please have you provider fax your clearance letter to [phone] if needed.   -A blank clearance letter will be provided to you if indicated.     Anticoagulation  -If you are on oral anticoagulation such as Coumadin, Eliquis, Xarelto, Plavix, Brilinta, Pradaxa; we will need to obtain preoperative anticoagulation instructions from the prescribing provider.   -We will reach out to the prescriber but do encourage you to call their office as well as it will increase the chances of getting the necessary information.   -We will contact you with the instruction once we obtain them.

## 2025-06-30 NOTE — CPM/PAT H&P
CPM/PAT Evaluation       Name: Yaritza Bernal (Yaritza Bernal)  /Age: 1950/75 y.o.     In-Person       Chief Complaint: Primary osteoarthritis of left knee     HPI  Patient is an alert and oriented 75 year old female scheduled for a  Left Total Knee Arthroplasty, Robot-Assisted on 25 with Dr. Lockhart for  Primary osteoarthritis of left knee. PMHX includes DVT, HTN, HLD, essential tremor, polyneuropathy, asthma. Presents to [facility] today for perioperative risk stratification and optimization.     Medical History[1]    Surgical History[2]    Patient  reports that she is not currently sexually active and has had partner(s) who are male. She reports using the following method of birth control/protection: Female Sterilization.    Family History[3]    Allergies[4]    Prior to Admission medications    Medication Sig Start Date End Date Taking? Authorizing Provider   albuterol (Proventil HFA) 90 mcg/actuation inhaler Inhale 2 puffs every 4 hours if needed for wheezing or shortness of breath. 24  Maria Eugenia Ulloa PA-C   bisoproloL-hydrochlorothiazide (Ziac) 10-6.25 mg tablet Take 2 tablets by mouth once daily with breakfast. 25   Madyson Sandoval DO   budesonide-formoteroL (Symbicort) 160-4.5 mcg/actuation inhaler Inhale 2 puffs 2 times a day. Rinse mouth with water after use to reduce aftertaste and incidence of candidiasis. Do not swallow. 24  Madyson Sandoval DO   citalopram (CeleXA) 20 mg tablet Take 1 tablet (20 mg) by mouth once daily. 25   Madyson Sandoval DO   Eliquis 5 mg tablet Take 1 tablet by mouth 2 times a day. 25   Madyson Sandoval DO   esomeprazole (NexIUM) 20 mg DR capsule Take 1 capsule (20 mg) by mouth once daily.    Historical Provider, MD   fluticasone (Flonase) 50 mcg/actuation nasal spray Administer into affected nostril(s). 21   Historical Provider, MD   gabapentin (Neurontin) 600 mg tablet Take 1 tablet (600  "mg) by mouth 3 times a day. 6/30/25   Madyson Sandoval DO   levothyroxine (Synthroid, Levoxyl) 25 mcg tablet Take 1 tablet (25 mcg) by mouth once daily. 12/5/24   Madyson Sandoval DO   mv-mn-iron-FA-Ca carb-vit K (Women's Multivitamin) 18 mg-400 mcg- 500 mg-50 mcg tablet Take by mouth. 9/1/16   Historical Provider, MD   bisoproloL-hydrochlorothiazide (Ziac) 10-6.25 mg tablet Take 2 tablets by mouth once daily with breakfast. 4/30/25 6/30/25  Madyson Sandoval DO   citalopram (CeleXA) 20 mg tablet Take 1 tablet (20 mg) by mouth once daily. 12/4/24 6/30/25  Madyson Sandoval DO   gabapentin (Neurontin) 600 mg tablet Take 1 tablet (600 mg) by mouth 3 times a day. 3/5/25 6/30/25  Brandyn Bernal MD          Visit Vitals  /74   Pulse 68   Temp 37.1 °C (98.8 °F) (Temporal)   Resp 18   Ht 1.778 m (5' 10\")   Wt 93.8 kg (206 lb 12.8 oz)   BMI 29.67 kg/m²   OB Status Postmenopausal   Smoking Status Never   BSA 2.15 m²      Review of Systems   Constitutional: Negative for chills, decreased appetite, diaphoresis, fever and malaise/fatigue.   Eyes:  Negative for blurred vision and double vision.   Cardiovascular:  Negative for chest pain, claudication, cyanosis, dyspnea on exertion, irregular heartbeat, leg swelling, near-syncope and palpitations.   Respiratory:  Negative for cough, hemoptysis, shortness of breath and wheezing.    Endocrine: Negative for cold intolerance, heat intolerance, polydipsia, polyphagia and polyuria.   Gastrointestinal:  Negative for abdominal pain, constipation, diarrhea, dysphagia, nausea and vomiting.   Genitourinary:  Negative for bladder incontinence, dysuria, hematuria, incomplete emptying, nocturia, frequency, pelvic pain and urgency.   Neurological:  Negative for headaches, light-headedness, paresthesias, sensory change and weakness.   Psychiatric/Behavioral:  Negative for altered mental status.    Musculoskeletal: Positive for myalgias, arthralgias   "   Vitals and nursing note reviewed.     Physical exam  Constitutional:       Appearance: Normal appearance. She is Obese.   HENT:      Head: Normocephalic and atraumatic.      Mouth/Throat:      Mouth: Mucous membranes are moist.      Pharynx: Oropharynx is clear.   Eyes:      Extraocular Movements: Extraocular movements intact.      Conjunctiva/sclera: Conjunctivae normal.      Pupils: Pupils are equal, round, and reactive to light.   Cardiovascular:      PMI at left midclavicular line. Normal rate. Regular rhythm. Normal S1. Normal S2.       Murmurs: There is no murmur.      No gallop.  No click. No rub.       No audible carotid bruit     No lower extremity edema on exam  Pulmonary:      Effort: Pulmonary effort is normal.      Breath sounds: Normal breath sounds.   Abdominal:      General: Abdomen is flat. Bowel sounds are normal.      Palpations: Abdomen is soft and non-tender  Musculoskeletal:      Cervical back: Normal range of motion and neck supple.   Skin:     General: Skin is warm and dry.      Capillary Refill: Capillary refill takes less than 2 seconds.   Neurological:      General: No focal deficit present.      Mental Status: She is alert and oriented to person, place, and time. Mental status is at baseline.   Psychiatric:         Mood and Affect: Mood normal.         Behavior: Behavior normal.         Thought Content: Thought content normal.         Judgment: Judgment normal.     Vitals and nursing note reviewed. Physical exam within normal limits.     DASI Risk Score      Flowsheet Row Questionnaire Series Submission from 6/16/2025 in [facility] OR with Generic Provider Karla   Can you take care of yourself (eat, dress, bathe, or use toilet)?  2.75  filed at 06/16/2025 1625   Can you walk indoors, such as around your house? 1.75  filed at 06/16/2025 1625   Can you walk a block or two on level ground?  2.75  filed at 06/16/2025 1625   Can you climb a flight of stairs or walk up a  hill? 5.5  filed at 06/16/2025 1625   Can you run a short distance? 0  filed at 06/16/2025 1625   Can you do light work around the house like dusting or washing dishes? 2.7  filed at 06/16/2025 1625   Can you do moderate work around the house like vacuuming, sweeping floors or carrying groceries? 3.5  filed at 06/16/2025 1625   Can you do heavy work around the house like scrubbing floors or lifting and moving heavy furniture?  8  filed at 06/16/2025 1625   Can you do yard work like raking leaves, weeding or pushing a mower? 4.5  filed at 06/16/2025 1625   Can you have sexual relations? 5.25  filed at 06/16/2025 1625   Can you participate in moderate recreational activities like golf, bowling, dancing, doubles tennis or throwing a baseball or football? 0  filed at 06/16/2025 1625   Can you participate in strenous sports like swimming, singles tennis, football, basketball, or skiing? 0  filed at 06/16/2025 1625   DASI SCORE 36.7  filed at 06/16/2025 1625   METS Score (Will be calculated only when all the questions are answered) 7.3  filed at 06/16/2025 1625          Caprini DVT Assessment      Flowsheet Row Pre-Admission Testing from 6/30/2025 in [facility]   DVT Score (IF A SCORE IS NOT CALCULATING, MUST SELECT A BMI TO COMPLETE) 12 filed at 06/30/2025 1617   Medical Factors History of DVT/PE filed at 06/30/2025 1617   Surgical Factors Elective major lower extremity arthroplasty filed at 06/30/2025 1617   BMI (BMI MUST BE CHOSEN) 30 or less filed at 06/30/2025 1617          Modified Frailty Index    No data to display       [id number] Stroke Risk Points  Current as of just now        N/A 0 to 9 Points:      Last Change: N/A          The [id number] risk score (Lip DAVID, et al. 2009. © 2010 [employer]) quantifies the risk of stroke for a patient with atrial fibrillation. For patients without atrial fibrillation or under the age of 18 this score appears as N/A. Higher score  values generally indicate higher risk of stroke.        This score is not applicable to this patient. Components are not calculated.          Revised Cardiac Risk Index      Flowsheet Row Pre-Admission Testing from 6/30/2025 in [facility]   High-Risk Surgery (Intraperitoneal, Intrathoracic,Suprainguinal vascular) 0 filed at 06/30/2025 1620   History of ischemic heart disease (History of MI, History of positive exercuse test, Current chest paint considered due to myocardial ischemia, Use of nitrate therapy, ECG with pathological Q Waves) 0 filed at 06/30/2025 1620   History of congestive heart failure (pulmonary edemia, bilateral rales or S3 gallop, Paroxysmal nocturnal dyspnea, CXR showing pulmonary vascular redistribution) 0 filed at 06/30/2025 1620   History of cerebrovascular disease (Prior TIA or stroke) 0 filed at 06/30/2025 1620   Pre-operative insulin treatment 0 filed at 06/30/2025 1620   Pre-operative creatinine>2 mg/dl 0 filed at 06/30/2025 1620   Revised Cardiac Risk Calculator 0 filed at 06/30/2025 1620          Apfel Simplified Score    No data to display       Risk Analysis Index Results This Encounter    No data found in the last 10 encounters.       Stop Bang Score      Flowsheet Row Pre-Admission Testing from 6/30/2025 in [facility] Questionnaire Series Submission from 6/16/2025 in [facility] OR with Generic Provider Karla   Do you snore loudly? 0 filed at 06/30/2025 1517 0  filed at 06/16/2025 1625   Do you often feel tired or fatigued after your sleep? 0 filed at 06/30/2025 1517 1  filed at 06/16/2025 1625   Has anyone ever observed you stop breathing in your sleep? 0 filed at 06/30/2025 1517 0  filed at 06/16/2025 1625   Do you have or are you being treated for high blood pressure? 1 filed at 06/30/2025 1517 1  filed at 06/16/2025 1625   Recent BMI (Calculated) 31.6 filed at 06/30/2025 1517 31.9  filed at 06/16/2025 1625   Is BMI greater than  35 kg/m2? 0=No filed at 06/30/2025 1517 0=No  filed at 06/16/2025 1625   Age older than 50 years old? 1=Yes filed at 06/30/2025 1517 1=Yes  filed at 06/16/2025 1625   Is your neck circumference greater than 17 inches (Male) or 16 inches (Female)? 0 filed at 06/30/2025 1517 --   Gender - Male 0=No filed at 06/30/2025 1517 0=No  filed at 06/16/2025 1625   STOP-BANG Total Score 2 filed at 06/30/2025 1517 --          Prodigy: High Risk  Total Score: 12              Prodigy Age Score           ARISCAT Score for Postoperative Pulmonary Complications    No data to display       Matt Perioperative Risk for Myocardial Infarction or Cardiac Arrest (PEG)      Flowsheet Row Pre-Admission Testing from 6/30/2025 in [facility]   Calculated Age Score 1.52 filed at 06/30/2025 1620   Functional Status  0 filed at 06/30/2025 1620   ASA Class  -3.29 filed at 06/30/2025 1620   Creatinine 0 filed at 06/30/2025 1620   Type of Procedure  0.80 filed at 06/30/2025 1620   PEG Total Score  -6.22 filed at 06/30/2025 1620   PEG % 0.2 filed at 06/30/2025 1620            Assessment & Plan:    Neuro:  Depression (citalopram)    Essential tremor    HEENT/Airway:  No diagnosis or significant findings on chart review or clinical presentation and evaluation.   STOP-BANG Score-2 points low risk for EVAN    Mallampati::  II    TM distance::  >3 FB    Neck ROM::  Full  Dentures-denies  Crowns-reports x 3  Implants-denies    Cardiovascular:  LLE DVT after femur fracture on Eliquis  HTN (bisoprolol)   LLE pt states this is normal for her  METS: 7.3  RCRI: 0 points, 3.9%  risk for postoperative MACE   PEG: 0.2% risk for postoperative MACE  EKG -right bundle branch block Rate-64  No acute changes.history of RBBB     Pulmonary:  COPD (Symbicort, albuterol)   ARISCAT: <26 points, 1.6% risk of in-hospital postoperative pulmonary complication  PRODIGY: Moderate risk for opioid induced respiratory depression  Smoking History-She has never  smoked.  Deep breathing handout given    Renal/Urinary:    No diagnosis or significant findings on chart review or clinical presentation and evaluation, however, the patient is at increased risk of perioperative renal complications secondary to HTN. Preventative measures include BP monitoring, medication compliance, and hydration management.   CMP-Pending  Creatinine-0.72  GFR-87    Endocrine:  DM2  Hypothyroidism (levothyroxine)   XDE2W-5.1  TSH 2.69    Hematologic/Immunology:  Iron deficiency anemia  The patient is not a Jehovah’s witness and will accept blood and blood products if medically indicated.   History of previous blood transfusions No  CBC  HGB-12.0/40.1  Caprini Score 12, patient at High for postoperative DVT. Pt supplied education/VTE handout  Anticoagulation use: Yes     Gastrointestinal:   No diagnosis or significant findings on chart review or clinical presentation and evaluation.   Recreational drug use: none  Alcohol use 3 glasses of wine per month    Infectious disease:   No diagnosis or significant findings on chart review or clinical presentation and evaluation.   Prescription provided for CHG body wash and dental rinse. CHG use instructions reviewed and provided to patient.  Staph screen collected-Negative for MRSA    Musculoskeletal:   Left knee pain   JHFRAT score- 12 points. moderate risk for falls    Anesthesia:  ASA 2 - Patient with mild systemic disease with no functional limitations  Anticipated anesthesia-spinal  History of General anesthesia- yes  Complications- No anesthesia complications  No family history of anesthesia complications    Labs & Imaging ordered:  MRSA, EKG  Nickel/metal allergy-negative  Shellfish allergy-negative    Discussed with patient medication instructions, NPO guidelines, and any questions or concerns.   Per Hematology : Discussed holding Eliquis 2 days prior to any procedures and can restart 24 hour after barring any complications pt aware    time spent 45  minutes  All resulted testing from PAT was forwarded to the surgeon and the patient's PCP if applicable.           [1]   Past Medical History:  Diagnosis Date    Allergic rhinitis     Anxiety 2011    Arthritis 2010    Asthma     Bladder disorder, unspecified 10/21/2015    Bladder disorder    Breast cyst march    Cataract     Clotting disorder (Multi)     Cutaneous abscess, unspecified 03/16/2017    Abscess    Disorder of the skin and subcutaneous tissue, unspecified 07/14/2016    Hand lesion    Dry eyes     DVT (deep venous thrombosis) (Multi)     after femur fx surgery    Encounter for immunization 02/17/2017    Need for Tdap vaccination    Encounter for immunization 10/04/2016    Need for vaccination with 13-polyvalent pneumococcal conjugate vaccine    Fatigue     Fracture of wrist     Fracture, femur (Multi)     GERD (gastroesophageal reflux disease)     Hernia, internal 2021    HL (hearing loss)     Hyperlipidemia     Hypertension     Hypothyroidism     Impacted cerumen, bilateral 10/21/2015    Impacted cerumen of both ears    Impaired fasting glucose 09/17/2018    Abnormal fasting glucose    Irritable bowel syndrome     Medial epicondylitis, right elbow 10/04/2016    Medial epicondylitis, right    Nephrolithiasis     Other conditions influencing health status 03/16/2017    Cyst    Other specified abnormal findings of blood chemistry 10/04/2018    Abnormal TSH    Other specified abnormal findings of blood chemistry 09/17/2018    Abnormal TSH    Other specified symptoms and signs involving the circulatory and respiratory systems 09/28/2017    Chest congestion    Pain in left hand 09/28/2017    Pain of left hand    Pain in left hip 10/04/2018    Left hip pain    Pain in right hip 11/01/2017    Bilateral hip pain    Pain in right knee 03/13/2017    Right knee pain    Pain in unspecified knee 03/13/2017    Knee pain    Personal history of other diseases of the circulatory system 05/09/2018    History of hypertension     Personal history of other diseases of the circulatory system 06/27/2018    History of hypertension    Personal history of other diseases of the circulatory system 01/18/2018    History of hypertension    Personal history of other diseases of the circulatory system 08/17/2017    History of hypertension    Personal history of other diseases of the nervous system and sense organs 09/17/2018    History of benign essential tremor    Personal history of other diseases of the nervous system and sense organs 10/20/2016    History of acute otitis media    Personal history of other diseases of the respiratory system 03/09/2018    History of acute bacterial sinusitis    Personal history of other drug therapy 05/09/2018    History of pneumococcal vaccination    Personal history of other endocrine, nutritional and metabolic disease 10/30/2017    History of hyperlipidemia    Personal history of other mental and behavioral disorders 07/16/2018    History of anxiety    Personal history of other specified conditions 06/27/2018    History of fatigue    Shortness of breath 2020    Strain of muscle, fascia and tendon of the posterior muscle group at thigh level, unspecified thigh, initial encounter 01/15/2019    Hamstring strain, initial encounter    Strain of unspecified muscles, fascia and tendons at thigh level, left thigh, initial encounter 05/01/2018    Muscle strain of left thigh    Tinnitus     Unspecified injury of unspecified elbow, initial encounter 01/15/2019    Elbow injury, initial encounter   [2]   Past Surgical History:  Procedure Laterality Date    BLADDER SURGERY  10/21/2015    Bladder Surgery    CATARACT EXTRACTION W/  INTRAOCULAR LENS IMPLANT Right 06/06/2024    Dr. Rashid    CATARACT EXTRACTION W/  INTRAOCULAR LENS IMPLANT Left 07/20/2023    Dr. Rashid    EXTRACORPOREAL SHOCK WAVE LITHOTRIPSY      FEMUR FRACTURE SURGERY      KNEE SURGERY      VEIN LIGATION AND STRIPPING     [3]   Family History  Problem Relation  Name Age of Onset    Other (cardiac disorder) Mother jose cardenas     Cancer Mother jose cardenas     Hypertension Mother jose cardenas     Migraines Mother jose cardenas     Rashes / Skin problems Mother jose cardenas     Arthritis Mother jose cardenas     Other (emphysema lung) Father monika     Asthma Father monika     Breast cancer Neg Hx     [4]   Allergies  Allergen Reactions    Amoxicillin Other    Erythromycin Unknown    Lisinopril Cough    Sulfa (Sulfonamide Antibiotics) Unknown

## 2025-06-30 NOTE — PROGRESS NOTES
Subjective   Reason for Visit: Yaritza Bernal is an 75 y.o. female here for a Medicare Wellness visit.     Past Medical, Surgical, and Family History reviewed and updated in chart.    Reviewed all medications by prescribing practitioner or clinical pharmacist (such as prescriptions, OTCs, herbal therapies and supplements) and documented in the medical record.  Medicare Wellness Billing Compliance Satisfied    *This is a visual tool to show completion of required items on the day of the visit. Green checks will only appear on the date of visit.    Review all medications by prescribing practitioner or clinical pharmacist (such as prescriptions, OTCs, herbal therapies and supplements) documented in the medical record    Past Medical, Surgical, and Family History reviewed and updated in chart    Tobacco Use Reviewed    Alcohol Use Reviewed    Illicit Drug Use Reviewed    PHQ2/9    Falls in Last Year Reviewed    Home Safety Risk Factors Reviewed    Cognitive Impairment Reviewed    Patient Self Assessment and Health Status    Current Diet Reviewed    Exercise Frequency    ADL - Hearing Impairment    ADL - Bathing    ADL - Dressing    ADL - Walks in Home    IADL - Managing Finances    IADL - Grocery Shopping    IADL - Taking Medications    IADL - Doing Housework      HPI  Patient is in the office today for her Medicare Annual Wellness.    She reports that she has chronic bilateral knee pain which got worse last week after she bend over to  something and her left knee buckled  with more pain than usual and she fell, and because of that she will have a left knee arthoplasty next 7/17/2025 with Dr. Lockhart.     Her blood pressure (149/76) was slightly elevated when checked in the clinic today. She continues on bisoprolol-HYDROCHLOROTHIAZIDE 10-6.25 mg (2 tablets daily) for treatment of hypertension. She denies any side-effects to her bisoprolol-HYDROCHLOROTHIAZIDE medication. She denies any chest  "pain and/or SOB symptoms.  Bp rechecked end of OV much better.    She takes Eliquis 5 mg (2 times daily) due to history of acute DVT in E (under the supervision of BEBE Norman). She denies any side-effects to her Eliquis medication.    She takes levothyroxine 25 mcg daily for treatment of hypothyroidism (somewhat non-compliantly as she often misses doses of this medication). She has been responding well to her levothyroxine medication. She denies any side-effects to her levothyroxine medication.    She is on esomeprazole 20 mg daily for treatment of GERD. Her GERD has been controlled on esomeprazole medication. She denies any side-effects to her esomeprazole medication.    She also takes citalopram 20 mg daily for treatment of anxiety. Her anxiety has been controlled on citalopram medication and her mood has been good. She denies any side-effects to her citalopram medication.    She continues on Symbicort 160-4.5 mcg/actuation inhaler (2 puff 2 times daily) for treatment of asthma. Her asthma has been controlled on Symbicort. She denies any side-effects to her Symbicort inhaler.    She is on gabapentin 600 mg (3 times daily) for treatment of polyneuropathy. Her neuropathy symptoms have been controlled on gabapentin medication. She denies any side-effects to her gabapentin medication.    Her last screening mammogram was in June 2025.    Patient Care Team:  Madyson Sandoval DO as PCP - General  Madyson Sandoval DO as PCP - [employer] PCP  Wendy Medina RN as Registered Nurse (Orthopaedic Surgery)     Review of Systems    Objective   Vitals:  /80 (BP Location: Left arm, Patient Position: Sitting, BP Cuff Size: Adult)   Pulse 70   Temp 36.7 °C (98 °F) (Temporal)   Ht 1.727 m (5' 8\")   Wt 94.3 kg (208 lb)   SpO2 95%   BMI 31.63 kg/m²       Physical Exam  HENT:      Head: Normocephalic.      Right Ear: Tympanic membrane normal.      Left Ear: Tympanic membrane normal.      " Mouth/Throat:      Pharynx: Oropharynx is clear.   Neck:      Vascular: No carotid bruit.   Cardiovascular:      Rate and Rhythm: Normal rate and regular rhythm.      Pulses: Normal pulses.      Heart sounds: Normal heart sounds.   Pulmonary:      Effort: Pulmonary effort is normal.      Breath sounds: Normal breath sounds.   Abdominal:      General: Bowel sounds are normal.      Palpations: Abdomen is soft. There is no mass.      Tenderness: There is no abdominal tenderness.   Musculoskeletal:         General: No tenderness. Normal range of motion.      Cervical back: Normal range of motion.      Right lower leg: No edema.      Left lower leg: Edema present.   Lymphadenopathy:      Cervical: No cervical adenopathy.   Skin:     General: Skin is warm and dry.   Neurological:      Mental Status: She is alert and oriented to person, place, and time.   Psychiatric:         Mood and Affect: Mood normal.         Thought Content: Thought content normal.         Judgment: Judgment normal.         Assessment & Plan  Medicare annual wellness visit, subsequent         Routine general medical examination at [facility] care facility  Recommended continue with knee surgery as scheduled.   Medications refilled today. Instructed to take medications as prescribed. CSA reviewed and signed today.   OARRS reviewed, diversion addiction and efficacy of medication considered along with risks and benefits and will continue patient on medication.   Follow-up in 6 months, call us if needed sooner.        Benign essential tremor  Recommended continue with bisoprolol- hydrochlorothiazide as prescribed.  Orders:    bisoproloL-hydrochlorothiazide (Ziac) 10-6.25 mg tablet; Take 2 tablets by mouth once daily with breakfast.    Anxiety  Recommended continue with citalopram 20 mg once daily.   Orders:    citalopram (CeleXA) 20 mg tablet; Take 1 tablet (20 mg) by mouth once daily.    Polyneuropathy  Recommended continue with gabapentin as  prescribed.  Orders:    gabapentin (Neurontin) 600 mg tablet; Take 1 tablet (600 mg) by mouth 3 times a day.    Major depression in remission         Mild intermittent asthma without complication (HHS-HCC)         Benign essential hypertension         Hyperlipidemia, unspecified hyperlipidemia type         Prediabetes         Recommended continue with knee surgery as scheduled.   Medications refilled today. Instructed to take medications as prescribed. CSA reviewed and signed today.   Discussed diet and exercise to help lipids and bs, discussed starting statin also, will do so when recovered from surgery, she does not want to do at this time  OARRS reviewed, diversion addiction and efficacy of medication considered along with risks and benefits and will continue patient on medication.   Follow-up in 6 months, call us if needed sooner.         Scribe Attestation  By signing my name below, I, Corona Falk` , Scribe   attest that this documentation has been prepared under the direction and in the presence of Madyson Sandoval DO.

## 2025-06-30 NOTE — ASSESSMENT & PLAN NOTE
Recommended continue with gabapentin as prescribed.  Orders:    gabapentin (Neurontin) 600 mg tablet; Take 1 tablet (600 mg) by mouth 3 times a day.

## 2025-06-30 NOTE — H&P (VIEW-ONLY)
CPM/PAT Evaluation       Name: Yaritza Bernal (Yaritza Bernal)  /Age: 1950/75 y.o.     In-Person       Chief Complaint: Primary osteoarthritis of left knee     HPI  Patient is an alert and oriented 75 year old female scheduled for a  Left Total Knee Arthroplasty, Robot-Assisted on 25 with Dr. Lockhart for  Primary osteoarthritis of left knee. PMHX includes DVT, HTN, HLD, essential tremor, polyneuropathy, asthma. Presents to [facility] today for perioperative risk stratification and optimization.     Medical History[1]    Surgical History[2]    Patient  reports that she is not currently sexually active and has had partner(s) who are male. She reports using the following method of birth control/protection: Female Sterilization.    Family History[3]    Allergies[4]    Prior to Admission medications    Medication Sig Start Date End Date Taking? Authorizing Provider   albuterol (Proventil HFA) 90 mcg/actuation inhaler Inhale 2 puffs every 4 hours if needed for wheezing or shortness of breath. 24  Maria Eugenia Ulloa PA-C   bisoproloL-hydrochlorothiazide (Ziac) 10-6.25 mg tablet Take 2 tablets by mouth once daily with breakfast. 25   Madyson Sandoval DO   budesonide-formoteroL (Symbicort) 160-4.5 mcg/actuation inhaler Inhale 2 puffs 2 times a day. Rinse mouth with water after use to reduce aftertaste and incidence of candidiasis. Do not swallow. 24  Madyson Sandoval DO   citalopram (CeleXA) 20 mg tablet Take 1 tablet (20 mg) by mouth once daily. 25   Madyson Sandoval DO   Eliquis 5 mg tablet Take 1 tablet by mouth 2 times a day. 25   Madyson Sandoval DO   esomeprazole (NexIUM) 20 mg DR capsule Take 1 capsule (20 mg) by mouth once daily.    Historical Provider, MD   fluticasone (Flonase) 50 mcg/actuation nasal spray Administer into affected nostril(s). 21   Historical Provider, MD   gabapentin (Neurontin) 600 mg tablet Take 1 tablet (600  "mg) by mouth 3 times a day. 6/30/25   Madyson Sandoval DO   levothyroxine (Synthroid, Levoxyl) 25 mcg tablet Take 1 tablet (25 mcg) by mouth once daily. 12/5/24   Madyson Sandoval DO   mv-mn-iron-FA-Ca carb-vit K (Women's Multivitamin) 18 mg-400 mcg- 500 mg-50 mcg tablet Take by mouth. 9/1/16   Historical Provider, MD   bisoproloL-hydrochlorothiazide (Ziac) 10-6.25 mg tablet Take 2 tablets by mouth once daily with breakfast. 4/30/25 6/30/25  Madyson Sandoval DO   citalopram (CeleXA) 20 mg tablet Take 1 tablet (20 mg) by mouth once daily. 12/4/24 6/30/25  Madyson Sandoval DO   gabapentin (Neurontin) 600 mg tablet Take 1 tablet (600 mg) by mouth 3 times a day. 3/5/25 6/30/25  Brandyn Bernal MD          Visit Vitals  /74   Pulse 68   Temp 37.1 °C (98.8 °F) (Temporal)   Resp 18   Ht 1.778 m (5' 10\")   Wt 93.8 kg (206 lb 12.8 oz)   BMI 29.67 kg/m²   OB Status Postmenopausal   Smoking Status Never   BSA 2.15 m²      Review of Systems   Constitutional: Negative for chills, decreased appetite, diaphoresis, fever and malaise/fatigue.   Eyes:  Negative for blurred vision and double vision.   Cardiovascular:  Negative for chest pain, claudication, cyanosis, dyspnea on exertion, irregular heartbeat, leg swelling, near-syncope and palpitations.   Respiratory:  Negative for cough, hemoptysis, shortness of breath and wheezing.    Endocrine: Negative for cold intolerance, heat intolerance, polydipsia, polyphagia and polyuria.   Gastrointestinal:  Negative for abdominal pain, constipation, diarrhea, dysphagia, nausea and vomiting.   Genitourinary:  Negative for bladder incontinence, dysuria, hematuria, incomplete emptying, nocturia, frequency, pelvic pain and urgency.   Neurological:  Negative for headaches, light-headedness, paresthesias, sensory change and weakness.   Psychiatric/Behavioral:  Negative for altered mental status.    Musculoskeletal: Positive for myalgias, arthralgias   "   Vitals and nursing note reviewed.     Physical exam  Constitutional:       Appearance: Normal appearance. She is Obese.   HENT:      Head: Normocephalic and atraumatic.      Mouth/Throat:      Mouth: Mucous membranes are moist.      Pharynx: Oropharynx is clear.   Eyes:      Extraocular Movements: Extraocular movements intact.      Conjunctiva/sclera: Conjunctivae normal.      Pupils: Pupils are equal, round, and reactive to light.   Cardiovascular:      PMI at left midclavicular line. Normal rate. Regular rhythm. Normal S1. Normal S2.       Murmurs: There is no murmur.      No gallop.  No click. No rub.       No audible carotid bruit     No lower extremity edema on exam  Pulmonary:      Effort: Pulmonary effort is normal.      Breath sounds: Normal breath sounds.   Abdominal:      General: Abdomen is flat. Bowel sounds are normal.      Palpations: Abdomen is soft and non-tender  Musculoskeletal:      Cervical back: Normal range of motion and neck supple.   Skin:     General: Skin is warm and dry.      Capillary Refill: Capillary refill takes less than 2 seconds.   Neurological:      General: No focal deficit present.      Mental Status: She is alert and oriented to person, place, and time. Mental status is at baseline.   Psychiatric:         Mood and Affect: Mood normal.         Behavior: Behavior normal.         Thought Content: Thought content normal.         Judgment: Judgment normal.     Vitals and nursing note reviewed. Physical exam within normal limits.     DASI Risk Score      Flowsheet Row Questionnaire Series Submission from 6/16/2025 in [facility] with Generic Provider Karla   Can you take care of yourself (eat, dress, bathe, or use toilet)?  2.75  filed at 06/16/2025 1625   Can you walk indoors, such as around your house? 1.75  filed at 06/16/2025 1625   Can you walk a block or two on level ground?  2.75  filed at 06/16/2025 1625   Can you climb a flight of stairs or walk up a  hill? 5.5  filed at 06/16/2025 1625   Can you run a short distance? 0  filed at 06/16/2025 1625   Can you do light work around the house like dusting or washing dishes? 2.7  filed at 06/16/2025 1625   Can you do moderate work around the house like vacuuming, sweeping floors or carrying groceries? 3.5  filed at 06/16/2025 1625   Can you do heavy work around the house like scrubbing floors or lifting and moving heavy furniture?  8  filed at 06/16/2025 1625   Can you do yard work like raking leaves, weeding or pushing a mower? 4.5  filed at 06/16/2025 1625   Can you have sexual relations? 5.25  filed at 06/16/2025 1625   Can you participate in moderate recreational activities like golf, bowling, dancing, doubles tennis or throwing a baseball or football? 0  filed at 06/16/2025 1625   Can you participate in strenous sports like swimming, singles tennis, football, basketball, or skiing? 0  filed at 06/16/2025 1625   DASI SCORE 36.7  filed at 06/16/2025 1625   METS Score (Will be calculated only when all the questions are answered) 7.3  filed at 06/16/2025 1625          Caprini DVT Assessment      Flowsheet Row Pre-Admission Testing from 6/30/2025 in [facility]   DVT Score (IF A SCORE IS NOT CALCULATING, MUST SELECT A BMI TO COMPLETE) 12 filed at 06/30/2025 1617   Medical Factors History of DVT/PE filed at 06/30/2025 1617   Surgical Factors Elective major lower extremity arthroplasty filed at 06/30/2025 1617   BMI (BMI MUST BE CHOSEN) 30 or less filed at 06/30/2025 1617          Modified Frailty Index    No data to display       [id number] Stroke Risk Points  Current as of just now        N/A 0 to 9 Points:      Last Change: N/A          The [id number] risk score (Lip DAVID, et al. 2009. © 2010 [employer]) quantifies the risk of stroke for a patient with atrial fibrillation. For patients without atrial fibrillation or under the age of 18 this score appears as N/A. Higher score  values generally indicate higher risk of stroke.        This score is not applicable to this patient. Components are not calculated.          Revised Cardiac Risk Index      Flowsheet Row Pre-Admission Testing from 6/30/2025 in [facility]   High-Risk Surgery (Intraperitoneal, Intrathoracic,Suprainguinal vascular) 0 filed at 06/30/2025 1620   History of ischemic heart disease (History of MI, History of positive exercuse test, Current chest paint considered due to myocardial ischemia, Use of nitrate therapy, ECG with pathological Q Waves) 0 filed at 06/30/2025 1620   History of congestive heart failure (pulmonary edemia, bilateral rales or S3 gallop, Paroxysmal nocturnal dyspnea, CXR showing pulmonary vascular redistribution) 0 filed at 06/30/2025 1620   History of cerebrovascular disease (Prior TIA or stroke) 0 filed at 06/30/2025 1620   Pre-operative insulin treatment 0 filed at 06/30/2025 1620   Pre-operative creatinine>2 mg/dl 0 filed at 06/30/2025 1620   Revised Cardiac Risk Calculator 0 filed at 06/30/2025 1620          Apfel Simplified Score    No data to display       Risk Analysis Index Results This Encounter    No data found in the last 10 encounters.       Stop Bang Score      Flowsheet Row Pre-Admission Testing from 6/30/2025 in [facility] Questionnaire Series Submission from 6/16/2025 in [facility] OR with Generic Provider Karla   Do you snore loudly? 0 filed at 06/30/2025 1517 0  filed at 06/16/2025 1625   Do you often feel tired or fatigued after your sleep? 0 filed at 06/30/2025 1517 1  filed at 06/16/2025 1625   Has anyone ever observed you stop breathing in your sleep? 0 filed at 06/30/2025 1517 0  filed at 06/16/2025 1625   Do you have or are you being treated for high blood pressure? 1 filed at 06/30/2025 1517 1  filed at 06/16/2025 1625   Recent BMI (Calculated) 31.6 filed at 06/30/2025 1517 31.9  filed at 06/16/2025 1625   Is BMI greater than  35 kg/m2? 0=No filed at 06/30/2025 1517 0=No  filed at 06/16/2025 1625   Age older than 50 years old? 1=Yes filed at 06/30/2025 1517 1=Yes  filed at 06/16/2025 1625   Is your neck circumference greater than 17 inches (Male) or 16 inches (Female)? 0 filed at 06/30/2025 1517 --   Gender - Male 0=No filed at 06/30/2025 1517 0=No  filed at 06/16/2025 1625   STOP-BANG Total Score 2 filed at 06/30/2025 1517 --          Prodigy: High Risk  Total Score: 12              Prodigy Age Score           ARISCAT Score for Postoperative Pulmonary Complications    No data to display       Matt Perioperative Risk for Myocardial Infarction or Cardiac Arrest (PEG)      Flowsheet Row Pre-Admission Testing from 6/30/2025 in [facility]   Calculated Age Score 1.52 filed at 06/30/2025 1620   Functional Status  0 filed at 06/30/2025 1620   ASA Class  -3.29 filed at 06/30/2025 1620   Creatinine 0 filed at 06/30/2025 1620   Type of Procedure  0.80 filed at 06/30/2025 1620   PEG Total Score  -6.22 filed at 06/30/2025 1620   PEG % 0.2 filed at 06/30/2025 1620            Assessment & Plan:    Neuro:  Depression (citalopram)    Essential tremor    HEENT/Airway:  No diagnosis or significant findings on chart review or clinical presentation and evaluation.   STOP-BANG Score-2 points low risk for EVAN    Mallampati::  II    TM distance::  >3 FB    Neck ROM::  Full  Dentures-denies  Crowns-reports x 3  Implants-denies    Cardiovascular:  LLE DVT after femur fracture on Eliquis  HTN (bisoprolol)   LLE pt states this is normal for her  METS: 7.3  RCRI: 0 points, 3.9%  risk for postoperative MACE   PEG: 0.2% risk for postoperative MACE  EKG -right bundle branch block Rate-64  No acute changes.history of RBBB     Pulmonary:  COPD (Symbicort, albuterol)   ARISCAT: <26 points, 1.6% risk of in-hospital postoperative pulmonary complication  PRODIGY: Moderate risk for opioid induced respiratory depression  Smoking History-She has never  smoked.  Deep breathing handout given    Renal/Urinary:    No diagnosis or significant findings on chart review or clinical presentation and evaluation, however, the patient is at increased risk of perioperative renal complications secondary to HTN. Preventative measures include BP monitoring, medication compliance, and hydration management.   CMP-Pending  Creatinine-0.72  GFR-87    Endocrine:  DM2  Hypothyroidism (levothyroxine)   OBO9R-0.1  TSH 2.69    Hematologic/Immunology:  Iron deficiency anemia  The patient is not a Jehovah’s witness and will accept blood and blood products if medically indicated.   History of previous blood transfusions No  CBC  HGB-12.0/40.1  Caprini Score 12, patient at High for postoperative DVT. Pt supplied education/VTE handout  Anticoagulation use: Yes     Gastrointestinal:   No diagnosis or significant findings on chart review or clinical presentation and evaluation.   Recreational drug use: none  Alcohol use 3 glasses of wine per month    Infectious disease:   No diagnosis or significant findings on chart review or clinical presentation and evaluation.   Prescription provided for CHG body wash and dental rinse. CHG use instructions reviewed and provided to patient.  Staph screen collected-Negative for MRSA    Musculoskeletal:   Left knee pain   JHFRAT score- 12 points. moderate risk for falls    Anesthesia:  ASA 2 - Patient with mild systemic disease with no functional limitations  Anticipated anesthesia-spinal  History of General anesthesia- yes  Complications- No anesthesia complications  No family history of anesthesia complications    Labs & Imaging ordered:  MRSA, EKG  Nickel/metal allergy-negative  Shellfish allergy-negative    Discussed with patient medication instructions, NPO guidelines, and any questions or concerns.   Per Hematology : Discussed holding Eliquis 2 days prior to any procedures and can restart 24 hour after barring any complications pt aware    time spent 45  minutes  All resulted testing from PAT was forwarded to the surgeon and the patient's PCP if applicable.           [1]   Past Medical History:  Diagnosis Date    Allergic rhinitis     Anxiety 2011    Arthritis 2010    Asthma     Bladder disorder, unspecified 10/21/2015    Bladder disorder    Breast cyst march    Cataract     Clotting disorder (Multi)     Cutaneous abscess, unspecified 03/16/2017    Abscess    Disorder of the skin and subcutaneous tissue, unspecified 07/14/2016    Hand lesion    Dry eyes     DVT (deep venous thrombosis) (Multi)     after femur fx surgery    Encounter for immunization 02/17/2017    Need for Tdap vaccination    Encounter for immunization 10/04/2016    Need for vaccination with 13-polyvalent pneumococcal conjugate vaccine    Fatigue     Fracture of wrist     Fracture, femur (Multi)     GERD (gastroesophageal reflux disease)     Hernia, internal 2021    HL (hearing loss)     Hyperlipidemia     Hypertension     Hypothyroidism     Impacted cerumen, bilateral 10/21/2015    Impacted cerumen of both ears    Impaired fasting glucose 09/17/2018    Abnormal fasting glucose    Irritable bowel syndrome     Medial epicondylitis, right elbow 10/04/2016    Medial epicondylitis, right    Nephrolithiasis     Other conditions influencing health status 03/16/2017    Cyst    Other specified abnormal findings of blood chemistry 10/04/2018    Abnormal TSH    Other specified abnormal findings of blood chemistry 09/17/2018    Abnormal TSH    Other specified symptoms and signs involving the circulatory and respiratory systems 09/28/2017    Chest congestion    Pain in left hand 09/28/2017    Pain of left hand    Pain in left hip 10/04/2018    Left hip pain    Pain in right hip 11/01/2017    Bilateral hip pain    Pain in right knee 03/13/2017    Right knee pain    Pain in unspecified knee 03/13/2017    Knee pain    Personal history of other diseases of the circulatory system 05/09/2018    History of hypertension     Personal history of other diseases of the circulatory system 06/27/2018    History of hypertension    Personal history of other diseases of the circulatory system 01/18/2018    History of hypertension    Personal history of other diseases of the circulatory system 08/17/2017    History of hypertension    Personal history of other diseases of the nervous system and sense organs 09/17/2018    History of benign essential tremor    Personal history of other diseases of the nervous system and sense organs 10/20/2016    History of acute otitis media    Personal history of other diseases of the respiratory system 03/09/2018    History of acute bacterial sinusitis    Personal history of other drug therapy 05/09/2018    History of pneumococcal vaccination    Personal history of other endocrine, nutritional and metabolic disease 10/30/2017    History of hyperlipidemia    Personal history of other mental and behavioral disorders 07/16/2018    History of anxiety    Personal history of other specified conditions 06/27/2018    History of fatigue    Shortness of breath 2020    Strain of muscle, fascia and tendon of the posterior muscle group at thigh level, unspecified thigh, initial encounter 01/15/2019    Hamstring strain, initial encounter    Strain of unspecified muscles, fascia and tendons at thigh level, left thigh, initial encounter 05/01/2018    Muscle strain of left thigh    Tinnitus     Unspecified injury of unspecified elbow, initial encounter 01/15/2019    Elbow injury, initial encounter   [2]   Past Surgical History:  Procedure Laterality Date    BLADDER SURGERY  10/21/2015    Bladder Surgery    CATARACT EXTRACTION W/  INTRAOCULAR LENS IMPLANT Right 06/06/2024    Dr. Rashid    CATARACT EXTRACTION W/  INTRAOCULAR LENS IMPLANT Left 07/20/2023    Dr. Rashid    EXTRACORPOREAL SHOCK WAVE LITHOTRIPSY      FEMUR FRACTURE SURGERY      KNEE SURGERY      VEIN LIGATION AND STRIPPING     [3]   Family History  Problem Relation  Name Age of Onset    Other (cardiac disorder) Mother jose cardenas     Cancer Mother jose cardenas     Hypertension Mother jose cardenas     Migraines Mother jose cardenas     Rashes / Skin problems Mother jose cardenas     Arthritis Mother jose cardenas     Other (emphysema lung) Father monika     Asthma Father monika     Breast cancer Neg Hx     [4]   Allergies  Allergen Reactions    Amoxicillin Other    Erythromycin Unknown    Lisinopril Cough    Sulfa (Sulfonamide Antibiotics) Unknown

## 2025-06-30 NOTE — ASSESSMENT & PLAN NOTE
Recommended continue with bisoprolol- hydrochlorothiazide as prescribed.  Orders:    bisoproloL-hydrochlorothiazide (Ziac) 10-6.25 mg tablet; Take 2 tablets by mouth once daily with breakfast.

## 2025-07-01 ENCOUNTER — APPOINTMENT (OUTPATIENT)
Dept: HEMATOLOGY/ONCOLOGY | Facility: CLINIC | Age: 75
End: 2025-07-01
Payer: MEDICARE

## 2025-07-02 LAB — STAPHYLOCOCCUS SPEC CULT: NORMAL

## 2025-07-08 ENCOUNTER — APPOINTMENT (OUTPATIENT)
Dept: ORTHOPEDIC SURGERY | Facility: HOSPITAL | Age: 75
End: 2025-07-08
Payer: MEDICARE

## 2025-07-08 NOTE — GROUP NOTE
In addition to the group class activities, Yaritzaharper Bernal had the following lab work completed:  No orders of the defined types were placed in this encounter.      A new History and Physical was not completed.    This class lasted approximately 2 hour and had 5 participants. The nurse instructor covered the following topics:    MyChart Enrollment  Communication with Care Team  My Chart is the best form of communication to reach all of your caregivers  You can send messages to specific care givers, or a care team  Continued Education  You will be enrolled in a Joint Replacement care plan to receive additional education before and after surgery  You can review a short recording of the class content - [email or web address]  Access to Medical Records  You can access test results, office notes, appointments, etc.  You can connect to other healthcare systems who use [employer] ([facility], [facility], [facility], etc.)  Variable  Program Information  Opportunity to Opt Out    Background/Understanding of Joint Replacement Surgery  Potential for same day discharge  Any questions or concerns to be directed to the surgeon's office  Not all patients are appropriate for same day discharge  All patients will be required to meet discharge criteria prior to leaving our care    How to Prepare for Surgery  Use of Recreational Products (Nicotine, Alcohol, THC, CBD, Drugs, Etc.)  The ultimate goal is to quit using thee products!  Stop several weeks before surgery  Such products slow down the healing process and increase risk of post-op infection and complications  Clearance for Surgery  Preadmission Testing - Appropriateness for anesthesia  Medical Clearance by Specialists  Dental Clearance  Cracked/Broken/Loose teeth left untreated may postpone surgery  The importance of post-op antibiotics for dental visits per surgeon protocol  Preadmission Testing  **Potential for postponed surgery if appropriate clearance is not  obtained  Medication Instruction  Follow instructions provided by the doctor who prescribes your medication (typically, but not limited to cardiologist)  Preadmission testing will provide additional instructions during your appointment on what to stop and what to take as you get closer to surgery  For clarification of these instructions, please call preadmission testing directly - [phone]  Tips for Preparing the home for discharge from the hospital  Care Partner  Requirement for surgery, the patient must have a plan to have help at home  Potential for postponed surgery if plan for home support cannot be established  Your Care Partner does not need medical training  How the care partner can help after surgery  CHG Body Wash/Mouth Wash  Follow the instructions given at preadmission testing  Body wash is to be used on the body and hair for 5 washes  Mouthwash is to be used the night before and morning of surgery  **This is a system-wide protocol developed by infectious disease professionals, we will not alter our recommendations for those with sensitive skin or those who have special hair needs.  Please follow the instructions as they are written as this will provide the best infection prevention measures for surgery.  Should you have an allergy to one of the products, please discuss with your preadmission team**    What to Expect in the Hospital/At Home  Morning of Surgery NPO Guidelines  Nothing to eat after midnight  Water can be consumed up to 2 hours prior to arrival  Surgical and Post-Surgical Care Team  Surgical Team  Anesthesia Team  Nursing  Physical Therapy  Care Coordinating  Pharmacy  Hospital Arrival Instructions  Arrive at the time provided to you  Consider traffic patterns (rush-hour) based on arrival time  Have arrangements made for a ride home  If discharging same day, care partner should remain at the hospital  Recovering after Surgery  Recovery Room - Visitors are not brought back  Transition to  hospital room - 2nd Floor, Visitors will be directed to your room  The presence of and strategies for controlling surgical pain and swelling  The importance of early mobility  Side effects after surgery  What to expect if staying overnight    Discharge Planning  The intended plan for discharge will be for patients to discharge home  All patients require a care partner (family, friend, neighbor, etc.) to stay with the patient for the first few nights after surgery  The inability to secure help at home may postpone surgery  Home Care Services set up per surgeon order  Physical Therapy  Occupational Therapy  **If desired, private duty care can be arranged by the patient ahead of time**  Outpatient Physical Therapy per surgeon order    Recovering at Home  Wound Care  Follow wound care instructions found in your discharge paperwork  Bandage is water-resistant and you may shower with the bandage  Do not scrub directly over the bandage  Do not submerge in water until cleared (bathtub, hot tub, pool, etc.)    Post-Op Risk Prevention  Infection Prevention  Promptly seek treatment for any infections post-operatively  Routine dental visits must be postponed for 3 months after surgery  Your surgeon may require antibiotics prior to future dental visits  Any concerns for infection not related directly to the knee or the hip should be managed by your primary care provider  Blood Clots  Be sure to complete the course of blood thinning medication as prescribed by your surgeon  Movement every 1-2 hours during the day is encouraged to prevent blood clots  Monitor for signs of blood clots  Wear compression stockings as prescribed by your surgeon  Constipation  Constipation is common following surgery  Drink plenty of fluids  Take stool softener/laxative as prescribed by your surgeon  Move around frequently  Eat foods high in fiber  Fall Prevention  Prepare home ahead of time to clear space to move with walker  Remove throw rugs and  electrical cords from walkways  Install railings near any stairways with more than 2 steps  Use night lights for increased visibility at night  Continue to use your assistive device until cleared by surgeon or physical therapy  Dislocation Prevention - Not all procedures will have dislocation precautions  Follow dislocation precautions provided by your surgeon  It is OK to resume sexual activity about 6 weeks following surgery  Be sure to follow any dislocation precautions assigned    Durable Medical Equipment  Cold Therapy  Breg Cold Therapy Machines  Ice/Gel Packs  Assistive Devices  Folding Walker with Wheels (in the front only)  No Rollators  Crutches if approved by Physical Therapy and Surgeon after surgery  Hip Kits  Raised Toilet Seats  Additional Compression Stockings    Joint Preservation  Healthy Activities when Cleared  Walking  Swimming  Bike Riding  Activities to Avoid  Refrain from repetitive motions which have a high impact on the joint  Gradual Progression  Progress activity slowly, listen to your body  Common Findings - NORMAL after surgery  Clicking/Grinding  Numbness near incision    Physical Therapy  Prehabilitation exercises  START TODAY ON BOTH LEGS  Surgery Specific Precautions  Follow surgery specific precautions found in your discharge paperwork    Follow-Up Visit  All patients will see their surgeon for a follow up visit after surgery  The visit may range from 2-6 weeks after surgery and is surgeon specific      Please don't hesitate to reach out if you have any additional questions or concerns.    Thank you,  NATALIE Newman, RN  Lorena Rincon RN  [facility]  [facility]   [phone]

## 2025-07-09 ENCOUNTER — APPOINTMENT (OUTPATIENT)
Dept: HEMATOLOGY/ONCOLOGY | Facility: CLINIC | Age: 75
End: 2025-07-09
Payer: MEDICARE

## 2025-07-11 ENCOUNTER — APPOINTMENT (OUTPATIENT)
Dept: VASCULAR SURGERY | Facility: HOSPITAL | Age: 75
End: 2025-07-11
Payer: MEDICARE

## 2025-07-16 ENCOUNTER — ANESTHESIA EVENT (OUTPATIENT)
Dept: OPERATING ROOM | Facility: HOSPITAL | Age: 75
End: 2025-07-16
Payer: MEDICARE

## 2025-07-16 RX ORDER — SENNOSIDES 8.6 MG/1
1 TABLET ORAL 2 TIMES DAILY
Qty: 60 TABLET | Refills: 0 | Status: SHIPPED | OUTPATIENT
Start: 2025-07-16 | End: 2025-08-16

## 2025-07-16 RX ORDER — TRAMADOL HYDROCHLORIDE 50 MG/1
50 TABLET, FILM COATED ORAL EVERY 6 HOURS PRN
Qty: 28 TABLET | Refills: 0 | Status: SHIPPED | OUTPATIENT
Start: 2025-07-16 | End: 2025-07-22 | Stop reason: HOSPADM

## 2025-07-16 RX ORDER — DOXYCYCLINE 100 MG/1
100 CAPSULE ORAL 2 TIMES DAILY
Qty: 14 CAPSULE | Refills: 0 | Status: SHIPPED | OUTPATIENT
Start: 2025-07-16 | End: 2025-07-24

## 2025-07-16 RX ORDER — ACETAMINOPHEN 325 MG/1
1000 TABLET ORAL EVERY 8 HOURS PRN
Qty: 90 TABLET | Refills: 1 | Status: SHIPPED | OUTPATIENT
Start: 2025-07-16

## 2025-07-16 RX ORDER — OXYCODONE HYDROCHLORIDE 5 MG/1
5 TABLET ORAL EVERY 6 HOURS PRN
Qty: 28 TABLET | Refills: 0 | Status: ON HOLD | OUTPATIENT
Start: 2025-07-16 | End: 2025-07-22

## 2025-07-16 RX ORDER — PANTOPRAZOLE SODIUM 40 MG/1
40 TABLET, DELAYED RELEASE ORAL DAILY PRN
Qty: 30 TABLET | Refills: 0 | Status: SHIPPED | OUTPATIENT
Start: 2025-07-16 | End: 2025-08-15

## 2025-07-16 RX ORDER — ASPIRIN 81 MG/1
81 TABLET ORAL 2 TIMES DAILY
Status: CANCELLED | OUTPATIENT
Start: 2025-07-16

## 2025-07-16 RX ORDER — CYCLOBENZAPRINE HCL 5 MG
5 TABLET ORAL 3 TIMES DAILY PRN
Qty: 30 TABLET | Refills: 0 | Status: SHIPPED | OUTPATIENT
Start: 2025-07-16 | End: 2025-07-27

## 2025-07-16 NOTE — DISCHARGE INSTRUCTIONS
Brant Lockhart,   Phone: [phone] - [place], Medical Assistant    PLEASE READ CAREFULLY BEFORE CONTACTING YOUR PROVIDER.    WE WORK COLLABORATIVELY AS A TEAM. CALLING MULTIPLE STAFF MEMBERS REGARDING THE SAME ISSUE WILL DELAY YOUR CARE.  MYCHART IS THE PREFERRED COMMUNICATION FOR ALL TEAM MEMBERS.  ________________________________________________________________________________________________________________________________________________________________________    After Surgery Instructions: TOTAL KNEE ARTHROPLASTY    The following is a general guide and may be applied to most patients that go home from the hospital after surgery. If you go to a rehab facility the timeline may deviate a little. Please do not hesitate to call our office for any questions or additional guidance. We will work with you to get the best experience from your new joint replacement.     WEEK 1  Relax…Don't Overdo it!  - WEIGHT BEARING: As tolerated, unless otherwise specified  - Get up once an hour for small activities. (get a drink, go to the bathroom, etc.)  - Keep the surgical site iced and elevated above your heart, if possible, while you are resting.  - You will have some light exercises given to you from the physical therapist in the hospital. Work diligently on your range of motion.  - BANDAGE CARE: REMOVE YOUR BANDAGE AT HOME 7 DAYS AFTER SURGERY    DO NOT place a pillow under the knee for comfort  DO NOT sleep or rest in a recliner if you are able to get in your bed  DO NOT wait until you start therapy to bend the knee.  The sooner the better! (work within your pain tolerance).    All of this may sound scary but knees can get stiff easily and we want you to get your range of motion back as quickly as possible!    SWELLING AND BRUISING  BRUISING AND SWELLING AFTER SURGERY IS NORMAL. As the patient becomes more mobile the bruising and swelling will begin to migrate towards the ankle and foot and is usually noticed toward the  end of the day.    WEEK 2-3  You will have a prescription for physical therapy given to you or electronically prescribed and you should start outpatient therapy 7-10 days after your operation. Be sure to do the home exercises on the days you are not attending physical therapy.    - Continue to progress walking as tolerated.   - Continue to work on range of motion exercises at home with a goal of 0-120 degrees by 12 weeks post operative  - Continue to use ice for soreness on the surgical site  - You may wean from your walker to a cane when you feel safe and comfortable to do so. Your physical therapist will also be helpful with progressing your assistive device.   - Be careful with bending and twisting - If it hurts, stop!!    FOLLOW UP  - Your first post-operative visit with Dr. Brant Lockhart should have been scheduled already. Please call [phone] for appointment questions  - You do not need new xrays for this visit  - Don't be nervous! This visit is to see how you are doing and make sure your incision is healing nicely and have begun working with physical therapy.       MEDICATION REFILLS - Please call main office number: [phone]    You will not receive a call indicating that your prescription has been filled.  Please contact your pharmacy with any questions.    Medication refills will be filled Monday-Friday 7am to 1pm ONLY. Please call the office or send a [employer] message for a refill request.  Any requests received outside of this timeframe will be handled on the next business day.  The office staff and orthopedic nurses cannot refill medications; messages should be left directly through the office or via my chart.  Please do not call multiple times or call other members of the care team for medication needs, this will cause the refill to take longer.    Per State and Institutional policy, pain medications can only be refilled every 7 days for up to six weeks following surgery.    DRIVING & TRAVEL  AFTER SURGERY   Patients should anticipate waiting at least 3-6 weeks before traveling long distances after surgery.  At minimum you cannot be using your walker before considering driving and you cannot take any narcotic medications prior to driving. You will need to stop to walk around ever 1 hour during your travel to help with blood clot prevention.  Please call the office or your joint nurse to discuss prior to post-surgical travel.  Patients may not drive until cleared by the joint nurse or the office.    DENTAL PROCEDURES & CLEANINGS  You must wait a minimum of 3 months for elective dental appointments (if possible, we understand emergencies happen), including routine cleanings or dental work including bridges, crowns, extractions, etc..  For any dental visit - cleaning or dental procedures - patients must take an antibiotic 1 hour before the appointment.  Antibiotics are a lifelong need before dental appointments.  The antibiotic prescribed will be based on each patient's allergies.    WOUND CARE  You will have an ace wrap on your leg put on during surgery. This compression wrap is for comfort and may be removed 24 hours after surgery. You may continue to use compression throughout your recovery if this is comfortable for you.  You have a waterproof bandage on your wound and may shower with this on. The waterproof bandage is to remain in place for 7 days. You may remove it yourself. You may leave your incision open to air after the bandage has been removed.  Under your waterproof bandage you have a mesh and glue dressin in place. Do not peel this off. This will be on for 3-4 weeks. You may trim the edges as they peel off on their own. You can continue to shower with this on. Let the water run freely over your incision when showering and do not scrub.   You may shower upon discharging from the hospital.  Soap and water is permitted to run over the surgical dressing.  Do not scrub directly over these items.  DO  NOT soak your incision in a bath, hot tub, pool or pond/lake for a minimum of 3 weeks following your surgery.  DO NOT use lotions, creams, ointments on your wound for a minimum of 3 weeks following your surgery. At that time you may use vitamin E to assist with softening of your incision.    NUMBNESS & CLICKING  Audible clicking with movement or exercises is considered normal following joint replacement.  You may also feel decreased sensation or numbness near the incision site.  These are usually normal and may or may not fade over time.     RESTARTING HOME ROUTINE - DIET & MEDICATIONS  Post-operative constipation can result due to a combination of inactivity, anesthesia and pain medication. To help prevent this, you should increase your water and fiber intake. Physical activity such as walking will also help stimulate the bowels.   You may resume your normal diet when you discharge home.  Choose foods that help promote good bowel habits and prevent constipation, such as foods high in fiber.  You may restart your home medications the following day after your surgery UNLESS you have been given alternate instructions.  Follow the instructions given to you on your hospital discharge instructions for more information regarding your home medications.    IN-HOME PHYSICAL THERAPY & OUTPATIENT PHYSICAL THERAPY  Continue the exercises you were given in the hospital until you have been seen by in-home therapy.  Make sure to provide a phone number with the ability for the home care staff to leave a message if you do not answer your phone.    Depending on your treatment plan you will begin outpatient or home therapy after surgery. This should be arranged through the office of hospital during discharge  FOR PATIENT DOING HOME THERAPY: Outpatient physical therapy following knee replacement surgery should begin 2-3 weeks after surgery.  You should call to schedule this appointment ASAP if not already scheduled before surgery.   Waiting until you are ready for outpatient physical therapy will cause a delay in your care.  You may choose any outpatient physical therapy location.  Call the office for an order if needed.    EMERGENCIES - WHEN TO CONTACT THE SURGEON'S OFFICE IMMEDIATELY  Fever >101 with chills that has been present for at least 48 hours.   Excessive bleeding from incision that will not slow down. A small amount of drainage is normal and expected.  Once pressure is applied and the area is covered, do not continue to check the area regularly.  This will remove pressure and bleeding will continue.  Leave in place for 4-6 hours.  Signs of infection of incision-excessive drainage that is soaking through your dressing (especially if it is pus-like), redness that is spreading out from the edges of your incision, or increased warmth around the area.  Excruciating pain for which the pain medication, taken as instructed, is not helping.  Severe calf pain.  Go directly to the emergency room or call 911, if you are experiencing chest pain or difficulty breathing.      ICE & COLD THERAPY INSTRUCTIONS    To assist with pain control and post-op swelling, you should be using ice regularly throughout recovery, especially for the first 6 weeks, regardless of the cold therapy method you use.      Always make sure there is a layer of protection between the cold pad and your skin.    If you are using ICE PACKS or GEL PACKS, you will need to alternate 20 minutes on, 20 minutes off twice per hour.    If you are using an ICE MACHINE, please follow the provided ice machine instructions.  These devices differ from ice or ice packs whereas the mechanism circulates water through tubing and a pad to provide longer periods of cold therapy to the desired site.  You can use your cold devices around the clock for optimal comfort.  We recommend using cold therapy after working with therapy or completing exercises on your own.  There is no set schedule in which  you must follow while using cold therapy.  Below are a few points to remember when using a cold therapy device:    You do not need to need to use the 20 on, 20 off method.  Detach the pad from the cooler and ambulate at least once every hour.  You can check your skin under the pad at this time.  You may wear the cold therapy device during periods of sleep including overnight.  If you wake up during the night, you can check the skin at this time.  You do not need to wake up specifically to perform skin checks.  Empty the cooler and pad when device is not in use.  Follow 's instructions for cleaning your cold therapy device.      DISCHARGE MEDICATIONS - Please reference the sample schedule on the reverse side for instructions on how to best schedule medications.    PAIN MEDICATION    _______ Oxycodone   Oxycodone has been prescribed for post-operative pain control. Take one 5 mg tablet every 6 hours for pain. Alternate with Tramadol. See medication example sheet. This medication will only be refilled ONCE every 7 days for a period of 6 weeks. After 6 weeks, you will transition to acetaminophen (Tylenol) and over -the- counter anti-inflammatories such as Ibuprofen, Advil or Aleve in conjunction with ICE.    Side effects may be constipation and nausea, vomiting, sleepiness, dizziness, lightheadedness, headache, blurred vision, dry mouth sweating, itching (if you have itching, over-the -counter Benadryl can be used as needed).   You may NOT operate a motor vehicle while taking this medication or have been cleared by your surgeon or PA.     ________ Tramadol   Tramadol has been prescribed for post-operative pain control. Take one 50 mg tablet every 6 hours for pain. Alternate with Oxycodone. See medication example sheet. This medication will only be refilled ONCE every 7 days for a period of 6 weeks. After 6 weeks, you will transition to acetaminophen (Tylenol) and over- the- counter anti-inflammatories such  as Ibuprofen, Advil or Aleve in conjunction with ICE.    Side effects may be constipation and nausea, vomiting, sleepiness, dizziness, lightheadedness, headache, blurred vision, dry mouth, sweating, itching (if you have itching, over-the -counter Benadryl can be used as needed).   You may NOT operate a motor vehicle while taking this medication or have been cleared by your surgeon or PA.    NON-NARCOTIC PAIN MEDICATION     _________ Celecoxib (Celebrex) or Meloxicam (Mobic) - Prescription anti-inflammatory medication   One of these will be prescribed (if you are able to take it) as an adjunct anti-inflammatory to assist in pain control. Take one twice daily for 4 weeks. See medication example sheet. You will not receive refills on this medication.   Side effects may include nausea or upset stomach    _________ Acetaminophen (Tylenol)   Acetaminophen has been prescribed as an adjunct for pain control. Take two 500 mg tablet every 6-8 hours for 4 weeks. See medication example sheet. No refills will be given after initial prescription.   Side effects may include nausea, heartburn, drowsiness, and headache.    _________Cyclobenzaprine (Flexeril)   This is a muscle relaxer that will be prescribed    Take this AS NEEDED per instructions on bottle   This will be only prescribed once and is available to help with muscle spasms. There will be no refills of this medication    BLOOD THINNER    __________ Aspirin or Other Blood Thinner   Aspirin or another medication has been prescribed as a blood thinner to prevent blood clots in your leg or lungs. Take as prescribed on the bottle for 4 weeks. You will not receive a refill on this medication.   Do not take this medication if you are on another blood thinner.    ANTI NAUSEA    __________ Pantoprazole   Pantoprazole has been prescribed to help with nausea and protect your stomach while taking pain medication. Take one 40 mg tablet once daily for 4 weeks. You will not receive a  refill on this medication.    ANTIBIOTIC     If you are deemed “high risk” for infection after surgery and antibiotic will be prescribed. Please take this as directed for one week.     STOOL SOFTENERS    __________ Senna   Post-operative constipation can result due to a combination of inactivity, anesthesia and pain medication. To help prevent this, you should increase your water and fiber intake. Physical activity such as walking will also help stimulate the bowels.    Senna has been prescribed to help with constipation while on Oxycodone and Tramadol. Take one tablet twice daily for 4 weeks. No refills will be given.   You may also take Miralax in combination with Senna to prevent constipation.  This can be purchased over the counter at your local pharmacy.  Take as instructed on the bottle.   Pain Medication Refills -[phone] or MyChart- Monday through Friday 7am-1pm    Medication refills will be sent upon receipt of your request during the times listed above. Due to the high call volume, you will not receive a call confirming prescription refills; please do not call multiple times.  Prescription refills may take a few hours to process, you may follow up with your pharmacy for pickup availability.    SAMPLE              The times below are an example of how to organize medications to optimize pain control  Your actual medication schedule may vary based on your last dose taken IN THE HOSPITAL      Time 3:00am 6:00am 9:00am 12:00pm 3:00pm 6:00pm 9:00pm 12:00am   Medications Tramadol Acetaminophen (Tylenol)   Oxycodone   Blood Thinner  Senna  Pantoprazole  Tramadol  Celebrex Acetaminophen (Tylenol)   Oxycodone Tramadol Acetaminophen (Tylenol)   Oxycodone Blood Thinner  Senna  Tramadol  Celebrex   Acetaminophen (Tylenol)   Oxycodone            You may begin to wean off the pain medication as your pain remains controlled with increased activity.  The schedules provided are meant to serve as an example.  You may  wean off based on your pain control.  Please note that pain medications are not filled beyond 6 weeks after surgery.              The times below are an example of how to WEAN OFF medications WHILE CONTINUING TO OPTIMIZE PAIN CONTROL.  Your actual medication schedule may vary based on your last dose taken.  Time 12:00am 4:00am 8:00am 12:00pm 4:00pm 8:00pm   Med Tramadol Oxycodone   Tramadol Oxycodone Tramadol Oxycodone     Time 12:00am 6:00am 12:00pm 6:00pm   Med Tramadol Oxycodone   Tramadol Oxycodone     Time 12:00am 8:00am 4:00pm   Med Tramadol Oxycodone   Tramadol     Time 12:00am 12:00pm   Med Tramadol Tramadol       _________________________________________________________________________________________________________________________________________________________________________    OFFICE INFORMATION    OFFICE LOCATIONS    Location 1: [facility]  [address], [address]  [place] [address]  Office Number: [phone]    Location 2: [facility]  [address]  [facility] [address]  Office Number: [phone]    Location 3: [facility]  [address] [place] [address]  Office Number: [phone]    Location 4:  [place]  [address] [place] [address]

## 2025-07-17 ENCOUNTER — HOSPITAL ENCOUNTER (OUTPATIENT)
Facility: HOSPITAL | Age: 75
Discharge: HOME HEALTH | End: 2025-07-18
Attending: ORTHOPAEDIC SURGERY | Admitting: ORTHOPAEDIC SURGERY
Payer: MEDICARE

## 2025-07-17 ENCOUNTER — ANESTHESIA (OUTPATIENT)
Dept: OPERATING ROOM | Facility: HOSPITAL | Age: 75
End: 2025-07-17
Payer: MEDICARE

## 2025-07-17 ENCOUNTER — PHARMACY VISIT (OUTPATIENT)
Dept: PHARMACY | Facility: CLINIC | Age: 75
End: 2025-07-17
Payer: COMMERCIAL

## 2025-07-17 ENCOUNTER — DOCUMENTATION (OUTPATIENT)
Dept: HOME HEALTH SERVICES | Facility: HOME HEALTH | Age: 75
End: 2025-07-17

## 2025-07-17 ENCOUNTER — APPOINTMENT (OUTPATIENT)
Dept: RADIOLOGY | Facility: HOSPITAL | Age: 75
End: 2025-07-17
Payer: MEDICARE

## 2025-07-17 ENCOUNTER — HOME HEALTH ADMISSION (OUTPATIENT)
Dept: HOME HEALTH SERVICES | Facility: HOME HEALTH | Age: 75
End: 2025-07-17
Payer: MEDICARE

## 2025-07-17 DIAGNOSIS — M17.12 ARTHRITIS OF KNEE, LEFT: ICD-10-CM

## 2025-07-17 DIAGNOSIS — M17.12 PRIMARY OSTEOARTHRITIS OF LEFT KNEE: Primary | ICD-10-CM

## 2025-07-17 PROBLEM — Z79.01 ANTICOAGULANT LONG-TERM USE: Status: ACTIVE | Noted: 2025-07-17

## 2025-07-17 PROCEDURE — 7100000011 HC EXTENDED STAY RECOVERY HOURLY - NURSING UNIT

## 2025-07-17 PROCEDURE — 97530 THERAPEUTIC ACTIVITIES: CPT | Mod: GP

## 2025-07-17 PROCEDURE — 73560 X-RAY EXAM OF KNEE 1 OR 2: CPT | Mod: LT

## 2025-07-17 PROCEDURE — RXMED WILLOW AMBULATORY MEDICATION CHARGE

## 2025-07-17 PROCEDURE — 73560 X-RAY EXAM OF KNEE 1 OR 2: CPT | Mod: LEFT SIDE | Performed by: RADIOLOGY

## 2025-07-17 PROCEDURE — 2500000004 HC RX 250 GENERAL PHARMACY W/ HCPCS (ALT 636 FOR OP/ED): Mod: JW | Performed by: ANESTHESIOLOGY

## 2025-07-17 PROCEDURE — 2500000004 HC RX 250 GENERAL PHARMACY W/ HCPCS (ALT 636 FOR OP/ED): Performed by: ORTHOPAEDIC SURGERY

## 2025-07-17 PROCEDURE — 2720000007 HC OR 272 NO HCPCS: Performed by: ORTHOPAEDIC SURGERY

## 2025-07-17 PROCEDURE — 99100 ANES PT EXTEME AGE<1 YR&>70: CPT | Performed by: ANESTHESIOLOGY

## 2025-07-17 PROCEDURE — 2500000001 HC RX 250 WO HCPCS SELF ADMINISTERED DRUGS (ALT 637 FOR MEDICARE OP): Performed by: STUDENT IN AN ORGANIZED HEALTH CARE EDUCATION/TRAINING PROGRAM

## 2025-07-17 PROCEDURE — 97110 THERAPEUTIC EXERCISES: CPT | Mod: GP

## 2025-07-17 PROCEDURE — 2500000004 HC RX 250 GENERAL PHARMACY W/ HCPCS (ALT 636 FOR OP/ED): Performed by: ANESTHESIOLOGY

## 2025-07-17 PROCEDURE — 3600000018 HC OR TIME - INITIAL BASE CHARGE - PROCEDURE LEVEL SIX: Performed by: ORTHOPAEDIC SURGERY

## 2025-07-17 PROCEDURE — A27447 PR TOTAL KNEE ARTHROPLASTY: Performed by: NURSE ANESTHETIST, CERTIFIED REGISTERED

## 2025-07-17 PROCEDURE — 64473 LWR XTR FSCL PLN BLK UNI NJX: CPT | Performed by: ANESTHESIOLOGY

## 2025-07-17 PROCEDURE — 2500000001 HC RX 250 WO HCPCS SELF ADMINISTERED DRUGS (ALT 637 FOR MEDICARE OP): Performed by: ORTHOPAEDIC SURGERY

## 2025-07-17 PROCEDURE — C1776 JOINT DEVICE (IMPLANTABLE): HCPCS | Performed by: ORTHOPAEDIC SURGERY

## 2025-07-17 PROCEDURE — 3700000001 HC GENERAL ANESTHESIA TIME - INITIAL BASE CHARGE: Performed by: ORTHOPAEDIC SURGERY

## 2025-07-17 PROCEDURE — 2500000005 HC RX 250 GENERAL PHARMACY W/O HCPCS: Performed by: ORTHOPAEDIC SURGERY

## 2025-07-17 PROCEDURE — 3700000002 HC GENERAL ANESTHESIA TIME - EACH INCREMENTAL 1 MINUTE: Performed by: ORTHOPAEDIC SURGERY

## 2025-07-17 PROCEDURE — 2500000004 HC RX 250 GENERAL PHARMACY W/ HCPCS (ALT 636 FOR OP/ED): Performed by: NURSE ANESTHETIST, CERTIFIED REGISTERED

## 2025-07-17 PROCEDURE — 64447 NJX AA&/STRD FEMORAL NRV IMG: CPT | Performed by: ANESTHESIOLOGY

## 2025-07-17 PROCEDURE — C1713 ANCHOR/SCREW BN/BN,TIS/BN: HCPCS | Performed by: ORTHOPAEDIC SURGERY

## 2025-07-17 PROCEDURE — 7100000001 HC RECOVERY ROOM TIME - INITIAL BASE CHARGE: Performed by: ORTHOPAEDIC SURGERY

## 2025-07-17 PROCEDURE — 97161 PT EVAL LOW COMPLEX 20 MIN: CPT | Mod: GP

## 2025-07-17 PROCEDURE — 27447 TOTAL KNEE ARTHROPLASTY: CPT | Performed by: ORTHOPAEDIC SURGERY

## 2025-07-17 PROCEDURE — 7100000002 HC RECOVERY ROOM TIME - EACH INCREMENTAL 1 MINUTE: Performed by: ORTHOPAEDIC SURGERY

## 2025-07-17 PROCEDURE — A27447 PR TOTAL KNEE ARTHROPLASTY: Performed by: ANESTHESIOLOGY

## 2025-07-17 PROCEDURE — 2780000003 HC OR 278 NO HCPCS: Performed by: ORTHOPAEDIC SURGERY

## 2025-07-17 PROCEDURE — 3600000017 HC OR TIME - EACH INCREMENTAL 1 MINUTE - PROCEDURE LEVEL SIX: Performed by: ORTHOPAEDIC SURGERY

## 2025-07-17 PROCEDURE — 99221 1ST HOSP IP/OBS SF/LOW 40: CPT | Performed by: STUDENT IN AN ORGANIZED HEALTH CARE EDUCATION/TRAINING PROGRAM

## 2025-07-17 DEVICE — TIBIAL BEARING INSERT - CS
Type: IMPLANTABLE DEVICE | Site: KNEE | Status: FUNCTIONAL
Brand: TRIATHLON

## 2025-07-17 DEVICE — PATELLA
Type: IMPLANTABLE DEVICE | Site: KNEE | Status: FUNCTIONAL
Brand: TRIATHLON

## 2025-07-17 DEVICE — TIBIAL COMPONENT
Type: IMPLANTABLE DEVICE | Site: KNEE | Status: FUNCTIONAL
Brand: TRIATHLON

## 2025-07-17 DEVICE — CRUCIATE RETAINING FEMORAL
Type: IMPLANTABLE DEVICE | Site: KNEE | Status: FUNCTIONAL
Brand: TRIATHLON

## 2025-07-17 DEVICE — BONE PINS (3.2MM X 110MM): Type: IMPLANTABLE DEVICE | Site: KNEE | Status: NON-FUNCTIONAL

## 2025-07-17 RX ORDER — TRANEXAMIC ACID 1 G/10ML
1000 INJECTION, SOLUTION INTRAVENOUS 2 TIMES DAILY
Status: COMPLETED | OUTPATIENT
Start: 2025-07-17 | End: 2025-07-17

## 2025-07-17 RX ORDER — FENTANYL CITRATE 50 UG/ML
25 INJECTION, SOLUTION INTRAMUSCULAR; INTRAVENOUS EVERY 5 MIN PRN
Status: DISCONTINUED | OUTPATIENT
Start: 2025-07-17 | End: 2025-07-17 | Stop reason: HOSPADM

## 2025-07-17 RX ORDER — GABAPENTIN 300 MG/1
600 CAPSULE ORAL 3 TIMES DAILY
Status: DISCONTINUED | OUTPATIENT
Start: 2025-07-17 | End: 2025-07-18 | Stop reason: HOSPADM

## 2025-07-17 RX ORDER — ONDANSETRON 4 MG/1
4 TABLET, ORALLY DISINTEGRATING ORAL EVERY 8 HOURS PRN
Status: DISCONTINUED | OUTPATIENT
Start: 2025-07-17 | End: 2025-07-17

## 2025-07-17 RX ORDER — ONDANSETRON 4 MG/1
4 TABLET, ORALLY DISINTEGRATING ORAL EVERY 8 HOURS PRN
Status: DISCONTINUED | OUTPATIENT
Start: 2025-07-17 | End: 2025-07-18 | Stop reason: HOSPADM

## 2025-07-17 RX ORDER — TALC
3 POWDER (GRAM) TOPICAL NIGHTLY PRN
Status: DISCONTINUED | OUTPATIENT
Start: 2025-07-17 | End: 2025-07-18 | Stop reason: HOSPADM

## 2025-07-17 RX ORDER — SENNOSIDES 8.6 MG/1
2 TABLET ORAL 2 TIMES DAILY
Status: DISCONTINUED | OUTPATIENT
Start: 2025-07-17 | End: 2025-07-18 | Stop reason: HOSPADM

## 2025-07-17 RX ORDER — ONDANSETRON HYDROCHLORIDE 2 MG/ML
4 INJECTION, SOLUTION INTRAVENOUS EVERY 8 HOURS PRN
Status: DISCONTINUED | OUTPATIENT
Start: 2025-07-17 | End: 2025-07-17

## 2025-07-17 RX ORDER — IPRATROPIUM BROMIDE 0.5 MG/2.5ML
500 SOLUTION RESPIRATORY (INHALATION) ONCE
Status: DISCONTINUED | OUTPATIENT
Start: 2025-07-17 | End: 2025-07-17 | Stop reason: HOSPADM

## 2025-07-17 RX ORDER — METOCLOPRAMIDE 10 MG/1
10 TABLET ORAL EVERY 6 HOURS PRN
Status: DISCONTINUED | OUTPATIENT
Start: 2025-07-17 | End: 2025-07-18 | Stop reason: HOSPADM

## 2025-07-17 RX ORDER — FENTANYL CITRATE 50 UG/ML
50 INJECTION, SOLUTION INTRAMUSCULAR; INTRAVENOUS EVERY 5 MIN PRN
Status: DISCONTINUED | OUTPATIENT
Start: 2025-07-17 | End: 2025-07-17 | Stop reason: HOSPADM

## 2025-07-17 RX ORDER — LIDOCAINE HYDROCHLORIDE 10 MG/ML
INJECTION, SOLUTION EPIDURAL; INFILTRATION; INTRACAUDAL; PERINEURAL AS NEEDED
Status: DISCONTINUED | OUTPATIENT
Start: 2025-07-17 | End: 2025-07-17

## 2025-07-17 RX ORDER — FLUTICASONE PROPIONATE 50 MCG
1 SPRAY, SUSPENSION (ML) NASAL DAILY
Status: DISCONTINUED | OUTPATIENT
Start: 2025-07-17 | End: 2025-07-18 | Stop reason: HOSPADM

## 2025-07-17 RX ORDER — VANCOMYCIN HYDROCHLORIDE 1 G/20ML
INJECTION, POWDER, LYOPHILIZED, FOR SOLUTION INTRAVENOUS AS NEEDED
Status: DISCONTINUED | OUTPATIENT
Start: 2025-07-17 | End: 2025-07-17 | Stop reason: HOSPADM

## 2025-07-17 RX ORDER — NALOXONE HYDROCHLORIDE 0.4 MG/ML
0.2 INJECTION, SOLUTION INTRAMUSCULAR; INTRAVENOUS; SUBCUTANEOUS EVERY 5 MIN PRN
Status: DISCONTINUED | OUTPATIENT
Start: 2025-07-17 | End: 2025-07-18 | Stop reason: HOSPADM

## 2025-07-17 RX ORDER — HYDRALAZINE HYDROCHLORIDE 20 MG/ML
5 INJECTION INTRAMUSCULAR; INTRAVENOUS EVERY 30 MIN PRN
Status: DISCONTINUED | OUTPATIENT
Start: 2025-07-17 | End: 2025-07-17 | Stop reason: HOSPADM

## 2025-07-17 RX ORDER — BISACODYL 5 MG
10 TABLET, DELAYED RELEASE (ENTERIC COATED) ORAL DAILY PRN
Status: DISCONTINUED | OUTPATIENT
Start: 2025-07-17 | End: 2025-07-18 | Stop reason: HOSPADM

## 2025-07-17 RX ORDER — FLUTICASONE FUROATE AND VILANTEROL 200; 25 UG/1; UG/1
1 POWDER RESPIRATORY (INHALATION)
Status: DISCONTINUED | OUTPATIENT
Start: 2025-07-18 | End: 2025-07-18 | Stop reason: HOSPADM

## 2025-07-17 RX ORDER — OXYCODONE HYDROCHLORIDE 5 MG/1
5 TABLET ORAL EVERY 6 HOURS PRN
Status: DISCONTINUED | OUTPATIENT
Start: 2025-07-17 | End: 2025-07-17

## 2025-07-17 RX ORDER — MIDAZOLAM HYDROCHLORIDE 5 MG/ML
2 INJECTION, SOLUTION INTRAMUSCULAR; INTRAVENOUS ONCE AS NEEDED
Status: CANCELLED | OUTPATIENT
Start: 2025-07-17

## 2025-07-17 RX ORDER — CITALOPRAM 10 MG/1
20 TABLET ORAL DAILY
Status: DISCONTINUED | OUTPATIENT
Start: 2025-07-17 | End: 2025-07-18 | Stop reason: HOSPADM

## 2025-07-17 RX ORDER — CEFAZOLIN SODIUM 2 G/100ML
2 INJECTION, SOLUTION INTRAVENOUS ONCE
Status: COMPLETED | OUTPATIENT
Start: 2025-07-17 | End: 2025-07-17

## 2025-07-17 RX ORDER — VANCOMYCIN HYDROCHLORIDE 1 G/20ML
1 INJECTION, POWDER, LYOPHILIZED, FOR SOLUTION INTRAVENOUS ONCE
Status: DISCONTINUED | OUTPATIENT
Start: 2025-07-17 | End: 2025-07-17 | Stop reason: HOSPADM

## 2025-07-17 RX ORDER — CELECOXIB 200 MG/1
200 CAPSULE ORAL ONCE
Status: COMPLETED | OUTPATIENT
Start: 2025-07-17 | End: 2025-07-17

## 2025-07-17 RX ORDER — CEFAZOLIN SODIUM 2 G/100ML
2 INJECTION, SOLUTION INTRAVENOUS EVERY 8 HOURS
Status: COMPLETED | OUTPATIENT
Start: 2025-07-17 | End: 2025-07-18

## 2025-07-17 RX ORDER — ALBUTEROL SULFATE 0.83 MG/ML
2.5 SOLUTION RESPIRATORY (INHALATION) EVERY 6 HOURS PRN
Status: DISCONTINUED | OUTPATIENT
Start: 2025-07-17 | End: 2025-07-18 | Stop reason: HOSPADM

## 2025-07-17 RX ORDER — ACETAMINOPHEN 325 MG/1
650 TABLET ORAL EVERY 6 HOURS SCHEDULED
Status: DISCONTINUED | OUTPATIENT
Start: 2025-07-17 | End: 2025-07-18 | Stop reason: HOSPADM

## 2025-07-17 RX ORDER — CYCLOBENZAPRINE HCL 10 MG
5 TABLET ORAL 3 TIMES DAILY PRN
Status: DISCONTINUED | OUTPATIENT
Start: 2025-07-17 | End: 2025-07-18 | Stop reason: HOSPADM

## 2025-07-17 RX ORDER — PROPOFOL 10 MG/ML
INJECTION, EMULSION INTRAVENOUS AS NEEDED
Status: DISCONTINUED | OUTPATIENT
Start: 2025-07-17 | End: 2025-07-17

## 2025-07-17 RX ORDER — ONDANSETRON HYDROCHLORIDE 2 MG/ML
4 INJECTION, SOLUTION INTRAVENOUS EVERY 4 HOURS PRN
Status: DISCONTINUED | OUTPATIENT
Start: 2025-07-17 | End: 2025-07-18 | Stop reason: HOSPADM

## 2025-07-17 RX ORDER — FLUTICASONE FUROATE AND VILANTEROL 200; 25 UG/1; UG/1
1 POWDER RESPIRATORY (INHALATION)
Status: DISCONTINUED | OUTPATIENT
Start: 2025-07-17 | End: 2025-07-17

## 2025-07-17 RX ORDER — FENTANYL CITRATE 50 UG/ML
50 INJECTION, SOLUTION INTRAMUSCULAR; INTRAVENOUS ONCE
Status: COMPLETED | OUTPATIENT
Start: 2025-07-17 | End: 2025-07-17

## 2025-07-17 RX ORDER — PANTOPRAZOLE SODIUM 40 MG/1
40 TABLET, DELAYED RELEASE ORAL
Status: DISCONTINUED | OUTPATIENT
Start: 2025-07-18 | End: 2025-07-18 | Stop reason: HOSPADM

## 2025-07-17 RX ORDER — MIDAZOLAM HYDROCHLORIDE 1 MG/ML
2 INJECTION, SOLUTION INTRAMUSCULAR; INTRAVENOUS ONCE
Status: COMPLETED | OUTPATIENT
Start: 2025-07-17 | End: 2025-07-17

## 2025-07-17 RX ORDER — KETOROLAC TROMETHAMINE 15 MG/ML
15 INJECTION, SOLUTION INTRAMUSCULAR; INTRAVENOUS EVERY 6 HOURS
Status: COMPLETED | OUTPATIENT
Start: 2025-07-17 | End: 2025-07-18

## 2025-07-17 RX ORDER — ALBUTEROL SULFATE 0.83 MG/ML
2.5 SOLUTION RESPIRATORY (INHALATION) EVERY 4 HOURS PRN
Status: DISCONTINUED | OUTPATIENT
Start: 2025-07-17 | End: 2025-07-18 | Stop reason: HOSPADM

## 2025-07-17 RX ORDER — ALBUTEROL SULFATE 0.83 MG/ML
2.5 SOLUTION RESPIRATORY (INHALATION) ONCE AS NEEDED
Status: DISCONTINUED | OUTPATIENT
Start: 2025-07-17 | End: 2025-07-17 | Stop reason: HOSPADM

## 2025-07-17 RX ORDER — BISOPROLOL FUMARATE 5 MG/1
20 TABLET, FILM COATED ORAL DAILY
Status: DISCONTINUED | OUTPATIENT
Start: 2025-07-18 | End: 2025-07-18 | Stop reason: HOSPADM

## 2025-07-17 RX ORDER — OXYCODONE HYDROCHLORIDE 5 MG/1
10 TABLET ORAL ONCE
Status: DISCONTINUED | OUTPATIENT
Start: 2025-07-17 | End: 2025-07-17 | Stop reason: HOSPADM

## 2025-07-17 RX ORDER — LEVOTHYROXINE SODIUM 25 UG/1
25 TABLET ORAL DAILY
Status: DISCONTINUED | OUTPATIENT
Start: 2025-07-18 | End: 2025-07-18 | Stop reason: HOSPADM

## 2025-07-17 RX ORDER — SODIUM CHLORIDE, SODIUM LACTATE, POTASSIUM CHLORIDE, CALCIUM CHLORIDE 600; 310; 30; 20 MG/100ML; MG/100ML; MG/100ML; MG/100ML
100 INJECTION, SOLUTION INTRAVENOUS CONTINUOUS
Status: ACTIVE | OUTPATIENT
Start: 2025-07-17 | End: 2025-07-18

## 2025-07-17 RX ORDER — OXYCODONE HYDROCHLORIDE 5 MG/1
5 TABLET ORAL EVERY 4 HOURS PRN
Refills: 0 | Status: DISCONTINUED | OUTPATIENT
Start: 2025-07-17 | End: 2025-07-18 | Stop reason: HOSPADM

## 2025-07-17 RX ORDER — SODIUM CHLORIDE, SODIUM LACTATE, POTASSIUM CHLORIDE, CALCIUM CHLORIDE 600; 310; 30; 20 MG/100ML; MG/100ML; MG/100ML; MG/100ML
100 INJECTION, SOLUTION INTRAVENOUS CONTINUOUS
Status: DISCONTINUED | OUTPATIENT
Start: 2025-07-17 | End: 2025-07-17 | Stop reason: HOSPADM

## 2025-07-17 RX ORDER — CALCIUM CARBONATE 200(500)MG
500 TABLET,CHEWABLE ORAL 4 TIMES DAILY PRN
Status: DISCONTINUED | OUTPATIENT
Start: 2025-07-17 | End: 2025-07-18 | Stop reason: HOSPADM

## 2025-07-17 RX ORDER — METOCLOPRAMIDE HYDROCHLORIDE 5 MG/ML
10 INJECTION INTRAMUSCULAR; INTRAVENOUS EVERY 6 HOURS PRN
Status: DISCONTINUED | OUTPATIENT
Start: 2025-07-17 | End: 2025-07-18 | Stop reason: HOSPADM

## 2025-07-17 RX ORDER — LABETALOL HYDROCHLORIDE 5 MG/ML
5 INJECTION, SOLUTION INTRAVENOUS ONCE AS NEEDED
Status: DISCONTINUED | OUTPATIENT
Start: 2025-07-17 | End: 2025-07-17 | Stop reason: HOSPADM

## 2025-07-17 RX ORDER — SIMETHICONE 80 MG
80 TABLET,CHEWABLE ORAL 4 TIMES DAILY PRN
Status: DISCONTINUED | OUTPATIENT
Start: 2025-07-17 | End: 2025-07-18 | Stop reason: HOSPADM

## 2025-07-17 RX ORDER — FENTANYL CITRATE 50 UG/ML
50 INJECTION, SOLUTION INTRAMUSCULAR; INTRAVENOUS ONCE AS NEEDED
Status: CANCELLED | OUTPATIENT
Start: 2025-07-17

## 2025-07-17 RX ORDER — OXYCODONE HYDROCHLORIDE 5 MG/1
10 TABLET ORAL EVERY 4 HOURS PRN
Status: DISCONTINUED | OUTPATIENT
Start: 2025-07-17 | End: 2025-07-18 | Stop reason: HOSPADM

## 2025-07-17 RX ORDER — ACETAMINOPHEN 325 MG/1
975 TABLET ORAL ONCE
Status: COMPLETED | OUTPATIENT
Start: 2025-07-17 | End: 2025-07-17

## 2025-07-17 RX ORDER — ROPIVACAINE/EPI/CLONIDINE/KET 2.46-0.005
SYRINGE (ML) INJECTION AS NEEDED
Status: DISCONTINUED | OUTPATIENT
Start: 2025-07-17 | End: 2025-07-17 | Stop reason: HOSPADM

## 2025-07-17 RX ADMIN — SODIUM CHLORIDE, SODIUM LACTATE, POTASSIUM CHLORIDE, AND CALCIUM CHLORIDE 100 ML/HR: 600; 310; 30; 20 INJECTION, SOLUTION INTRAVENOUS at 10:46

## 2025-07-17 RX ADMIN — GABAPENTIN 600 MG: 300 CAPSULE ORAL at 16:37

## 2025-07-17 RX ADMIN — ACETAMINOPHEN 650 MG: 325 TABLET ORAL at 11:42

## 2025-07-17 RX ADMIN — KETOROLAC TROMETHAMINE 15 MG: 15 INJECTION, SOLUTION INTRAMUSCULAR; INTRAVENOUS at 11:42

## 2025-07-17 RX ADMIN — CELECOXIB 200 MG: 200 CAPSULE ORAL at 06:00

## 2025-07-17 RX ADMIN — CEFAZOLIN SODIUM 2 G: 2 INJECTION, SOLUTION INTRAVENOUS at 07:28

## 2025-07-17 RX ADMIN — FENTANYL CITRATE 50 MCG: 50 INJECTION INTRAMUSCULAR; INTRAVENOUS at 07:14

## 2025-07-17 RX ADMIN — SODIUM CHLORIDE, SODIUM LACTATE, POTASSIUM CHLORIDE, AND CALCIUM CHLORIDE 100 ML/HR: 600; 310; 30; 20 INJECTION, SOLUTION INTRAVENOUS at 21:15

## 2025-07-17 RX ADMIN — GABAPENTIN 600 MG: 300 CAPSULE ORAL at 21:01

## 2025-07-17 RX ADMIN — CITALOPRAM HYDROBROMIDE 20 MG: 10 TABLET ORAL at 17:05

## 2025-07-17 RX ADMIN — KETOROLAC TROMETHAMINE 15 MG: 15 INJECTION, SOLUTION INTRAMUSCULAR; INTRAVENOUS at 23:23

## 2025-07-17 RX ADMIN — CEFAZOLIN SODIUM 2 G: 2 INJECTION, SOLUTION INTRAVENOUS at 23:33

## 2025-07-17 RX ADMIN — ACETAMINOPHEN 650 MG: 325 TABLET ORAL at 17:05

## 2025-07-17 RX ADMIN — CEFAZOLIN SODIUM 2 G: 2 INJECTION, SOLUTION INTRAVENOUS at 15:52

## 2025-07-17 RX ADMIN — KETOROLAC TROMETHAMINE 15 MG: 15 INJECTION, SOLUTION INTRAMUSCULAR; INTRAVENOUS at 17:05

## 2025-07-17 RX ADMIN — TRANEXAMIC ACID 1000 MG: 1 INJECTION, SOLUTION INTRAVENOUS at 07:38

## 2025-07-17 RX ADMIN — ACETAMINOPHEN 975 MG: 325 TABLET ORAL at 06:00

## 2025-07-17 RX ADMIN — PROPOFOL 25 MCG/KG/MIN: 10 INJECTION, EMULSION INTRAVENOUS at 07:38

## 2025-07-17 RX ADMIN — MIDAZOLAM 2 MG: 1 INJECTION INTRAMUSCULAR; INTRAVENOUS at 07:14

## 2025-07-17 RX ADMIN — PROPOFOL 50 MG: 10 INJECTION, EMULSION INTRAVENOUS at 07:37

## 2025-07-17 RX ADMIN — TRANEXAMIC ACID 1000 MG: 1 INJECTION, SOLUTION INTRAVENOUS at 08:32

## 2025-07-17 RX ADMIN — MEPIVACAINE HYDROCHLORIDE 3.5 ML: 15 INJECTION, SOLUTION EPIDURAL; INFILTRATION at 07:34

## 2025-07-17 RX ADMIN — DEXAMETHASONE SODIUM PHOSPHATE 6 MG: 4 INJECTION, SOLUTION INTRAMUSCULAR; INTRAVENOUS at 07:16

## 2025-07-17 RX ADMIN — SENNOSIDES 17.2 MG: 8.6 TABLET, FILM COATED ORAL at 21:01

## 2025-07-17 RX ADMIN — ACETAMINOPHEN 650 MG: 325 TABLET ORAL at 23:22

## 2025-07-17 RX ADMIN — SODIUM CHLORIDE, POTASSIUM CHLORIDE, SODIUM LACTATE AND CALCIUM CHLORIDE: 600; 310; 30; 20 INJECTION, SOLUTION INTRAVENOUS at 07:23

## 2025-07-17 RX ADMIN — LIDOCAINE HYDROCHLORIDE 3 ML: 10 INJECTION, SOLUTION EPIDURAL; INFILTRATION; INTRACAUDAL; PERINEURAL at 07:37

## 2025-07-17 RX ADMIN — POVIDONE-IODINE 1 APPLICATION: 5 SOLUTION TOPICAL at 06:00

## 2025-07-17 SDOH — SOCIAL STABILITY: SOCIAL INSECURITY: HAVE YOU HAD ANY THOUGHTS OF HARMING ANYONE ELSE?: NO

## 2025-07-17 SDOH — ECONOMIC STABILITY: FOOD INSECURITY: WITHIN THE PAST 12 MONTHS, YOU WORRIED THAT YOUR FOOD WOULD RUN OUT BEFORE YOU GOT THE MONEY TO BUY MORE.: NEVER TRUE

## 2025-07-17 SDOH — ECONOMIC STABILITY: HOUSING INSECURITY: DO YOU FEEL UNSAFE GOING BACK TO THE PLACE WHERE YOU LIVE?: NO

## 2025-07-17 SDOH — ECONOMIC STABILITY: FOOD INSECURITY: WITHIN THE PAST 12 MONTHS, THE FOOD YOU BOUGHT JUST DIDN'T LAST AND YOU DIDN'T HAVE MONEY TO GET MORE.: NEVER TRUE

## 2025-07-17 SDOH — SOCIAL STABILITY: SOCIAL INSECURITY
WITHIN THE LAST YEAR, HAVE YOU BEEN RAPED OR FORCED TO HAVE ANY KIND OF SEXUAL ACTIVITY BY YOUR PARTNER OR EX-PARTNER?: NO

## 2025-07-17 SDOH — ECONOMIC STABILITY: HOUSING INSECURITY: AT ANY TIME IN THE PAST 12 MONTHS, WERE YOU HOMELESS OR LIVING IN A SHELTER (INCLUDING NOW)?: NO

## 2025-07-17 SDOH — SOCIAL STABILITY: SOCIAL INSECURITY: WITHIN THE LAST YEAR, HAVE YOU BEEN HUMILIATED OR EMOTIONALLY ABUSED IN OTHER WAYS BY YOUR PARTNER OR EX-PARTNER?: NO

## 2025-07-17 SDOH — ECONOMIC STABILITY: HOUSING INSECURITY: IN THE PAST 12 MONTHS, HOW MANY TIMES HAVE YOU MOVED WHERE YOU WERE LIVING?: 0

## 2025-07-17 SDOH — ECONOMIC STABILITY: HOUSING INSECURITY: IN THE LAST 12 MONTHS, WAS THERE A TIME WHEN YOU WERE NOT ABLE TO PAY THE MORTGAGE OR RENT ON TIME?: NO

## 2025-07-17 SDOH — SOCIAL STABILITY: SOCIAL INSECURITY: ABUSE: ADULT

## 2025-07-17 SDOH — ECONOMIC STABILITY: FOOD INSECURITY: HOW HARD IS IT FOR YOU TO PAY FOR THE VERY BASICS LIKE FOOD, HOUSING, MEDICAL CARE, AND HEATING?: NOT HARD AT ALL

## 2025-07-17 SDOH — SOCIAL STABILITY: SOCIAL INSECURITY
WITHIN THE LAST YEAR, HAVE YOU BEEN KICKED, HIT, SLAPPED, OR OTHERWISE PHYSICALLY HURT BY YOUR PARTNER OR EX-PARTNER?: NO

## 2025-07-17 SDOH — SOCIAL STABILITY: SOCIAL INSECURITY: WERE YOU ABLE TO COMPLETE ALL THE BEHAVIORAL HEALTH SCREENINGS?: YES

## 2025-07-17 SDOH — ECONOMIC STABILITY: INCOME INSECURITY: IN THE PAST 12 MONTHS HAS THE ELECTRIC, GAS, OIL, OR WATER COMPANY THREATENED TO SHUT OFF SERVICES IN YOUR HOME?: NO

## 2025-07-17 SDOH — SOCIAL STABILITY: SOCIAL INSECURITY: ARE THERE ANY APPARENT SIGNS OF INJURIES/BEHAVIORS THAT COULD BE RELATED TO ABUSE/NEGLECT?: NO

## 2025-07-17 SDOH — SOCIAL STABILITY: SOCIAL INSECURITY: WITHIN THE LAST YEAR, HAVE YOU BEEN AFRAID OF YOUR PARTNER OR EX-PARTNER?: NO

## 2025-07-17 SDOH — SOCIAL STABILITY: SOCIAL INSECURITY: DO YOU FEEL UNSAFE GOING BACK TO THE PLACE WHERE YOU ARE LIVING?: NO

## 2025-07-17 SDOH — SOCIAL STABILITY: SOCIAL INSECURITY: DO YOU FEEL ANYONE HAS EXPLOITED OR TAKEN ADVANTAGE OF YOU FINANCIALLY OR OF YOUR PERSONAL PROPERTY?: NO

## 2025-07-17 SDOH — HEALTH STABILITY: MENTAL HEALTH: CURRENT SMOKER: 0

## 2025-07-17 SDOH — SOCIAL STABILITY: SOCIAL INSECURITY: HAVE YOU HAD THOUGHTS OF HARMING ANYONE ELSE?: YES

## 2025-07-17 SDOH — SOCIAL STABILITY: SOCIAL INSECURITY
ASK PARENT OR GUARDIAN: ARE THERE TIMES WHEN YOU, YOUR CHILD(REN), OR ANY MEMBER OF YOUR HOUSEHOLD FEEL UNSAFE, HARMED, OR THREATENED AROUND PERSONS WITH WHOM YOU KNOW OR LIVE?: NO

## 2025-07-17 SDOH — SOCIAL STABILITY: SOCIAL INSECURITY: ARE YOU OR HAVE YOU BEEN THREATENED OR ABUSED PHYSICALLY, EMOTIONALLY, OR SEXUALLY BY ANYONE?: NO

## 2025-07-17 SDOH — SOCIAL STABILITY: SOCIAL INSECURITY: DOES ANYONE TRY TO KEEP YOU FROM HAVING/CONTACTING OTHER FRIENDS OR DOING THINGS OUTSIDE YOUR HOME?: NO

## 2025-07-17 SDOH — ECONOMIC STABILITY: TRANSPORTATION INSECURITY: IN THE PAST 12 MONTHS, HAS LACK OF TRANSPORTATION KEPT YOU FROM MEDICAL APPOINTMENTS OR FROM GETTING MEDICATIONS?: NO

## 2025-07-17 SDOH — SOCIAL STABILITY: SOCIAL INSECURITY: HAS ANYONE EVER THREATENED TO HURT YOUR FAMILY OR YOUR PETS?: NO

## 2025-07-17 ASSESSMENT — ACTIVITIES OF DAILY LIVING (ADL)
ADEQUATE_TO_COMPLETE_ADL: YES
PATIENT'S MEMORY ADEQUATE TO SAFELY COMPLETE DAILY ACTIVITIES?: YES
GROOMING: INDEPENDENT
GROOMING: INDEPENDENT
JUDGMENT_ADEQUATE_SAFELY_COMPLETE_DAILY_ACTIVITIES: YES
HEARING - RIGHT EAR: FUNCTIONAL
HEARING - LEFT EAR: FUNCTIONAL
FEEDING YOURSELF: INDEPENDENT
DRESSING YOURSELF: INDEPENDENT
WALKS IN HOME: INDEPENDENT
FEEDING YOURSELF: INDEPENDENT
BATHING: INDEPENDENT
LACK_OF_TRANSPORTATION: NO
ADL_ASSISTANCE: INDEPENDENT
LACK_OF_TRANSPORTATION: NO
PATIENT'S MEMORY ADEQUATE TO SAFELY COMPLETE DAILY ACTIVITIES?: YES
DRESSING YOURSELF: INDEPENDENT
HEARING - LEFT EAR: FUNCTIONAL
WALKS IN HOME: INDEPENDENT
LACK_OF_TRANSPORTATION: NO
HEARING - RIGHT EAR: FUNCTIONAL
TOILETING: INDEPENDENT
LACK_OF_TRANSPORTATION: NO
JUDGMENT_ADEQUATE_SAFELY_COMPLETE_DAILY_ACTIVITIES: YES
BATHING: INDEPENDENT
TOILETING: INDEPENDENT

## 2025-07-17 ASSESSMENT — COGNITIVE AND FUNCTIONAL STATUS - GENERAL
PATIENT BASELINE BEDBOUND: NO
DRESSING REGULAR LOWER BODY CLOTHING: A LITTLE
WALKING IN HOSPITAL ROOM: A LITTLE
HELP NEEDED FOR BATHING: A LITTLE
MOVING TO AND FROM BED TO CHAIR: A LITTLE
MOBILITY SCORE: 18
HELP NEEDED FOR BATHING: A LITTLE
TURNING FROM BACK TO SIDE WHILE IN FLAT BAD: A LITTLE
CLIMB 3 TO 5 STEPS WITH RAILING: A LOT
MOVING FROM LYING ON BACK TO SITTING ON SIDE OF FLAT BED WITH BEDRAILS: A LITTLE
MOVING TO AND FROM BED TO CHAIR: A LITTLE
DAILY ACTIVITIY SCORE: 21
TURNING FROM BACK TO SIDE WHILE IN FLAT BAD: A LITTLE
TOILETING: A LITTLE
MOVING FROM LYING ON BACK TO SITTING ON SIDE OF FLAT BED WITH BEDRAILS: A LITTLE
DAILY ACTIVITIY SCORE: 21
STANDING UP FROM CHAIR USING ARMS: A LOT
MOBILITY SCORE: 20
STANDING UP FROM CHAIR USING ARMS: A LITTLE
DRESSING REGULAR LOWER BODY CLOTHING: A LITTLE
TOILETING: A LITTLE
WALKING IN HOSPITAL ROOM: A LITTLE
STANDING UP FROM CHAIR USING ARMS: A LITTLE
CLIMB 3 TO 5 STEPS WITH RAILING: A LITTLE
CLIMB 3 TO 5 STEPS WITH RAILING: A LITTLE
MOVING TO AND FROM BED TO CHAIR: A LITTLE
MOBILITY SCORE: 16
WALKING IN HOSPITAL ROOM: A LITTLE

## 2025-07-17 ASSESSMENT — PAIN DESCRIPTION - DESCRIPTORS
DESCRIPTORS: SORE
DESCRIPTORS: OTHER (COMMENT)
DESCRIPTORS: SORE

## 2025-07-17 ASSESSMENT — PAIN - FUNCTIONAL ASSESSMENT
PAIN_FUNCTIONAL_ASSESSMENT: 0-10
PAIN_FUNCTIONAL_ASSESSMENT: WONG-BAKER FACES
PAIN_FUNCTIONAL_ASSESSMENT: 0-10
PAIN_FUNCTIONAL_ASSESSMENT: 0-10
PAIN_FUNCTIONAL_ASSESSMENT: UNABLE TO SELF-REPORT
PAIN_FUNCTIONAL_ASSESSMENT: 0-10
PAIN_FUNCTIONAL_ASSESSMENT: 0-10
PAIN_FUNCTIONAL_ASSESSMENT: WONG-BAKER FACES
PAIN_FUNCTIONAL_ASSESSMENT: 0-10
PAIN_FUNCTIONAL_ASSESSMENT: WONG-BAKER FACES

## 2025-07-17 ASSESSMENT — COLUMBIA-SUICIDE SEVERITY RATING SCALE - C-SSRS
6. HAVE YOU EVER DONE ANYTHING, STARTED TO DO ANYTHING, OR PREPARED TO DO ANYTHING TO END YOUR LIFE?: NO
2. HAVE YOU ACTUALLY HAD ANY THOUGHTS OF KILLING YOURSELF?: NO
1. IN THE PAST MONTH, HAVE YOU WISHED YOU WERE DEAD OR WISHED YOU COULD GO TO SLEEP AND NOT WAKE UP?: NO

## 2025-07-17 ASSESSMENT — PAIN SCALES - GENERAL
PAINLEVEL_OUTOF10: 2
PAINLEVEL_OUTOF10: 0 - NO PAIN
PAINLEVEL_OUTOF10: 0 - NO PAIN
PAIN_LEVEL: 0
PAINLEVEL_OUTOF10: 0 - NO PAIN
PAINLEVEL_OUTOF10: 1
PAINLEVEL_OUTOF10: 2
PAINLEVEL_OUTOF10: 0 - NO PAIN
PAINLEVEL_OUTOF10: 2

## 2025-07-17 ASSESSMENT — LIFESTYLE VARIABLES
HOW MANY STANDARD DRINKS CONTAINING ALCOHOL DO YOU HAVE ON A TYPICAL DAY: 1 OR 2
AUDIT-C TOTAL SCORE: 1
HOW OFTEN DO YOU HAVE A DRINK CONTAINING ALCOHOL: MONTHLY OR LESS
HOW OFTEN DO YOU HAVE 6 OR MORE DRINKS ON ONE OCCASION: NEVER
SKIP TO QUESTIONS 9-10: 1
AUDIT-C TOTAL SCORE: 1

## 2025-07-17 ASSESSMENT — PATIENT HEALTH QUESTIONNAIRE - PHQ9
2. FEELING DOWN, DEPRESSED OR HOPELESS: SEVERAL DAYS
SUM OF ALL RESPONSES TO PHQ9 QUESTIONS 1 & 2: 1
1. LITTLE INTEREST OR PLEASURE IN DOING THINGS: NOT AT ALL

## 2025-07-17 ASSESSMENT — PAIN DESCRIPTION - ORIENTATION: ORIENTATION: LEFT

## 2025-07-17 ASSESSMENT — PAIN DESCRIPTION - LOCATION: LOCATION: KNEE

## 2025-07-17 NOTE — NURSING NOTE
Patient declines to sit in chair at this time. We discussed her sitting in the chair for dinner. She also states she only has pain with movement/ambulation.

## 2025-07-17 NOTE — INTERVAL H&P NOTE
History and physical reviewed, no updates at this time 
35 yo AAF who comes in for constipation, bloating and abdominal discomfort.The patient is seen for evaluation of constipation.    Notes the onset of constipation   several  years   ago.  Symptoms started   gradually  .   Currently, the patient evacuates stool   1   times per   week  .    Bowel movements are described as   hard   in consistency.   Stools are typically   brown   in color.    Associated symptoms include   the feeling of incomplete evacuation of stool, abdominal bloating/distension and generalized abdominal pain  .  Symptoms are alleviated with   herbal remedies, stool softeners and stimulant laxatives  .  The patient has previously medicated symptoms with   stool softeners  .   The patient has undergone a colonoscopy    ago.  Findings on that exam included   .

## 2025-07-17 NOTE — CARE PLAN
Problem: Pain  Goal: Takes deep breaths with improved pain control throughout the shift  7/17/2025 1653 by Phuong Darden RN  Outcome: Progressing  7/17/2025 1038 by Phuong Dardne RN  Outcome: Progressing   The patient's goals for the shift include Pain control    The clinical goals for the shift include Pain control, safety    Over the shift, the patient did make progress toward the following goals.   Patients' pain was well controlled during the shift and patient remained safe.    Problem: Pain  Goal: Takes deep breaths with improved pain control throughout the shift  7/17/2025 1653 by Phuong Darden RN  Outcome: Progressing  7/17/2025 1038 by Phuong Darden RN  Outcome: Progressing     Problem: Pain  Goal: Turns in bed with improved pain control throughout the shift  7/17/2025 1653 by Phuong Darden RN  Outcome: Progressing  7/17/2025 1038 by Phuong Darden RN  Outcome: Progressing     Problem: Pain  Goal: Walks with improved pain control throughout the shift  7/17/2025 1653 by Phuong Darden RN  Outcome: Progressing  7/17/2025 1038 by Phuong Darden RN  Outcome: Progressing     Problem: Pain  Goal: Performs ADL's with improved pain control throughout shift  7/17/2025 1653 by Phuong Darden RN  Outcome: Progressing  7/17/2025 1038 by Phuong Darden RN  Outcome: Progressing     Problem: Pain  Goal: Participates in PT with improved pain control throughout the shift  7/17/2025 1653 by Phuong Darden RN  Outcome: Progressing  7/17/2025 1038 by Phuong Darden RN  Outcome: Progressing     Problem: Pain  Goal: Free from opioid side effects throughout the shift  7/17/2025 1653 by Phuong Darden RN  Outcome: Progressing  7/17/2025 1038 by Phuong Darden RN  Outcome: Progressing     Problem: Pain  Goal: Free from acute confusion related to pain meds throughout the shift  7/17/2025 1653 by Phuong Darden RN  Outcome: Progressing  7/17/2025 1038 by Phuong Darden RN  Outcome: Progressing

## 2025-07-17 NOTE — PERIOPERATIVE NURSING NOTE
Pt awake, repositioned, baseline anxiety. Pt consistently reassured of spinal progression. Spinal inj site assessed, BRIAN, no hematoma or oozing seen.  Pt tolerating PO, VSS.

## 2025-07-17 NOTE — ANESTHESIA PROCEDURE NOTES
Spinal Block    Start time: 7/17/2025 7:30 AM  End time: 7/17/2025 7:35 AM  Reason for block: primary anesthetic  Staffing  Performed: CRNA   Authorized by: Hanane Holloway MD MPH    Performed by: RICHARD Nice    Preanesthetic Checklist  Completed: patient identified, IV checked, risks and benefits discussed, surgical consent, monitors and equipment checked, pre-op evaluation, timeout performed and sterile techniques followed  Block Timeout  RN/Licensed healthcare professional reads aloud to the Anesthesia provider and entire team: Patient identity, procedure with side and site, patient position, and as applicable the availability of implants/special equipment/special requirements.  Patient on coagulant treatment: yes (last dose taken 7/12/25)  Timeout performed at: 7/17/2025 7:24 AM  Spinal Block  Patient position: sitting  Prep: Betadine  Sterility prep: cap, drape, gloves, hand hygiene and mask  Sedation level: light sedation  Patient monitoring: blood pressure, continuous pulse oximetry and heart rate  Approach: midline  Vertebral space: L3-4  Injection technique: single-shot  Needle  Needle type: pencil-point   Needle gauge: 24 G  Needle length: 4 in  Free flowing CSF: yes    Assessment  Sensory level: T4 bilateral  Block outcome: Allis test negative  Procedure assessment: patient tolerated procedure well with no immediate complications

## 2025-07-17 NOTE — HH CARE COORDINATION
Home Care received a Referral for Physical Therapy and Occupational Therapy. We have processed the referral for a Start of Care on 7/19/25.     If you have any questions or concerns, please feel free to contact us at [phone]. Follow the prompts, enter your five digit zip code, and you will be directed to your care team on [facility].

## 2025-07-17 NOTE — ANESTHESIA PREPROCEDURE EVALUATION
Patient: Yaritza Bernal    Procedure Information       Date/Time: 07/17/25 0700    Procedure: Left Total Knee Arthroplasty, Robot-Assisted (Horace, SULEIMAN) ** Overnight ** (Left: Knee) - 23 hr obs    Location: [facility] / [facility]    Surgeons: Brant Lockhart, DO            Relevant Problems   Anesthesia (within normal limits)      Cardiac   (+) Benign essential hypertension   (+) Chest pain   (+) Hyperlipidemia   (+) Hypertension      Pulmonary   (+) Mild persistent asthma without complication (HHS-HCC)   (+) Panlobular emphysema (Multi)      Neuro   (+) Anxiety   (+) MDD (major depressive disorder), single episode   (+) Major depression in remission   (+) Polyneuropathy      GI (within normal limits)      /Renal (within normal limits)      Liver (within normal limits)      Endocrine   (+) Hypothyroid   (+) Morbid (severe) obesity due to excess calories (Multi)      Hematology   (+) Anticoagulant long-term use (Eliquis for h/o DVT, last dose 7/12)   (+) DVT (deep venous thrombosis) (Multi) (After femur surgery)   (+) Iron deficiency anemia secondary to inadequate dietary iron intake      Musculoskeletal   (+) Localized primary osteoarthritis of carpometacarpal joint of left thumb   (+) Primary osteoarthritis of left knee      HEENT   (+) Asymmetrical sensorineural hearing loss   (+) Sensorineural hearing loss of both ears      ID   (+) Viral URI with cough      Skin   (+) Eczema      GYN (within normal limits)     Past Surgical History:   Procedure Laterality Date    BLADDER SURGERY  10/21/2015    Bladder Surgery    CARDIAC CATHETERIZATION      CATARACT EXTRACTION W/  INTRAOCULAR LENS IMPLANT Right 06/06/2024    Dr. Rashid    CATARACT EXTRACTION W/  INTRAOCULAR LENS IMPLANT Left 07/20/2023    Dr. Rashid    COLONOSCOPY      EXTRACORPOREAL SHOCK WAVE LITHOTRIPSY      FEMUR FRACTURE SURGERY      KNEE SURGERY      LITHOTRIPSY  2015    VEIN LIGATION AND STRIPPING         Clinical information reviewed:   Tobacco   Allergies  Meds   Med Hx  Surg Hx  OB Status  Fam Hx  Soc   Hx        NPO Detail:  MN     Physical Exam    Airway  Mallampati: II  TM distance: >3 FB  Neck ROM: full  Mouth opening: 3 or more finger widths     Cardiovascular    Dental    Pulmonary    Abdominal          Lab Results   Component Value Date    WBC 5.5 06/13/2025    HGB 12.0 06/13/2025    HCT 40.1 06/13/2025    MCV 90 06/13/2025     06/13/2025       Chemistry    Lab Results   Component Value Date/Time     06/13/2025 1342    K 3.4 (L) 06/13/2025 1342     06/13/2025 1342    CO2 32 06/13/2025 1342    BUN 17 06/13/2025 1342    CREATININE 0.72 06/13/2025 1342    Lab Results   Component Value Date/Time    CALCIUM 8.7 06/13/2025 1342    ALKPHOS 79 06/13/2025 1342    AST 10 06/13/2025 1342    ALT 10 06/13/2025 1342    BILITOT 0.6 06/13/2025 1342            Anesthesia Plan    History of general anesthesia?: yes  History of complications of general anesthesia?: no    ASA 3     regional and spinal     The patient is not a current smoker.  Patient was not previously instructed to abstain from smoking on day of procedure.  Patient did not smoke on day of procedure.    intravenous induction   Postoperative pain plan includes opioids.  Anesthetic plan and risks discussed with patient.  Use of blood products discussed with patient who consented to blood products.    Plan discussed with CRNA and CAA.

## 2025-07-17 NOTE — CONSULTS
Inpatient consult to Medicine  Consult performed by: Sara Goldman MD  Consult ordered by: Brant Lockhart DO          Reason For Consult  Medical management of hypertension, hypothyroidism, hyperlipidemia, GERD, history of DVT, asthma.    History Of Present Illness  Yaritza Bernal is a 75 y.o. female who is postop day 0 status post left TKA secondary to osteoarthritis.  Patient underwent regional and spinal anesthesia for the procedure.  Patient complains of fatigue and states that she did not sleep at all last night.  She already did physical therapy and did well.  Pain is controlled at this time.  No other acute complaints.  Vital signs have been stable postoperatively.     Past Medical History  She has a past medical history of Adverse effect of anesthesia, Allergic rhinitis, Anxiety (2011), Arthritis (2010), Asthma, Bladder disorder, unspecified (10/21/2015), Breast cyst ([place]), Cataract, Clotting disorder (Multi), Cutaneous abscess, unspecified (03/16/2017), Disorder of the skin and subcutaneous tissue, unspecified (07/14/2016), Dry eyes, DVT (deep venous thrombosis) (Multi), Encounter for immunization (02/17/2017), Encounter for immunization (10/04/2016), Fatigue, Fracture of wrist, Fracture, femur (Multi), GERD (gastroesophageal reflux disease), Hernia, internal (2021), HL (hearing loss), Hyperlipidemia, Hypertension, Hypothyroidism, Impacted cerumen, bilateral (10/21/2015), Impaired fasting glucose (09/17/2018), Irritable bowel syndrome, Medial epicondylitis, right elbow (10/04/2016), Nephrolithiasis, Other conditions influencing health status (03/16/2017), Other specified abnormal findings of blood chemistry (10/04/2018), Other specified abnormal findings of blood chemistry (09/17/2018), Other specified symptoms and signs involving the circulatory and respiratory systems (09/28/2017), Pain in left hand (09/28/2017), Pain in left hip (10/04/2018), Pain in right hip (11/01/2017), Pain in right knee  (03/13/2017), Pain in unspecified knee (03/13/2017), Peripheral neuropathy, Personal history of other diseases of the circulatory system (05/09/2018), Personal history of other diseases of the circulatory system (06/27/2018), Personal history of other diseases of the circulatory system (01/18/2018), Personal history of other diseases of the circulatory system (08/17/2017), Personal history of other diseases of the nervous system and sense organs (09/17/2018), Personal history of other diseases of the nervous system and sense organs (10/20/2016), Personal history of other diseases of the respiratory system (03/09/2018), Personal history of other drug therapy (05/09/2018), Personal history of other endocrine, nutritional and metabolic disease (10/30/2017), Personal history of other mental and behavioral disorders (07/16/2018), Personal history of other specified conditions (06/27/2018), Shortness of breath (2020), Strain of muscle, fascia and tendon of the posterior muscle group at thigh level, unspecified thigh, initial encounter (01/15/2019), Strain of unspecified muscles, fascia and tendons at thigh level, left thigh, initial encounter (05/01/2018), Tinnitus, Unspecified injury of unspecified elbow, initial encounter (01/15/2019), and Venous insufficiency.    Surgical History  She has a past surgical history that includes Bladder surgery (10/21/2015); Vein ligation and stripping; Extracorporeal shock wave lithotripsy; Femur fracture surgery; Knee surgery; Cataract extraction w/  intraocular lens implant (Right, 06/06/2024); Cataract extraction w/  intraocular lens implant (Left, 07/20/2023); Cardiac catheterization; Colonoscopy; and Lithotripsy (2015).     Social History  She reports that she has never smoked. She has never used smokeless tobacco. She reports current alcohol use of about 2.0 standard drinks of alcohol per week. She reports that she does not use drugs.    Family History  Family History[1]      Allergies  Amoxicillin, Erythromycin, Lisinopril, and Sulfa (sulfonamide antibiotics)    Review of Systems   All other systems reviewed and are negative.       Physical Exam  Constitutional:       General: She is not in acute distress.     Appearance: Normal appearance.   HENT:      Head: Normocephalic and atraumatic.      Nose: Nose normal.      Mouth/Throat:      Mouth: Mucous membranes are moist.      Pharynx: Oropharynx is clear.     Cardiovascular:      Rate and Rhythm: Normal rate and regular rhythm.   Pulmonary:      Effort: Pulmonary effort is normal. No respiratory distress.      Breath sounds: Normal breath sounds.   Abdominal:      General: Bowel sounds are normal.      Palpations: Abdomen is soft.      Tenderness: There is no abdominal tenderness. There is no rebound.     Musculoskeletal:         General: No swelling, deformity or signs of injury.      Cervical back: Normal range of motion and neck supple.     Skin:     General: Skin is warm and dry.     Neurological:      General: No focal deficit present.      Mental Status: She is alert and oriented to person, place, and time. Mental status is at baseline.     Psychiatric:         Attention and Perception: Attention and perception normal.         Mood and Affect: Mood normal.         Behavior: Behavior normal.         Judgment: Judgment normal.          Last Recorded Vitals  /71   Pulse 61   Temp 36.4 °C (97.5 °F)   Resp 15   Wt 94.1 kg (207 lb 6.6 oz)   SpO2 96%     Relevant Results  XR knee left 1-2 views  Result Date: 7/17/2025  Interpreted By:  Chelsea Remy, STUDY: XR KNEE LEFT 1-2 VIEWS;  7/17/2025 9:13 am   INDICATION: Signs/Symptoms:Post-op knee, PACU.   COMPARISON: 12/30/2024   ACCESSION NUMBER(S): [id number]   ORDERING CLINICIAN: MICKY ELKINS   FINDINGS: There has been placement of left total knee arthroplasty. The hardware is intact. No acute fracture is seen. There is some expected air within adjacent soft tissues.    Palliative intramedullary anum and interlocking screws again noted in the distal femur.   No lytic or blastic lesions are seen.   No periosteal reaction is seen.       Status post left total knee arthroplasty, with expected postoperative findings.     MACRO: None   Signed by: Chelsea Remy 7/17/2025 9:22 AM Dictation workstation:   [id number]         Assessment/Plan     Left knee osteoarthritis  Management per primary  PT/OT  Pain control, bowel regimen  Antibiotic and DVT prophylaxis per primary  Hypertension  Stable  Continue bisoprolol, hold diuretic perioperatively  Hyperlipidemia  Continue statin  GERD  PPI  Hypothyroidism  Continue Synthroid  History of DVT  Continue Eliquis, per primary  Asthma  No acute exacerbation  Continue ICS-LABA  Albuterol nebs PRN    Sara Goldman MD             [1]   Family History  Problem Relation Name Age of Onset    Other (cardiac disorder) Mother jose cardenas     Cancer Mother jose cardenas     Hypertension Mother jose cardenas     Migraines Mother jose cardenas     Rashes / Skin problems Mother jose cardenas     Arthritis Mother jose cardenas     Other (emphysema lung) Father monika     Asthma Father monika     Breast cancer Neg Hx

## 2025-07-17 NOTE — PERIOPERATIVE NURSING NOTE
Pt meets criteria for RNF, attempted report, no answer. Pt and family updated. Belongings returned to bedside. Pt removed from monitors.

## 2025-07-17 NOTE — NURSING NOTE
Patient arrived to room 204 from PACU. She was able to transfer herself from the cart to the bed in the room.  Call light within reach, continue to monitor.

## 2025-07-17 NOTE — ANESTHESIA POSTPROCEDURE EVALUATION
Patient: Yaritza Bernal    Procedure Summary       Date: 07/17/25 Room / Location: [facility] / [facility]    Anesthesia Start: 0723 Anesthesia Stop: 0857    Procedure: Left Total Knee Arthroplasty, Robot-Assisted (SULEIMAN Gann) ** Overnight ** (Left: Knee) Diagnosis:       Primary osteoarthritis of left knee      (Primary osteoarthritis of left knee [M17.12])    Surgeons: Brant Lockhart DO Responsible Provider: Hanane Holloway MD MPH    Anesthesia Type: regional, spinal ASA Status: 3            Anesthesia Type: regional, spinal    Vitals Value Taken Time   /75 07/17/25 09:07   Temp 36.4 °C (97.5 °F) 07/17/25 08:52   Pulse 59 07/17/25 09:07   Resp 14 07/17/25 09:07   SpO2 97 % 07/17/25 09:07       Anesthesia Post Evaluation    Patient location during evaluation: PACU  Patient participation: complete - patient participated  Level of consciousness: awake and alert  Pain score: 0  Pain management: adequate  Multimodal analgesia pain management approach  Airway patency: patent  Two or more strategies used to mitigate risk of obstructive sleep apnea  Cardiovascular status: acceptable  Respiratory status: acceptable  Hydration status: acceptable  Postoperative Nausea and Vomiting: none        There were no known notable events for this encounter.

## 2025-07-17 NOTE — PROGRESS NOTES
75 yr old female admitted extended recovery following left knee replacement with Dr. Lockhart.  Plan is home tomorrow with  Home Care PT. SOC pending confirmation for 7/19/25.  Post op follow up on Tuesday Aug 12, 2025 12:00 PM-[place].  Spouse to transport home and assist with care as needed.      07/17/25 1330   Discharge Planning   Living Arrangements Spouse/significant other   Support Systems Spouse/significant other;Friends/neighbors   Assistance Needed transportation   Type of Residence Private residence   Do you have animals or pets at home? No   Who is requesting discharge planning? Provider   Home or Post Acute Services In home services   Type of Home Care Services Home PT   Expected Discharge Disposition Home H  ( Home Care)   Does the patient need discharge transport arranged? No   Financial Resource Strain   How hard is it for you to pay for the very basics like food, housing, medical care, and heating? Not hard   Housing Stability   In the last 12 months, was there a time when you were not able to pay the mortgage or rent on time? N   In the past 12 months, how many times have you moved where you were living? 0   At any time in the past 12 months, were you homeless or living in a shelter (including now)? N   Transportation Needs   In the past 12 months, has lack of transportation kept you from medical appointments or from getting medications? no   In the past 12 months, has lack of transportation kept you from meetings, work, or from getting things needed for daily living? No   Patient Choice   Provider Choice list and CMS website (https://medicare.gov/care-compare#search) for post-acute Quality and Resource Measure Data were provided and reviewed with: Patient   Patient / Family choosing to utilize agency / facility established prior to hospitalization No   Stroke Family Assessment   Stroke Family Assessment Needed No

## 2025-07-17 NOTE — NURSING NOTE
Nurse to nurse report received from SUZY Baca.  Assumed care of patient.  Medications, labs, and orders reviewed. Patient asleep in bed with HOB elevated and knee straight. Vitals are stable.  SCDs on.  IVF infusing as ordered.  Whiteboard updated.  Call light and possessions within reach.  Bed alarm on.

## 2025-07-17 NOTE — PERIOPERATIVE NURSING NOTE
No RTM, registration called, requested pt to be moved assigned room.  Pt tx'd to 2nd floor via cart, accompanied by PACU RN.

## 2025-07-17 NOTE — CARE PLAN
Patient currently working with Physical Therapy; physician and nursing walking in to see patient; unable to instruct Incentive Spirometry with patient at this time.

## 2025-07-17 NOTE — PROGRESS NOTES
75 yr old female admitted extended recovery following left knee replacement with Dr. Lockhart.  Plan is home tomorrow with  Home Care PT. SOC pending confirmation for 7/19/25.  Post op follow up on Tuesday Aug 12, 2025 12:00 PM-[place].  Spouse to transport home and assist with care as needed.

## 2025-07-17 NOTE — ANESTHESIA PROCEDURE NOTES
Peripheral Block    Patient location during procedure: pre-op  Medication administered at: 7/17/2025 7:18 AM  End time: 7/17/2025 7:20 AM  Reason for block: at surgeon's request and post-op pain management  Staffing  Performed: attending   Authorized by: Hanane Holloway MD MPH    Performed by: Hanane Holloway MD MPH  Preanesthetic Checklist  Completed: patient identified, IV checked, site marked, risks and benefits discussed, surgical consent, monitors and equipment checked, pre-op evaluation and timeout performed   Timeout performed at: 7/17/2025 7:13 AM  Peripheral Block  Patient position: sitting  Prep: ChloraPrep  Patient monitoring: heart rate  Block type: other (I PACK)  Laterality: left  Injection technique: single-shot  Guidance: ultrasound guided  Local infiltration: ropivacaine  Infiltration strength: 0.5 %  Dose: 10 mL  Needle  Needle gauge: 21 G  Needle length: 10 cm  Needle localization: ultrasound guidance       image stored in chart  Assessment  Injection assessment: negative aspiration for heme, no paresthesia on injection, incremental injection and local visualized surrounding nerve on ultrasound  Slow fractionated injection: yes  Medications Administered  dexAMETHasone (Decadron) injection - perineural injection   4 mg - 7/17/2025 7:18:00 AM

## 2025-07-17 NOTE — OP NOTE
left TOTAL KNEE ARTHROPLASTY     Date: 2025   OR Location: [facility]    Name: Yaritza Bernal  : 1950    MRN: [id number]    Diagnosis  Pre-op Diagnosis      * Primary osteoarthritis of left knee [M17.12]  Post-op Diagnosis     * Primary osteoarthritis of left knee [M17.12]      Procedures  Left Total Knee Arthroplasty, Robot-Assisted (Sanjuanita, SULEIMAN) ** Overnight **  [id number] - WI ARTHRP KNE CONDYLE&PLATU MEDIAL&LAT COMPARTMENTS      Surgeons      * Brant Lockhart - Primary     Specimen: none    Staff: Circulator: Chelle Kline RN  Scrub Person: Genia Lambert SA; MITA Giraldo; Edwige Billings     Drains and/or Catheters: none    Estimated Blood Loss: 75 cc    Resident/Fellow/Other Assistant:  Surgeons and Role:  * No surgeons found with a matching role *     Procedure Summary  Anesthesia: Spinal    Anesthesia Staff: Anesthesiologist: Hanane Holloway MD MPH  CRNA: GOKUL Nice-CRNA   ASA: III     Tourniquet Time: not used    Intra-op Medications:   - 1 Gram Tranexamic acid prior to surgical incision  - 1 Gram Tranexamic acid at start of incision close  - JELENA Pain cockail: ropivacaine-epinephrine-clonidine-ketorolac 2.46-0.005- 0.0008-0.3mg/mL periarticular syringe: 50 cc    IMPLANTS:   Implant Name Type Inv. Item Serial No.  Lot No. LRB No. Used Action   PATELLA, TRIATHLON, ASYMMETRIC,  SIZE A32 - [id number] Joint PATELLA, TRIATHLON, ASYMMETRIC,  SIZE A32  [employer] [id number] Left 1 Implanted   BASEPLATE, TRIATHLON TRITANIUM, SIZE 5, 49MM - [id number] Joint BASEPLATE, TRIATHLON TRITANIUM, SIZE 5, 49MM  [employer] [facility] [id number] Left 1 Implanted   INSERT, TIBIAL, X3 TRIATHLON CS, SZ-5 9MM - [id number] Joint INSERT, TIBIAL, X3 TRIATHLON CS, SZ-5 9MM  [employer] [id number] Left 1 Implanted   COMPONENT, CR FEMORAL, P5, BEADED W/PA, LEFT - [id number] Joint COMPONENT, CR FEMORAL, P5, BEADED W/PA, LEFT  [employer]  [id number] Left 1 Implanted       Findings: See operative note    Operative Indications:  The patient  is well-known to me and initially presented as an outpatient. They have been treated for advanced knee osteoarthritis with therapy, anti-inflammatories, and activity modification, all without improvement of symptoms. We therefore reviewed the alternative option of elective total knee arthroplasty. The risks and benefits of this procedure were discussed in detail as an outpatient and are documented in our outpatient record. The patient expressed full understanding and wished to proceed. Informed consent was obtained and was placed on the medical chart    The risks, benefits and potential complications of the arthroplasty surgery were discussed with the patient in detail.  Risks including but not limited to the risk of anesthesia, DVT, pulmonary embolism with the possibility of death, retained infection, and neurovascular injury.  Specific details of the procedure, hospitalization, recovery, rehabilitation, and long-term precautions were also provided. Pre-operative teaching was provided. Implant/prosthesis selection was outlined, and the many options available were explained; the final choice will be made at the time of the procedure to match the anatomy and condition of the bone, ligaments, tendons, and muscles. Understanding of all topics was conveyed to me by the patient, and consent was given to proceed with a total knee arthroplasty.     The use of robotic assisted surgery was discussed with the patient.  The risk, benefits were discussed regarding this.  Patient consented for this procedure.  We discussed specifically placement of pins and arrays for navigation during surgery and to help with the robotic assistance.  We discussed the use of an additional bandage to cover the pin sites.  We also reviewed that they may have some slight pain at the placement of pin sites that should improve in the same  fashion as their general recovery of the joint replacement.    We discussed the term robot, when used to describe the SULEIMAN system, refers to the [employer] robotic arm. The SULEIMAN System is not a robot, but a surgeon-controlled robotic-arm assisted device.    Procedure In Detail:  The patient was identified in the preoperative area .Their knee was then marked by myself and the patient agreed to proceed with the outlined procedure. Preoperative SULEIMAN plan was reviewed with [facility] . They were transferred to the operating room and positioned supine on the OR table. Appropriate surgical huddle was performed with myself present. Anesthesia was then successfully induced. The operative lower extremity was then prepped and draped in the normal sterile fashion. A critical pause was taken and we identified the patient, the site of surgery, as well as the administration of preoperative IV antibiotics.     With the knee in 90-degrees of flexion an anterior midline incision was made. A standard medial parapatellar arthrotomy was then made and the knee was extended. A provisional soft tissue medial release was performed to the posterior medial corner. The patellar fat pad was excised and the patella was everted and controlled with two towel clips. The thickness was measured with a caliper and a freehand measured resection was then performed of the articular surface. The patella was then appropriately sized. Peg holes were drilled and a trial was inserted. Restoration of pre-resection thickness was noted. A protective plate was then applied to the patella and it was subluxed into the lateral gutter. The knee was flexed to 90-degrees and retractors were inserted. The ACL and lateral meniscus were released. Tibial and femoral checkpoints were placed for the SULEIMAN system. Tibial and femoral arrays were placed with the utilization of 3.2mm pins in an intra-incisional fashion.     At this point, utilizing the SULEIMAN system the hip  center was established. The medial and lateral malleoli were then established and tracker pin in tibia and femur was confirmed. The femur and tibia were tracked at this point utilizing the sharp blue probe. Secondary check was also performed with accuracy noted to be within under 1 mm. Once confirmed we moved to the planning stage.  Native alignment was captured at this point as well. Any osteophytes that were readily accessible were removed after this and prior to the balancing stage. At this point we turned to the balancing portion of the procedure. A manual stress was placed to capture poses at 10 degrees of extension to assess medial and lateral laxity of collateral ligaments. Following this the knee was brought into flexion and the medial and lateral compartments were stressed independently to assess laxity in flexion.     Once poses were captures we assessed the plan on the RACHEL system. Appropriate adjustments were made to the femur and tibia to allow restoration of alignments and a knee that was well balanced in flexion and extension. After we were satisfied with the reconstruction we turned to the cutting portion of the cases. Retractors were placed in a self-retaining fashion to protect the MCL and LCL. The RACHEL cutting arm was then brought into place. We performed the following sequence of cuts: Tibia, anterior femur, anterior chamfer, posterior femur. The blade was then switched to the down cutting blade and the distal femur and posterior chamfer cuts were made. Once this was done, cut bones were removed. Utilizing the  “x” distal femur cut guide we were able to restore limb axis to under one degree of the preoperative plan and our depth of resection to one millimeter of our preoperative plan. Satisfied with this the rachel robot was then removed.     Lamina  was inserted into the joint and the remainder of menisci as well as any posterior osteophytes were excised. Pain cocktail was injected in  posterior capsule. At this point a tibial baseplate and a trial liner was placed in a “float” fashion. The knee was then flexed up and the femur was delivered into the wound and a trial femoral component was placed.  With the trials in the knee was then noted to come to full extension and flexed to greater than 120 degrees. The patella tracked centrally throughout the range of motion. Utilizing the Ze[employer] software we used poly trial to allow for no more than 1.5 mm of laxity in the medial and lateral compartments throughout a range of motion. The knee was then brought into extension and the tibial rotation was marked with a bovie on the tibia to allow the rotation to match the shape of the femur.  At this point satisfied with our reconstruction trackers and pins from tibia and femur were removed.    The knee was then flexed back up and the lug holes were drilled for a pressfit femur. The femoral trial was then removed. The tibia was then exposed. Trial tibia was pinned in place in line with our jessica for the tibial tray rotation.  Tibial tray was noted to fit well without any overhang. Femoral and polyethylene trials were inserted and the knee was extended. Satisfied with this reconstruction the tibia was then broached and milled in appropriate fashion. All the trials were removed and the bony surfaces were lavaged with normal saline. Final components were then press-fit in place. They were noted to be fully seated and stable. The knee was then thoroughly lavaged and the final tibial insert was placed. An anesthetic injection cocktail was infiltrated throughout the soft tissues. The arthrotomy was then repaired with #1 barbed suture. Subcutaneous tissues were closed in layers and finally with monocryl. Skin glue was applied.. Sterile dressing was applied and the patient was then transferred to the PACU in stable condition. All counts were correct at the end of the case.     Complications:  None; patient tolerated the  procedure well.    Disposition: PACU - hemodynamically stable.  Condition: stable      Plan:                         Weight Bearing Status:  WBAT Bilateral LE                        Range of Motion: As tolerated                        Bonilla: none placed                        Dressing: Maintain for one week                        DVT Prophylaxis:  ASA 81mg BID x 4 weeks                         Antibiotics: Continue x24 hours reji-operatively                         Discharge/Follow-up: Follow up in clinic in two weeks      Brant Lockhart DO  Attending Surgeon  Joint Replacement and Adult Reconstructive Surgery  [place], [place]      Attending Attestation: I was present and scrubbed for the entire procedure.    This note was dictated using speech recognition software and was not corrected for spelling or grammatical errors

## 2025-07-17 NOTE — PROGRESS NOTES
"Physical Therapy    Physical Therapy Evaluation & Treatment    Patient Name: Yaritza Bernal  MRN: [id number]  Department: [facility]  Room: [address] [place] [address]  Today's Date: 7/17/2025   Time Calculation  Start Time: 1020  Stop Time: 1110  Time Calculation (min): 50 min    Assessment/Plan   PT Assessment  PT Assessment Results: Decreased strength, Decreased range of motion, Decreased endurance, Impaired balance, Decreased mobility, Decreased coordination, Decreased safety awareness, Obesity, Orthopedic restrictions  Rehab Prognosis: Good  Barriers to Discharge Home: Physical needs  Physical Needs: Intermittent mobility assistance needed, Intermittent ADL assistance needed  Evaluation/Treatment Tolerance: Patient tolerated treatment well  Medical Staff Made Aware: Yes  Strengths: Attitude of self, Living arrangement secure  Barriers to Participation: Comorbidities  End of Session Communication: Bedside nurse  Assessment Comment: pt required close supervision to mod assist of 1 for the above mobility; Pt demonstrated decreased strength, AROM left knee, endurance, mobility ease and safety as compared to baseline. Pt should progress well enough for safe d/c home with assist from friends as needed, use of FWW and low int rehab ( PT discussed with pt \" calling in some favors with friends\" in order to have increased assistance available at home for a few days upon d/c.  End of Session Patient Position: Bed, 3 rail up, Alarm on (friend present; All needs in reach)   IP OR SWING BED PT PLAN  Inpatient or Swing Bed: Inpatient  PT Plan  Treatment/Interventions: Bed mobility, Transfer training, Gait training, Stair training, Balance training, Endurance training, Range of motion, Therapeutic exercise, Therapeutic activity, Home exercise program  PT Plan: Ongoing PT  PT Frequency: BID  PT Discharge Recommendations: Low intensity level of continued care  Equipment Recommended upon Discharge: Wheeled walker  PT Recommended Transfer Status: Assist " "x1, Assistive device  PT - OK to Discharge: Yes      Subjective     PT Visit Info:  PT Received On: 07/17/25  General Visit Information:  General  Reason for Referral: impaired mobility; s/p left TKR  Referred By: Dr Brant Lockhart  Past Medical History Relevant to Rehab: HTN, essential tremors, polyneuropathy, asthma, OA, , left femur fx/ IM rodding, (DVT) on Eliquis  Family/Caregiver Present: Yes  Caregiver Feedback: friend \" Toan\"  Prior to Session Communication: Bedside nurse  Patient Position Received: Bed, 3 rail up, Alarm on  Preferred Learning Style: verbal, visual  General Comment: 74 yo WF admitted [place] [facility] [place] for elective left TKR; Cleared by nurse for therapy; pt agreeable to therapy  Home Living:  Home Living  Type of Home: House  Lives With: Alone  Home Adaptive Equipment: Walker rolling or standard, Other (Comment) (rollator; 2 walking sticks)  Home Layout: Two level, Able to live on main level with bedroom/bathroom  Home Access: Stairs to enter with rails  Entrance Stairs-Rails: Both  Entrance Stairs-Number of Steps: 3  Bathroom Shower/Tub: Tub/shower unit  Bathroom Toilet: Adaptive toilet seating  Bathroom Equipment: Grab bars in shower  Prior Level of Function:  Prior Function Per Pt/Caregiver Report  Level of [place]: Independent with ADLs and functional transfers, Independent with homemaking with ambulation  Receives Help From: Friends  ADL Assistance: Independent  Homemaking Assistance: Independent (uses laundromat)  Ambulatory Assistance: Independent (with FWW vs rollator vs 2 walking sticks)  Vocational: Part time employment (works at [employer] as a  30 hrs/week)  Prior Function Comments: pt admits to 2 falls in past 6 mos; independently manages her own meds  Precautions:  Precautions  Hearing/Visual Limitations: reading glasses  LE Weight Bearing Status: Weight Bearing as Tolerated (left LE)  Medical Precautions: Fall precautions  Post-Surgical Precautions: Left total knee " precautions  Precautions Comment: PT educated  pt in post-op left TKR precautions     Date/Time Vitals Session Patient Position Pulse Resp SpO2 BP MAP (mmHg)    07/17/25 1020 During PT  --  --  --  --  --  --    07/17/25 1120 --  --  --  --  98 %  --  --     Vital Signs Comment: O2 sat 95 to 97% with HR 67 to 80 bpm----room air    Objective   Pain:  Pain Assessment  Pain Assessment: 0-10  0-10 (Numeric) Pain Score: 2  Pain Type: Surgical pain  Pain Location: Knee  Pain Orientation: Left  Pain Interventions: Repositioned, Cold applied  Response to Interventions: Decrease in pain  Cognition:  Cognition  Overall Cognitive Status: Within Functional Limits  Orientation Level: Oriented X4  Safety/Judgement:  (decreased safety insight during functional mobility)  Insight: Mild  Impulsive: Mildly    General Assessments:  General Observation  General Observation: pleasant, cooperative, left anterior knee minimal dressing/ace wrap clean, dry               Activity Tolerance  Endurance: Decreased tolerance for upright activites  Activity Tolerance Comments: fair +    Sensation  Sensation Comment: decreased sensation right anterior knee ? due to surgical block    Strength  Strength Comments: right hip 3+/5, knee 4-/5, ankle 4-/5; left hip 3-/5, knee 3/5, ankle 3+/5  Coordination  Movements are Fluid and Coordinated: No  Upper Body Coordination: + essential tramors right > left hands  Lower Body Coordination: decreased timing/accuracy of left LE grossly due to TKR    Postural Control  Posture Comment: obese with mild forward head, protracted shldrs    Static Sitting Balance  Static Sitting-Balance Support: Feet supported  Static Sitting-Level of Assistance: Close supervision  Static Sitting-Comment/Number of Minutes: good  Dynamic Sitting Balance  Dynamic Sitting-Balance Support: Feet supported  Dynamic Sitting-Level of Assistance: Contact guard  Dynamic Sitting-Comments: good -    Static Standing Balance  Static  Standing-Balance Support: Bilateral upper extremity supported  Static Standing-Level of Assistance: Contact guard  Static Standing-Comment/Number of Minutes: FWW, fair +  Dynamic Standing Balance  Dynamic Standing-Balance Support: Bilateral upper extremity supported  Dynamic Standing-Level of Assistance: Minimum assistance  Dynamic Standing-Comments: FWW, fair  Functional Assessments:  Bed Mobility  Bed Mobility: Yes  Bed Mobility 1  Bed Mobility 1: Supine to sitting  Level of Assistance 1: Close supervision  Bed Mobility Comments 1: head of bed elevated  Bed Mobility 2  Bed Mobility  2: Sitting to supine  Level of Assistance 2: Close supervision  Bed Mobility Comments 2: head of bed elevated; mild to mod difficulty elevating left LE onto bed    Transfers  Transfer: Yes  Transfer 1  Transfer From 1: Bed to  Transfer to 1: Stand  Technique 1: Sit to stand  Transfer Device 1: Walker  Transfer Level of Assistance 1: Minimum assistance, Minimal verbal cues  Trials/Comments 1: bed height elevated approx 1-2 inches; Patient initially attempted transfer with both hands on bed---unable to perform and appeared somewhat self limiting' pt then stood bith both hands on arms of stabilized FWW with min assist of 1, verbal cues for left foot placed slightly forward on floor  Transfers 2  Transfer From 2: Stand to  Transfer to 2: Bed  Technique 2: Stand to sit  Transfer Device 2: Walker  Transfer Level of Assistance 2: Minimum assistance, Moderate assistance, Minimal verbal cues  Trials/Comments 2: min to mod assist of 1 for trunk down, verbal cues for reaching back with both hands for bed, left foot placed forward on floor    Ambulation/Gait Training  Ambulation/Gait Training Performed: Yes  Ambulation/Gait Training 1  Surface 1: Level tile  Device 1: Rolling walker  Assistance 1: Minimum assistance, Minimal verbal cues  Quality of Gait 1: Diminished heel strike, Narrow base of support, Decreased step length, Soft knee(s) (left  knee mild genu recurvatum with right LE advancement)  Comments/Distance (ft) 1: 6 steps forward/retro x 2 trials, 8 lateral steps to right, verbal cues for step-too sequencing for safety at this time    Stairs  Stairs: No  Extremity/Trunk Assessments:  Cervical Spine   Cervical Spine:  (mild forward head)  RLE   RLE :  (see above strength comments)  LLE   LLE :  (see above strength comments)  Treatments:  Therapeutic Exercise  Therapeutic Exercise Performed: Yes  Therapeutic Exercise Activity 1: supine james AP, QS, GS x 10 reps each  Therapeutic Exercise Activity 2: supine left heel slides x 6 reps  Therapeutic Exercise Activity 3: supine left SAQ's x 6 reps  Therapeutic Exercise Activity 4: supine left LE min assisted SLR x 5 reps  Therapeutic Exercise Activity 5: supine left hip abd x 8 reps  Therapeutic Exercise Activity 6: seated left LAQ's x 7 reps    Therapeutic Activity  Therapeutic Activity Performed: Yes (see bed mobility, transfers, amb with FWW)  Outcome Measures:  [facility] Basic Mobility  Turning from your back to your side while in a flat bed without using bedrails: A little  Moving from lying on your back to sitting on the side of a flat bed without using bedrails: A little  Moving to and from bed to chair (including a wheelchair): A little  Standing up from a chair using your arms (e.g. wheelchair or bedside chair): A lot  To walk in hospital room: A little  Climbing 3-5 steps with railing: A lot  Basic Mobility - Total Score: 16    Encounter Problems       Encounter Problems (Active)       Balance       STG - Maintains dynamic standing balance with upper extremity support (Progressing)       Start:  07/17/25    Expected End:  07/24/25       INTERVENTIONS:  1. Practice standing with minimal support.  2. Educate patient about standing tolerance.  3. Educate patient about independence with gait, transfers, and ADL's.  4. Educate patient about use of assistive device.  5. Educate patient about self-directed  care.            Mobility       STG - Patient will ambulate 100 ft, FWW, + turns, mod ind (Progressing)       Start:  07/17/25    Expected End:  07/24/25            pt ascends/descends 3 steps, 2 rails, nonreciprocally with close supervision of 1 (Progressing)       Start:  07/17/25    Expected End:  07/24/25               PT Transfers       STG - Patient to transfer to and from sit to supine mod ind (Progressing)       Start:  07/17/25    Expected End:  07/24/25            STG - Patient will transfer sit to and from stand mod ind (Progressing)       Start:  07/17/25    Expected End:  07/24/25               Safety       pt able to verbalize 3 out of 3 post-op TKR precautions without verbal reminders (Progressing)       Start:  07/17/25    Expected End:  07/24/25                   Education Documentation  Precautions, taught by Alisa Rodgers PT at 7/17/2025 11:45 AM.  Learner: Patient  Readiness: Acceptance  Method: Explanation, Demonstration  Response: Needs Reinforcement  Comment: PT educated pt in safe transfer technique and post-op TKR precautions    Mobility Training, taught by Alisa Rodgers PT at 7/17/2025 11:45 AM.  Learner: Patient  Readiness: Acceptance  Method: Explanation, Demonstration  Response: Needs Reinforcement  Comment: PT educated pt in safe transfer technique and post-op TKR precautions    Education Comments  No comments found.

## 2025-07-17 NOTE — ANESTHESIA PROCEDURE NOTES
Peripheral Block    Patient location during procedure: pre-op  Medication administered at: 7/17/2025 7:16 AM  End time: 7/17/2025 7:17 AM  Reason for block: at surgeon's request and post-op pain management  Staffing  Performed: attending   Authorized by: Hanane Holloway MD MPH    Performed by: Hanane Holloway MD MPH  Preanesthetic Checklist  Completed: patient identified, IV checked, site marked, risks and benefits discussed, surgical consent, monitors and equipment checked, pre-op evaluation and timeout performed   Timeout performed at: 7/17/2025 7:13 AM  Peripheral Block  Patient position: sitting  Prep: ChloraPrep  Patient monitoring: heart rate  Block type: adductor canal  Laterality: left  Injection technique: single-shot  Guidance: ultrasound guided  Local infiltration: ropivacaine  Infiltration strength: 0.5 %  Dose: 20 mL  Needle  Needle gauge: 21 G  Needle length: 10 cm  Needle localization: ultrasound guidance       image stored in chart  Assessment  Injection assessment: negative aspiration for heme, no paresthesia on injection, incremental injection and local visualized surrounding nerve on ultrasound  Slow fractionated injection: yes  Medications Administered  dexAMETHasone (Decadron) injection - perineural injection   6 mg - 7/17/2025 7:16:00 AM

## 2025-07-18 ENCOUNTER — CLINICAL SUPPORT (OUTPATIENT)
Dept: INPATIENT UNIT | Facility: HOSPITAL | Age: 75
End: 2025-07-18
Payer: MEDICARE

## 2025-07-18 VITALS
HEART RATE: 67 BPM | RESPIRATION RATE: 16 BRPM | DIASTOLIC BLOOD PRESSURE: 76 MMHG | WEIGHT: 207.41 LBS | SYSTOLIC BLOOD PRESSURE: 135 MMHG | TEMPERATURE: 98.8 F | HEIGHT: 70 IN | OXYGEN SATURATION: 98 % | BODY MASS INDEX: 29.69 KG/M2

## 2025-07-18 LAB
ANION GAP SERPL CALC-SCNC: 13 MMOL/L (ref 10–20)
BUN SERPL-MCNC: 23 MG/DL (ref 6–23)
CALCIUM SERPL-MCNC: 8.3 MG/DL (ref 8.6–10.3)
CHLORIDE SERPL-SCNC: 106 MMOL/L (ref 98–107)
CO2 SERPL-SCNC: 25 MMOL/L (ref 21–32)
CREAT SERPL-MCNC: 0.62 MG/DL (ref 0.5–1.05)
EGFRCR SERPLBLD CKD-EPI 2021: >90 ML/MIN/1.73M*2
ERYTHROCYTE [DISTWIDTH] IN BLOOD BY AUTOMATED COUNT: 13 % (ref 11.5–14.5)
GLUCOSE SERPL-MCNC: 134 MG/DL (ref 74–99)
HCT VFR BLD AUTO: 33.5 % (ref 36–46)
HGB BLD-MCNC: 10.5 G/DL (ref 12–16)
MCH RBC QN AUTO: 26.6 PG (ref 26–34)
MCHC RBC AUTO-ENTMCNC: 31.3 G/DL (ref 32–36)
MCV RBC AUTO: 85 FL (ref 80–100)
NRBC BLD-RTO: ABNORMAL /100{WBCS}
PLATELET # BLD AUTO: 257 X10*3/UL (ref 150–450)
POTASSIUM SERPL-SCNC: 3.5 MMOL/L (ref 3.5–5.3)
RBC # BLD AUTO: 3.95 X10*6/UL (ref 4–5.2)
SODIUM SERPL-SCNC: 140 MMOL/L (ref 136–145)
WBC # BLD AUTO: 10.9 X10*3/UL (ref 4.4–11.3)

## 2025-07-18 PROCEDURE — 80048 BASIC METABOLIC PNL TOTAL CA: CPT | Performed by: STUDENT IN AN ORGANIZED HEALTH CARE EDUCATION/TRAINING PROGRAM

## 2025-07-18 PROCEDURE — 93005 ELECTROCARDIOGRAM TRACING: CPT

## 2025-07-18 PROCEDURE — 7100000011 HC EXTENDED STAY RECOVERY HOURLY - NURSING UNIT

## 2025-07-18 PROCEDURE — 2500000001 HC RX 250 WO HCPCS SELF ADMINISTERED DRUGS (ALT 637 FOR MEDICARE OP): Performed by: ORTHOPAEDIC SURGERY

## 2025-07-18 PROCEDURE — 99232 SBSQ HOSP IP/OBS MODERATE 35: CPT | Performed by: STUDENT IN AN ORGANIZED HEALTH CARE EDUCATION/TRAINING PROGRAM

## 2025-07-18 PROCEDURE — 2500000004 HC RX 250 GENERAL PHARMACY W/ HCPCS (ALT 636 FOR OP/ED): Mod: JZ | Performed by: ORTHOPAEDIC SURGERY

## 2025-07-18 PROCEDURE — 36415 COLL VENOUS BLD VENIPUNCTURE: CPT | Performed by: STUDENT IN AN ORGANIZED HEALTH CARE EDUCATION/TRAINING PROGRAM

## 2025-07-18 PROCEDURE — 97110 THERAPEUTIC EXERCISES: CPT | Mod: GP

## 2025-07-18 PROCEDURE — 97116 GAIT TRAINING THERAPY: CPT | Mod: GP

## 2025-07-18 PROCEDURE — 2500000001 HC RX 250 WO HCPCS SELF ADMINISTERED DRUGS (ALT 637 FOR MEDICARE OP): Performed by: STUDENT IN AN ORGANIZED HEALTH CARE EDUCATION/TRAINING PROGRAM

## 2025-07-18 PROCEDURE — 97530 THERAPEUTIC ACTIVITIES: CPT | Mod: GP

## 2025-07-18 PROCEDURE — 85027 COMPLETE CBC AUTOMATED: CPT | Performed by: STUDENT IN AN ORGANIZED HEALTH CARE EDUCATION/TRAINING PROGRAM

## 2025-07-18 RX ADMIN — PANTOPRAZOLE SODIUM 40 MG: 40 TABLET, DELAYED RELEASE ORAL at 06:51

## 2025-07-18 RX ADMIN — GABAPENTIN 600 MG: 300 CAPSULE ORAL at 08:19

## 2025-07-18 RX ADMIN — ACETAMINOPHEN 650 MG: 325 TABLET ORAL at 11:54

## 2025-07-18 RX ADMIN — CITALOPRAM HYDROBROMIDE 20 MG: 10 TABLET ORAL at 08:17

## 2025-07-18 RX ADMIN — APIXABAN 2.5 MG: 2.5 TABLET, FILM COATED ORAL at 08:21

## 2025-07-18 RX ADMIN — SENNOSIDES 17.2 MG: 8.6 TABLET, FILM COATED ORAL at 08:20

## 2025-07-18 RX ADMIN — ACETAMINOPHEN 650 MG: 325 TABLET ORAL at 05:26

## 2025-07-18 RX ADMIN — KETOROLAC TROMETHAMINE 15 MG: 15 INJECTION, SOLUTION INTRAMUSCULAR; INTRAVENOUS at 05:26

## 2025-07-18 ASSESSMENT — COGNITIVE AND FUNCTIONAL STATUS - GENERAL
TURNING FROM BACK TO SIDE WHILE IN FLAT BAD: A LITTLE
MOVING FROM LYING ON BACK TO SITTING ON SIDE OF FLAT BED WITH BEDRAILS: A LITTLE
TURNING FROM BACK TO SIDE WHILE IN FLAT BAD: A LITTLE
TOILETING: A LITTLE
WALKING IN HOSPITAL ROOM: A LITTLE
MOBILITY SCORE: 19
MOBILITY SCORE: 18
STANDING UP FROM CHAIR USING ARMS: A LITTLE
STANDING UP FROM CHAIR USING ARMS: A LITTLE
DAILY ACTIVITIY SCORE: 20
MOVING TO AND FROM BED TO CHAIR: A LITTLE
CLIMB 3 TO 5 STEPS WITH RAILING: A LITTLE
MOVING TO AND FROM BED TO CHAIR: A LITTLE
DRESSING REGULAR LOWER BODY CLOTHING: A LITTLE
WALKING IN HOSPITAL ROOM: A LITTLE
HELP NEEDED FOR BATHING: A LITTLE
CLIMB 3 TO 5 STEPS WITH RAILING: A LITTLE
DRESSING REGULAR UPPER BODY CLOTHING: A LITTLE

## 2025-07-18 ASSESSMENT — PAIN DESCRIPTION - ORIENTATION: ORIENTATION: LEFT

## 2025-07-18 ASSESSMENT — PAIN SCALES - GENERAL
PAINLEVEL_OUTOF10: 0 - NO PAIN
PAINLEVEL_OUTOF10: 2
PAINLEVEL_OUTOF10: 0 - NO PAIN

## 2025-07-18 ASSESSMENT — PAIN - FUNCTIONAL ASSESSMENT
PAIN_FUNCTIONAL_ASSESSMENT: 0-10

## 2025-07-18 ASSESSMENT — PAIN DESCRIPTION - LOCATION: LOCATION: KNEE

## 2025-07-18 ASSESSMENT — PAIN DESCRIPTION - DESCRIPTORS
DESCRIPTORS: ACHING
DESCRIPTORS: ACHING

## 2025-07-18 NOTE — NURSING NOTE
Patient asleep in bed with knee straight and HOB elevated. Vitals are stable.  SCDs on.  Call light and possessions within reach.  Bed alarm on.

## 2025-07-18 NOTE — NURSING NOTE
Rounded on patient.  Patient asleep in bed with HOB elevated and knee straight.  Patient awakened easily to voice.  Vitals are stable. Patient ambulated to bathroom with assist x 1 and FWW and returned to bed.  Patient tolerated well. Left knee dressing is clean, dry, and intact.  Ice pack reapplied.  SCDs on.  Pedal pulses 2+, movement/sensation intact. Patient rates pain when ambulating 2/10 and tolerable.  Scheduled medication given as ordered. Labs drawn and sent. Bed in lowest position and alarm on.  Call light and possessions within reach.  Patient denies any other needs at this time.

## 2025-07-18 NOTE — NURSING NOTE
Assumed care of patient. Pt awake in bed ordering breakfast. Pt does not have any concerns at this time. Call light within reach and bed in lowest position.

## 2025-07-18 NOTE — PROGRESS NOTES
Yaritza Bernal is a 75 y.o. female on day 0 of admission presenting with Primary osteoarthritis of left knee.      Subjective   Patient seen for follow-up of left TKA on postop day 1.  No acute events overnight.  Pain is well-controlled.  Anticipating discharge.       Objective     Last Recorded Vitals  /79 (BP Location: Left arm, Patient Position: Lying)   Pulse 83   Temp 37.1 °C (98.8 °F) (Temporal)   Resp 16   Wt 94.1 kg (207 lb 6.6 oz)   SpO2 96%   Intake/Output last 3 Shifts:    Intake/Output Summary (Last 24 hours) at 7/18/2025 0922  Last data filed at 7/18/2025 0552  Gross per 24 hour   Intake 3081.67 ml   Output 650 ml   Net 2431.67 ml       Admission Weight  Weight: 94.1 kg (207 lb 6.6 oz) (07/17/25 0556)    Daily Weight  07/17/25 : 94.1 kg (207 lb 6.6 oz)    Image Results  XR knee left 1-2 views  Narrative: Interpreted By:  Chelsea Remy,   STUDY:  XR KNEE LEFT 1-2 VIEWS;  7/17/2025 9:13 am      INDICATION:  Signs/Symptoms:Post-op knee, PACU.      COMPARISON:  12/30/2024      ACCESSION NUMBER(S):  [id number]      ORDERING CLINICIAN:  MICKY ELKINS      FINDINGS:  There has been placement of left total knee arthroplasty. The  hardware is intact. No acute fracture is seen. There is some expected  air within adjacent soft tissues.      Palliative intramedullary anum and interlocking screws again noted in  the distal femur.      No lytic or blastic lesions are seen.      No periosteal reaction is seen.      Impression: Status post left total knee arthroplasty, with expected postoperative  findings.          MACRO:  None      Signed by: Chelsea Remy 7/17/2025 9:22 AM  Dictation workstation:   [id number]      Physical Exam  Constitutional:       General: She is not in acute distress.     Appearance: Normal appearance.   HENT:      Head: Normocephalic and atraumatic.      Nose: Nose normal.      Mouth/Throat:      Mouth: Mucous membranes are moist.      Pharynx: Oropharynx is clear.      Cardiovascular:      Rate and Rhythm: Normal rate and regular rhythm.   Pulmonary:      Effort: Pulmonary effort is normal. No respiratory distress.      Breath sounds: Normal breath sounds.   Abdominal:      General: Bowel sounds are normal.      Palpations: Abdomen is soft.      Tenderness: There is no abdominal tenderness. There is no rebound.     Musculoskeletal:         General: No swelling, deformity or signs of injury.      Cervical back: Normal range of motion and neck supple.     Skin:     General: Skin is warm and dry.     Neurological:      General: No focal deficit present.      Mental Status: She is alert and oriented to person, place, and time. Mental status is at baseline.     Psychiatric:         Attention and Perception: Attention and perception normal.         Mood and Affect: Mood normal.         Behavior: Behavior normal.         Judgment: Judgment normal.         Relevant Results                    Results for orders placed or performed during the hospital encounter of 07/17/25 (from the past 24 hours)   Basic Metabolic Panel   Result Value Ref Range    Glucose 134 (H) 74 - 99 mg/dL    Sodium 140 136 - 145 mmol/L    Potassium 3.5 3.5 - 5.3 mmol/L    Chloride 106 98 - 107 mmol/L    Bicarbonate 25 21 - 32 mmol/L    Anion Gap 13 10 - 20 mmol/L    Urea Nitrogen 23 6 - 23 mg/dL    Creatinine 0.62 0.50 - 1.05 mg/dL    eGFR >90 >60 mL/min/1.73m*2    Calcium 8.3 (L) 8.6 - 10.3 mg/dL   CBC   Result Value Ref Range    WBC 10.9 4.4 - 11.3 x10*3/uL    nRBC      RBC 3.95 (L) 4.00 - 5.20 x10*6/uL    Hemoglobin 10.5 (L) 12.0 - 16.0 g/dL    Hematocrit 33.5 (L) 36.0 - 46.0 %    MCV 85 80 - 100 fL    MCH 26.6 26.0 - 34.0 pg    MCHC 31.3 (L) 32.0 - 36.0 g/dL    RDW 13.0 11.5 - 14.5 %    Platelets 257 150 - 450 x10*3/uL               Assessment & Plan  Primary osteoarthritis of left knee    DVT (deep venous thrombosis) (Multi)    Anticoagulant long-term use    Left knee osteoarthritis  Management per  primary  PT/OT  Pain control, bowel regimen  Antibiotic and DVT prophylaxis per primary  Stable for discharge  Hypertension  Stable  Continue bisoprolol, resume diuretic at discharge  Hyperlipidemia  Continue statin  GERD  PPI  Hypothyroidism  Continue Synthroid  History of DVT  Continue Eliquis, per primary  Asthma  No acute exacerbation  Continue ICS-LABA  Albuterol nebs PRN           Sara Goldman MD

## 2025-07-18 NOTE — PROGRESS NOTES
Interdisciplinary Rounds were completed at the bedside with Patient.  Staff participating in rounds included: Clinical Nurse Orthopedic Coordinator Transitional Care Coordinator Nursing Leadership Hospitalist MD/PA.  Topics discussed included: Today's Plan of Care Discharge Plan and Accommodations Physical Therapy Medications/Preferred Pharmacy and the Patient was given the opportunity to ask additional questions or bring up any concerns at that time.  During the final discharge discussion and review of instructions, they will have another opportunity to review questions or concerns prior to leaving our care.  Patient was given information on who to call post-discharge should new questions or concerns arise.      At the time of this discussion, the patient's plan includes:    Discharge Date/Disposition:  Home, Today with, Home Care Services  Discharge Needs: No Equipment Needs Identified  Medications/Pharmacy: [id number] service utilized for discharge prescriptions through [facility]

## 2025-07-18 NOTE — CARE PLAN
The patient's goals for the shift include Pain control    The clinical goals for the shift include pain control and safety

## 2025-07-18 NOTE — NURSING NOTE
Pt discharged home after undoing a left TKA with Dr. Lockhart. Medication instructions and discharge instructions reviewed.  Patient verbalized understanding. The patient was escorted to the car via wheelchair.

## 2025-07-18 NOTE — PROGRESS NOTES
Medication Education     Medication education for Yaritza Bernal was provided to the patient  for the following medication(s):  Oxycodone  Tramadol  Tylenol  Flexeril  Senna  Doxycycline    Returning Protonix, will use home Nexium      Medication education provided by a Pharmacist:  Dose, frequency, storage Proper dose, indication, possible ADRs Refilling the medication  How the medication works and benefits of taking it Benefits of taking the medication  Importance of compliance    Identified potential barriers to education:  None    Method(s) of Education:  Verbal Written materials provided and reviewed    An opportunity to ask questions and receive answers was provided.     Assessment of understanding the patient :  2= meets goals/outcomes    Additional Notes (if applicable):     M2B delivered.    Jose Alfredo Rosario, PharmD

## 2025-07-18 NOTE — PROGRESS NOTES
Physical Therapy    Physical Therapy Treatment    Patient Name: Yaritza Bernal  MRN: [id number]  Department: [facility]  Room: [address] [place] [address]  Today's Date: 7/18/2025  Time Calculation  Start Time: 0950  Stop Time: 1037  Time Calculation (min): 47 min         Assessment/Plan   PT Assessment  Rehab Prognosis: Good  Barriers to Discharge Home: No anticipated barriers  Evaluation/Treatment Tolerance: Patient tolerated treatment well  Medical Staff Made Aware: Yes  Strengths: Ability to acquire knowledge, Living arrangement secure  Barriers to Participation: Comorbidities  End of Session Communication: Bedside nurse  Assessment Comment: pt demonstrated much improved overall functional mobility tolerance and ease however she does demonstrate chronic balance issues. Pt is safe to be d/c'ed home today with temp increased supervision/assist at home from friends, use of FWW and low int rehab.  End of Session Patient Position: Alarm on, Up in chair (all needs in reach)     PT Plan  Treatment/Interventions: Bed mobility, Transfer training, Gait training, Stair training, Balance training, Endurance training, Range of motion, Therapeutic exercise, Therapeutic activity, Home exercise program  PT Plan: Ongoing PT  PT Frequency: BID  PT Discharge Recommendations: Low intensity level of continued care  Equipment Recommended upon Discharge: Wheeled walker  PT Recommended Transfer Status: Stand by assist, Assistive device  PT - OK to Discharge: Yes    PT Visit Info:  PT Received On: 07/18/25     General Visit Information:   General  Family/Caregiver Present: No  Prior to Session Communication: Bedside nurse  Patient Position Received: Bed, 3 rail up, Alarm on  Preferred Learning Style: verbal, visual  General Comment: cleared by nurse for therapy; pt agreeable to therapy.    Subjective   Precautions:  Precautions  Hearing/Visual Limitations: reading glasses  LE Weight Bearing Status: Weight Bearing as Tolerated (left LE)  Medical Precautions: Fall  precautions  Post-Surgical Precautions: Left total knee precautions  Precautions Comment: pt able to verbalize 3 out of 3 post-op TKR precautions with minimal verbal reminders     Date/Time Vitals Session Patient Position Pulse Resp SpO2 BP MAP (mmHg)    07/18/25 0950 During PT  --  --  --  --  --  --      Vital Signs Comment: O2 sat 97 to 98% with HR 70 to 81 bpm during session---room air     Objective   Pain:  Pain Assessment  Pain Assessment: 0-10  0-10 (Numeric) Pain Score: 0 - No pain  Cognition:  Cognition  Overall Cognitive Status: Within Functional Limits  Orientation Level: Oriented X4  Safety/Judgement:  (intermittant decreased safety insight during functional mobility)  Insight: Mild  Coordination:  Movements are Fluid and Coordinated: No  Upper Body Coordination: + essential tramors right > left hands  Lower Body Coordination: decreased timing/accuracy of left LE grossly due to TKR but improved as compared to 7/17  Postural Control:  Postural Control  Posture Comment: obese with mild forward head, protracted shldrs  Extremity/Trunk Assessments:    Activity Tolerance:  Activity Tolerance  Endurance: Decreased tolerance for upright activites  Activity Tolerance Comments: good -  Treatments:  Therapeutic Exercise  Therapeutic Exercise Performed: Yes  Therapeutic Exercise Activity 1: supine james AP, QS, GS x 15 reps each  Therapeutic Exercise Activity 2: supine left heel slides x 12 reps  Therapeutic Exercise Activity 3: supine left SAQ's x 12 reps  Therapeutic Exercise Activity 4: supine left LE min assisted SLR x 10 reps  Therapeutic Exercise Activity 5: supine left hip abd x 15 reps  Therapeutic Exercise Activity 6: seated left LAQ's x 13 reps    Therapeutic Activity  Therapeutic Activity Performed: Yes (see bed mobility, transfers, amb with FWW, stairs)    Bed Mobility  Bed Mobility: Yes  Bed Mobility 1  Bed Mobility 1: Supine to sitting  Level of Assistance 1: Modified independent  Bed Mobility Comments  1: head of bed elevated    Ambulation/Gait Training  Ambulation/Gait Training Performed: Yes  Ambulation/Gait Training 1  Surface 1: Level tile  Device 1: Rolling walker  Assistance 1: Close supervision, Distant supervision  Quality of Gait 1: Diminished heel strike, Narrow base of support, Decreased step length  Comments/Distance (ft) 1: 80 ft x 2, + turns, good balance and sequencing  Transfers  Transfer: Yes  Transfer 1  Transfer From 1: Bed to  Transfer to 1: Stand  Technique 1: Sit to stand  Transfer Device 1: Walker  Transfer Level of Assistance 1: Close supervision, Minimal verbal cues  Trials/Comments 1: bed height elevated 1-2 inches; verbal cues for james hand placement on bed, forward weight shifting  Transfers 2  Transfer From 2: Stand to  Transfer to 2: Chair with arms  Technique 2: Stand to sit  Transfer Device 2: Walker  Transfer Level of Assistance 2: Distant supervision, Minimal verbal cues  Trials/Comments 2: verbal cues for left foot forward on floor, reaching back with both hands for arms of chair, controlling descent into chair  Transfers 3  Transfer From 3: Chair with arms to  Transfer to 3: Stand  Technique 3: Sit to stand  Transfer Device 3: Walker  Transfer Level of Assistance 3: Close supervision  Trials/Comments 3: good james hand placement on arms of chair    Stairs  Stairs: Yes  Stairs  Rails 1: Bilateral  Device 1: No device  Assistance 1: Contact guard, Minimal verbal cues  Comment/Number of Steps 1: pt ascends/descends 3 steps, nonreciprocally, verbal cues for ease and safety    Other Activity  Other Activity Performed: Yes (PT educated pt in safe/propr lower body dressing/undressing with use of reacher and/or sockaide)    Outcome Measures:  [facility] Basic Mobility  Turning from your back to your side while in a flat bed without using bedrails: A little  Moving from lying on your back to sitting on the side of a flat bed without using bedrails: A little  Moving to and from bed to chair  (including a wheelchair): A little  Standing up from a chair using your arms (e.g. wheelchair or bedside chair): A little  To walk in hospital room: A little  Climbing 3-5 steps with railing: A little  Basic Mobility - Total Score: 18    Education Documentation  Precautions, taught by Alisa Rodgers PT at 7/18/2025 11:01 AM.  Learner: Patient  Readiness: Acceptance  Method: Explanation  Response: Verbalizes Understanding  Comment: PT provided minimal verbal reminders for post-op TKR precautions and safe transfers.    Mobility Training, taught by Alisa Rodgers PT at 7/18/2025 11:01 AM.  Learner: Patient  Readiness: Acceptance  Method: Explanation  Response: Verbalizes Understanding  Comment: PT provided minimal verbal reminders for post-op TKR precautions and safe transfers.    Education Comments  No comments found.               Encounter Problems       Encounter Problems (Active)       Mobility       STG - Patient will ambulate 100 ft, FWW, + turns, mod ind (Progressing)       Start:  07/17/25    Expected End:  07/24/25            pt ascends/descends 3 steps, 2 rails, nonreciprocally with close supervision of 1 (Progressing)       Start:  07/17/25    Expected End:  07/24/25               PT Transfers       STG - Patient to transfer to and from sit to supine mod ind (Met)       Start:  07/17/25    Expected End:  07/24/25    Resolved:  07/18/25         STG - Patient will transfer sit to and from stand mod ind (Progressing)       Start:  07/17/25    Expected End:  07/24/25               Safety       pt able to verbalize 3 out of 3 post-op TKR precautions without verbal reminders (Progressing)       Start:  07/17/25    Expected End:  07/24/25                  Encounter Problems (Resolved)       Balance       STG - Maintains dynamic standing balance with upper extremity support (Met)       Start:  07/17/25    Expected End:  07/24/25    Resolved:  07/18/25    INTERVENTIONS:  1. Practice standing with minimal  support.  2. Educate patient about standing tolerance.  3. Educate patient about independence with gait, transfers, and ADL's.  4. Educate patient about use of assistive device.  5. Educate patient about self-directed care.

## 2025-07-18 NOTE — PROGRESS NOTES
07/18/25 0832   Discharge Planning   Who is requesting discharge planning? Provider   Home or Post Acute Services In home services   Type of Home Care Services Home PT   Expected Discharge Disposition Home H  ([facility] - SOC 7/19)

## 2025-07-18 NOTE — CARE PLAN
The patient's goals for the shift include Pain control.    The clinical goals for the shift include pain control and safety.      Problem: Skin  Goal: Decreased wound size/increased tissue granulation at next dressing change  Outcome: Progressing  Goal: Participates in plan/prevention/treatment measures  Outcome: Progressing  Goal: Prevent/manage excess moisture  Outcome: Progressing  Goal: Prevent/minimize sheer/friction injuries  Outcome: Progressing  Goal: Promote/optimize nutrition  Outcome: Progressing  Goal: Promote skin healing  Outcome: Progressing     Problem: Pain - Adult  Goal: Verbalizes/displays adequate comfort level or baseline comfort level  Outcome: Progressing     Problem: Safety - Adult  Goal: Free from fall injury  Outcome: Progressing     Problem: Discharge Planning  Goal: Discharge to home or other facility with appropriate resources  Outcome: Progressing     Problem: Chronic Conditions and Co-morbidities  Goal: Patient's chronic conditions and co-morbidity symptoms are monitored and maintained or improved  Outcome: Progressing     Problem: Nutrition  Goal: Nutrient intake appropriate for maintaining nutritional needs  Outcome: Progressing     Problem: Pain  Goal: Takes deep breaths with improved pain control throughout the shift  Outcome: Progressing  Goal: Turns in bed with improved pain control throughout the shift  Outcome: Progressing  Goal: Walks with improved pain control throughout the shift  Outcome: Progressing  Goal: Performs ADL's with improved pain control throughout shift  Outcome: Progressing  Goal: Participates in PT with improved pain control throughout the shift  Outcome: Progressing  Goal: Free from opioid side effects throughout the shift  Outcome: Progressing  Goal: Free from acute confusion related to pain meds throughout the shift  Outcome: Progressing     Problem: Fall/Injury  Goal: Not fall by end of shift  Outcome: Progressing  Goal: Be free from injury by end of the  shift  Outcome: Progressing  Goal: Verbalize understanding of personal risk factors for fall in the hospital  Outcome: Progressing  Goal: Verbalize understanding of risk factor reduction measures to prevent injury from fall in the home  Outcome: Progressing  Goal: Use assistive devices by end of the shift  Outcome: Progressing  Goal: Pace activities to prevent fatigue by end of the shift  Outcome: Progressing     Problem: Deep Vein Thrombosis  Goal: I will remain free from complications of deep vein thrombosis and maintain current level of mobility  Outcome: Progressing

## 2025-07-19 ENCOUNTER — APPOINTMENT (OUTPATIENT)
Dept: RADIOLOGY | Facility: HOSPITAL | Age: 75
DRG: 556 | End: 2025-07-19
Payer: MEDICARE

## 2025-07-19 ENCOUNTER — HOME CARE VISIT (OUTPATIENT)
Dept: HOME HEALTH SERVICES | Facility: HOME HEALTH | Age: 75
End: 2025-07-19
Payer: MEDICARE

## 2025-07-19 ENCOUNTER — HOSPITAL ENCOUNTER (INPATIENT)
Facility: HOSPITAL | Age: 75
DRG: 556 | End: 2025-07-19
Attending: EMERGENCY MEDICINE | Admitting: HOSPITALIST
Payer: MEDICARE

## 2025-07-19 DIAGNOSIS — R33.9 URINARY RETENTION: ICD-10-CM

## 2025-07-19 DIAGNOSIS — M17.12 ARTHRITIS OF KNEE, LEFT: ICD-10-CM

## 2025-07-19 DIAGNOSIS — R29.6 FALLS: Primary | ICD-10-CM

## 2025-07-19 DIAGNOSIS — N39.0 URINARY TRACT INFECTION WITHOUT HEMATURIA, SITE UNSPECIFIED: ICD-10-CM

## 2025-07-19 PROCEDURE — 83735 ASSAY OF MAGNESIUM: CPT | Performed by: EMERGENCY MEDICINE

## 2025-07-19 PROCEDURE — 70450 CT HEAD/BRAIN W/O DYE: CPT

## 2025-07-19 PROCEDURE — 72170 X-RAY EXAM OF PELVIS: CPT | Performed by: SURGERY

## 2025-07-19 PROCEDURE — 36415 COLL VENOUS BLD VENIPUNCTURE: CPT | Performed by: EMERGENCY MEDICINE

## 2025-07-19 PROCEDURE — 72170 X-RAY EXAM OF PELVIS: CPT

## 2025-07-19 PROCEDURE — G0151 HHCP-SERV OF PT,EA 15 MIN: HCPCS

## 2025-07-19 PROCEDURE — 85025 COMPLETE CBC W/AUTO DIFF WBC: CPT | Performed by: EMERGENCY MEDICINE

## 2025-07-19 PROCEDURE — G0390 TRAUMA RESPONS W/HOSP CRITI: HCPCS

## 2025-07-19 PROCEDURE — 73564 X-RAY EXAM KNEE 4 OR MORE: CPT | Mod: LEFT SIDE | Performed by: SURGERY

## 2025-07-19 PROCEDURE — 70450 CT HEAD/BRAIN W/O DYE: CPT | Performed by: RADIOLOGY

## 2025-07-19 PROCEDURE — 73564 X-RAY EXAM KNEE 4 OR MORE: CPT | Mod: LT

## 2025-07-19 PROCEDURE — 80053 COMPREHEN METABOLIC PANEL: CPT | Performed by: EMERGENCY MEDICINE

## 2025-07-19 PROCEDURE — 71260 CT THORAX DX C+: CPT

## 2025-07-19 PROCEDURE — 72128 CT CHEST SPINE W/O DYE: CPT | Performed by: RADIOLOGY

## 2025-07-19 PROCEDURE — 2550000001 HC RX 255 CONTRASTS: Performed by: EMERGENCY MEDICINE

## 2025-07-19 PROCEDURE — 99285 EMERGENCY DEPT VISIT HI MDM: CPT | Mod: 25 | Performed by: EMERGENCY MEDICINE

## 2025-07-19 PROCEDURE — 71260 CT THORAX DX C+: CPT | Performed by: RADIOLOGY

## 2025-07-19 PROCEDURE — 85610 PROTHROMBIN TIME: CPT | Performed by: EMERGENCY MEDICINE

## 2025-07-19 PROCEDURE — 72128 CT CHEST SPINE W/O DYE: CPT

## 2025-07-19 RX ADMIN — IOHEXOL 50 ML: 350 INJECTION, SOLUTION INTRAVENOUS at 23:18

## 2025-07-19 ASSESSMENT — PAIN DESCRIPTION - ORIENTATION
ORIENTATION: MID
ORIENTATION: MID

## 2025-07-19 ASSESSMENT — PAIN - FUNCTIONAL ASSESSMENT
PAIN_FUNCTIONAL_ASSESSMENT: 0-10
PAIN_FUNCTIONAL_ASSESSMENT: 0-10

## 2025-07-19 ASSESSMENT — PAIN SCALES - GENERAL
PAINLEVEL_OUTOF10: 0 - NO PAIN
PAINLEVEL_OUTOF10: 2
PAINLEVEL_OUTOF10: 5 - MODERATE PAIN

## 2025-07-19 ASSESSMENT — PAIN DESCRIPTION - FREQUENCY
FREQUENCY: CONSTANT/CONTINUOUS
FREQUENCY: CONSTANT/CONTINUOUS

## 2025-07-19 ASSESSMENT — PAIN DESCRIPTION - DESCRIPTORS
DESCRIPTORS: BURNING
DESCRIPTORS: BURNING

## 2025-07-19 ASSESSMENT — PAIN DESCRIPTION - LOCATION
LOCATION: BACK
LOCATION: BACK

## 2025-07-19 ASSESSMENT — PAIN DESCRIPTION - PAIN TYPE: TYPE: ACUTE PAIN

## 2025-07-20 ENCOUNTER — APPOINTMENT (OUTPATIENT)
Dept: RADIOLOGY | Facility: HOSPITAL | Age: 75
DRG: 556 | End: 2025-07-20
Payer: MEDICARE

## 2025-07-20 VITALS
DIASTOLIC BLOOD PRESSURE: 81 MMHG | HEIGHT: 70 IN | RESPIRATION RATE: 17 BRPM | TEMPERATURE: 98.4 F | SYSTOLIC BLOOD PRESSURE: 172 MMHG | HEART RATE: 77 BPM | BODY MASS INDEX: 29.63 KG/M2 | OXYGEN SATURATION: 95 % | WEIGHT: 207 LBS

## 2025-07-20 PROBLEM — R29.6 FALLS: Status: ACTIVE | Noted: 2025-07-20

## 2025-07-20 LAB
ALBUMIN SERPL BCP-MCNC: 3.4 G/DL (ref 3.4–5)
ALP SERPL-CCNC: 62 U/L (ref 33–136)
ALT SERPL W P-5'-P-CCNC: 7 U/L (ref 7–45)
ANION GAP SERPL CALC-SCNC: 15 MMOL/L (ref 10–20)
AST SERPL W P-5'-P-CCNC: 12 U/L (ref 9–39)
BASOPHILS # BLD AUTO: 0.05 X10*3/UL (ref 0–0.1)
BASOPHILS NFR BLD AUTO: 0.6 %
BILIRUB SERPL-MCNC: 0.9 MG/DL (ref 0–1.2)
BUN SERPL-MCNC: 18 MG/DL (ref 6–23)
CALCIUM SERPL-MCNC: 8.1 MG/DL (ref 8.6–10.3)
CHLORIDE SERPL-SCNC: 101 MMOL/L (ref 98–107)
CO2 SERPL-SCNC: 25 MMOL/L (ref 21–32)
CREAT SERPL-MCNC: 0.7 MG/DL (ref 0.5–1.05)
EGFRCR SERPLBLD CKD-EPI 2021: 90 ML/MIN/1.73M*2
EOSINOPHIL # BLD AUTO: 0.11 X10*3/UL (ref 0–0.4)
EOSINOPHIL NFR BLD AUTO: 1.3 %
ERYTHROCYTE [DISTWIDTH] IN BLOOD BY AUTOMATED COUNT: 13.6 % (ref 11.5–14.5)
GLUCOSE SERPL-MCNC: 84 MG/DL (ref 74–99)
HCT VFR BLD AUTO: 30.7 % (ref 36–46)
HGB BLD-MCNC: 9.7 G/DL (ref 12–16)
IMM GRANULOCYTES # BLD AUTO: 0.03 X10*3/UL (ref 0–0.5)
IMM GRANULOCYTES NFR BLD AUTO: 0.4 % (ref 0–0.9)
INR PPP: 1.4 (ref 0.9–1.1)
LYMPHOCYTES # BLD AUTO: 1.51 X10*3/UL (ref 0.8–3)
LYMPHOCYTES NFR BLD AUTO: 17.7 %
MAGNESIUM SERPL-MCNC: 1.75 MG/DL (ref 1.6–2.4)
MCH RBC QN AUTO: 27.1 PG (ref 26–34)
MCHC RBC AUTO-ENTMCNC: 31.6 G/DL (ref 32–36)
MCV RBC AUTO: 86 FL (ref 80–100)
MONOCYTES # BLD AUTO: 0.95 X10*3/UL (ref 0.05–0.8)
MONOCYTES NFR BLD AUTO: 11.2 %
NEUTROPHILS # BLD AUTO: 5.86 X10*3/UL (ref 1.6–5.5)
NEUTROPHILS NFR BLD AUTO: 68.8 %
NRBC BLD-RTO: 0 /100 WBCS (ref 0–0)
PLATELET # BLD AUTO: 232 X10*3/UL (ref 150–450)
POTASSIUM SERPL-SCNC: 3 MMOL/L (ref 3.5–5.3)
PROT SERPL-MCNC: 5.8 G/DL (ref 6.4–8.2)
PROTHROMBIN TIME: 15.1 SECONDS (ref 9.8–12.4)
RBC # BLD AUTO: 3.58 X10*6/UL (ref 4–5.2)
SODIUM SERPL-SCNC: 138 MMOL/L (ref 136–145)
WBC # BLD AUTO: 8.5 X10*3/UL (ref 4.4–11.3)

## 2025-07-20 PROCEDURE — 94640 AIRWAY INHALATION TREATMENT: CPT

## 2025-07-20 PROCEDURE — 73700 CT LOWER EXTREMITY W/O DYE: CPT | Mod: LT

## 2025-07-20 PROCEDURE — 73552 X-RAY EXAM OF FEMUR 2/>: CPT | Mod: LT

## 2025-07-20 PROCEDURE — 1200000002 HC GENERAL ROOM WITH TELEMETRY DAILY

## 2025-07-20 PROCEDURE — G0378 HOSPITAL OBSERVATION PER HR: HCPCS

## 2025-07-20 PROCEDURE — 2500000001 HC RX 250 WO HCPCS SELF ADMINISTERED DRUGS (ALT 637 FOR MEDICARE OP): Performed by: HOSPITALIST

## 2025-07-20 PROCEDURE — 2500000002 HC RX 250 W HCPCS SELF ADMINISTERED DRUGS (ALT 637 FOR MEDICARE OP, ALT 636 FOR OP/ED): Performed by: EMERGENCY MEDICINE

## 2025-07-20 PROCEDURE — 72192 CT PELVIS W/O DYE: CPT

## 2025-07-20 PROCEDURE — 73502 X-RAY EXAM HIP UNI 2-3 VIEWS: CPT | Mod: LEFT SIDE | Performed by: RADIOLOGY

## 2025-07-20 PROCEDURE — 2500000001 HC RX 250 WO HCPCS SELF ADMINISTERED DRUGS (ALT 637 FOR MEDICARE OP)

## 2025-07-20 PROCEDURE — 99222 1ST HOSP IP/OBS MODERATE 55: CPT

## 2025-07-20 PROCEDURE — 9420000001 HC RT PATIENT EDUCATION 5 MIN

## 2025-07-20 PROCEDURE — 72192 CT PELVIS W/O DYE: CPT | Performed by: STUDENT IN AN ORGANIZED HEALTH CARE EDUCATION/TRAINING PROGRAM

## 2025-07-20 PROCEDURE — 2500000002 HC RX 250 W HCPCS SELF ADMINISTERED DRUGS (ALT 637 FOR MEDICARE OP, ALT 636 FOR OP/ED): Performed by: HOSPITALIST

## 2025-07-20 PROCEDURE — 73552 X-RAY EXAM OF FEMUR 2/>: CPT | Mod: LEFT SIDE | Performed by: RADIOLOGY

## 2025-07-20 PROCEDURE — 73502 X-RAY EXAM HIP UNI 2-3 VIEWS: CPT | Mod: LT

## 2025-07-20 PROCEDURE — 2500000004 HC RX 250 GENERAL PHARMACY W/ HCPCS (ALT 636 FOR OP/ED): Performed by: EMERGENCY MEDICINE

## 2025-07-20 PROCEDURE — 73700 CT LOWER EXTREMITY W/O DYE: CPT | Mod: LEFT SIDE | Performed by: STUDENT IN AN ORGANIZED HEALTH CARE EDUCATION/TRAINING PROGRAM

## 2025-07-20 PROCEDURE — 96374 THER/PROPH/DIAG INJ IV PUSH: CPT

## 2025-07-20 RX ORDER — ACETAMINOPHEN 325 MG/1
650 TABLET ORAL EVERY 4 HOURS PRN
Status: DISCONTINUED | OUTPATIENT
Start: 2025-07-20 | End: 2025-07-22 | Stop reason: HOSPADM

## 2025-07-20 RX ORDER — CITALOPRAM 20 MG/1
20 TABLET ORAL DAILY
Status: DISCONTINUED | OUTPATIENT
Start: 2025-07-20 | End: 2025-07-22 | Stop reason: HOSPADM

## 2025-07-20 RX ORDER — LEVOTHYROXINE SODIUM 25 UG/1
25 TABLET ORAL
Status: DISCONTINUED | OUTPATIENT
Start: 2025-07-20 | End: 2025-07-22 | Stop reason: HOSPADM

## 2025-07-20 RX ORDER — DOXYCYCLINE HYCLATE 100 MG
100 TABLET ORAL 2 TIMES DAILY
Status: DISCONTINUED | OUTPATIENT
Start: 2025-07-20 | End: 2025-07-22 | Stop reason: HOSPADM

## 2025-07-20 RX ORDER — CYCLOBENZAPRINE HCL 5 MG
5 TABLET ORAL 3 TIMES DAILY PRN
Status: DISCONTINUED | OUTPATIENT
Start: 2025-07-20 | End: 2025-07-22 | Stop reason: HOSPADM

## 2025-07-20 RX ORDER — DOXYCYCLINE 100 MG/1
100 TABLET ORAL 2 TIMES DAILY
Status: DISCONTINUED | OUTPATIENT
Start: 2025-07-20 | End: 2025-07-20

## 2025-07-20 RX ORDER — OXYCODONE HYDROCHLORIDE 5 MG/1
10 TABLET ORAL EVERY 4 HOURS PRN
Status: DISCONTINUED | OUTPATIENT
Start: 2025-07-20 | End: 2025-07-22 | Stop reason: HOSPADM

## 2025-07-20 RX ORDER — TALC
3 POWDER (GRAM) TOPICAL NIGHTLY PRN
Status: DISCONTINUED | OUTPATIENT
Start: 2025-07-20 | End: 2025-07-22 | Stop reason: HOSPADM

## 2025-07-20 RX ORDER — ALBUTEROL SULFATE 0.83 MG/ML
3 SOLUTION RESPIRATORY (INHALATION) EVERY 2 HOUR PRN
Status: DISCONTINUED | OUTPATIENT
Start: 2025-07-20 | End: 2025-07-22 | Stop reason: HOSPADM

## 2025-07-20 RX ORDER — ALBUTEROL SULFATE 0.83 MG/ML
3 SOLUTION RESPIRATORY (INHALATION) EVERY 6 HOURS PRN
Status: DISCONTINUED | OUTPATIENT
Start: 2025-07-20 | End: 2025-07-20

## 2025-07-20 RX ORDER — POTASSIUM CHLORIDE 14.9 MG/ML
20 INJECTION INTRAVENOUS
Status: COMPLETED | OUTPATIENT
Start: 2025-07-20 | End: 2025-07-20

## 2025-07-20 RX ORDER — BUDESONIDE 0.5 MG/2ML
0.5 INHALANT ORAL
Status: DISCONTINUED | OUTPATIENT
Start: 2025-07-20 | End: 2025-07-22 | Stop reason: HOSPADM

## 2025-07-20 RX ORDER — BISOPROLOL FUMARATE 5 MG/1
20 TABLET, FILM COATED ORAL DAILY
Status: DISCONTINUED | OUTPATIENT
Start: 2025-07-20 | End: 2025-07-22 | Stop reason: HOSPADM

## 2025-07-20 RX ORDER — SENNOSIDES 8.6 MG/1
1 TABLET ORAL 2 TIMES DAILY
Status: DISCONTINUED | OUTPATIENT
Start: 2025-07-20 | End: 2025-07-21

## 2025-07-20 RX ORDER — OXYCODONE HYDROCHLORIDE 5 MG/1
5 TABLET ORAL EVERY 6 HOURS PRN
Status: DISCONTINUED | OUTPATIENT
Start: 2025-07-20 | End: 2025-07-22 | Stop reason: HOSPADM

## 2025-07-20 RX ORDER — ACETAMINOPHEN 325 MG/1
650 TABLET ORAL ONCE
Status: COMPLETED | OUTPATIENT
Start: 2025-07-20 | End: 2025-07-20

## 2025-07-20 RX ORDER — FORMOTEROL FUMARATE 20 UG/2ML
20 SOLUTION RESPIRATORY (INHALATION)
Status: DISCONTINUED | OUTPATIENT
Start: 2025-07-20 | End: 2025-07-22 | Stop reason: HOSPADM

## 2025-07-20 RX ORDER — POLYETHYLENE GLYCOL 3350 17 G/17G
17 POWDER, FOR SOLUTION ORAL DAILY
Status: DISCONTINUED | OUTPATIENT
Start: 2025-07-20 | End: 2025-07-22 | Stop reason: HOSPADM

## 2025-07-20 RX ORDER — HYDROCHLOROTHIAZIDE 12.5 MG/1
12.5 TABLET ORAL DAILY
Status: DISCONTINUED | OUTPATIENT
Start: 2025-07-20 | End: 2025-07-22 | Stop reason: HOSPADM

## 2025-07-20 RX ORDER — GABAPENTIN 300 MG/1
600 CAPSULE ORAL 3 TIMES DAILY
Status: DISCONTINUED | OUTPATIENT
Start: 2025-07-20 | End: 2025-07-22 | Stop reason: HOSPADM

## 2025-07-20 RX ORDER — PANTOPRAZOLE SODIUM 20 MG/1
20 TABLET, DELAYED RELEASE ORAL
Status: DISCONTINUED | OUTPATIENT
Start: 2025-07-20 | End: 2025-07-22 | Stop reason: HOSPADM

## 2025-07-20 RX ORDER — POTASSIUM CHLORIDE 20 MEQ/1
40 TABLET, EXTENDED RELEASE ORAL ONCE
Status: COMPLETED | OUTPATIENT
Start: 2025-07-20 | End: 2025-07-20

## 2025-07-20 RX ORDER — OXYCODONE HYDROCHLORIDE 5 MG/1
5 TABLET ORAL ONCE
Refills: 0 | Status: COMPLETED | OUTPATIENT
Start: 2025-07-20 | End: 2025-07-20

## 2025-07-20 RX ADMIN — GABAPENTIN 600 MG: 300 CAPSULE ORAL at 09:54

## 2025-07-20 RX ADMIN — BUDESONIDE 0.5 MG: 0.5 INHALANT RESPIRATORY (INHALATION) at 19:00

## 2025-07-20 RX ADMIN — LEVOTHYROXINE SODIUM 25 MCG: 0.03 TABLET ORAL at 06:59

## 2025-07-20 RX ADMIN — POTASSIUM CHLORIDE 20 MEQ: 14.9 INJECTION, SOLUTION INTRAVENOUS at 03:13

## 2025-07-20 RX ADMIN — POTASSIUM CHLORIDE EXTENDED-RELEASE 40 MEQ: 1500 TABLET ORAL at 01:10

## 2025-07-20 RX ADMIN — HYDROCHLOROTHIAZIDE 12.5 MG: 12.5 TABLET ORAL at 09:54

## 2025-07-20 RX ADMIN — POTASSIUM CHLORIDE 20 MEQ: 14.9 INJECTION, SOLUTION INTRAVENOUS at 01:10

## 2025-07-20 RX ADMIN — FORMOTEROL FUMARATE DIHYDRATE 20 MCG: 20 SOLUTION RESPIRATORY (INHALATION) at 08:56

## 2025-07-20 RX ADMIN — DOXYCYCLINE HYCLATE 100 MG: 100 TABLET, COATED ORAL at 20:49

## 2025-07-20 RX ADMIN — GABAPENTIN 600 MG: 300 CAPSULE ORAL at 20:49

## 2025-07-20 RX ADMIN — ACETAMINOPHEN 650 MG: 325 TABLET ORAL at 06:59

## 2025-07-20 RX ADMIN — CITALOPRAM HYDROBROMIDE 20 MG: 20 TABLET ORAL at 09:54

## 2025-07-20 RX ADMIN — OXYCODONE HYDROCHLORIDE 5 MG: 5 TABLET ORAL at 06:59

## 2025-07-20 RX ADMIN — GABAPENTIN 600 MG: 300 CAPSULE ORAL at 14:26

## 2025-07-20 RX ADMIN — PANTOPRAZOLE SODIUM 20 MG: 20 TABLET, DELAYED RELEASE ORAL at 10:12

## 2025-07-20 RX ADMIN — BUDESONIDE 0.5 MG: 0.5 INHALANT RESPIRATORY (INHALATION) at 08:55

## 2025-07-20 RX ADMIN — BISOPROLOL FUMARATE 20 MG: 5 TABLET ORAL at 09:54

## 2025-07-20 RX ADMIN — APIXABAN 2.5 MG: 2.5 TABLET, FILM COATED ORAL at 09:54

## 2025-07-20 RX ADMIN — FORMOTEROL FUMARATE DIHYDRATE 20 MCG: 20 SOLUTION RESPIRATORY (INHALATION) at 19:00

## 2025-07-20 RX ADMIN — SENNOSIDES 8.6 MG: 8.6 TABLET, FILM COATED ORAL at 20:49

## 2025-07-20 RX ADMIN — DOXYCYCLINE HYCLATE 100 MG: 100 TABLET, COATED ORAL at 10:12

## 2025-07-20 RX ADMIN — CYCLOBENZAPRINE HYDROCHLORIDE 5 MG: 5 TABLET, FILM COATED ORAL at 20:49

## 2025-07-20 RX ADMIN — APIXABAN 2.5 MG: 2.5 TABLET, FILM COATED ORAL at 20:49

## 2025-07-20 SDOH — SOCIAL STABILITY: SOCIAL INSECURITY: WITHIN THE LAST YEAR, HAVE YOU BEEN AFRAID OF YOUR PARTNER OR EX-PARTNER?: NO

## 2025-07-20 SDOH — SOCIAL STABILITY: SOCIAL INSECURITY: DO YOU FEEL UNSAFE GOING BACK TO THE PLACE WHERE YOU ARE LIVING?: NO

## 2025-07-20 SDOH — ECONOMIC STABILITY: HOUSING INSECURITY: IN THE LAST 12 MONTHS, WAS THERE A TIME WHEN YOU WERE NOT ABLE TO PAY THE MORTGAGE OR RENT ON TIME?: NO

## 2025-07-20 SDOH — SOCIAL STABILITY: SOCIAL INSECURITY: WITHIN THE LAST YEAR, HAVE YOU BEEN HUMILIATED OR EMOTIONALLY ABUSED IN OTHER WAYS BY YOUR PARTNER OR EX-PARTNER?: NO

## 2025-07-20 SDOH — ECONOMIC STABILITY: FOOD INSECURITY: WITHIN THE PAST 12 MONTHS, THE FOOD YOU BOUGHT JUST DIDN'T LAST AND YOU DIDN'T HAVE MONEY TO GET MORE.: NEVER TRUE

## 2025-07-20 SDOH — SOCIAL STABILITY: SOCIAL INSECURITY: WERE YOU ABLE TO COMPLETE ALL THE BEHAVIORAL HEALTH SCREENINGS?: YES

## 2025-07-20 SDOH — SOCIAL STABILITY: SOCIAL INSECURITY: HAS ANYONE EVER THREATENED TO HURT YOUR FAMILY OR YOUR PETS?: NO

## 2025-07-20 SDOH — SOCIAL STABILITY: SOCIAL INSECURITY: ARE YOU OR HAVE YOU BEEN THREATENED OR ABUSED PHYSICALLY, EMOTIONALLY, OR SEXUALLY BY ANYONE?: NO

## 2025-07-20 SDOH — SOCIAL STABILITY: SOCIAL INSECURITY: ARE YOU MARRIED, WIDOWED, DIVORCED, SEPARATED, NEVER MARRIED, OR LIVING WITH A PARTNER?: WIDOWED

## 2025-07-20 SDOH — HEALTH STABILITY: PHYSICAL HEALTH: ON AVERAGE, HOW MANY DAYS PER WEEK DO YOU ENGAGE IN MODERATE TO STRENUOUS EXERCISE (LIKE A BRISK WALK)?: 3 DAYS

## 2025-07-20 SDOH — SOCIAL STABILITY: SOCIAL INSECURITY: HAVE YOU HAD ANY THOUGHTS OF HARMING ANYONE ELSE?: NO

## 2025-07-20 SDOH — SOCIAL STABILITY: SOCIAL INSECURITY: ARE THERE ANY APPARENT SIGNS OF INJURIES/BEHAVIORS THAT COULD BE RELATED TO ABUSE/NEGLECT?: NO

## 2025-07-20 SDOH — HEALTH STABILITY: PHYSICAL HEALTH
HOW OFTEN DO YOU NEED TO HAVE SOMEONE HELP YOU WHEN YOU READ INSTRUCTIONS, PAMPHLETS, OR OTHER WRITTEN MATERIAL FROM YOUR DOCTOR OR PHARMACY?: NEVER

## 2025-07-20 SDOH — HEALTH STABILITY: MENTAL HEALTH
DO YOU FEEL STRESS - TENSE, RESTLESS, NERVOUS, OR ANXIOUS, OR UNABLE TO SLEEP AT NIGHT BECAUSE YOUR MIND IS TROUBLED ALL THE TIME - THESE DAYS?: ONLY A LITTLE

## 2025-07-20 SDOH — SOCIAL STABILITY: SOCIAL NETWORK
IN A TYPICAL WEEK, HOW MANY TIMES DO YOU TALK ON THE PHONE WITH FAMILY, FRIENDS, OR NEIGHBORS?: MORE THAN THREE TIMES A WEEK

## 2025-07-20 SDOH — ECONOMIC STABILITY: FOOD INSECURITY: WITHIN THE PAST 12 MONTHS, YOU WORRIED THAT YOUR FOOD WOULD RUN OUT BEFORE YOU GOT THE MONEY TO BUY MORE.: NEVER TRUE

## 2025-07-20 SDOH — HEALTH STABILITY: PHYSICAL HEALTH: ON AVERAGE, HOW MANY MINUTES DO YOU ENGAGE IN EXERCISE AT THIS LEVEL?: 100 MIN

## 2025-07-20 SDOH — ECONOMIC STABILITY: HOUSING INSECURITY: IN THE PAST 12 MONTHS, HOW MANY TIMES HAVE YOU MOVED WHERE YOU WERE LIVING?: 0

## 2025-07-20 SDOH — ECONOMIC STABILITY: TRANSPORTATION INSECURITY: IN THE PAST 12 MONTHS, HAS LACK OF TRANSPORTATION KEPT YOU FROM MEDICAL APPOINTMENTS OR FROM GETTING MEDICATIONS?: NO

## 2025-07-20 SDOH — ECONOMIC STABILITY: HOUSING INSECURITY: DO YOU FEEL UNSAFE GOING BACK TO THE PLACE WHERE YOU LIVE?: NO

## 2025-07-20 SDOH — SOCIAL STABILITY: SOCIAL NETWORK: HOW OFTEN DO YOU GET TOGETHER WITH FRIENDS OR RELATIVES?: ONCE A WEEK

## 2025-07-20 SDOH — SOCIAL STABILITY: SOCIAL NETWORK: HOW OFTEN DO YOU ATTEND CHURCH OR RELIGIOUS SERVICES?: PATIENT DECLINED

## 2025-07-20 SDOH — ECONOMIC STABILITY: HOUSING INSECURITY: AT ANY TIME IN THE PAST 12 MONTHS, WERE YOU HOMELESS OR LIVING IN A SHELTER (INCLUDING NOW)?: NO

## 2025-07-20 SDOH — SOCIAL STABILITY: SOCIAL NETWORK
DO YOU BELONG TO ANY CLUBS OR ORGANIZATIONS SUCH AS CHURCH GROUPS, UNIONS, FRATERNAL OR ATHLETIC GROUPS, OR SCHOOL GROUPS?: PATIENT DECLINED

## 2025-07-20 SDOH — ECONOMIC STABILITY: INCOME INSECURITY: IN THE PAST 12 MONTHS HAS THE ELECTRIC, GAS, OIL, OR WATER COMPANY THREATENED TO SHUT OFF SERVICES IN YOUR HOME?: NO

## 2025-07-20 SDOH — ECONOMIC STABILITY: FOOD INSECURITY: HOW HARD IS IT FOR YOU TO PAY FOR THE VERY BASICS LIKE FOOD, HOUSING, MEDICAL CARE, AND HEATING?: NOT HARD AT ALL

## 2025-07-20 SDOH — SOCIAL STABILITY: SOCIAL NETWORK: HOW OFTEN DO YOU ATTEND MEETINGS OF THE CLUBS OR ORGANIZATIONS YOU BELONG TO?: PATIENT DECLINED

## 2025-07-20 SDOH — HEALTH STABILITY: PHYSICAL HEALTH: EXERCISE TYPE: UNABLE TO COMPLETE DURING THIS SESSION - REVIEWED EXERCISE SHEET

## 2025-07-20 SDOH — SOCIAL STABILITY: SOCIAL INSECURITY: DOES ANYONE TRY TO KEEP YOU FROM HAVING/CONTACTING OTHER FRIENDS OR DOING THINGS OUTSIDE YOUR HOME?: NO

## 2025-07-20 SDOH — SOCIAL STABILITY: SOCIAL INSECURITY: ABUSE: ADULT

## 2025-07-20 SDOH — SOCIAL STABILITY: SOCIAL INSECURITY: HAVE YOU HAD THOUGHTS OF HARMING ANYONE ELSE?: NO

## 2025-07-20 SDOH — ECONOMIC STABILITY: HOUSING INSECURITY
HOME SAFETY: PATIENT IS NOT SAFE TO BE HOME ALONE DUE TO HER LIMITED MOBILITY - UNABLE TO STAND OR AMBULATE WITHOUT MODERATE ASSISTANCE.

## 2025-07-20 SDOH — SOCIAL STABILITY: SOCIAL INSECURITY: DO YOU FEEL ANYONE HAS EXPLOITED OR TAKEN ADVANTAGE OF YOU FINANCIALLY OR OF YOUR PERSONAL PROPERTY?: NO

## 2025-07-20 ASSESSMENT — ACTIVITIES OF DAILY LIVING (ADL)
OASIS_M1830: 05
HEARING - RIGHT EAR: FUNCTIONAL
LACK_OF_TRANSPORTATION: NO
TOILETING: NEEDS ASSISTANCE
GROOMING: INDEPENDENT
ENTERING_EXITING_HOME: MAXIMUM ASSIST
DRESSING YOURSELF: INDEPENDENT
FEEDING YOURSELF: INDEPENDENT
PATIENT'S MEMORY ADEQUATE TO SAFELY COMPLETE DAILY ACTIVITIES?: YES
BATHING: INDEPENDENT
LACK_OF_TRANSPORTATION: NO
ADEQUATE_TO_COMPLETE_ADL: YES
WALKS IN HOME: INDEPENDENT
HEARING - LEFT EAR: FUNCTIONAL
ASSISTIVE_DEVICE: WALKER
JUDGMENT_ADEQUATE_SAFELY_COMPLETE_DAILY_ACTIVITIES: YES

## 2025-07-20 ASSESSMENT — ENCOUNTER SYMPTOMS
PERSON REPORTING PAIN: PATIENT
CHILLS: 0
DIARRHEA: 0
RHINORRHEA: 0
VOMITING: 0
PAIN: 1
DIFFICULTY URINATING: 0
SORE THROAT: 0
ABDOMINAL PAIN: 0
LIGHT-HEADEDNESS: 0
DIZZINESS: 0
SLEEP QUALITY: ADEQUATE
MUSCLE WEAKNESS: 1
AGGRESSION WITHIN DEFINED LIMITS: 1
DYSURIA: 0
FEVER: 0
CONSTIPATION: 0
PAIN LOCATION: LEFT KNEE
ANGER WITHIN DEFINED LIMITS: 1
LIMITED RANGE OF MOTION: 1
PALPITATIONS: 0
NAUSEA: 0

## 2025-07-20 ASSESSMENT — PAIN SCALES - GENERAL
PAINLEVEL_OUTOF10: 0 - NO PAIN
PAINLEVEL_OUTOF10: 8
PAINLEVEL_OUTOF10: 0 - NO PAIN
PAINLEVEL_OUTOF10: 0 - NO PAIN

## 2025-07-20 ASSESSMENT — LIFESTYLE VARIABLES
PRESCIPTION_ABUSE_PAST_12_MONTHS: NO
SKIP TO QUESTIONS 9-10: 1
AUDIT-C TOTAL SCORE: 2
HOW OFTEN DO YOU HAVE A DRINK CONTAINING ALCOHOL: 2-4 TIMES A MONTH
HOW MANY STANDARD DRINKS CONTAINING ALCOHOL DO YOU HAVE ON A TYPICAL DAY: 1 OR 2
SUBSTANCE_ABUSE_PAST_12_MONTHS: NO
AUDIT-C TOTAL SCORE: 2
HOW OFTEN DO YOU HAVE 6 OR MORE DRINKS ON ONE OCCASION: NEVER

## 2025-07-20 ASSESSMENT — COGNITIVE AND FUNCTIONAL STATUS - GENERAL
MOBILITY SCORE: 16
CLIMB 3 TO 5 STEPS WITH RAILING: A LOT
WALKING IN HOSPITAL ROOM: A LOT
DRESSING REGULAR LOWER BODY CLOTHING: A LITTLE
STANDING UP FROM CHAIR USING ARMS: A LOT
DAILY ACTIVITIY SCORE: 23
MOVING TO AND FROM BED TO CHAIR: A LOT
PATIENT BASELINE BEDBOUND: NO

## 2025-07-20 ASSESSMENT — PAIN - FUNCTIONAL ASSESSMENT
PAIN_FUNCTIONAL_ASSESSMENT: 0-10

## 2025-07-20 ASSESSMENT — PATIENT HEALTH QUESTIONNAIRE - PHQ9
SUM OF ALL RESPONSES TO PHQ9 QUESTIONS 1 & 2: 0
2. FEELING DOWN, DEPRESSED OR HOPELESS: NOT AT ALL
1. LITTLE INTEREST OR PLEASURE IN DOING THINGS: NOT AT ALL

## 2025-07-20 NOTE — H&P
History Of Present Illness  Yaritza Bernal is a 75 y.o. female with PMHx of HTN, GERD, hypothyroidism, asthma, DVT on Eliquis, s/p L TKA 7/17/25 who presented to [facility] [place] ED due to recurrent falls. Pt stated that she had a fall at home where she slid from the end of her bed as she was trying to get herself up. Due to recent knee surgery she was unable to get off the floor and had to call EMS who helped her up. She did not come to the hospital. This same exact scenario then occurred a second time and EMS convinced her to come in. She does think she hit her head but she never lost consciousness. She denies pain from the falls. Denies recent fevers/chills, N/V/C/D, SOB, CP. She has a little bit of pain in the L knee (a 3 or 4) but feels it is what is expected with recent surgery. She states her home is well equipped with grab bars as her late  had Parksinson's but she has concerns she may need some rehab prior to returning home alone with these recent falls.   ED course: Vitals significant for intermittent HTN, tachypnea   Labs significant for K 3.0, Ca 8.1, H/H 9.7/30,7  CTH - No acute intracranial abnormality   CT thoracic spine/chest - Age-indeterminate compression deformity of T2, suspected to be chronic, bandlike consolidation left lung base, likely atelectasis, mild enlargement of the main pulmonary artery   XR L knee - No acute fracture or malalignment  XR pelvis - Suspected acute periprosthetic fracture of the L femoral neck with mild varus impaction        Past Medical History  She has a past medical history of Adverse effect of anesthesia, Allergic rhinitis, Anxiety (2011), Arthritis (2010), Asthma, Bladder disorder, unspecified (10/21/2015), Breast cyst (march), Cataract, Clotting disorder (Multi), Cutaneous abscess, unspecified (03/16/2017), Disorder of the skin and subcutaneous tissue, unspecified (07/14/2016), Dry eyes, DVT (deep venous thrombosis) (Multi), Encounter for immunization (02/17/2017),  Encounter for immunization (10/04/2016), Fatigue, Fracture of wrist, Fracture, femur (Multi), GERD (gastroesophageal reflux disease), Hernia, internal (2021), HL (hearing loss), Hyperlipidemia, Hypertension, Hypothyroidism, Impacted cerumen, bilateral (10/21/2015), Impaired fasting glucose (09/17/2018), Irritable bowel syndrome, Medial epicondylitis, right elbow (10/04/2016), Nephrolithiasis, Other conditions influencing health status (03/16/2017), Other specified abnormal findings of blood chemistry (10/04/2018), Other specified abnormal findings of blood chemistry (09/17/2018), Other specified symptoms and signs involving the circulatory and respiratory systems (09/28/2017), Pain in left hand (09/28/2017), Pain in left hip (10/04/2018), Pain in right hip (11/01/2017), Pain in right knee (03/13/2017), Pain in unspecified knee (03/13/2017), Peripheral neuropathy, Personal history of other diseases of the circulatory system (05/09/2018), Personal history of other diseases of the circulatory system (06/27/2018), Personal history of other diseases of the circulatory system (01/18/2018), Personal history of other diseases of the circulatory system (08/17/2017), Personal history of other diseases of the nervous system and sense organs (09/17/2018), Personal history of other diseases of the nervous system and sense organs (10/20/2016), Personal history of other diseases of the respiratory system (03/09/2018), Personal history of other drug therapy (05/09/2018), Personal history of other endocrine, nutritional and metabolic disease (10/30/2017), Personal history of other mental and behavioral disorders (07/16/2018), Personal history of other specified conditions (06/27/2018), Shortness of breath (2020), Strain of muscle, fascia and tendon of the posterior muscle group at thigh level, unspecified thigh, initial encounter (01/15/2019), Strain of unspecified muscles, fascia and tendons at thigh level, left thigh, initial  encounter (05/01/2018), Tinnitus, Unspecified injury of unspecified elbow, initial encounter (01/15/2019), and Venous insufficiency.    Surgical History  She has a past surgical history that includes Bladder surgery (10/21/2015); Vein ligation and stripping; Extracorporeal shock wave lithotripsy; Femur fracture surgery; Knee surgery; Cataract extraction w/  intraocular lens implant (Right, 06/06/2024); Cataract extraction w/  intraocular lens implant (Left, 07/20/2023); Cardiac catheterization; Colonoscopy; and Lithotripsy (2015).     Social History  She reports that she has never smoked. She has never used smokeless tobacco. She reports current alcohol use of about 2.0 standard drinks of alcohol per week. She reports that she does not use drugs.    Family History  Family History[1]     Allergies  Amoxicillin, Erythromycin, Lisinopril, and Sulfa (sulfonamide antibiotics)    Review of Systems   Constitutional:  Negative for chills and fever.   HENT:  Negative for congestion, rhinorrhea and sore throat.    Cardiovascular:  Positive for leg swelling (chronic R>L). Negative for chest pain and palpitations.   Gastrointestinal:  Negative for abdominal pain, constipation, diarrhea, nausea and vomiting.   Genitourinary:  Negative for difficulty urinating and dysuria.   Neurological:  Negative for dizziness, syncope and light-headedness.        Physical Exam  Constitutional:       General: She is not in acute distress.     Appearance: Normal appearance. She is not ill-appearing or toxic-appearing.   HENT:      Head: Normocephalic and atraumatic.     Cardiovascular:      Rate and Rhythm: Normal rate and regular rhythm.      Pulses: Normal pulses.      Heart sounds: Normal heart sounds. No murmur heard.     No friction rub. No gallop.   Pulmonary:      Effort: Pulmonary effort is normal.      Breath sounds: Normal breath sounds. No wheezing, rhonchi or rales.   Abdominal:      General: There is no distension.      Palpations:  Abdomen is soft. There is no mass.      Tenderness: There is no abdominal tenderness.     Musculoskeletal:      Right lower leg: Edema (Mild non-pitting edema) present.      Left lower leg: Edema (Mild swelling around L knee (expected for POD 3 L TKA)) present.      Comments: L knee with dressing which is clean and dry      Skin:     General: Skin is warm and dry.     Neurological:      General: No focal deficit present.      Mental Status: She is alert and oriented to person, place, and time.     Psychiatric:         Mood and Affect: Mood normal.         Behavior: Behavior normal.          Last Recorded Vitals  /68   Pulse 79   Temp 36.7 °C (98.1 °F) (Temporal)   Resp 20   Wt 93.9 kg (207 lb)   SpO2 94%     Relevant Results  XR knee left 4+ views  Result Date: 7/20/2025  Interpreted By:  Curt Jansen, STUDY: XR KNEE LEFT 4+ VIEWS; ;  7/19/2025 11:35 pm   INDICATION: Signs/Symptoms:Fall, recent TKA.     COMPARISON: 07/17/2025.   ACCESSION NUMBER(S): [id number]   ORDERING CLINICIAN: SIA JADE   FINDINGS: Four views of the left knee.   No acute fracture or malalignment. No osteolysis or aggressive periostitis   Left knee arthroplasty in standard alignment, without abnormal periprosthetic lucency. Partially imaged intramedullary nail with distal interlocking screws seen in the femur, with intact orthopedic hardware, without abnormal periprosthetic lucency.   Knee joint effusion without definite lipohemarthrosis.   Markedly decreased volume intra-articular ventral soft tissue air related to recent surgery. Ventral soft tissue swelling and mild edema, probably postoperative in etiology.       Please see above.     MACRO: None   Signed by: Curt Jansen 7/20/2025 12:35 AM Dictation workstation:   [id number]    XR pelvis 1-2 views  Result Date: 7/20/2025  Interpreted By:  Curt Jansen, STUDY: XR PELVIS 1-2 VIEWS; ;  7/19/2025 11:35 pm   INDICATION: Signs/Symptoms:Trauma.     COMPARISON: 06/25/2021    ACCESSION NUMBER(S): [id number]   ORDERING CLINICIAN: ISA JADE   FINDINGS: AP view of the pelvis.   Partially imaged ORIF changes to the left femur, with suspected acute periprosthetic fracture of the left femoral neck, with mild varus impaction. Please correlate with physical exam findings.   No other evidence of acute fracture. Moderate bilateral hip osteoarthropathy and osteitis pubis. Mild degenerative changes sacroiliac joints. Moderate to severe degenerative changes of the lumbar spine.   Asymmetric edema about the left hip with obscuring of fat planes relative to the contralateral side.       Please see above.     MACRO: None   Signed by: Curt Jansen 7/20/2025 12:27 AM Dictation workstation:   [id number]    CT thoracic spine wo IV contrast  Result Date: 7/19/2025  Interpreted By:  Kentrell Charles, STUDY: CT CHEST W IV CONTRAST; CT THORACIC SPINE WO IV CONTRAST;  7/19/2025 11:24 pm   INDICATION: Signs/Symptoms:Trauma.   COMPARISON: None.   ACCESSION NUMBER(S): [id number]; [id number]   ORDERING CLINICIAN: ISA JADE   TECHNIQUE: Contiguous axial images of the chest and upper abdomen were obtained after the intravenous administration of 50 mL Omnipaque 350 contrast. Coronal and sagittal reformatted images were reconstructed from the axial data. Multiplanar reconstructions of the thoracic spine are provided.   FINDINGS: MEDIASTINUM AND LYMPH NODES: Thyroid is within normal limits. No enlarged intrathoracic or axillary lymph nodes by imaging criteria. No pneumomediastinum. The esophagus appears within normal limits.   VESSELS: Normal caliber thoracic aorta without dissection. Mild calcifications of the aortic arch and descending thoracic aorta. The main pulmonary artery is mildly enlarged at 3.3 cm.   HEART: Normal in size.  Triple-vessel coronary artery calcifications greatest within the left coronary distribution. No significant pericardial effusion.   LUNG, AIRWAYS, AND PLEURA: The trachea  proximal mainstem bronchi are partially collapsed without gross filling defect. Bandlike opacity in the left lung base, likely atelectasis. No pleural effusion or pneumothorax.   OSSEOUS STRUCTURES: No rib fracture. No traumatic malalignment of the thoracic spine. Age-indeterminate compression deformity of T2, suspected to be chronic. Correlate for point tenderness on exam. Mild multilevel disc space height loss and degenerative endplate changes. No high-grade spinal canal stenosis evident by CT.   CHEST WALL SOFT TISSUES: No discernible abnormality.   UPPER ABDOMEN/OTHER: Cholelithiasis without CT evidence of acute cholecystitis.       1. Age-indeterminate compression deformity of T2, suspected to be chronic. Correlate for point tenderness on exam. 2. Bandlike consolidation left lung base, likely atelectasis. 3. Mild enlargement of the main pulmonary artery, correlate for pulmonary arterial hypertension.               Signed by: Kentrell Charles 7/19/2025 11:51 PM Dictation workstation:   [id number]    CT chest w IV contrast  Result Date: 7/19/2025  Interpreted By:  Kentrell Charles, STUDY: CT CHEST W IV CONTRAST; CT THORACIC SPINE WO IV CONTRAST;  7/19/2025 11:24 pm   INDICATION: Signs/Symptoms:Trauma.   COMPARISON: None.   ACCESSION NUMBER(S): [id number]; [id number]   ORDERING CLINICIAN: ISA JADE   TECHNIQUE: Contiguous axial images of the chest and upper abdomen were obtained after the intravenous administration of 50 mL Omnipaque 350 contrast. Coronal and sagittal reformatted images were reconstructed from the axial data. Multiplanar reconstructions of the thoracic spine are provided.   FINDINGS: MEDIASTINUM AND LYMPH NODES: Thyroid is within normal limits. No enlarged intrathoracic or axillary lymph nodes by imaging criteria. No pneumomediastinum. The esophagus appears within normal limits.   VESSELS: Normal caliber thoracic aorta without dissection. Mild calcifications of the aortic arch and  descending thoracic aorta. The main pulmonary artery is mildly enlarged at 3.3 cm.   HEART: Normal in size.  Triple-vessel coronary artery calcifications greatest within the left coronary distribution. No significant pericardial effusion.   LUNG, AIRWAYS, AND PLEURA: The trachea proximal mainstem bronchi are partially collapsed without gross filling defect. Bandlike opacity in the left lung base, likely atelectasis. No pleural effusion or pneumothorax.   OSSEOUS STRUCTURES: No rib fracture. No traumatic malalignment of the thoracic spine. Age-indeterminate compression deformity of T2, suspected to be chronic. Correlate for point tenderness on exam. Mild multilevel disc space height loss and degenerative endplate changes. No high-grade spinal canal stenosis evident by CT.   CHEST WALL SOFT TISSUES: No discernible abnormality.   UPPER ABDOMEN/OTHER: Cholelithiasis without CT evidence of acute cholecystitis.       1. Age-indeterminate compression deformity of T2, suspected to be chronic. Correlate for point tenderness on exam. 2. Bandlike consolidation left lung base, likely atelectasis. 3. Mild enlargement of the main pulmonary artery, correlate for pulmonary arterial hypertension.               Signed by: Kentrell Charles 7/19/2025 11:51 PM Dictation workstation:   [id number]    CT head W O contrast trauma protocol  Result Date: 7/19/2025  Interpreted By:  Kentrell Charles, STUDY: CT HEAD W/O CONTRAST TRAUMA PROTOCOL;  7/19/2025 11:24 pm   INDICATION: Signs/Symptoms:Trauma.     COMPARISON: CT head 10/09/2015   ACCESSION NUMBER(S): [id number]   ORDERING CLINICIAN: ISA JADE   TECHNIQUE: Noncontrast axial CT images of head were obtained with coronal and sagittal reconstructed images.   FINDINGS: BRAIN PARENCHYMA: Gray-white differentiation is maintained. Chronic lacunar infarct of the right caudate nucleus head. Patchy periventricular and subcortical white matter hypodensities, nonspecific but often seen in the  setting of chronic microangiopathic change. No mass-effect, midline shift or effacement of cerebral sulci.   HEMORRHAGE: No acute intracranial hemorrhage.   VENTRICLES and EXTRA-AXIAL SPACES: The ventricles and sulci are within normal limits for brain volume. No abnormal extra-axial fluid collection.   ORBITS: The visualized orbits and globes are within normal limits. Status post bilateral lens extraction.   EXTRACRANIAL SOFT TISSUES: No significant abnormality.   PARANASAL SINUSES/MASTOIDS: The visualized paranasal sinuses and mastoid air cells are well aerated.   CALVARIUM: No depressed skull fracture.       1. No acute intracranial abnormality identified.       MACRO: None   Signed by: Kentrell Charles 7/19/2025 11:37 PM Dictation workstation:   [id number]    XR knee left 1-2 views  Result Date: 7/17/2025  Interpreted By:  Chelsea Remy, STUDY: XR KNEE LEFT 1-2 VIEWS;  7/17/2025 9:13 am   INDICATION: Signs/Symptoms:Post-op knee, PACU.   COMPARISON: 12/30/2024   ACCESSION NUMBER(S): [id number]   ORDERING CLINICIAN: MICKY ELKINS   FINDINGS: There has been placement of left total knee arthroplasty. The hardware is intact. No acute fracture is seen. There is some expected air within adjacent soft tissues.   Palliative intramedullary anum and interlocking screws again noted in the distal femur.   No lytic or blastic lesions are seen.   No periosteal reaction is seen.       Status post left total knee arthroplasty, with expected postoperative findings.     MACRO: None   Signed by: Chelsea Remy 7/17/2025 9:22 AM Dictation workstation:   [id number]    CT knee left wo IV contrast  Result Date: 6/30/2025  Interpreted By:  Paul Velazco, STUDY: CT KNEE LEFT WO IV CONTRAST; ;  6/30/2025 3:10 pm   INDICATION: Signs/Symptoms:PRE OP IMAGING.   ,M25.362 Other instability, left knee   COMPARISON: None.   ACCESSION NUMBER(S): [id number]   ORDERING CLINICIAN: MICKY ELKINS   TECHNIQUE: Serial axial segmental CT images  obtained of the left knee utilizing protocol   FINDINGS: Left hemipelvis demonstrates osteoarthritic degenerative changes of the left hip. Left hip fixation is demonstrated. There is a mild left SI joint osteoarthritis. Musculature about the left hip demonstrates no asymmetric atrophy. Visualized portions soft tissue pelvis demonstrate bilateral inguinal hernias. Characterization about the knee demonstrates tricompartmental osteoarthritis. Intramedullary nail demonstrated within the distal femoral diaphysis. There is small knee joint effusion. Small popliteal cyst demonstrated. Moderate atrophy of the musculature of the right calf with diffuse asymmetric atrophy medial head of the gastrocnemius. Diffuse subcutaneous edema within the distal calf.               1. CT left lower extremity utilizing preoperative protocol.     MACRO: None   Signed by: Paul Velazco 6/30/2025 3:16 PM Dictation workstation:   [id number]             Assessment/Plan   Assessment & Plan  Falls  Yaritza Bernal is a 75 y.o. female with PMHx of HTN, GERD, hypothyroidism, asthma, DVT on Eliquis, s/p L TKA 7/17/25 who presented to [facility] ED due to recurrent falls. Pt stated that she had a fall at home where she slid from the end of her bed as she was trying to get herself up. Due to recent knee surgery she was unable to get off the floor and had to call EMS who helped her up.    Recurrent falls   S/p L TKA 7/17/25   - XR L knee - No acute fracture or malalignment  - XR pelvis - Suspected acute periprosthetic fracture of the L femoral neck with mild varus impaction   - Consulted ortho with above findings -> recommended CT L femur and pelvis (orders placed)  - PT/OT evals with Ortho clearance   - Continue doxycycline prescribed for prophylaxis following surgery   - PRN pain meds     HTN   - BP variable with intermittent HTN noted   - Continue home bisoprolol, hydrochlorothiazide     GERD   - Protonix daily     Hypothyroidism   - Continue home  levothyroxine     Asthma   - Scheduled and PRN nebs per RT     Other comorbidities as above  - Continue medications as ordered and adjust based on clinical course      VTE / GI prophylaxis   - Eliquis, PPI, bowel regimen in place      Discharge planning  - Pending PT/OT evals      Discussed with Dr. Cedillo and the interdisciplinary team            Rose Pinto PA-C         [1]   Family History  Problem Relation Name Age of Onset    Other (cardiac disorder) Mother jose cardenas     Cancer Mother jose cardenas     Hypertension Mother jose cardenas     Migraines Mother jose cardenas     Rashes / Skin problems Mother jose cardenas     Arthritis Mother jose cardenas     Other (emphysema lung) Father monika     Asthma Father monika     Breast cancer Neg Hx

## 2025-07-20 NOTE — SIGNIFICANT EVENT
Patient stated she was prescribed symbacort and albuterol by her allergist due to allergies from the cat she owns.

## 2025-07-20 NOTE — ED TRIAGE NOTES
Patient arrived to ED with c/o slip and fall from her bed, patient on thinners. Patient denies hitting her head, denies LOC. Patient A&Ox4. Patient denies chest pain/SOB. Patient reports left knee replacement on Thurs. Patient c/o mid back pain from scraping it on bed. Patient stable at this time.

## 2025-07-20 NOTE — ED PROVIDER NOTES
"History/Exam limitations: {limitations:84226::\"none\"}.   Additional history was obtained from {info source:79527}.          HPI:    Yaritza Bernal is a 75 y.o. female PMH hypertension, hypothyroidism, hyperlipidemia, GERD, DVT, asthma, Eliquis use left TKA 7/17 presenting for evaluation of recurrent falls.  Patient states that she fell when she was sitting on the edge of the bed and was trying to get herself upright to use her walker.  She states that she fell onto her backside and landed on her back and struck her head.  She states that she had some mild pain across her mid upper back.  No neck pain.  No LOC.  No weakness or numbness.  EMS was reportedly called and they helped her up and got her on her feet and she ultimately is not transported.  2 by hours later she had a similar episode she was sitting on the edge for bed and attempted to get up and slid off and landed on her backside/back and struck her head.  No LOC.  Denies any acute pain in the knee.    Postoperative changes left knee, no significant tenderness, mild swelling, diffuse midline and paraspinal mid thoracic tenderness, no deformity, superficial abrasion to the left paraspinal thoracic area, no midline C or L-spine tenderness, no distracting injury, no focal neurologic deficits     Patient states she feels she needs to go back to rehab as she feels like home is not safe for her at this time.         Physical Exam:  ED Triage Vitals   Temp Pulse Resp BP   -- -- -- --      SpO2 Temp src Heart Rate Source Patient Position   -- -- -- --      BP Location FiO2 (%)     -- --        GEN:      Alert, NAD  Eyes:       PERRL, EOMI  HENT:      NC/AT, OP clear, airway patent, MM  CV:      RRR, no MRG, no LE pitting edema, 2+ radial and pedal pulses  PULM:     CTAB, no w/r/r, easy WOB, symmetric chest rise  ABD:      Soft, NT, ND, no masses, BS +  :       No CVA TTP  NEURO:   A&Ox3, no focal deficits ***   MSK:      FROM, no joint deformities or swelling, no " e/o trauma  SKIN:       Warm and dry  PSYCH:    Appropriate mood and affect         MDM/ED Course:   ***     ED Course as of 07/20/25 0116   Sun Jul 20, 2025   0102 Patient does have some tenderness in the T2 area potentially acute versus chronic compression fracture. [JM]      ED Course User Index  [JM] Umesh Taylor MD         Chronic medical conditions affecting care listed in MDM. I independently reviewed imaging studies and agreed with radiology reads. I reviewed recent and relevant outside records including PCP notes, Prior discharge summaries, and prior radiology reports.    Procedure  Procedures    Diagnosis:   1. ***    Dispo:  *** in stable condition      Disclaimer: Portions of this note were dictated by speech recognition. An attempt at proof reading was made to minimize errors. Minor errors in transcription may be present.  Please call if questions.   affecting care listed in MDM. I independently reviewed imaging studies and agreed with radiology reads. I reviewed recent and relevant outside records including PCP notes, Prior discharge summaries, and prior radiology reports.    Procedure  Procedures    Diagnosis:   1.  T2 compression fracture of unclear chronicity  2.  Recurrent mechanical falls    Dispo: Hospitalized in stable condition      Disclaimer: Portions of this note were dictated by speech recognition. An attempt at proof reading was made to minimize errors. Minor errors in transcription may be present.  Please call if questions.     Umesh Taylor MD  07/22/25 [id number]

## 2025-07-21 ENCOUNTER — HOME CARE VISIT (OUTPATIENT)
Dept: HOME HEALTH SERVICES | Facility: HOME HEALTH | Age: 75
End: 2025-07-21
Payer: MEDICARE

## 2025-07-21 LAB
ANION GAP SERPL CALC-SCNC: 16 MMOL/L (ref 10–20)
APPEARANCE UR: ABNORMAL
BILIRUB UR STRIP.AUTO-MCNC: NEGATIVE MG/DL
BUN SERPL-MCNC: 14 MG/DL (ref 6–23)
CALCIUM SERPL-MCNC: 8.1 MG/DL (ref 8.6–10.3)
CHLORIDE SERPL-SCNC: 104 MMOL/L (ref 98–107)
CO2 SERPL-SCNC: 25 MMOL/L (ref 21–32)
COLOR UR: YELLOW
CREAT SERPL-MCNC: 0.73 MG/DL (ref 0.5–1.05)
EGFRCR SERPLBLD CKD-EPI 2021: 85 ML/MIN/1.73M*2
ERYTHROCYTE [DISTWIDTH] IN BLOOD BY AUTOMATED COUNT: 13.7 % (ref 11.5–14.5)
GLUCOSE SERPL-MCNC: 94 MG/DL (ref 74–99)
GLUCOSE UR STRIP.AUTO-MCNC: NORMAL MG/DL
HCT VFR BLD AUTO: 31.9 % (ref 36–46)
HGB BLD-MCNC: 9.8 G/DL (ref 12–16)
KETONES UR STRIP.AUTO-MCNC: ABNORMAL MG/DL
LEUKOCYTE ESTERASE UR QL STRIP.AUTO: ABNORMAL
MCH RBC QN AUTO: 26.8 PG (ref 26–34)
MCHC RBC AUTO-ENTMCNC: 30.7 G/DL (ref 32–36)
MCV RBC AUTO: 87 FL (ref 80–100)
MUCOUS THREADS #/AREA URNS AUTO: ABNORMAL /LPF
NITRITE UR QL STRIP.AUTO: NEGATIVE
NRBC BLD-RTO: 0 /100 WBCS (ref 0–0)
PH UR STRIP.AUTO: 5.5 [PH]
PLATELET # BLD AUTO: 248 X10*3/UL (ref 150–450)
POTASSIUM SERPL-SCNC: 3.2 MMOL/L (ref 3.5–5.3)
PROT UR STRIP.AUTO-MCNC: NEGATIVE MG/DL
RBC # BLD AUTO: 3.65 X10*6/UL (ref 4–5.2)
RBC # UR STRIP.AUTO: ABNORMAL MG/DL
RBC #/AREA URNS AUTO: >20 /HPF
SODIUM SERPL-SCNC: 142 MMOL/L (ref 136–145)
SP GR UR STRIP.AUTO: 1.01
SQUAMOUS #/AREA URNS AUTO: ABNORMAL /HPF
UROBILINOGEN UR STRIP.AUTO-MCNC: NORMAL MG/DL
WBC # BLD AUTO: 7.7 X10*3/UL (ref 4.4–11.3)
WBC #/AREA URNS AUTO: ABNORMAL /HPF

## 2025-07-21 PROCEDURE — 97161 PT EVAL LOW COMPLEX 20 MIN: CPT | Mod: GP

## 2025-07-21 PROCEDURE — 2500000005 HC RX 250 GENERAL PHARMACY W/O HCPCS: Performed by: HOSPITALIST

## 2025-07-21 PROCEDURE — 97530 THERAPEUTIC ACTIVITIES: CPT | Mod: GP

## 2025-07-21 PROCEDURE — 2500000001 HC RX 250 WO HCPCS SELF ADMINISTERED DRUGS (ALT 637 FOR MEDICARE OP)

## 2025-07-21 PROCEDURE — 81001 URINALYSIS AUTO W/SCOPE: CPT | Performed by: NURSE PRACTITIONER

## 2025-07-21 PROCEDURE — 80048 BASIC METABOLIC PNL TOTAL CA: CPT

## 2025-07-21 PROCEDURE — 87086 URINE CULTURE/COLONY COUNT: CPT | Mod: AHULAB | Performed by: NURSE PRACTITIONER

## 2025-07-21 PROCEDURE — 94640 AIRWAY INHALATION TREATMENT: CPT

## 2025-07-21 PROCEDURE — 2500000002 HC RX 250 W HCPCS SELF ADMINISTERED DRUGS (ALT 637 FOR MEDICARE OP, ALT 636 FOR OP/ED)

## 2025-07-21 PROCEDURE — 2500000001 HC RX 250 WO HCPCS SELF ADMINISTERED DRUGS (ALT 637 FOR MEDICARE OP): Performed by: HOSPITALIST

## 2025-07-21 PROCEDURE — 2500000001 HC RX 250 WO HCPCS SELF ADMINISTERED DRUGS (ALT 637 FOR MEDICARE OP): Performed by: FAMILY MEDICINE

## 2025-07-21 PROCEDURE — 2500000004 HC RX 250 GENERAL PHARMACY W/ HCPCS (ALT 636 FOR OP/ED): Performed by: HOSPITALIST

## 2025-07-21 PROCEDURE — 1200000002 HC GENERAL ROOM WITH TELEMETRY DAILY

## 2025-07-21 PROCEDURE — 9420000001 HC RT PATIENT EDUCATION 5 MIN

## 2025-07-21 PROCEDURE — 36415 COLL VENOUS BLD VENIPUNCTURE: CPT

## 2025-07-21 PROCEDURE — 2500000002 HC RX 250 W HCPCS SELF ADMINISTERED DRUGS (ALT 637 FOR MEDICARE OP, ALT 636 FOR OP/ED): Performed by: HOSPITALIST

## 2025-07-21 PROCEDURE — 2500000004 HC RX 250 GENERAL PHARMACY W/ HCPCS (ALT 636 FOR OP/ED): Performed by: NURSE PRACTITIONER

## 2025-07-21 PROCEDURE — G0378 HOSPITAL OBSERVATION PER HR: HCPCS

## 2025-07-21 PROCEDURE — 97165 OT EVAL LOW COMPLEX 30 MIN: CPT | Mod: GO

## 2025-07-21 PROCEDURE — 85027 COMPLETE CBC AUTOMATED: CPT

## 2025-07-21 PROCEDURE — 97110 THERAPEUTIC EXERCISES: CPT | Mod: GP

## 2025-07-21 RX ORDER — POTASSIUM CHLORIDE 20 MEQ/1
40 TABLET, EXTENDED RELEASE ORAL ONCE
Status: COMPLETED | OUTPATIENT
Start: 2025-07-21 | End: 2025-07-21

## 2025-07-21 RX ORDER — ONDANSETRON HYDROCHLORIDE 2 MG/ML
4 INJECTION, SOLUTION INTRAVENOUS EVERY 8 HOURS PRN
Status: DISCONTINUED | OUTPATIENT
Start: 2025-07-21 | End: 2025-07-22 | Stop reason: HOSPADM

## 2025-07-21 RX ORDER — ONDANSETRON 4 MG/1
4 TABLET, FILM COATED ORAL EVERY 8 HOURS PRN
Status: DISCONTINUED | OUTPATIENT
Start: 2025-07-21 | End: 2025-07-22 | Stop reason: HOSPADM

## 2025-07-21 RX ORDER — AMOXICILLIN 250 MG
2 CAPSULE ORAL 2 TIMES DAILY
Status: DISCONTINUED | OUTPATIENT
Start: 2025-07-21 | End: 2025-07-22 | Stop reason: HOSPADM

## 2025-07-21 RX ADMIN — DOXYCYCLINE HYCLATE 100 MG: 100 TABLET, COATED ORAL at 21:13

## 2025-07-21 RX ADMIN — Medication 3 MG: at 21:14

## 2025-07-21 RX ADMIN — APIXABAN 2.5 MG: 2.5 TABLET, FILM COATED ORAL at 21:13

## 2025-07-21 RX ADMIN — BISOPROLOL FUMARATE 20 MG: 5 TABLET ORAL at 09:24

## 2025-07-21 RX ADMIN — CITALOPRAM HYDROBROMIDE 20 MG: 20 TABLET ORAL at 09:25

## 2025-07-21 RX ADMIN — POLYETHYLENE GLYCOL 3350 17 G: 17 POWDER, FOR SOLUTION ORAL at 09:25

## 2025-07-21 RX ADMIN — HYDROCHLOROTHIAZIDE 12.5 MG: 12.5 TABLET ORAL at 09:25

## 2025-07-21 RX ADMIN — FORMOTEROL FUMARATE DIHYDRATE 20 MCG: 20 SOLUTION RESPIRATORY (INHALATION) at 20:18

## 2025-07-21 RX ADMIN — APIXABAN 2.5 MG: 2.5 TABLET, FILM COATED ORAL at 09:35

## 2025-07-21 RX ADMIN — FORMOTEROL FUMARATE DIHYDRATE 20 MCG: 20 SOLUTION RESPIRATORY (INHALATION) at 07:35

## 2025-07-21 RX ADMIN — GABAPENTIN 600 MG: 300 CAPSULE ORAL at 09:25

## 2025-07-21 RX ADMIN — GABAPENTIN 600 MG: 300 CAPSULE ORAL at 21:13

## 2025-07-21 RX ADMIN — POTASSIUM CHLORIDE EXTENDED-RELEASE 40 MEQ: 1500 TABLET ORAL at 13:18

## 2025-07-21 RX ADMIN — LEVOTHYROXINE SODIUM 25 MCG: 0.03 TABLET ORAL at 05:52

## 2025-07-21 RX ADMIN — DOXYCYCLINE HYCLATE 100 MG: 100 TABLET, COATED ORAL at 09:24

## 2025-07-21 RX ADMIN — PANTOPRAZOLE SODIUM 20 MG: 20 TABLET, DELAYED RELEASE ORAL at 05:51

## 2025-07-21 RX ADMIN — BUDESONIDE 0.5 MG: 0.5 INHALANT RESPIRATORY (INHALATION) at 20:18

## 2025-07-21 RX ADMIN — ONDANSETRON 4 MG: 2 INJECTION, SOLUTION INTRAMUSCULAR; INTRAVENOUS at 10:37

## 2025-07-21 RX ADMIN — BUDESONIDE 0.5 MG: 0.5 INHALANT RESPIRATORY (INHALATION) at 07:35

## 2025-07-21 RX ADMIN — GABAPENTIN 600 MG: 300 CAPSULE ORAL at 15:47

## 2025-07-21 RX ADMIN — SENNOSIDES 8.6 MG: 8.6 TABLET, FILM COATED ORAL at 09:24

## 2025-07-21 RX ADMIN — ACETAMINOPHEN 650 MG: 325 TABLET ORAL at 04:18

## 2025-07-21 RX ADMIN — SENNOSIDES AND DOCUSATE SODIUM 2 TABLET: 50; 8.6 TABLET ORAL at 21:13

## 2025-07-21 ASSESSMENT — COGNITIVE AND FUNCTIONAL STATUS - GENERAL
MOVING FROM LYING ON BACK TO SITTING ON SIDE OF FLAT BED WITH BEDRAILS: A LITTLE
DRESSING REGULAR UPPER BODY CLOTHING: A LOT
MOBILITY SCORE: 18
DAILY ACTIVITIY SCORE: 23
STANDING UP FROM CHAIR USING ARMS: A LITTLE
STANDING UP FROM CHAIR USING ARMS: TOTAL
PERSONAL GROOMING: A LITTLE
CLIMB 3 TO 5 STEPS WITH RAILING: TOTAL
DRESSING REGULAR LOWER BODY CLOTHING: A LITTLE
HELP NEEDED FOR BATHING: A LOT
DAILY ACTIVITIY SCORE: 14
MOVING TO AND FROM BED TO CHAIR: A LITTLE
MOBILITY SCORE: 14
EATING MEALS: A LITTLE
WALKING IN HOSPITAL ROOM: A LOT
DRESSING REGULAR LOWER BODY CLOTHING: A LOT
MOVING TO AND FROM BED TO CHAIR: A LITTLE
TOILETING: A LOT
CLIMB 3 TO 5 STEPS WITH RAILING: A LOT
WALKING IN HOSPITAL ROOM: A LITTLE
TURNING FROM BACK TO SIDE WHILE IN FLAT BAD: A LITTLE

## 2025-07-21 ASSESSMENT — PAIN - FUNCTIONAL ASSESSMENT
PAIN_FUNCTIONAL_ASSESSMENT: 0-10

## 2025-07-21 ASSESSMENT — PAIN SCALES - GENERAL
PAINLEVEL_OUTOF10: 2
PAINLEVEL_OUTOF10: 1
PAINLEVEL_OUTOF10: 0 - NO PAIN

## 2025-07-21 ASSESSMENT — PAIN DESCRIPTION - DESCRIPTORS
DESCRIPTORS: ACHING
DESCRIPTORS: ACHING

## 2025-07-21 ASSESSMENT — ACTIVITIES OF DAILY LIVING (ADL)
BATHING_ASSISTANCE: MAXIMAL
ADL_ASSISTANCE: INDEPENDENT
ADL_ASSISTANCE: INDEPENDENT
LACK_OF_TRANSPORTATION: NO

## 2025-07-21 ASSESSMENT — PAIN DESCRIPTION - ORIENTATION: ORIENTATION: RIGHT

## 2025-07-21 ASSESSMENT — PAIN DESCRIPTION - LOCATION: LOCATION: LEG

## 2025-07-21 NOTE — PROGRESS NOTES
Occupational Therapy    Evaluation    Patient Name: Yaritza Bernal  MRN: [id number]  Department: [facility] A 7  Room: [facility]  Today's Date: 7/21/2025  Time Calculation  Start Time: 1011  Stop Time: 1039  Time Calculation (min): 28 min      Completion of this session, clinical decision making, and documentation performed under the supervision/direction of Geoffrey Bernal OTR/L.    Assessment:  OT Assessment: OT eval complete, Pt limited by pain and fatigue. Pt would benefit from skilled OT services to maximize performance in ADLs, functional transfers, coordination, bed mobility, core strength and stability, and dynamic and static sitting and standing balance.  Prognosis: Fair  Barriers to Discharge Home: Physical needs  Physical Needs: 24hr mobility assistance needed, 24hr ADL assistance needed, High falls risk due to function or environment  Evaluation/Treatment Tolerance: Patient limited by fatigue, Patient limited by pain  Medical Staff Made Aware: Yes  End of Session Communication: Bedside nurse  End of Session Patient Position: Alarm on, Up in chair  OT Assessment Results: Decreased ADL status, Decreased upper extremity strength, Decreased endurance, Decreased functional mobility, Decreased gross motor control, Decreased IADLs, Decreased trunk control for functional activities, Decreased fine motor control  Prognosis: Fair  Barriers to Discharge: Decreased caregiver support  Evaluation/Treatment Tolerance: Patient limited by fatigue, Patient limited by pain  Medical Staff Made Aware: Yes  Strengths: Ability to acquire knowledge  Plan:  Treatment Interventions: ADL retraining, Functional transfer training, Endurance training, UE strengthening/ROM, Patient/family training, Neuromuscular reeducation, Fine motor coordination activities, Compensatory technique education  OT Frequency: 3 times per week (This acute inpatient hospitalization.)  OT Discharge Recommendations: Moderate intensity level of continued care Based on  current functional status and rehab potential, patient is anticipated to tolerate and benefit from 5 or more days per week of skilled rehabilitative therapy after discharge from this acute inpatient hospitalization.   Equipment Recommended upon Discharge: Wheeled walker  OT Recommended Transfer Status: Maximum assist, Assist of 2  OT - OK to Discharge: Yes (Per POC)  Treatment Interventions: ADL retraining, Functional transfer training, Endurance training, UE strengthening/ROM, Patient/family training, Neuromuscular reeducation, Fine motor coordination activities, Compensatory technique education    Subjective   Current Problem:  1. Falls          OT Visit Info:  OT Received On: 07/21/25  General:  General  Reason for Referral: Pt is 76 y/o female presenting s/p fall, s/p TKA.  Referred By: Calista Santillan MD  Past Medical History Relevant to Rehab: Medical History[1]  Family/Caregiver Present: No  Co-Treatment: PT  Co-Treatment Reason: To increase patient safety, transfer ability, and participation.  Prior to Session Communication: Bedside nurse  Patient Position Received: Bed, 3 rail up, Alarm on  General Comment: Pt pleasant and agreeable to OT eval.  Precautions:  LE Weight Bearing Status: Weight Bearing as Tolerated (LLE)  Medical Precautions: Fall precautions  Post-Surgical Precautions: Left total knee precautions    Pain:  Pain Assessment  Pain Assessment: 0-10  0-10 (Numeric) Pain Score: 0 - No pain (No pain at rest, severe pain w/ movement.)  Pain Interventions: Ambulation/increased activity, Repositioned    Objective   Cognition:  Overall Cognitive Status: Within Functional Limits  Orientation Level: Oriented X4    Home Living:  Type of Home: House  Lives With: Alone  Home Adaptive Equipment: Walker rolling or standard, Other (Comment) (Rollator, 2 walking sticks)  Home Layout: Two level, Able to live on main level with bedroom/bathroom  Home Access: Stairs to enter with rails  Entrance Stairs-Rails:  Both  Entrance Stairs-Number of Steps: 3  Bathroom Shower/Tub: Tub/shower unit  Bathroom Toilet: Adaptive toilet seating  Bathroom Equipment: Grab bars in shower, Raised toilet seat with rails  Prior Function:  Level of [place]: Independent with ADLs and functional transfers, Independent with homemaking with ambulation  Receives Help From: Friends  ADL Assistance: Independent  Homemaking Assistance: Independent (Uses [employer] according to previous note)  Ambulatory Assistance: Independent (Uses walking sticks inside and FWW or Rollator outside for longer distances)  Vocational: Part time employment ([employer] )  Hand Dominance: Right (Since development of severe central tremor, has been using L hand more often.)  Prior Function Comments: pt admits to 2 falls in past 6 mos; independently manages her own meds  IADL History:  Homemaking Responsibilities: Yes  Meal Prep Responsibility: Primary  Laundry Responsibility: Primary  Cleaning Responsibility: Primary  Occupation: Part time employment  Type of Occupation:   ADL:  Eating Assistance: Minimal  Eating Deficit:  (Anticipate minimal assistance for cutting food d/t severe central tremor.)  Grooming Assistance:  (CGA)  Grooming Deficit:  (Pt requires CGA for safety d/t severe central tremor, and decreased dynamic sitting balance.)  Bathing Assistance: Maximal  Bathing Deficit:  (Anticipate Pt requires assistance for washing LEs, back, and perineal area d/t severe central tremor, and decreased dynamic sitting balance.)  UE Dressing Assistance: Moderate  UE Dressing Deficit:  (Anticipate Pt requires assistance with pulling gown around back d/t severe central tremor, and decreased dynamic sitting balance.)  LE Dressing Assistance: Maximal  LE Dressing Deficit:  (Pt requires assistance with donning brief, and pulling brief over hips d/t severe central tremor, and decreased dynamic sitting/standing balance)  Toileting Assistance with Device:  Maximal  Toileting Deficit:  (Anticipate Pt requires assistance with pulling off pants and brief, donning pants and brief, and cleaning perineal area d/t severe central tremor, and decreased dynamic sitting/standing balance)  Functional Assistance: Maximal  Functional Deficit:  (Anticipate Pt requires assistance with all ADLs d/t severe central tremor, and decreased dynamic sitting balance,)  Activity Tolerance:  Endurance: Tolerates 10 - 20 min exercise with multiple rests  Bed Mobility/Transfers: Bed Mobility  Bed Mobility: Yes  Bed Mobility 1  Bed Mobility 1: Supine to sitting  Level of Assistance 1: Close supervision  Bed Mobility Comments 1: Pt requires supervision for safety d/t severe central tremor and decreased core strength and stability.    Transfers  Transfer: Yes  Transfer 1  Transfer From 1: Bed to  Transfer to 1: Toilet  Technique 1: Sit to stand, Stand to sit  Transfer Device 1: Walker, Gait belt  Transfer Level of Assistance 1: Maximum assistance, +2  Trials/Comments 1: Pt requires assistance d/t severe central tremor, and decreased core strength and stability.  Transfers 2  Transfer From 2: Toilet to  Transfer to 2: Chair with arms  Technique 2: Sit to stand, Stand to sit  Transfer Device 2: Walker, Gait belt  Transfer Level of Assistance 2: Maximum assistance, +2  Trials/Comments 2: Pt requires assistance d/t severe central tremor, and decreased core strength and stability.    Sitting Balance:  Static Sitting Balance  Static Sitting-Balance Support: Feet supported, Bilateral upper extremity supported  Static Sitting-Level of Assistance: Contact guard  Static Sitting-Comment/Number of Minutes: Pt requires guarding for safety d/t severe central tremor, and decreased core strength and stability.  Dynamic Sitting Balance  Dynamic Sitting-Balance Support: No upper extremity supported, Feet supported  Dynamic Sitting-Level of Assistance: Contact guard  Dynamic Sitting-Balance:  (don adult  brief)  Dynamic Sitting-Comments: Pt requires guarding for safety d/t severe central tremor, and decreased core strength and stability.  Standing Balance:  Static Standing Balance  Static Standing-Balance Support: Bilateral upper extremity supported  Static Standing-Level of Assistance: Contact guard  Static Standing-Comment/Number of Minutes: Pt requires guarding for safety d/t severe central tremor, and decreased core strength and stability.  Dynamic Standing Balance  Dynamic Standing-Balance Support: Bilateral upper extremity supported  Dynamic Standing-Level of Assistance: Maximum assistance  Dynamic Standing-Balance:  (return from standing to sitting.)  Dynamic Standing-Comments: Pt requires assistance for safety and stability d/t severe central tremor, and decreased core strength and stability.     Vision:Vision - Basic Assessment  Current Vision: Wears glasses all the time  Sensation:  Light Touch: No apparent deficits  Strength:  Strength Comments: BUE impaired, tested functionally MaxA x2 to stand  Perception:  Inattention/Neglect: Appears intact  Initiation: Appears intact  Coordination:  Movements are Fluid and Coordinated: No (Severe BUE tremors)  Upper Body Coordination: + essential tramors right worse left hands   Hand Function:  Gross Grasp: Functional  Coordination: Impaired  Extremities:   RUE : Within Functional Limits   LUE: Within Functional Limits    Outcome Measures:[facility] Daily Activity  Putting on and taking off regular lower body clothing: A lot  Bathing (including washing, rinsing, drying): A lot  Putting on and taking off regular upper body clothing: A lot  Toileting, which includes using toilet, bedpan or urinal: A lot  Taking care of personal grooming such as brushing teeth: A little  Eating Meals: A little  Daily Activity - Total Score: 14    Education Documentation  Body Mechanics, taught by ITALO Huber at 7/21/2025 11:38 AM.  Learner: Patient  Readiness: Acceptance  Method:  Explanation  Response: Verbalizes Understanding  Comment: Pt educated on ADLs, functional transfers, and bed mobility. Pt receptive of education, but limited by pain and fatigue. Became Nauseous w/ movement.    ADL Training, taught by ITALO Huber at 7/21/2025 11:38 AM.  Learner: Patient  Readiness: Acceptance  Method: Explanation  Response: Verbalizes Understanding  Comment: Pt educated on ADLs, functional transfers, and bed mobility. Pt receptive of education, but limited by pain and fatigue. Became Nauseous w/ movement.      Goals:  Encounter Problems       Encounter Problems (Active)       ADLs       Patient will perform UB and LB bathing with minimal assist  level of assistance and raised toilet seat and grab bars.       Start:  07/21/25    Expected End:  08/04/25            Patient with complete upper body dressing with contact guard assist level of assistance donning and doffing all UE clothes with no adaptive equipment while edge of bed        Start:  07/21/25    Expected End:  08/04/25            Patient with complete lower body dressing with minimal assist  level of assistance donning and doffing all LE clothes  with no adaptive equipment while edge of bed        Start:  07/21/25    Expected End:  08/04/25            Patient will complete daily grooming tasks brushing teeth and washing face/hair with stand by assist level of assistance and PRN adaptive equipment while edge of bed .       Start:  07/21/25    Expected End:  08/04/25            Patient will complete toileting including hygiene clothing management/hygiene with minimal assist  level of assistance and raised toilet seat and grab bars.       Start:  07/21/25    Expected End:  08/04/25               BALANCE       Pt will maintain dynamic standing balance during ADL task with contact guard assist level of assistance in order to demonstrate decreased risk of falling and improved postural control.       Start:  07/21/25    Expected End:   08/04/25                 TRANSFERS       Patient will perform bed mobility independent level of assistance and bed rails in order to improve safety and independence with mobility       Start:  07/21/25    Expected End:  08/04/25            Patient will complete functional transfer to chair with front wheeled walker with minimal assist  level of assistance.       Start:  07/21/25    Expected End:  08/04/25                                     [1]   Past Medical History:  Diagnosis Date    Adverse effect of anesthesia     was told I was throwing T waves and was broght out from under.  Happened about 10 yrs ago    Allergic rhinitis     Anxiety 2011    Arthritis 2010    Asthma     Bladder disorder, unspecified 10/21/2015    Bladder disorder    Breast cyst march    Cataract     Clotting disorder (Multi)     Cutaneous abscess, unspecified 03/16/2017    Abscess    Disorder of the skin and subcutaneous tissue, unspecified 07/14/2016    Hand lesion    Dry eyes     DVT (deep venous thrombosis) (Multi)     after femur fx surgery    Encounter for immunization 02/17/2017    Need for Tdap vaccination    Encounter for immunization 10/04/2016    Need for vaccination with 13-polyvalent pneumococcal conjugate vaccine    Fatigue     Fracture of wrist     Fracture, femur (Multi)     GERD (gastroesophageal reflux disease)     Hernia, internal 2021    HL (hearing loss)     Hyperlipidemia     Hypertension     Hypothyroidism     Impacted cerumen, bilateral 10/21/2015    Impacted cerumen of both ears    Impaired fasting glucose 09/17/2018    Abnormal fasting glucose    Irritable bowel syndrome     Medial epicondylitis, right elbow 10/04/2016    Medial epicondylitis, right    Nephrolithiasis     Other conditions influencing health status 03/16/2017    Cyst    Other specified abnormal findings of blood chemistry 10/04/2018    Abnormal TSH    Other specified abnormal findings of blood chemistry 09/17/2018    Abnormal TSH    Other specified  symptoms and signs involving the circulatory and respiratory systems 09/28/2017    Chest congestion    Pain in left hand 09/28/2017    Pain of left hand    Pain in left hip 10/04/2018    Left hip pain    Pain in right hip 11/01/2017    Bilateral hip pain    Pain in right knee 03/13/2017    Right knee pain    Pain in unspecified knee 03/13/2017    Knee pain    Peripheral neuropathy     Personal history of other diseases of the circulatory system 05/09/2018    History of hypertension    Personal history of other diseases of the circulatory system 06/27/2018    History of hypertension    Personal history of other diseases of the circulatory system 01/18/2018    History of hypertension    Personal history of other diseases of the circulatory system 08/17/2017    History of hypertension    Personal history of other diseases of the nervous system and sense organs 09/17/2018    History of benign essential tremor    Personal history of other diseases of the nervous system and sense organs 10/20/2016    History of acute otitis media    Personal history of other diseases of the respiratory system 03/09/2018    History of acute bacterial sinusitis    Personal history of other drug therapy 05/09/2018    History of pneumococcal vaccination    Personal history of other endocrine, nutritional and metabolic disease 10/30/2017    History of hyperlipidemia    Personal history of other mental and behavioral disorders 07/16/2018    History of anxiety    Personal history of other specified conditions 06/27/2018    History of fatigue    Shortness of breath 2020    Strain of muscle, fascia and tendon of the posterior muscle group at thigh level, unspecified thigh, initial encounter 01/15/2019    Hamstring strain, initial encounter    Strain of unspecified muscles, fascia and tendons at thigh level, left thigh, initial encounter 05/01/2018    Muscle strain of left thigh    Tinnitus     Unspecified injury of unspecified elbow, initial  encounter 01/15/2019    Elbow injury, initial encounter    Venous insufficiency

## 2025-07-21 NOTE — PROGRESS NOTES
Pharmacy Medication History     Source of Information: Per patient     Additional concerns with the patient's PTA list.   Asked about the gap in fill with symbicort but patient gets samples from MD sometimes.     Notified Provider via Haiku : No no changes     The following updates were made to the Prior to Admission medication list:     Medications ADDED:   N/a  Medications CHANGED:  N/a  Medications REMOVED:   N/a  Medications NOT TAKING:   N/a    Allergy reviewed : Yes    Meds 2 Beds : Yes    Outpatient pharmacy confirmed and updated in chart : Yes    Pharmacy name: [facility]     The list below reflectives the updated PTA list. Please review each medication in order reconciliation for additional clarification and justification.    Prior to Admission Medications   Prescriptions Last Dose   Eliquis 5 mg tablet    Sig: Take 1 tablet by mouth 2 times a day.   acetaminophen (Tylenol) 325 mg tablet    Sig: Take 3 tablets (975 mg) by mouth every 8 hours if needed for mild pain (1 - 3).   albuterol (Proventil HFA) 90 mcg/actuation inhaler    Sig: Inhale 2 puffs every 4 hours if needed for wheezing or shortness of breath.   bisoproloL-hydrochlorothiazide (Ziac) 10-6.25 mg tablet    Sig: Take 2 tablets by mouth once daily with breakfast.   budesonide-formoteroL (Symbicort) 160-4.5 mcg/actuation inhaler    Sig: Inhale 2 puffs 2 times a day. Rinse mouth with water after use to reduce aftertaste and incidence of candidiasis. Do not swallow.   citalopram (CeleXA) 20 mg tablet    Sig: Take 1 tablet (20 mg) by mouth once daily.   cyclobenzaprine (Flexeril) 5 mg tablet    Sig: Take 1 tablet (5 mg) by mouth 3 times a day as needed for muscle spasms for up to 10 days.   doxycycline (Monodox) 100 mg capsule    Sig: Take 1 capsule (100 mg) by mouth 2 times a day for 7 days. To prevent infection   esomeprazole (NexIUM) 20 mg DR capsule    Sig: Take 1 capsule (20 mg) by mouth once daily.   fluticasone (Flonase) 50  mcg/actuation nasal spray    Sig: Administer 1 spray into affected nostril(s) once daily.   gabapentin (Neurontin) 600 mg tablet    Sig: Take 1 tablet (600 mg) by mouth 3 times a day.   levothyroxine (Synthroid, Levoxyl) 25 mcg tablet    Sig: Take 1 tablet (25 mcg) by mouth once daily.   mv-mn-iron-FA-Ca carb-vit K (Women's Multivitamin) 18 mg-400 mcg- 500 mg-50 mcg tablet    Sig: Take 1 tablet by mouth once daily.   oxyCODONE (Roxicodone) 5 mg immediate release tablet    Sig: Take 1 tablet (5 mg) by mouth every 6 hours if needed for severe pain (7 - 10) for up to 7 days.   pantoprazole (ProtoNix) 40 mg EC tablet    Sig: Take 1 tablet (40 mg) by mouth once daily as needed (heartburn). Do not crush, chew, or split.   sennosides (Senokot) 8.6 mg tablet    Sig: Take 1 tablet (8.6 mg) by mouth 2 times a day. To prevent constipation   traMADol (Ultram) 50 mg tablet    Sig: Take 1 tablet (50 mg) by mouth every 6 hours if needed for severe pain (7 - 10) for up to 7 days.      Facility-Administered Medications: None       The list below reflectives the updated allergy list. Please review each documented allergy for additional clarification and justification.    Allergies   Allergen Reactions    Amoxicillin Other     tolerated cefazolin    Erythromycin Unknown    Lisinopril Cough    Sulfa (Sulfonamide Antibiotics) Unknown          07/21/25 at 12:05 PM - Joyce Parham

## 2025-07-21 NOTE — ASSESSMENT & PLAN NOTE
Yaritza Bernal is a 75 y.o. female with PMHx of HTN, GERD, hypothyroidism, asthma, DVT on Eliquis, s/p L TKA 7/17/25 who presented to [facility] ED due to recurrent falls. Pt stated that she had a fall at home where she slid from the end of her bed as she was trying to get herself up. Due to recent knee surgery she was unable to get off the floor and had to call EMS who helped her up.    Recurrent falls   S/p L TKA 7/17/25   - XR L knee - No acute fracture or malalignment  - XR pelvis - Suspected acute periprosthetic fracture of the L femoral neck with mild varus impaction   - Consulted ortho with above findings -> recommended CT L femur and pelvis (orders placed)  - PT/OT evals with Ortho clearance   - Continue doxycycline prescribed for prophylaxis following surgery   - PRN pain meds     ?UTI   Check urine  See orders    HTN   - BP variable with intermittent HTN noted   - Continue home bisoprolol, hydrochlorothiazide     GERD   - Protonix daily     Hypothyroidism   - Continue home levothyroxine     Asthma   - Scheduled and PRN nebs per RT     Other comorbidities as above  - Continue medications as ordered and adjust based on clinical course      VTE / GI prophylaxis   - Eliquis, PPI, bowel regimen in place      Discharge planning  - Pending PT/OT evals

## 2025-07-21 NOTE — PROGRESS NOTES
07/21/25 1326   Discharge Planning   Living Arrangements Alone   Expected Discharge Disposition SNF   Does the patient need discharge transport arranged? Yes   [facility] coordination needed? Yes   Patient Choice   Provider Choice list and CMS website (https://medicare.gov/care-compare#search) for post-acute Quality and Resource Measure Data were provided and reviewed with: Patient     7/21/2025 1:27 PM I met with patient. She lives alone. I discussed PT recommendations. I discussed that home care does not provide daily rehab services. I provided SNF list. She will decide between home with home care and SNF. She will call back with SNF choices if she agrees to SNF. Jessica LI

## 2025-07-21 NOTE — PROGRESS NOTES
Yaritza Bernal is a 75 y.o. female on day 1 of admission presenting with Falls.      Subjective   Pt transferred to our service  Chart noted    Pt with some nausea and vomiting this am  Tells me havnig trouble passing urine    Other wise feels ok        Objective     Last Recorded Vitals  /71   Pulse 69   Temp 36.4 °C (97.6 °F) (Temporal)   Resp 19   Wt 93.9 kg (207 lb)   SpO2 95%   Intake/Output last 3 Shifts:    Intake/Output Summary (Last 24 hours) at 7/21/2025 0956  Last data filed at 7/21/2025 0400  Gross per 24 hour   Intake 440 ml   Output --   Net 440 ml       Admission Weight  Weight: 93.9 kg (207 lb) (07/19/25 2252)    Daily Weight  07/19/25 : 93.9 kg (207 lb)    Image Results  XR hip left with pelvis when performed 2 or 3 views, XR femur left 2+ views  Narrative: Interpreted By:  Finkelstein, Evan,   STUDY:  XR HIP LEFT WITH PELVIS WHEN PERFORMED 2 OR 3 VIEWS; XR FEMUR LEFT 2+  VIEWS;  7/20/2025 7:52 pm two views left hip. AP view of the pelvis.  5 images left femur      INDICATION:  Signs/Symptoms:Possible fx.      COMPARISON:  Pelvic radiograph 07/19/2025      ACCESSION NUMBER(S):  [id number]; [id number]      ORDERING CLINICIAN:  VINOD PORRAS      FINDINGS:  Intramedullary nail and screws transfixing a remote fracture of the  left femur. Remote fracture deformity involving the proximal left  femur. No definite acute displaced periprosthetic fracture is  evident. Total knee arthroplasty hardware is intact without  surrounding lucency or periprosthetic fracture. Air in the thigh soft  tissues is likely related to recent surgery. Bones are demineralized.      Impression: Intramedullary nail and screws transfix a remote fracture of the left  femur. Remote fracture deformity involving the proximal left femur  without definite acute displaced periprosthetic fracture. Total knee  arthroplasty hardware without surrounding lucency or periprosthetic  fracture. Air in the thigh soft tissues  likely represents changes of  recent surgery.          MACRO:  None.      Signed by: Evan Finkelstein 7/21/2025 1:26 AM  Dictation workstation:   [id number]      Physical Exam       Constitutional:       General: She is not in acute distress.     Appearance: Normal appearance. She is not ill-appearing or toxic-appearing.   HENT:      Head: Normocephalic and atraumatic.      Cardiovascular:      Rate and Rhythm: Normal rate and regular rhythm.      Pulses: Normal pulses.      Heart sounds: Normal heart sounds. No murmur heard.     No friction rub. No gallop.   Pulmonary:      Effort: Pulmonary effort is normal.      Breath sounds: Normal breath sounds. No wheezing, rhonchi or rales.   Abdominal:      General: There is no distension.      Palpations: Abdomen is soft. There is no mass.      Tenderness: There is no abdominal tenderness.      Musculoskeletal:      Right lower leg: Edema (Mild non-pitting edema) present.      Left lower leg: Edema (Mild swelling around L knee (expected for POD 3 L TKA)) present.      Comments: L knee with dressing which is clean and dry       Skin:     General: Skin is warm and dry.      Neurological:      General: No focal deficit present.      Mental Status: She is alert and oriented to person, place, and time.      Psychiatric:         Mood and Affect: Mood normal.         Behavior: Behavior normal.         Relevant Results                 Medications Ordered Prior to Encounter[1]    No results found for this or any previous visit (from the past 24 hours).                Assessment & Plan  Falls  Yaritza Bernal is a 75 y.o. female with PMHx of HTN, GERD, hypothyroidism, asthma, DVT on Eliquis, s/p L TKA 7/17/25 who presented to [facility] ED due to recurrent falls. Pt stated that she had a fall at home where she slid from the end of her bed as she was trying to get herself up. Due to recent knee surgery she was unable to get off the floor and had to call EMS who helped her up.    Recurrent  falls   S/p L TKA 7/17/25   - XR L knee - No acute fracture or malalignment  - XR pelvis - Suspected acute periprosthetic fracture of the L femoral neck with mild varus impaction   - Consulted ortho with above findings -> recommended CT L femur and pelvis (orders placed)  - PT/OT evals with Ortho clearance   - Continue doxycycline prescribed for prophylaxis following surgery   - PRN pain meds     ?UTI   Check urine  See orders    HTN   - BP variable with intermittent HTN noted   - Continue home bisoprolol, hydrochlorothiazide     GERD   - Protonix daily     Hypothyroidism   - Continue home levothyroxine     Asthma   - Scheduled and PRN nebs per RT     Other comorbidities as above  - Continue medications as ordered and adjust based on clinical course      VTE / GI prophylaxis   - Eliquis, PPI, bowel regimen in place      Discharge planning  - Pending PT/OT evals           -Continue current treatment as ordered. Will make adjustments as necessary.    VTE Prophylaxis  -See Orders    Plan of care discussed with: Provider, RN, Patient.         Patient case and plan of care discussed with Dr. JOSE Salazar.    GOKUL Bone - CNP  -In collaboration with Dr. JOSE Salazar    [facility]  Office: [phone]  Fax: [phone]  I have reviewed the above note obtained and documented by the NP/PA and I personally participated in the key components. I have discussed the case and management of the patient's care. Changes made to the note, and all key components of history and physical/progress note done by me.  dw nursing  Rounded with nursing   Pain controlled    Constipation ++  Does have urine retention and nursing monitoring   Start on bowel regimen  Dc plan tomorrow to snf  MD Gerald Biswas MD               [1]   No current facility-administered medications on file prior to encounter.     Current Outpatient Medications on File Prior to Encounter   Medication Sig  Dispense Refill    acetaminophen (Tylenol) 325 mg tablet Take 3 tablets (975 mg) by mouth every 8 hours if needed for mild pain (1 - 3). 90 tablet 1    albuterol (Proventil HFA) 90 mcg/actuation inhaler Inhale 2 puffs every 4 hours if needed for wheezing or shortness of breath. 6.7 g 0    bisoproloL-hydrochlorothiazide (Ziac) 10-6.25 mg tablet Take 2 tablets by mouth once daily with breakfast. 180 tablet 1    budesonide-formoteroL (Symbicort) 160-4.5 mcg/actuation inhaler Inhale 2 puffs 2 times a day. Rinse mouth with water after use to reduce aftertaste and incidence of candidiasis. Do not swallow.      citalopram (CeleXA) 20 mg tablet Take 1 tablet (20 mg) by mouth once daily. 90 tablet 1    cyclobenzaprine (Flexeril) 5 mg tablet Take 1 tablet (5 mg) by mouth 3 times a day as needed for muscle spasms for up to 10 days. 30 tablet 0    doxycycline (Monodox) 100 mg capsule Take 1 capsule (100 mg) by mouth 2 times a day for 7 days. To prevent infection 14 capsule 0    Eliquis 5 mg tablet Take 1 tablet by mouth 2 times a day. 180 tablet 1    esomeprazole (NexIUM) 20 mg DR capsule Take 1 capsule (20 mg) by mouth once daily.      fluticasone (Flonase) 50 mcg/actuation nasal spray Administer into affected nostril(s).      gabapentin (Neurontin) 600 mg tablet Take 1 tablet (600 mg) by mouth 3 times a day. 270 tablet 0    levothyroxine (Synthroid, Levoxyl) 25 mcg tablet Take 1 tablet (25 mcg) by mouth once daily. 90 tablet 3    mv-mn-iron-FA-Ca carb-vit K (Women's Multivitamin) 18 mg-400 mcg- 500 mg-50 mcg tablet Take by mouth.      oxyCODONE (Roxicodone) 5 mg immediate release tablet Take 1 tablet (5 mg) by mouth every 6 hours if needed for severe pain (7 - 10) for up to 7 days. 28 tablet 0    pantoprazole (ProtoNix) 40 mg EC tablet Take 1 tablet (40 mg) by mouth once daily as needed (heartburn). Do not crush, chew, or split. 30 tablet 0    sennosides (Senokot) 8.6 mg tablet Take 1 tablet (8.6 mg) by mouth 2 times a day.  To prevent constipation 60 tablet 0    traMADol (Ultram) 50 mg tablet Take 1 tablet (50 mg) by mouth every 6 hours if needed for severe pain (7 - 10) for up to 7 days. 28 tablet 0

## 2025-07-21 NOTE — PROGRESS NOTES
07/21/25 1612   Discharge Planning   Expected Discharge Disposition SNF  (TBD)   Does the patient need discharge transport arranged? Yes   [facility] [employer] coordination needed? Yes   Has discharge transport been arranged? No       SW met with patient at bedside to review SNF list with patient. Patient reported she was at [facility] a couple of years ago and would be okay with going back. Patient identified her second choice as [facility]. SW requested [facility] send referrals.

## 2025-07-21 NOTE — PROGRESS NOTES
07/21/25 1236   Discharge Planning   Living Arrangements Alone   Support Systems Friends/neighbors   Assistance Needed Independent and has been using a walker/rollator for distances and has walking canes at home due to recent knee surgery   Type of Residence Private residence   Number of Stairs to Enter Residence 3   Number of Stairs Within Residence 0   Do you have animals or pets at home? Yes   Type of Animals or Pets cat   Who is requesting discharge planning? Provider   Home or Post Acute Services Post acute facilities (Rehab/SNF/etc)   Type of Post Acute Facility Services Skilled nursing   Expected Discharge Disposition SNF   Does the patient need discharge transport arranged? Yes   [facility] [employer] coordination needed? Yes   Has discharge transport been arranged? No   Financial Resource Strain   How hard is it for you to pay for the very basics like food, housing, medical care, and heating? Not hard   Housing Stability   In the last 12 months, was there a time when you were not able to pay the mortgage or rent on time? N   In the past 12 months, how many times have you moved where you were living? 0   At any time in the past 12 months, were you homeless or living in a shelter (including now)? N   Transportation Needs   In the past 12 months, has lack of transportation kept you from medical appointments or from getting medications? no   In the past 12 months, has lack of transportation kept you from meetings, work, or from getting things needed for daily living? No   Stroke Family Assessment   Stroke Family Assessment Needed No   Intensity of Service   Intensity of Service 0-30 min     Patient had a left TKA recently and fell at home.  PLAN/BARRIER: PT, OT, ortho plan  DISP: anticipate SNF  HHC: if home, patient wants [facility]  O2: none  WOUNDS: surgical incision  DME: none needed  Will request SW speak to patient about SNF choices due to PT/OT rec moderate intensity.  Hazel Diallo RN

## 2025-07-21 NOTE — CONSULTS
Reason For Consult  Periprosthetic fx s/p fall    History Of Present Illness  Yaritza Bernal is a 75 y.o. female presenting after a controlled fall at home. She reports she was holding onto a grab bar at her house when her feet slowly slide from under her. She reports she wasn't able to stand back up on her on 2/2 knee pain being 3 days post op from L TKA with Dr. Lockhart. She also reports a left femur fracture that was fixed around 4 years ago.      Past Medical History  She has a past medical history of Adverse effect of anesthesia, Allergic rhinitis, Anxiety (2011), Arthritis (2010), Asthma, Bladder disorder, unspecified (10/21/2015), Breast cyst (march), Cataract, Clotting disorder (Multi), Cutaneous abscess, unspecified (03/16/2017), Disorder of the skin and subcutaneous tissue, unspecified (07/14/2016), Dry eyes, DVT (deep venous thrombosis) (Multi), Encounter for immunization (02/17/2017), Encounter for immunization (10/04/2016), Fatigue, Fracture of wrist, Fracture, femur (Multi), GERD (gastroesophageal reflux disease), Hernia, internal (2021), HL (hearing loss), Hyperlipidemia, Hypertension, Hypothyroidism, Impacted cerumen, bilateral (10/21/2015), Impaired fasting glucose (09/17/2018), Irritable bowel syndrome, Medial epicondylitis, right elbow (10/04/2016), Nephrolithiasis, Other conditions influencing health status (03/16/2017), Other specified abnormal findings of blood chemistry (10/04/2018), Other specified abnormal findings of blood chemistry (09/17/2018), Other specified symptoms and signs involving the circulatory and respiratory systems (09/28/2017), Pain in left hand (09/28/2017), Pain in left hip (10/04/2018), Pain in right hip (11/01/2017), Pain in right knee (03/13/2017), Pain in unspecified knee (03/13/2017), Peripheral neuropathy, Personal history of other diseases of the circulatory system (05/09/2018), Personal history of other diseases of the circulatory system (06/27/2018), Personal history  of other diseases of the circulatory system (01/18/2018), Personal history of other diseases of the circulatory system (08/17/2017), Personal history of other diseases of the nervous system and sense organs (09/17/2018), Personal history of other diseases of the nervous system and sense organs (10/20/2016), Personal history of other diseases of the respiratory system (03/09/2018), Personal history of other drug therapy (05/09/2018), Personal history of other endocrine, nutritional and metabolic disease (10/30/2017), Personal history of other mental and behavioral disorders (07/16/2018), Personal history of other specified conditions (06/27/2018), Shortness of breath (2020), Strain of muscle, fascia and tendon of the posterior muscle group at thigh level, unspecified thigh, initial encounter (01/15/2019), Strain of unspecified muscles, fascia and tendons at thigh level, left thigh, initial encounter (05/01/2018), Tinnitus, Unspecified injury of unspecified elbow, initial encounter (01/15/2019), and Venous insufficiency.    Surgical History  She has a past surgical history that includes Bladder surgery (10/21/2015); Vein ligation and stripping; Extracorporeal shock wave lithotripsy; Femur fracture surgery; Knee surgery; Cataract extraction w/  intraocular lens implant (Right, 06/06/2024); Cataract extraction w/  intraocular lens implant (Left, 07/20/2023); Cardiac catheterization; Colonoscopy; and Lithotripsy (2015).     Social History  She reports that she has never smoked. She has never used smokeless tobacco. She reports current alcohol use of about 2.0 standard drinks of alcohol per week. She reports that she does not use drugs.    Family History  Family History[1]     Allergies  Amoxicillin, Erythromycin, Lisinopril, and Sulfa (sulfonamide antibiotics)    Review of Systems  A full 10 point ROS was completed. Nothing positive other than what was mentioned in the HPI.      Physical Exam  PE:  Constitutional: A&Ox3,  "calm and cooperative  Head/Neck: Neck supple, no JVD  Cardiovascular: RRR  Respiratory: Non labored breathing on RA  Genitourinary: [place]  Musculoskeletal: ROM intact, no joint swelling, normal strength  Extremities: BUENO, No peripheral edema   LLE: Silver Mepilex dressing over knee. ROM intact, SILT, NVI. Normal strength at knee, ankle and hip.  Neurological: No focal deficits   Psychological: Appropriate mood and behavior  Skin: Warm and dry.       Last Recorded Vitals  Blood pressure 119/73, pulse 71, temperature 37.1 °C (98.7 °F), temperature source Temporal, resp. rate 20, height 1.778 m (5' 10\"), weight 93.9 kg (207 lb), SpO2 99%.    Relevant Results  Results for orders placed or performed during the hospital encounter of 07/19/25 (from the past 24 hours)   CBC and Auto Differential   Result Value Ref Range    WBC 8.5 4.4 - 11.3 x10*3/uL    nRBC 0.0 0.0 - 0.0 /100 WBCs    RBC 3.58 (L) 4.00 - 5.20 x10*6/uL    Hemoglobin 9.7 (L) 12.0 - 16.0 g/dL    Hematocrit 30.7 (L) 36.0 - 46.0 %    MCV 86 80 - 100 fL    MCH 27.1 26.0 - 34.0 pg    MCHC 31.6 (L) 32.0 - 36.0 g/dL    RDW 13.6 11.5 - 14.5 %    Platelets 232 150 - 450 x10*3/uL    Neutrophils % 68.8 40.0 - 80.0 %    Immature Granulocytes %, Automated 0.4 0.0 - 0.9 %    Lymphocytes % 17.7 13.0 - 44.0 %    Monocytes % 11.2 2.0 - 10.0 %    Eosinophils % 1.3 0.0 - 6.0 %    Basophils % 0.6 0.0 - 2.0 %    Neutrophils Absolute 5.86 (H) 1.60 - 5.50 x10*3/uL    Immature Granulocytes Absolute, Automated 0.03 0.00 - 0.50 x10*3/uL    Lymphocytes Absolute 1.51 0.80 - 3.00 x10*3/uL    Monocytes Absolute 0.95 (H) 0.05 - 0.80 x10*3/uL    Eosinophils Absolute 0.11 0.00 - 0.40 x10*3/uL    Basophils Absolute 0.05 0.00 - 0.10 x10*3/uL   Comprehensive Metabolic Panel   Result Value Ref Range    Glucose 84 74 - 99 mg/dL    Sodium 138 136 - 145 mmol/L    Potassium 3.0 (L) 3.5 - 5.3 mmol/L    Chloride 101 98 - 107 mmol/L    Bicarbonate 25 21 - 32 mmol/L    Anion Gap 15 10 - 20 mmol/L    " Urea Nitrogen 18 6 - 23 mg/dL    Creatinine 0.70 0.50 - 1.05 mg/dL    eGFR 90 >60 mL/min/1.73m*2    Calcium 8.1 (L) 8.6 - 10.3 mg/dL    Albumin 3.4 3.4 - 5.0 g/dL    Alkaline Phosphatase 62 33 - 136 U/L    Total Protein 5.8 (L) 6.4 - 8.2 g/dL    AST 12 9 - 39 U/L    Bilirubin, Total 0.9 0.0 - 1.2 mg/dL    ALT 7 7 - 45 U/L   Protime-INR   Result Value Ref Range    Protime 15.1 (H) 9.8 - 12.4 seconds    INR 1.4 (H) 0.9 - 1.1   Magnesium   Result Value Ref Range    Magnesium 1.75 1.60 - 2.40 mg/dL      XR pelvis 1-2 views   Final Result   Please see above.             MACRO:   None        Signed by: Curt Jansen 7/20/2025 12:27 AM   Dictation workstation:   [id number]      XR knee left 4+ views   Final Result   Please see above.             MACRO:   None        Signed by: Curt Jansen 7/20/2025 12:35 AM   Dictation workstation:   [id number]      CT thoracic spine wo IV contrast   Final Result   1. Age-indeterminate compression deformity of T2, suspected to be   chronic. Correlate for point tenderness on exam.   2. Bandlike consolidation left lung base, likely atelectasis.   3. Mild enlargement of the main pulmonary artery, correlate for   pulmonary arterial hypertension.                                      Signed by: Kentrell Charles 7/19/2025 11:51 PM   Dictation workstation:   [id number]      CT head W O contrast trauma protocol   Final Result   1. No acute intracranial abnormality identified.                  MACRO:   None        Signed by: Kentrell Charles 7/19/2025 11:37 PM   Dictation workstation:   [id number]      CT chest w IV contrast   Final Result   1. Age-indeterminate compression deformity of T2, suspected to be   chronic. Correlate for point tenderness on exam.   2. Bandlike consolidation left lung base, likely atelectasis.   3. Mild enlargement of the main pulmonary artery, correlate for   pulmonary arterial hypertension.                                      Signed by: Kentrell Charles 7/19/2025 11:51 PM    Dictation workstation:   [id number]      XR hip left with pelvis when performed 2 or 3 views    (Results Pending)   XR femur left 2+ views    (Results Pending)   CT femur left wo IV contrast    (Results Pending)   CT pelvis wo IV contrast    (Results Pending)         Assessment/Plan     Plan: S/p L TKA  - Regular diet  - PT evaluation  - PRN pain medication  - PRN antemetic  - Pending CT final reads    Discussed with Dr. Jiménez will follow up with final CT reads and PT plan tomorrow.     I spent 60 minutes in the professional and overall care of this patient.      Skinny De Jesus PA-C         [1]   Family History  Problem Relation Name Age of Onset    Other (cardiac disorder) Mother jose cardenas     Cancer Mother jose cardenas     Hypertension Mother jose cardenas     Migraines Mother jose cardenas     Rashes / Skin problems Mother jose cardenas     Arthritis Mother jose cardenas     Other (emphysema lung) Father monika     Asthma Father monika     Breast cancer Neg Hx

## 2025-07-21 NOTE — PROGRESS NOTES
Physical Therapy    Physical Therapy Evaluation & Treatment    Patient Name: Yaritza Bernal  MRN: [id number]  Department: [place] [id number]  Room: [address] [place] [address]  Today's Date: 7/21/2025   Time Calculation  Start Time: 1012  Stop Time: 1057  Time Calculation (min): 45 min    Assessment/Plan   PT Assessment  PT Assessment Results: Decreased range of motion, Decreased endurance, Decreased strength, Impaired balance, Decreased mobility, Pain, Orthopedic restrictions, Decreased safety awareness  Rehab Prognosis: Good  Barriers to Discharge Home: Caregiver assistance, Physical needs  Caregiver Assistance: Patient lives alone and/or does not have reliable caregiver assistance  Physical Needs: Stair navigation into home limited by function/safety, In-home setup navigation limited by function/safety, Ambulating household distances limited by function/safety, 24hr mobility assistance needed, 24hr ADL assistance needed, High falls risk due to function or environment  End of Session Communication: Bedside nurse  Assessment Comment: PT Evaluation Completed. The patient presented with decreased mobility, balance, endurance, strength, safety awareness, and increased pain and fatigue. These impairments are negatively impacting her ability to perform at baseline functional level, in home environment. The patient is at risk of falls & injury. This patient would benefit from skilled therapy intervention to address limitations and progress towards the PT goals.  Anticipate       frequency PT needs at discharge  End of Session Patient Position: Alarm on, Up in chair   IP OR SWING BED PT PLAN  Inpatient or Swing Bed: Inpatient  PT Plan  Treatment/Interventions: Bed mobility, Transfer training, Gait training, Balance training, Strengthening, Endurance training, Range of motion, Therapeutic exercise, Therapeutic activity, Home exercise program  PT Plan: Ongoing PT  PT Frequency: 5 times per week (during this acute inpatient hospital admission)  PT  Discharge Recommendations: Moderate intensity level of continued care- Based on current functional status and rehab potential, patient is anticipated to tolerate and benefit from 5 or more days per week of skilled rehabilitative therapy after discharge from this acute inpatient hospitalization.    PT Recommended Transfer Status: Assist x2 (Max)  PT - OK to Discharge: Yes (PT POC established.)      Subjective     PT Visit Info:  PT Received On: 07/21/25  General Visit Information:  General  Reason for Referral: To ED after having 2 falls at home s/p L TKA. Cleared to participate by ortho team.  Referred By: Calista Santillan MD  Past Medical History Relevant to Rehab: HTN, essential tremors, polyneuropathy, asthma, OA, , left femur fx/ IM rodding, (DVT) on Eliquis  Co-Treatment: OT  Co-Treatment Reason: To increase patient safety, transfer ability, and participation.  Prior to Session Communication: Bedside nurse  Patient Position Received: Bed, 3 rail up, Alarm on  General Comment: Pt pleasant and agreeable to PT eval.  Home Living:  Home Living  Type of Home: House  Lives With: Alone  Home Adaptive Equipment: Walker rolling or standard, Other (Comment) (rollator, 2 walking sticks)  Home Layout: Two level, Able to live on main level with bedroom/bathroom  Home Access: Stairs to enter with rails  Entrance Stairs-Rails: Both  Entrance Stairs-Number of Steps: 3  Bathroom Shower/Tub: Tub/shower unit  Bathroom Toilet: Adaptive toilet seating  Bathroom Equipment: Grab bars in shower  Prior Level of Function:  Prior Function Per Pt/Caregiver Report  Level of [place]: Independent with ADLs and functional transfers, Independent with homemaking with ambulation  ADL Assistance: Independent  Homemaking Assistance: Independent  Ambulatory Assistance: Independent (Uses walking sticks inside and FWW or Rollator outside for longer distances)  Prior Function Comments: 2 falls just prior to arrival.  Precautions:  Precautions  LE  Weight Bearing Status: Weight Bearing as Tolerated (LLE)  Medical Precautions: Fall precautions  Post-Surgical Precautions: Left total knee precautions     Date/Time Vitals Session Patient Position Pulse Resp SpO2 BP MAP (mmHg)    07/21/25 1100 --  --  74  18  94 %  126/62  83           Objective   Pain:  Pain Assessment  Pain Assessment:  (Reports no pain at rest, severe pain with movement but does not provide a rating.)  Pain Interventions: Ambulation/increased activity, Repositioned  Cognition:  Cognition  Overall Cognitive Status: Within Functional Limits  Orientation Level: Oriented X4    General Assessments:  Activity Tolerance  Endurance: Tolerates 30 min exercise with multiple rests  Activity Tolerance Comments: Fatigues easily.    Sensation  Light Touch: No apparent deficits    Coordination  Movements are Fluid and Coordinated: No  Upper Body Coordination: essential tremors present in james UEs    Postural Control  Postural Control: Within Functional Limits    Static Sitting Balance  Static Sitting-Balance Support: Feet supported, Bilateral upper extremity supported  Static Sitting-Level of Assistance: Close supervision  Dynamic Sitting Balance  Dynamic Sitting-Balance Support: Feet supported, Bilateral upper extremity supported  Dynamic Sitting-Level of Assistance: Close supervision    Static Standing Balance  Static Standing-Balance Support: Bilateral upper extremity supported  Static Standing-Level of Assistance: Contact guard  Dynamic Standing Balance  Dynamic Standing-Balance Support: Bilateral upper extremity supported  Dynamic Standing-Level of Assistance: Contact guard  Functional Assessments:  Bed Mobility  Bed Mobility: Yes  Bed Mobility 1  Bed Mobility 1: Supine to sitting  Level of Assistance 1: Close supervision  Bed Mobility Comments 1: increased time and effort to complete.    Transfers  Transfer: Yes  Transfer 1  Technique 1: Sit to stand, Stand to sit  Transfer Device 1: Walker, Gait  belt  Transfer Level of Assistance 1: Maximum assistance, +2  Trials/Comments 1: Cues for hand placement, foot placement, anterior weight shifting, and reaching back prior to sitting. Pt peforms several trials with increased time and effort to achieve upright standing. Poor eccentric control during descent to sitting.    Ambulation/Gait Training  Ambulation/Gait Training Performed: Yes  Ambulation/Gait Training 1  Surface 1: Level tile  Device 1: Rolling walker  Gait Support Devices: Gait belt  Assistance 1: Contact guard  Comments/Distance (ft) 1: 10 ft x2. Pt unsteady with ambulation with increased WB through the UEs. Cues for sequencing and walker placement.       Extremity/Trunk Assessments:  RUE   RUE : Within Functional Limits  LUE   LUE: Within Functional Limits  RLE   RLE :  (Strength >3/5 based on observation of functional mobility. ROM WFL.)  LLE   LLE :  (L knee ROM 8-82 degrees. Hip and ankle ROM WFL. Strength grossly 3-/5)  Treatments:  Therapeutic Exercise  Therapeutic Exercise Performed: Yes  Therapeutic Exercise Activity 1: Pt complete each of the following x10 reps on the LLE to improve strength and endurance: ankle pumps, quad sets, glute sets, SLR, heel slides, SAQ. Cues for proper form and technique.    Therapeutic Activity  Therapeutic Activity Performed: Yes  Therapeutic Activity 1: Pt completes several bouts of transfers with max cueing provided for hand/foot placement, anterior weight shifting, and controlled descent. Pt completes ambulation beyond what was needed for assessment with cues for walker placement, sequencing, and body mechanics.  Outcome Measures:  [facility] Basic Mobility  Turning from your back to your side while in a flat bed without using bedrails: A little  Moving from lying on your back to sitting on the side of a flat bed without using bedrails: A little  Moving to and from bed to chair (including a wheelchair): A little  Standing up from a chair using your arms (e.g.  wheelchair or bedside chair): Total  To walk in hospital room: A little  Climbing 3-5 steps with railing: Total  Basic Mobility - Total Score: 14    Encounter Problems       Encounter Problems (Active)       Balance       complete all mobility with normal balance while dual tasking, negotiating in a dynamic environment, carrying items, etc., with proactive and reactive static and dynamic standing and sitting tasks, with mod I and RW >15 minutes.         Start:  07/21/25    Expected End:  08/04/25               Mobility       STG - Patient will ambulate 75 ft with mod I and a RW       Start:  07/21/25    Expected End:  08/04/25            Pt will tolerate 15 reps of each LE exercise in order to improve strength and endurance.         Start:  07/21/25    Expected End:  08/04/25            Pt will achieve L knee ROM of 0-100 degrees       Start:  07/21/25    Expected End:  08/04/25               PT Transfers       STG - Patient will perform bed mobility independently.        Start:  07/21/25    Expected End:  08/04/25            STG - Patient will transfer sit to and from stand with min A and a RW       Start:  07/21/25    Expected End:  08/04/25                   Education Documentation  Body Mechanics, taught by Evelyne Izaguirre, PT at 7/21/2025 11:59 AM.  Learner: Patient  Readiness: Acceptance  Method: Explanation  Response: Needs Reinforcement    Home Exercise Program, taught by Evelyne Izaguirre, PT at 7/21/2025 11:59 AM.  Learner: Patient  Readiness: Acceptance  Method: Explanation  Response: Needs Reinforcement    Precautions, taught by Evelyne Izaguirre, PT at 7/21/2025 11:59 AM.  Learner: Patient  Readiness: Acceptance  Method: Explanation  Response: Needs Reinforcement    Mobility Training, taught by Evelyne Izaguirre, PT at 7/21/2025 11:59 AM.  Learner: Patient  Readiness: Acceptance  Method: Explanation  Response: Needs Reinforcement    Education Comments  No comments found.

## 2025-07-21 NOTE — SIGNIFICANT EVENT
Orthopaedic Update    XR and CT imaging reviewed. Demonstrate prior injury to left hip treated with L CMN nail. Hardware intact without failure. No acute fracture or injury on XR or advanced imaging.     - OK for diet  - WBAT LLE  - PT/OT to assist with ambulation   - PRN pain medication  - PRN antemetic    Judy Jiménez, PGY-4  Orthopaedic Surgery

## 2025-07-22 VITALS
SYSTOLIC BLOOD PRESSURE: 117 MMHG | HEART RATE: 71 BPM | RESPIRATION RATE: 18 BRPM | HEIGHT: 70 IN | OXYGEN SATURATION: 95 % | BODY MASS INDEX: 29.63 KG/M2 | TEMPERATURE: 97.6 F | DIASTOLIC BLOOD PRESSURE: 63 MMHG | WEIGHT: 207 LBS

## 2025-07-22 PROBLEM — N39.0 UTI (URINARY TRACT INFECTION): Status: ACTIVE | Noted: 2025-07-22

## 2025-07-22 LAB
ANION GAP SERPL CALC-SCNC: 13 MMOL/L (ref 10–20)
BUN SERPL-MCNC: 15 MG/DL (ref 6–23)
CALCIUM SERPL-MCNC: 8.1 MG/DL (ref 8.6–10.3)
CHLORIDE SERPL-SCNC: 104 MMOL/L (ref 98–107)
CO2 SERPL-SCNC: 27 MMOL/L (ref 21–32)
CREAT SERPL-MCNC: 0.76 MG/DL (ref 0.5–1.05)
EGFRCR SERPLBLD CKD-EPI 2021: 81 ML/MIN/1.73M*2
ERYTHROCYTE [DISTWIDTH] IN BLOOD BY AUTOMATED COUNT: 13.8 % (ref 11.5–14.5)
GLUCOSE SERPL-MCNC: 102 MG/DL (ref 74–99)
HCT VFR BLD AUTO: 29 % (ref 36–46)
HGB BLD-MCNC: 9.2 G/DL (ref 12–16)
HOLD SPECIMEN: NORMAL
MCH RBC QN AUTO: 27 PG (ref 26–34)
MCHC RBC AUTO-ENTMCNC: 31.7 G/DL (ref 32–36)
MCV RBC AUTO: 85 FL (ref 80–100)
NRBC BLD-RTO: 0 /100 WBCS (ref 0–0)
PLATELET # BLD AUTO: 256 X10*3/UL (ref 150–450)
POTASSIUM SERPL-SCNC: 3.4 MMOL/L (ref 3.5–5.3)
RBC # BLD AUTO: 3.41 X10*6/UL (ref 4–5.2)
SODIUM SERPL-SCNC: 141 MMOL/L (ref 136–145)
WBC # BLD AUTO: 7.6 X10*3/UL (ref 4.4–11.3)

## 2025-07-22 PROCEDURE — 9420000001 HC RT PATIENT EDUCATION 5 MIN

## 2025-07-22 PROCEDURE — 2500000002 HC RX 250 W HCPCS SELF ADMINISTERED DRUGS (ALT 637 FOR MEDICARE OP, ALT 636 FOR OP/ED): Performed by: NURSE PRACTITIONER

## 2025-07-22 PROCEDURE — G0378 HOSPITAL OBSERVATION PER HR: HCPCS

## 2025-07-22 PROCEDURE — 97530 THERAPEUTIC ACTIVITIES: CPT | Mod: GO,CO

## 2025-07-22 PROCEDURE — 2500000002 HC RX 250 W HCPCS SELF ADMINISTERED DRUGS (ALT 637 FOR MEDICARE OP, ALT 636 FOR OP/ED)

## 2025-07-22 PROCEDURE — 2500000002 HC RX 250 W HCPCS SELF ADMINISTERED DRUGS (ALT 637 FOR MEDICARE OP, ALT 636 FOR OP/ED): Performed by: HOSPITALIST

## 2025-07-22 PROCEDURE — 2500000001 HC RX 250 WO HCPCS SELF ADMINISTERED DRUGS (ALT 637 FOR MEDICARE OP): Performed by: HOSPITALIST

## 2025-07-22 PROCEDURE — 97535 SELF CARE MNGMENT TRAINING: CPT | Mod: GO,CO

## 2025-07-22 PROCEDURE — 80048 BASIC METABOLIC PNL TOTAL CA: CPT

## 2025-07-22 PROCEDURE — 2500000001 HC RX 250 WO HCPCS SELF ADMINISTERED DRUGS (ALT 637 FOR MEDICARE OP): Performed by: FAMILY MEDICINE

## 2025-07-22 PROCEDURE — 51701 INSERT BLADDER CATHETER: CPT

## 2025-07-22 PROCEDURE — 97110 THERAPEUTIC EXERCISES: CPT | Mod: GP,CQ | Performed by: PHYSICAL THERAPY ASSISTANT

## 2025-07-22 PROCEDURE — 2500000001 HC RX 250 WO HCPCS SELF ADMINISTERED DRUGS (ALT 637 FOR MEDICARE OP)

## 2025-07-22 PROCEDURE — 2500000004 HC RX 250 GENERAL PHARMACY W/ HCPCS (ALT 636 FOR OP/ED): Performed by: HOSPITALIST

## 2025-07-22 PROCEDURE — 36415 COLL VENOUS BLD VENIPUNCTURE: CPT

## 2025-07-22 PROCEDURE — 85027 COMPLETE CBC AUTOMATED: CPT

## 2025-07-22 PROCEDURE — 94640 AIRWAY INHALATION TREATMENT: CPT

## 2025-07-22 PROCEDURE — 97116 GAIT TRAINING THERAPY: CPT | Mod: GP,CQ | Performed by: PHYSICAL THERAPY ASSISTANT

## 2025-07-22 RX ORDER — TAMSULOSIN HYDROCHLORIDE 0.4 MG/1
0.4 CAPSULE ORAL DAILY
Qty: 7 CAPSULE | Refills: 0 | Status: SHIPPED | OUTPATIENT
Start: 2025-07-22 | End: 2025-07-29

## 2025-07-22 RX ORDER — POTASSIUM CHLORIDE 20 MEQ/1
20 TABLET, EXTENDED RELEASE ORAL ONCE
Status: COMPLETED | OUTPATIENT
Start: 2025-07-22 | End: 2025-07-22

## 2025-07-22 RX ORDER — NITROFURANTOIN 25; 75 MG/1; MG/1
100 CAPSULE ORAL EVERY 12 HOURS SCHEDULED
Qty: 14 CAPSULE | Refills: 0 | Status: SHIPPED | OUTPATIENT
Start: 2025-07-22 | End: 2025-07-29

## 2025-07-22 RX ORDER — OXYCODONE HYDROCHLORIDE 5 MG/1
5 TABLET ORAL EVERY 8 HOURS PRN
Qty: 6 TABLET | Refills: 0 | Status: SHIPPED | OUTPATIENT
Start: 2025-07-22 | End: 2025-07-24

## 2025-07-22 RX ORDER — NITROFURANTOIN 25; 75 MG/1; MG/1
100 CAPSULE ORAL EVERY 12 HOURS SCHEDULED
Status: DISCONTINUED | OUTPATIENT
Start: 2025-07-22 | End: 2025-07-22 | Stop reason: HOSPADM

## 2025-07-22 RX ADMIN — CITALOPRAM HYDROBROMIDE 20 MG: 20 TABLET ORAL at 09:11

## 2025-07-22 RX ADMIN — POLYETHYLENE GLYCOL 3350 17 G: 17 POWDER, FOR SOLUTION ORAL at 09:12

## 2025-07-22 RX ADMIN — SENNOSIDES AND DOCUSATE SODIUM 2 TABLET: 50; 8.6 TABLET ORAL at 20:25

## 2025-07-22 RX ADMIN — FORMOTEROL FUMARATE DIHYDRATE 20 MCG: 20 SOLUTION RESPIRATORY (INHALATION) at 07:44

## 2025-07-22 RX ADMIN — ACETAMINOPHEN 650 MG: 325 TABLET ORAL at 09:16

## 2025-07-22 RX ADMIN — DOXYCYCLINE HYCLATE 100 MG: 100 TABLET, COATED ORAL at 09:12

## 2025-07-22 RX ADMIN — HYDROCHLOROTHIAZIDE 12.5 MG: 12.5 TABLET ORAL at 09:12

## 2025-07-22 RX ADMIN — BISOPROLOL FUMARATE 20 MG: 5 TABLET ORAL at 09:11

## 2025-07-22 RX ADMIN — APIXABAN 2.5 MG: 2.5 TABLET, FILM COATED ORAL at 09:12

## 2025-07-22 RX ADMIN — GABAPENTIN 600 MG: 300 CAPSULE ORAL at 20:25

## 2025-07-22 RX ADMIN — APIXABAN 2.5 MG: 2.5 TABLET, FILM COATED ORAL at 20:25

## 2025-07-22 RX ADMIN — PANTOPRAZOLE SODIUM 20 MG: 20 TABLET, DELAYED RELEASE ORAL at 05:48

## 2025-07-22 RX ADMIN — GABAPENTIN 600 MG: 300 CAPSULE ORAL at 15:50

## 2025-07-22 RX ADMIN — LEVOTHYROXINE SODIUM 25 MCG: 0.03 TABLET ORAL at 05:48

## 2025-07-22 RX ADMIN — SENNOSIDES AND DOCUSATE SODIUM 2 TABLET: 50; 8.6 TABLET ORAL at 09:11

## 2025-07-22 RX ADMIN — POTASSIUM CHLORIDE EXTENDED-RELEASE 20 MEQ: 1500 TABLET ORAL at 15:50

## 2025-07-22 RX ADMIN — DOXYCYCLINE HYCLATE 100 MG: 100 TABLET, COATED ORAL at 20:25

## 2025-07-22 RX ADMIN — GABAPENTIN 600 MG: 300 CAPSULE ORAL at 09:12

## 2025-07-22 RX ADMIN — BUDESONIDE 0.5 MG: 0.5 INHALANT RESPIRATORY (INHALATION) at 07:44

## 2025-07-22 RX ADMIN — NITROFURANTOIN MONOHYDRATE/MACROCRYSTALLINE 100 MG: 25; 75 CAPSULE ORAL at 20:25

## 2025-07-22 ASSESSMENT — PAIN SCALES - GENERAL
PAINLEVEL_OUTOF10: 2
PAINLEVEL_OUTOF10: 0 - NO PAIN
PAINLEVEL_OUTOF10: 2
PAINLEVEL_OUTOF10: 3

## 2025-07-22 ASSESSMENT — COGNITIVE AND FUNCTIONAL STATUS - GENERAL
HELP NEEDED FOR BATHING: A LOT
WALKING IN HOSPITAL ROOM: A LOT
DRESSING REGULAR UPPER BODY CLOTHING: A LOT
DRESSING REGULAR LOWER BODY CLOTHING: A LITTLE
MOVING TO AND FROM BED TO CHAIR: A LOT
DAILY ACTIVITIY SCORE: 22
MOVING TO AND FROM BED TO CHAIR: A LOT
PERSONAL GROOMING: A LITTLE
MOBILITY SCORE: 13
MOVING FROM LYING ON BACK TO SITTING ON SIDE OF FLAT BED WITH BEDRAILS: A LITTLE
DRESSING REGULAR UPPER BODY CLOTHING: A LITTLE
DAILY ACTIVITIY SCORE: 15
STANDING UP FROM CHAIR USING ARMS: A LOT
DRESSING REGULAR LOWER BODY CLOTHING: A LOT
STANDING UP FROM CHAIR USING ARMS: A LITTLE
CLIMB 3 TO 5 STEPS WITH RAILING: A LOT
MOBILITY SCORE: 18
WALKING IN HOSPITAL ROOM: A LITTLE
TOILETING: A LOT
CLIMB 3 TO 5 STEPS WITH RAILING: TOTAL
TURNING FROM BACK TO SIDE WHILE IN FLAT BAD: A LITTLE

## 2025-07-22 ASSESSMENT — PAIN - FUNCTIONAL ASSESSMENT
PAIN_FUNCTIONAL_ASSESSMENT: 0-10

## 2025-07-22 ASSESSMENT — PAIN DESCRIPTION - LOCATION: LOCATION: HIP

## 2025-07-22 ASSESSMENT — ACTIVITIES OF DAILY LIVING (ADL): HOME_MANAGEMENT_TIME_ENTRY: 15

## 2025-07-22 ASSESSMENT — PAIN DESCRIPTION - ORIENTATION: ORIENTATION: LEFT

## 2025-07-22 NOTE — PROGRESS NOTES
Occupational Therapy    Occupational Therapy Treatment    Name: Yaritza Bernal  MRN: [id number]  Department: [facility]   Room: [facility]  Date: 07/22/25  Time Calculation  Start Time: 1358  Stop Time: 1427  Time Calculation (min): 29 min    Assessment:  OT Assessment: Pt would benefit from skilled OT services to maximize performance in ADLs, functional transfers, coordination, bed mobility, core strength and stability, and dynamic and static sitting and standing balance.  Prognosis: Fair  Barriers to Discharge Home: Physical needs  Physical Needs: 24hr mobility assistance needed, 24hr ADL assistance needed  Evaluation/Treatment Tolerance: Patient limited by fatigue  Medical Staff Made Aware: Yes  End of Session Communication: Bedside nurse  End of Session Patient Position: Bed, 3 rail up, Alarm on  Plan:  Treatment Interventions: ADL retraining, Functional transfer training  OT Frequency: 3 times per week (This acute inpatient hospitalization.)  OT Discharge Recommendations: Moderate intensity level of continued care (Based on current functional status and rehab potential, patient is anticipated to tolerate and benefit from 5 or more days per week of skilled rehabilitative therapy after discharge from this acute inpatient hospitalization.  )  Equipment Recommended upon Discharge: Wheeled walker  OT Recommended Transfer Status: Maximum assist, Assist of 2  OT - OK to Discharge: Yes (per POC)    Subjective     OT Visit Info:  OT Received On: 07/22/25  General:  General  Reason for Referral: To ED after having 2 falls at home s/p L TKA. Cleared to participate by ortho team.  Prior to Session Communication: Bedside nurse  Patient Position Received: Up in chair, Alarm on  Preferred Learning Style: visual, verbal, auditory, kinesthetic  General Comment: Pt cooperative and compliant, fair safety awareness, severe central tremor noted.  Precautions:  LE Weight Bearing Status: Weight Bearing as Tolerated  Medical Precautions: Fall  precautions  Post-Surgical Precautions: Left total knee precautions  Precautions Comment: Pt educated in post-op left TKR precautions, fair carryover of instruction.    Pain Assessment:  Pain Assessment  Pain Assessment: 0-10  0-10 (Numeric) Pain Score: 0 - No pain    Objective   Cognition:  Orientation Level: Oriented X4  Activities of Daily Living: Grooming  Grooming Level of Assistance: Close supervision, Setup  Grooming Where Assessed: Edge of bed  Grooming Comments: Pt completed face washing, hair brushing, tooth brushing and donned deodorant. VC provided for task initiation.    UE Bathing  UE Bathing Level of Assistance: Minimum assistance, Minimal verbal cues  UE Bathing Where Assessed: Edge of bed  UE Bathing Comments: pt completed hair brushing with shampoo cap. VC provided for task initiation and participation    LE Dressing  LE Dressing: Yes  LE Dressing Adaptive Equipment: Reacher, Sock aide  Sock Level of Assistance: Minimum assistance, Minimal verbal cues  LE Dressing Where Assessed: Edge of bed  LE Dressing Comments: pt donned/doffed socks with reacher/sock aid. Visual demo provided on adaptive equipement management. Increased time and effort required d/t tremors.    Toileting  Toileting Level of Assistance: Moderate assistance  Where Assessed: Toilet  Toileting Comments: assist for pericare hygiene    Functional Standing Tolerance:     Bed Mobility/Transfers: Bed Mobility  Bed Mobility: Yes  Bed Mobility 1  Bed Mobility 1: Sitting to supine  Level of Assistance 1: Minimum assistance, Moderate verbal cues  Bed Mobility Comments 1: Cues provided for hand placement    Transfers  Transfer: Yes  Transfer 1  Transfer From 1: Chair with arms to  Transfer to 1: Toilet  Technique 1: Sit to stand, Stand to sit  Transfer Device 1: Walker, Gait belt  Transfer Level of Assistance 1: Moderate verbal cues, Minimal tactile cues, Maximum assistance, +2  Trials/Comments 1: assist to stand from chair<>toilet with VC/TC  for hand placement/LE advancement/upright posture  Transfers 2  Transfer From 2: Toilet to  Transfer to 2: Bed  Technique 2: Stand to sit, Sit to stand  Transfer Device 2: Walker, Gait belt  Transfer Level of Assistance 2: Maximum assistance, Moderate verbal cues, Minimal tactile cues, +2  Trials/Comments 2: assist to stand from toilet>stand with VC/TC for hand placement/LE advancement/upright posture. Pt presents decreased eccentric control and ERIC knee buckling.      Functional Mobility:  Functional Mobility  Functional Mobility Performed: Yes  Functional Mobility 1  Surface 1: Level tile  Device 1: Rolling walker  Functional Mobility Support Devices: Gait belt  Assistance 1: Moderate assistance, Minimal verbal cues, Minimal tactile cues  Comments 1: Pt completed functional mobility with FWW a short  household distance at mod assist to steady d/t walker management and safety.  Sitting Balance:  Dynamic Sitting Balance  Dynamic Sitting-Balance Support: Feet supported  Dynamic Sitting-Level of Assistance: Close supervision  Dynamic Sitting-Balance: Forward lean, Reaching for weighted objects, Reaching across midline  Dynamic Sitting-Comments: at EOB to complete self care tasks     Therapy/Activity: Therapeutic Activity  Therapeutic Activity Performed: Yes  Therapeutic Activity 1: Educated pt on tub transfer bench for purchse for homegoing.    Outcome Measures:  [facility] Daily Activity  Putting on and taking off regular lower body clothing: A lot  Bathing (including washing, rinsing, drying): A lot  Putting on and taking off regular upper body clothing: A lot  Toileting, which includes using toilet, bedpan or urinal: A lot  Taking care of personal grooming such as brushing teeth: A little  Eating Meals: None  Daily Activity - Total Score: 15    Education Documentation  Body Mechanics, taught by KARO Fraser at 7/22/2025  3:03 PM.  Learner: Patient  Readiness: Acceptance  Method: Explanation  Response: Needs  Reinforcement, Verbalizes Understanding    ADL Training, taught by KARO Fraser at 7/22/2025  3:03 PM.  Learner: Patient  Readiness: Acceptance  Method: Explanation  Response: Needs Reinforcement, Verbalizes Understanding    Education Comments  No comments found.      Goals:  Encounter Problems       Encounter Problems (Active)       ADLs       Patient will perform UB and LB bathing with minimal assist  level of assistance and raised toilet seat and grab bars. (Progressing)       Start:  07/21/25    Expected End:  08/04/25            Patient with complete upper body dressing with contact guard assist level of assistance donning and doffing all UE clothes with no adaptive equipment while edge of bed  (Not Progressing)       Start:  07/21/25    Expected End:  08/04/25            Patient with complete lower body dressing with minimal assist  level of assistance donning and doffing all LE clothes  with no adaptive equipment while edge of bed  (Progressing)       Start:  07/21/25    Expected End:  08/04/25            Patient will complete daily grooming tasks brushing teeth and washing face/hair with stand by assist level of assistance and PRN adaptive equipment while edge of bed . (Progressing)       Start:  07/21/25    Expected End:  08/04/25            Patient will complete toileting including hygiene clothing management/hygiene with minimal assist  level of assistance and raised toilet seat and grab bars. (Progressing)       Start:  07/21/25    Expected End:  08/04/25               BALANCE       Pt will maintain dynamic standing balance during ADL task with contact guard assist level of assistance in order to demonstrate decreased risk of falling and improved postural control. (Not Progressing)       Start:  07/21/25    Expected End:  08/04/25               MOBILITY          TRANSFERS       Patient will perform bed mobility independent level of assistance and bed rails in order to improve safety and  independence with mobility (Progressing)       Start:  07/21/25    Expected End:  08/04/25            Patient will complete functional transfer to chair with front wheeled walker with minimal assist  level of assistance. (Progressing)       Start:  07/21/25    Expected End:  08/04/25

## 2025-07-22 NOTE — PROGRESS NOTES
07/22/25 0727   Discharge Planning   Home or Post Acute Services Post acute facilities (Rehab/SNF/etc)   Type of Post Acute Facility Services Skilled nursing   Expected Discharge Disposition SNF   Does the patient need discharge transport arranged? Yes   [facility] coordination needed? Yes   Has discharge transport been arranged? No   Stroke Family Assessment   Stroke Family Assessment Needed No   Intensity of Service   Intensity of Service 0-30 min     Referral placed into [facility] to [facility].  Waiting to see if they can accept patient and then auth can be started.  Hazel Diallo RN     7/22/25 @ 0839  Patient is med ready per attending.  [facility] can accept patient and they have a bed available.  Requested [facility] submit for pre-cert.  Hazel Diallo RN     7/22/25 @ 1010  Precert Status: Approved  [facility] Auth ID # [id number]  Dates: 07/22/2025 - 07/24/2025   Secure chat to attending to complete the goldenrod and then patient may be set up for discharge.  Report to be called to [phone]  Hazel Diallo RN     7/22/25 @ 5802  Requested goldenrod be signed so transport can be set up.  HALEY to reach out to Dr. Salazar.  Hazel Diallo RN

## 2025-07-22 NOTE — PROGRESS NOTES
Yaritza Bernal is a 75 y.o. female on day 2 of admission presenting with Falls.      Subjective  doing ok   Pain controlled       Objective     Last Recorded Vitals  BP 96/53 (Patient Position: Sitting)   Pulse 74   Temp 36.5 °C (97.7 °F) (Temporal)   Resp 19   Wt 93.9 kg (207 lb)   SpO2 92%   Intake/Output last 3 Shifts:    Intake/Output Summary (Last 24 hours) at 7/22/2025 1356  Last data filed at 7/22/2025 0851  Gross per 24 hour   Intake 240 ml   Output 1050 ml   Net -810 ml       Admission Weight  Weight: 93.9 kg (207 lb) (07/19/25 2252)    Daily Weight  07/19/25 : 93.9 kg (207 lb)    Image Results  CT pelvis wo IV contrast  Narrative: Interpreted By:  Mehrdad Buchanan,   STUDY:  CT PELVIS WO IV CONTRAST;  7/20/2025 7:38 pm      INDICATION:  Signs/Symptoms:?periprosthetic fx.      COMPARISON:  None.      ACCESSION NUMBER(S):  [id number]      ORDERING CLINICIAN:  GERMAN CARRION      TECHNIQUE:  CT imaging of the  pelvis was obtained  without administration of  intravenous contrast medium. Coronal and sagittal reformatted images  were performed.      FINDINGS:  OSSEOUS STRUCTURES:  There are postsurgical changes status post partially visualized left  proximal femur intramedullary nail and interlocking screw fixation of  a intertrochanteric fracture without perihardware lucency or new  fracture. The fracture lines are inconspicuous. Heterotopic  ossification is seen along the greater trochanters bilaterally along  the insertions of the gluteus tendons. A displaced lesser trochanter  is seen on the left. Mild degenerative changes of the bilateral hip  joints are seen with marginal osteophytes. Moderate degenerative  changes of the pubic symphysis.      SOFT TISSUES:  Moderate bilateral gluteus minimus muscle atrophy is seen. Bilateral  small fat containing inguinal hernias are also seen. No acute soft  tissue abnormality.      Impression: Postsurgical changes of the left proximal femur ORIF for a  remote  intertrochanteric fracture without evidence of hardware complication.  No acute fracture.          MACRO:  None      Signed by: Mehrdad Buchanan 7/21/2025 1:54 PM  Dictation workstation:   [id number]  CT femur left wo IV contrast  Narrative: Interpreted By:  Mehrdad Buchanan,   STUDY:  CT FEMUR LEFT WO IV CONTRAST;  7/20/2025 7:38 pm      INDICATION:  Signs/Symptoms:?periprosthetic fx.      COMPARISON:  Radiograph of the pelvis and left hip 07/20/2025      ACCESSION NUMBER(S):  [id number]      ORDERING CLINICIAN:  GERMAN CARRION      TECHNIQUE:  CT imaging of the  left femur was obtained  without administration of  intravenous contrast medium. Coronal and sagittal reformatted images  were performed.      FINDINGS:  OSSEOUS STRUCTURES:  There are postsurgical changes status post intramedullary nail and  interlocking screw fixation of the left femur without perihardware  lucency or new fracture. Remote fracture deformity of the  intertrochanteric femur is seen. There are postsurgical changes of  total knee arthroplasty without hardware complication.      SOFT TISSUES:  A small knee joint effusion and subcutaneous emphysema is seen in the  deep myofascial planes of the distal thigh and proximal knee, likely  secondary to recent knee arthroplasty.      Impression: Postsurgical changes of left femur and left total knee arthroplasty  without evidence to suggest hardware complication.          MACRO:  None      Signed by: Mehrdad Buchanan 7/21/2025 10:29 AM  Dictation workstation:   [id number]  XR hip left with pelvis when performed 2 or 3 views, XR femur left 2+ views  Narrative: Interpreted By:  Finkelstein, Evan,   STUDY:  XR HIP LEFT WITH PELVIS WHEN PERFORMED 2 OR 3 VIEWS; XR FEMUR LEFT 2+  VIEWS;  7/20/2025 7:52 pm two views left hip. AP view of the pelvis.  5 images left femur      INDICATION:  Signs/Symptoms:Possible fx.      COMPARISON:  Pelvic radiograph 07/19/2025      ACCESSION  NUMBER(S):  [id number]; [id number]      ORDERING CLINICIAN:  VINOD PORRAS      FINDINGS:  Intramedullary nail and screws transfixing a remote fracture of the  left femur. Remote fracture deformity involving the proximal left  femur. No definite acute displaced periprosthetic fracture is  evident. Total knee arthroplasty hardware is intact without  surrounding lucency or periprosthetic fracture. Air in the thigh soft  tissues is likely related to recent surgery. Bones are demineralized.      Impression: Intramedullary nail and screws transfix a remote fracture of the left  femur. Remote fracture deformity involving the proximal left femur  without definite acute displaced periprosthetic fracture. Total knee  arthroplasty hardware without surrounding lucency or periprosthetic  fracture. Air in the thigh soft tissues likely represents changes of  recent surgery.          MACRO:  None.      Signed by: Evan Finkelstein 7/21/2025 1:26 AM  Dictation workstation:   [id number]      Physical Exam        Constitutional:       General: She is not in acute distress.     Appearance: Normal appearance. She is not ill-appearing or toxic-appearing.   HENT:      Head: Normocephalic and atraumatic.      Cardiovascular:      Rate and Rhythm: Normal rate and regular rhythm.      Pulses: Normal pulses.      Heart sounds: Normal heart sounds. No murmur heard.     No friction rub. No gallop.   Pulmonary:      Effort: Pulmonary effort is normal.      Breath sounds: Normal breath sounds. No wheezing, rhonchi or rales.   Abdominal:      General: There is no distension.      Palpations: Abdomen is soft. There is no mass.      Tenderness: There is no abdominal tenderness.      Musculoskeletal:      Right lower leg: Edema (Mild non-pitting edema) present.      Left lower leg: Edema (Mild swelling around L knee (expected for POD 3 L TKA)) present.      Comments: L knee with dressing which is clean and dry       Skin:     General: Skin is  warm and dry.      Neurological:      General: No focal deficit present.      Mental Status: She is alert and oriented to person, place, and time.      Psychiatric:         Mood and Affect: Mood normal.         Behavior: Behavior normal.         Relevant Results                 Medications Ordered Prior to Encounter[1]    Results for orders placed or performed during the hospital encounter of 07/19/25 (from the past 24 hours)   CBC   Result Value Ref Range    WBC 7.6 4.4 - 11.3 x10*3/uL    nRBC 0.0 0.0 - 0.0 /100 WBCs    RBC 3.41 (L) 4.00 - 5.20 x10*6/uL    Hemoglobin 9.2 (L) 12.0 - 16.0 g/dL    Hematocrit 29.0 (L) 36.0 - 46.0 %    MCV 85 80 - 100 fL    MCH 27.0 26.0 - 34.0 pg    MCHC 31.7 (L) 32.0 - 36.0 g/dL    RDW 13.8 11.5 - 14.5 %    Platelets 256 150 - 450 x10*3/uL   Basic Metabolic Panel   Result Value Ref Range    Glucose 102 (H) 74 - 99 mg/dL    Sodium 141 136 - 145 mmol/L    Potassium 3.4 (L) 3.5 - 5.3 mmol/L    Chloride 104 98 - 107 mmol/L    Bicarbonate 27 21 - 32 mmol/L    Anion Gap 13 10 - 20 mmol/L    Urea Nitrogen 15 6 - 23 mg/dL    Creatinine 0.76 0.50 - 1.05 mg/dL    eGFR 81 >60 mL/min/1.73m*2    Calcium 8.1 (L) 8.6 - 10.3 mg/dL                   Assessment & Plan  Falls  Yaritza Bernal is a 75 y.o. female with PMHx of HTN, GERD, hypothyroidism, asthma, DVT on Eliquis, s/p L TKA 7/17/25 who presented to [facility] ED due to recurrent falls. Pt stated that she had a fall at home where she slid from the end of her bed as she was trying to get herself up. Due to recent knee surgery she was unable to get off the floor and had to call EMS who helped her up.    Recurrent falls   S/p L TKA 7/17/25   - XR L knee - No acute fracture or malalignment  - XR pelvis - Suspected acute periprosthetic fracture of the L femoral neck with mild varus impaction   - Consulted ortho with above findings -> recommended CT L femur and pelvis (orders placed)  - PT/OT evals with Ortho clearance   - Continue doxycycline prescribed for  prophylaxis following surgery   - PRN pain meds     ?UTI   Check urine  Culture pending  Start abx d/w pharm >> macrobid  Can fllow up culturs as out pt    Urinary retention  Consitiaton  See rders  D/w nurse may need carmona       HTN   - BP variable with intermittent HTN noted   - Continue home bisoprolol, hydrochlorothiazide     GERD   - Protonix daily     Hypothyroidism   - Continue home levothyroxine     Asthma   - Scheduled and PRN nebs per RT     Other comorbidities as above  - Continue medications as ordered and adjust based on clinical course      VTE / GI prophylaxis   - Eliquis, PPI, bowel regimen in place      Discharge planning  - Pending PT/OT evals           -Continue current treatment as ordered. Will make adjustments as necessary.    VTE Prophylaxis  -See Orders       Constipation ++  Does have urine retention and nursing monitoring   Start on bowel regimen  Dc plan  to snf          Patient case and plan of care discussed with Dr. JOSE Salazar.    Brant Morales, GOKUL - CNP  -In collaboration with Dr. JOSE Salazar    [facility]  Office: [phone]  Fax: [phone]  I have reviewed the above note obtained and documented by the NP/PA and I personally participated in the key components. I have discussed the case and management of the patient's care. Changes made to the note, and all key components of history and physical/progress note done by me.   nursing  Gerald Salazar MD           [1]   No current facility-administered medications on file prior to encounter.     Current Outpatient Medications on File Prior to Encounter   Medication Sig Dispense Refill    acetaminophen (Tylenol) 325 mg tablet Take 3 tablets (975 mg) by mouth every 8 hours if needed for mild pain (1 - 3). 90 tablet 1    albuterol (Proventil HFA) 90 mcg/actuation inhaler Inhale 2 puffs every 4 hours if needed for wheezing or shortness of breath. 6.7 g 0    bisoproloL-hydrochlorothiazide (Ziac)  10-6.25 mg tablet Take 2 tablets by mouth once daily with breakfast. 180 tablet 1    budesonide-formoteroL (Symbicort) 160-4.5 mcg/actuation inhaler Inhale 2 puffs 2 times a day. Rinse mouth with water after use to reduce aftertaste and incidence of candidiasis. Do not swallow.      citalopram (CeleXA) 20 mg tablet Take 1 tablet (20 mg) by mouth once daily. 90 tablet 1    cyclobenzaprine (Flexeril) 5 mg tablet Take 1 tablet (5 mg) by mouth 3 times a day as needed for muscle spasms for up to 10 days. 30 tablet 0    doxycycline (Monodox) 100 mg capsule Take 1 capsule (100 mg) by mouth 2 times a day for 7 days. To prevent infection 14 capsule 0    Eliquis 5 mg tablet Take 1 tablet by mouth 2 times a day. 180 tablet 1    esomeprazole (NexIUM) 20 mg DR capsule Take 1 capsule (20 mg) by mouth once daily.      fluticasone (Flonase) 50 mcg/actuation nasal spray Administer 1 spray into affected nostril(s) once daily.      gabapentin (Neurontin) 600 mg tablet Take 1 tablet (600 mg) by mouth 3 times a day. 270 tablet 0    levothyroxine (Synthroid, Levoxyl) 25 mcg tablet Take 1 tablet (25 mcg) by mouth once daily. 90 tablet 3    mv-mn-iron-FA-Ca carb-vit K (Women's Multivitamin) 18 mg-400 mcg- 500 mg-50 mcg tablet Take 1 tablet by mouth once daily.      pantoprazole (ProtoNix) 40 mg EC tablet Take 1 tablet (40 mg) by mouth once daily as needed (heartburn). Do not crush, chew, or split. 30 tablet 0    sennosides (Senokot) 8.6 mg tablet Take 1 tablet (8.6 mg) by mouth 2 times a day. To prevent constipation 60 tablet 0    traMADol (Ultram) 50 mg tablet Take 1 tablet (50 mg) by mouth every 6 hours if needed for severe pain (7 - 10) for up to 7 days. 28 tablet 0    [DISCONTINUED] oxyCODONE (Roxicodone) 5 mg immediate release tablet Take 1 tablet (5 mg) by mouth every 6 hours if needed for severe pain (7 - 10) for up to 7 days. 28 tablet 0

## 2025-07-22 NOTE — PROGRESS NOTES
Physical Therapy    Physical Therapy Treatment    Patient Name: Yaritza Bernal  MRN: [id number]  Department: [place] [address]  Room: [address] [place] [address]  Today's Date: 7/22/2025  Time Calculation  Start Time: 0929  Stop Time: 0956  Time Calculation (min): 27 min         Assessment/Plan   PT Assessment  Rehab Prognosis: Fair  Barriers to Discharge Home: Caregiver assistance, Physical needs  Caregiver Assistance: Patient lives alone and/or does not have reliable caregiver assistance  Physical Needs: Stair navigation into home limited by function/safety, In-home setup navigation limited by function/safety, Ambulating household distances limited by function/safety, 24hr mobility assistance needed, 24hr ADL assistance needed, High falls risk due to function or environment  End of Session Communication: Bedside nurse  Assessment Comment:  (pt demo fair shira to tx)  End of Session Patient Position: Up in chair, Alarm on  PT Plan  Inpatient/Swing Bed or Outpatient: Inpatient  PT Plan  Treatment/Interventions: Bed mobility, Transfer training, Gait training, Therapeutic activity, Therapeutic exercise  PT Plan: Ongoing PT  PT Frequency: 5 times per week (this acute inpatient hospitalization.)  PT Discharge Recommendations: Moderate intensity level of continued care (.Based on current functional status and rehab potential, patient is anticipated to tolerate and benefit from 5 or more days per week of skilled rehabilitative therapy after discharge from this acute inpatient hospitalization.)  PT Recommended Transfer Status: Assist x2 (Max)  PT - OK to Discharge:  (per PT POC)    PT Visit Info:  PT Received On: 07/22/25     General Visit Information:   General  Reason for Referral: To ED after having 2 falls at home s/p L TKA. Cleared to participate by ortho team.  Referred By: Calista Santillan MD  Prior to Session Communication: Bedside nurse  Patient Position Received: Bed, 3 rail up, Alarm on  Preferred Learning Style: verbal, visual, auditory,  kinesthetic  General Comment:  (pt agreeable to tx.)    Subjective   Precautions:  Precautions  LE Weight Bearing Status: Weight Bearing as Tolerated  Medical Precautions: Fall precautions  Post-Surgical Precautions: Left total knee precautions            Objective   Pain:  Pain Assessment  Pain Assessment: 0-10  0-10 (Numeric) Pain Score: 3  Pain Type: Acute pain  Pain Location: Knee  Pain Orientation: Left  Cognition:  Cognition  Orientation Level: Oriented X4  Coordination:     Postural Control:     Extremity/Trunk Assessments:    Activity Tolerance:     Treatments:  Therapeutic Exercise  Therapeutic Exercise Performed: Yes  Therapeutic Exercise Activity 1: Pt completed each of the following x10 reps on the LLE to improve strength and endurance: ankle pumps, quad sets, glute sets, SLR, heel slides, SAQ. Cues for proper form and technique.         Bed Mobility  Bed Mobility: Yes  Bed Mobility 1  Bed Mobility 1: Supine to sitting  Level of Assistance 1: Minimum assistance  Bed Mobility Comments 1:  (pt req min cues for proper hand placement.)    Ambulation/Gait Training  Ambulation/Gait Training Performed: Yes  Ambulation/Gait Training 1  Surface 1: Level tile  Device 1: Rolling walker  Gait Support Devices: Gait belt  Assistance 1: Moderate assistance, Moderate verbal cues, Moderate tactile cues  Quality of Gait 1: Narrow base of support, Inconsistent stride length, Decreased step length, Antalgic  Comments/Distance (ft) 1: 5ftx2 (pt req increased cues for proper FWW placement and sequencing.)  Transfers  Transfer: Yes  Transfer 1  Transfer From 1: Sit to, Stand to  Technique 1: Sit to stand, Stand to sit  Transfer Device 1: Walker, Gait belt  Transfer Level of Assistance 1: Moderate assistance, Moderate verbal cues, Moderate tactile cues  Trials/Comments 1:  (pt req increased cues for proper hand placement)    Outcome Measures:  [facility] Basic Mobility  Turning from your back to your side while in a flat bed without  using bedrails: A little  Moving from lying on your back to sitting on the side of a flat bed without using bedrails: A little  Moving to and from bed to chair (including a wheelchair): A lot  Standing up from a chair using your arms (e.g. wheelchair or bedside chair): A lot  To walk in hospital room: A lot  Climbing 3-5 steps with railing: Total  Basic Mobility - Total Score: 13    Education Documentation  Body Mechanics, taught by Anshu Smith PTA at 7/22/2025 11:26 AM.  Learner: Patient  Readiness: Acceptance  Method: Explanation  Response: Needs Reinforcement    Home Exercise Program, taught by Anshu Smith PTA at 7/22/2025 11:26 AM.  Learner: Patient  Readiness: Acceptance  Method: Explanation  Response: Needs Reinforcement    Precautions, taught by Anshu Smith PTA at 7/22/2025 11:26 AM.  Learner: Patient  Readiness: Acceptance  Method: Explanation  Response: Needs Reinforcement    Mobility Training, taught by Anshu Smith PTA at 7/22/2025 11:26 AM.  Learner: Patient  Readiness: Acceptance  Method: Explanation  Response: Needs Reinforcement    Education Comments  No comments found.        OP EDUCATION:       Encounter Problems       Encounter Problems (Active)       Balance       complete all mobility with normal balance while dual tasking, negotiating in a dynamic environment, carrying items, etc., with proactive and reactive static and dynamic standing and sitting tasks, with mod I and RW >15 minutes.         Start:  07/21/25    Expected End:  08/04/25               Mobility       STG - Patient will ambulate 75 ft with mod I and a RW (Progressing)       Start:  07/21/25    Expected End:  08/04/25            Pt will tolerate 15 reps of each LE exercise in order to improve strength and endurance.   (Progressing)       Start:  07/21/25    Expected End:  08/04/25            Pt will achieve L knee ROM of 0-100 degrees (Progressing)       Start:  07/21/25    Expected End:   08/04/25               PT Transfers       STG - Patient will perform bed mobility independently.  (Progressing)       Start:  07/21/25    Expected End:  08/04/25            STG - Patient will transfer sit to and from stand with min A and a RW (Progressing)       Start:  07/21/25    Expected End:  08/04/25

## 2025-07-22 NOTE — DISCHARGE SUMMARY
Discharge Diagnosis  Left Total Knee Arthroplasty    Issues Requiring Follow-Up  Home care services to start within 48 hours. Outpatient PT to start per surgeon instructions.    Test Results Pending At Discharge  Pending Labs       No current pending labs.            Hospital Course  Patient underwent surgery for Left Total Knee Arthroplasty without complications. Patient was then takent to the PACU in stabe condition. Patient was then transferred to the or.  Pain was appropriately controlled and weaned to oral medications. Diet was advanced as tolerated. PT/OT was consulted to begin on post operative day 0 and recommended Home for patient on discharge. Patient had uneventful hospital course. Patient is to follow-up in 6 weeks at scheduled post-op visit.      Face to Face following surgical procedure on 07/22/25. The patient was awake and alert. VSS, afebrile. Sensation intact bilaterally, sural/saph/sp/tibal n. Motor intact flexion/extension/DF/PF/EHL/FHL bilaterally. Palpable symmetric DP/PT pulse bilaterally.    Pertinent Physical Exam At Time of Discharge  Physical Exam  Vitals and nursing note reviewed. Exam conducted with a chaperone present.   Constitutional:       Appearance: Normal appearance.   HENT:      Head: Normocephalic and atraumatic.   Eyes:      Extraocular Movements: Extraocular movements intact.   Cardiovascular:      Rate and Rhythm: Normal rate and regular rhythm.      Pulses: Normal pulses.      Heart sounds: Normal heart sounds.   Pulmonary:      Effort: Pulmonary effort is normal.   Abdominal:      Palpations: Abdomen is soft.   Musculoskeletal:      GENERAL: A/Ox3, NAD. Appears healthy, well nourished  CARDIAC: regular rate  LUNGS: Breathing non-labored    MUSCULOSKELETAL:  Laterality: left knee    - Incision: dressing in place with no saturation  - Palpation: appropriate post operative TTP along surgical incision  - Can actively straight leg raise  - compartments soft, negative  homans    NEUROVASCULAR:  - Neurovascular Status: sensation intact to light touch distally  - Capillary refill brisk at extremities, Bilateral dorsalis pedis pulse 2+     Skin:     General: Skin is warm and dry.      Capillary Refill: Capillary refill takes less than 2 seconds.   Neurological:      General: No focal deficit present.      Mental Status: Patient is alert and oriented to person, place, and time. Mental status is at baseline.   Psychiatric:         Mood and Affect: Mood normal.         Thought Content: Thought content normal.         Judgment: Judgment normal.           Home Medications  Medication List      Your medication list        START taking these medications        Instructions Last Dose Given Next Dose Due   acetaminophen 325 mg tablet  Commonly known as: Tylenol      Take 3 tablets (975 mg) by mouth every 8 hours if needed for mild pain (1 - 3).       cyclobenzaprine 5 mg tablet  Commonly known as: Flexeril      Take 1 tablet (5 mg) by mouth 3 times a day as needed for muscle spasms for up to 10 days.       doxycycline 100 mg capsule  Commonly known as: Monodox      Take 1 capsule (100 mg) by mouth 2 times a day for 7 days. To prevent infection       oxyCODONE 5 mg immediate release tablet  Commonly known as: Roxicodone      Take 1 tablet (5 mg) by mouth every 6 hours if needed for severe pain (7 - 10) for up to 7 days.       pantoprazole 40 mg EC tablet  Commonly known as: ProtoNix      Take 1 tablet (40 mg) by mouth once daily as needed (heartburn). Do not crush, chew, or split.       senna 8.6 mg tablet  Generic drug: sennosides      Take 1 tablet (8.6 mg) by mouth 2 times a day. To prevent constipation       traMADol 50 mg tablet  Commonly known as: Ultram      Take 1 tablet (50 mg) by mouth every 6 hours if needed for severe pain (7 - 10) for up to 7 days.              CONTINUE taking these medications        Instructions Last Dose Given Next Dose Due   albuterol 90 mcg/actuation  inhaler  Commonly known as: Proventil HFA      Inhale 2 puffs every 4 hours if needed for wheezing or shortness of breath.       bisoproloL-hydrochlorothiazide 10-6.25 mg tablet  Commonly known as: Ziac      Take 2 tablets by mouth once daily with breakfast.       budesonide-formoterol 160-4.5 mcg/actuation inhaler  Commonly known as: Symbicort      Inhale 2 puffs 2 times a day. Rinse mouth with water after use to reduce aftertaste and incidence of candidiasis. Do not swallow.       citalopram 20 mg tablet  Commonly known as: CeleXA      Take 1 tablet (20 mg) by mouth once daily.       Eliquis 5 mg tablet  Generic drug: apixaban      Take 1 tablet by mouth 2 times a day.       esomeprazole 20 mg DR capsule  Commonly known as: NexIUM           fluticasone 50 mcg/actuation nasal spray  Commonly known as: Flonase           gabapentin 600 mg tablet  Commonly known as: Neurontin      Take 1 tablet (600 mg) by mouth 3 times a day.       levothyroxine 25 mcg tablet  Commonly known as: Synthroid, Levoxyl      Take 1 tablet (25 mcg) by mouth once daily.       Women's Multivitamin 18 mg-400 mcg- 500 mg-50 mcg tablet  Generic drug: mv-mn-iron-FA-Ca carb-vit K                     Where to Get Your Medications        These medications were sent to [facility]  [address] [facility] [address], [place] [address]      Hours: 8 AM to 6 PM Mon-Fri, 9 AM to 1 PM Saturday Phone: [phone]   acetaminophen 325 mg tablet  cyclobenzaprine 5 mg tablet  doxycycline 100 mg capsule  oxyCODONE 5 mg immediate release tablet  pantoprazole 40 mg EC tablet  senna 8.6 mg tablet  traMADol 50 mg tablet             Outpatient Follow-Up  THREE WEEKS  OFFICE LOCATIONS    Location 1: [facility]  [address], [address]  [place] [address]  Office Number: [phone]    Location 2: [facility]  [address]  [facility], [address]  Office Number: [phone]    Location 3: [address]  [facility], [address]  [place] [address]  Office Number: [phone]        Please read discharge instructions provided by your surgeon before calling with questions as this will delay care.    Medication refills-Oxycodone and Tramadol will be refilled every 7 days per state law. Please request refills through [employer] preferably/[phone]. All medication requests may take up to 72 hours to refill and refills after Friday 1pm will be refilled on the next business day.

## 2025-07-22 NOTE — PROGRESS NOTES
07/22/25 1013   Discharge Planning   Expected Discharge Disposition SNF  ([facility])   Does the patient need discharge transport arranged? Yes   [employer] coordination needed? Yes   Has discharge transport been arranged? No       10:10am   SW met with patient at bedside to inform her that auth has been approved and discharge is being planned for today. Patient is in agreement with transitioning to [facility].

## 2025-07-22 NOTE — PROGRESS NOTES
07/22/25 1715   Discharge Planning   Expected Discharge Disposition SNF  ([facility])   Does the patient need discharge transport arranged? No   [employer] coordination needed? No   Has discharge transport been arranged? Yes   What day is the transport expected? 07/22/25   What time is the transport expected? 1900       5:00pm  ZACK met with patient at bedside and informed her of her transport time of 7:00pm.   ZACK also contacted patient's friend, Kathryn, via phone and informed her that patient would be discharging [facility] at 7:00pm and going to [facility].

## 2025-07-22 NOTE — ASSESSMENT & PLAN NOTE
Yaritza Bernal is a 75 y.o. female with PMHx of HTN, GERD, hypothyroidism, asthma, DVT on Eliquis, s/p L TKA 7/17/25 who presented to [facility] due to recurrent falls. Pt stated that she had a fall at home where she slid from the end of her bed as she was trying to get herself up. Due to recent knee surgery she was unable to get off the floor and had to call EMS who helped her up.    Recurrent falls   S/p L TKA 7/17/25   - XR L knee - No acute fracture or malalignment  - XR pelvis - Suspected acute periprosthetic fracture of the L femoral neck with mild varus impaction   - Consulted ortho with above findings -> recommended CT L femur and pelvis (orders placed)  - PT/OT evals with Ortho clearance   - Continue doxycycline prescribed for prophylaxis following surgery   - PRN pain meds     ?UTI   Check urine  Culture pending  Start abx d/w pharm >> macrobid  Can fllow up culturs as out pt    Urinary retention  Consitiaton  See rders  D/w nurse may need carmona       HTN   - BP variable with intermittent HTN noted   - Continue home bisoprolol, hydrochlorothiazide     GERD   - Protonix daily     Hypothyroidism   - Continue home levothyroxine     Asthma   - Scheduled and PRN nebs per RT     Other comorbidities as above  - Continue medications as ordered and adjust based on clinical course      VTE / GI prophylaxis   - Eliquis, PPI, bowel regimen in place      Discharge planning  - Pending PT/OT evals

## 2025-07-23 ENCOUNTER — NURSING HOME VISIT (OUTPATIENT)
Dept: POST ACUTE CARE | Facility: EXTERNAL LOCATION | Age: 75
End: 2025-07-23
Payer: MEDICARE

## 2025-07-23 DIAGNOSIS — D50.8 IRON DEFICIENCY ANEMIA SECONDARY TO INADEQUATE DIETARY IRON INTAKE: ICD-10-CM

## 2025-07-23 DIAGNOSIS — E78.5 HYPERLIPIDEMIA, UNSPECIFIED HYPERLIPIDEMIA TYPE: ICD-10-CM

## 2025-07-23 DIAGNOSIS — I10 BENIGN ESSENTIAL HYPERTENSION: ICD-10-CM

## 2025-07-23 DIAGNOSIS — E03.9 HYPOTHYROIDISM, UNSPECIFIED TYPE: ICD-10-CM

## 2025-07-23 DIAGNOSIS — F32.5 MAJOR DEPRESSIVE DISORDER WITH SINGLE EPISODE, IN FULL REMISSION: ICD-10-CM

## 2025-07-23 DIAGNOSIS — I82.409 DEEP VEIN THROMBOSIS (DVT) OF LOWER EXTREMITY, UNSPECIFIED CHRONICITY, UNSPECIFIED LATERALITY, UNSPECIFIED VEIN: ICD-10-CM

## 2025-07-23 DIAGNOSIS — F41.9 ANXIETY: ICD-10-CM

## 2025-07-23 DIAGNOSIS — E87.6 HYPOKALEMIA: ICD-10-CM

## 2025-07-23 DIAGNOSIS — Z79.01 ANTICOAGULANT LONG-TERM USE: ICD-10-CM

## 2025-07-23 DIAGNOSIS — Z96.652 S/P TOTAL KNEE ARTHROPLASTY, LEFT: Primary | ICD-10-CM

## 2025-07-23 LAB — BACTERIA UR CULT: ABNORMAL

## 2025-07-23 PROCEDURE — 99306 1ST NF CARE HIGH MDM 50: CPT | Performed by: FAMILY MEDICINE

## 2025-07-23 NOTE — LETTER
Patient: Yaritza Bernal  : 1950    Encounter Date: 2025    Admission H and P    History Of Present Illness  Subjective  Patient ID: Yaritza Bernal is a 75 y.o. female who is being seen for initial visit.     HPI:  75 y.o. female with PMHx of HTN, GERD, hypothyroidism, asthma, DVT on Eliquis, s/p L TKA 25 who presented to [facility] due to recurrent falls. Pt stated that she had a fall at home where she slid from the end of her bed as she was trying to get herself up. Due to recent knee surgery she was unable to get off the floor and had to call EMS who helped her up. She did not come to the hospital. This same exact scenario then occurred a second time and EMS convinced her to come in.   Patient was admitted to [facility] from  through   for falls after left knee replacement that was done on  and patient was discharged .  Patient fell twice at home and was readmitted on the .  She also had constipation and urinary retention.  She was discharged to SNF per therapy for rehab.  Today the patient is seen  while sitting in bed  She is sleeping while sitting in bed.  Difficult to arouse.  S She also also has some left calf pain intermittently since having surgery on .  .  She has a follow-up with orthopedics scheduled for .    Past medical history: Osteoarthritis of the left knee, repeated falls, left  arthroplasty in , emphysema, morbid obesity, hyperlipidemia, hypertension, iron deficiency anemia, hypothyroidism, wedge compression fracture of T1, neuropathy, constipation, depression and anxiety, asthma, UTI, GERD, DVT, periprosthetic fracture around internal prosthetic left hip joint, cataracts, polyneuropathy, vitamin D deficiency, essential tremor low back pain, osteoarthritis, hearing loss    Medications: Eliquis, senna, oxycodone, Tylenol, bisoprolol/hydrochlorothiazide, Lexapro, doxycycline, Flonase, pantoprazole, albuterol, levothyroxine, Celexa,  gabapentin, multivitamin, fluticasone/salmeterol inhaler    Medical History[1]  Surgical History[2]  Social History[3]     Family History  Family History[4]     Allergies  Amoxicillin, Erythromycin, Lisinopril, and Sulfa (sulfonamide antibiotics)    Review of Systems  Patient denies chest pain, shortness of breath, nausea, vomiting, fever, chills, diarrhea, ,  vision changes, rashes, dysuria, paresthesias, vertigo, headache, cough or cold symptoms, or any other complaints at this time. A complete review of systems was done, and is as stated in the history of present illness, is otherwise negative or not pertinent to the complaint.    Physical Exam  Physical exam: Vital signs and nurses notes were reviewed.  Blood pressure 116/68, temp 97.1, heart rate 72, SpO2 is 94% on room air    General:  no acute distress. Alert and oriented  x 2-3.     Head: atraumatic and normocephalic    Eyes: EOMs are intact, conjunctivae is not injected.    Oropharynx:  no trismus or drooling, buccal mucosa is moist.    Ears:  normal external exam, no swelling or erythema,     Nasal: normal external exam,     Neck: Supple, full range of motion,     Cardiac: Regular rate and rhythm. No murmurs noted.     Pulmonary: Lungs clear bilaterally with good aeration. No adventitious breath sounds. No wheezes rales or rhonchi. No accessory muscle use no retraction noted.    Abdomen: Soft, Obese  Nontender. No guarding, rigidity, or distention. Normoactive bowel sounds. No pulsatile masses, no bruits.  Patient has a Bonilla catheter in place.    Extremities: Examination of the left knee and lower leg reveals some erythema and increased warmth to the anterior knee and anterior proximal lower leg.  She has 1+ pitting edema to the distal lower legs bilaterally.  She has decreased range of motion slightly to the knee but she can move all extremities independently.    Skin: No rash seen. Skin is warm and dry     Neuro: Patient is alert and oriented x5. Speech  is clear. There is no asymmetry with facial grimaces, and no tongue deviation. Patient moves all extremities independently. Sensation is intact. No obvious neuro deficits are noted.     Assessment/Plan  Problem List Items Addressed This Visit           ICD-10-CM    Anxiety F41.9    Benign essential hypertension I10    DVT (deep venous thrombosis) (Multi) I82.409    Hyperlipidemia E78.5    Hypothyroid E03.9    MDD (major depressive disorder), single episode F32.9    Iron deficiency anemia secondary to inadequate dietary iron intake D50.8    Anticoagulant long-term use Z79.01    S/P total knee arthroplasty, left - Primary Z96.652    Hypokalemia E87.6          Yaritza Bernal is a 75 y.o. female with PMHx of HTN, GERD, hypothyroidism, asthma, DVT on Eliquis, s/p L TKA 7/17/25 who [place] [facility] [place] to [place]. Pt stated that she had a fall at home where she slid from the end of her bed as she was trying to get herself up. Due to recent knee surgery she was unable to get off the floor and had to call EMS who helped her up.     Recurrent falls   S/p L TKA 7/17/25   - XR L knee - No acute fracture or malalignment  - XR pelvis - Suspected acute periprosthetic fracture of the L femoral neck with mild varus impaction   - Consulted ortho with above findings -> recommended CT L femur and pelvis (orders placed)  - PT/OT evals with Ortho clearance   - PRN pain meds         Urinary retention  Consitiaton  See herman Bonilla in place         HTN   - BP variable with intermittent HTN noted   - Continue home bisoprolol, hydrochlorothiazide      GERD   - Protonix daily      Hypothyroidism   - Continue home levothyroxine      Asthma   - Scheduled and PRN nebs per RT     Physical Deconditioning OT/PT     PLAN:Reviewed orders, medications, records, and pertinent labs/x-rays from   hospital. Monitor VS, BS, O2, etc as per protocol. See written orders. PT/OT   will evaluate and start appropriate rehabilitation program. Reviewed  and signed   off orders, medications,Labs, x-rays, and current diagnoses. Reviewed and   updated CPR status and any changes in Advanced directives. Continue Rehab Will   see 1-2 times weekly for next 30 days then reassess. Will always see at   resident, family , or nursing request. Discharge Planning   Time   Time Spent With Patient: 55 minutes of which greater than 50 percent was spent   counseling and or coordinating care.      Edison Murray MD        Electronically Signed By: Edison Murray MD   8/3/25  8:59 PM       [1]  Past Medical History:  Diagnosis Date   • Adverse effect of anesthesia     was told I was throwing T waves and was broght out from under.  Happened about 10 yrs ago   • Allergic rhinitis    • Anxiety 2011   • Arthritis 2010   • Asthma    • Bladder disorder, unspecified 10/21/2015    Bladder disorder   • Breast cyst march   • Cataract    • Clotting disorder (Multi)    • Cutaneous abscess, unspecified 03/16/2017    Abscess   • Disorder of the skin and subcutaneous tissue, unspecified 07/14/2016    Hand lesion   • Dry eyes    • DVT (deep venous thrombosis) (Multi)     after femur fx surgery   • Encounter for immunization 02/17/2017    Need for Tdap vaccination   • Encounter for immunization 10/04/2016    Need for vaccination with 13-polyvalent pneumococcal conjugate vaccine   • Fatigue    • Fracture of wrist    • Fracture, femur (Multi)    • GERD (gastroesophageal reflux disease)    • Hernia, internal 2021   • HL (hearing loss)    • Hyperlipidemia    • Hypertension    • Hypothyroidism    • Impacted cerumen, bilateral 10/21/2015    Impacted cerumen of both ears   • Impaired fasting glucose 09/17/2018    Abnormal fasting glucose   • Irritable bowel syndrome    • Medial epicondylitis, right elbow 10/04/2016    Medial epicondylitis, right   • Nephrolithiasis    • Other conditions influencing health status 03/16/2017    Cyst   • Other specified abnormal findings of blood chemistry 10/04/2018     Abnormal TSH   • Other specified abnormal findings of blood chemistry 09/17/2018    Abnormal TSH   • Other specified symptoms and signs involving the circulatory and respiratory systems 09/28/2017    Chest congestion   • Pain in left hand 09/28/2017    Pain of left hand   • Pain in left hip 10/04/2018    Left hip pain   • Pain in right hip 11/01/2017    Bilateral hip pain   • Pain in right knee 03/13/2017    Right knee pain   • Pain in unspecified knee 03/13/2017    Knee pain   • Peripheral neuropathy    • Personal history of other diseases of the circulatory system 05/09/2018    History of hypertension   • Personal history of other diseases of the circulatory system 06/27/2018    History of hypertension   • Personal history of other diseases of the circulatory system 01/18/2018    History of hypertension   • Personal history of other diseases of the circulatory system 08/17/2017    History of hypertension   • Personal history of other diseases of the nervous system and sense organs 09/17/2018    History of benign essential tremor   • Personal history of other diseases of the nervous system and sense organs 10/20/2016    History of acute otitis media   • Personal history of other diseases of the respiratory system 03/09/2018    History of acute bacterial sinusitis   • Personal history of other drug therapy 05/09/2018    History of pneumococcal vaccination   • Personal history of other endocrine, nutritional and metabolic disease 10/30/2017    History of hyperlipidemia   • Personal history of other mental and behavioral disorders 07/16/2018    History of anxiety   • Personal history of other specified conditions 06/27/2018    History of fatigue   • Shortness of breath 2020   • Strain of muscle, fascia and tendon of the posterior muscle group at thigh level, unspecified thigh, initial encounter 01/15/2019    Hamstring strain, initial encounter   • Strain of unspecified muscles, fascia and tendons at thigh level, left  thigh, initial encounter 05/01/2018    Muscle strain of left thigh   • Tinnitus    • Unspecified injury of unspecified elbow, initial encounter 01/15/2019    Elbow injury, initial encounter   • Venous insufficiency    [2]  Past Surgical History:  Procedure Laterality Date   • BLADDER SURGERY  10/21/2015    Bladder Surgery   • CARDIAC CATHETERIZATION     • CATARACT EXTRACTION W/  INTRAOCULAR LENS IMPLANT Right 06/06/2024    Dr. Rashid   • CATARACT EXTRACTION W/  INTRAOCULAR LENS IMPLANT Left 07/20/2023    Dr. Rashid   • COLONOSCOPY     • EXTRACORPOREAL SHOCK WAVE LITHOTRIPSY     • FEMUR FRACTURE SURGERY     • KNEE SURGERY     • LITHOTRIPSY  2015   • VEIN LIGATION AND STRIPPING     [3]  Social History  Tobacco Use   • Smoking status: Never   • Smokeless tobacco: Never   • Tobacco comments:     Denies any illness in past 30 days     Denies personal/family reaction or problems with anesthesia     Denies any SOB with stairs or daily activity     Uses bilat walking sticks to ambulate     Able to lay flat x 30 minutes             Vaping Use   • Vaping status: Never Used   Substance Use Topics   • Alcohol use: Yes     Alcohol/week: 2.0 standard drinks of alcohol     Types: 1 Glasses of wine, 1 Cans of beer per week     Comment: every once in a while   • Drug use: Never   [4]  Family History  Problem Relation Name Age of Onset   • Other (cardiac disorder) Mother jose cardenas    • Cancer Mother jose cardenas    • Hypertension Mother jose cardenas    • Migraines Mother jose cardenas    • Rashes / Skin problems Mother jose cardenas    • Arthritis Mother jose cardenas    • Other (emphysema lung) Father monika    • Asthma Father monika    • Breast cancer Neg Hx

## 2025-07-23 NOTE — DISCHARGE SUMMARY
Discharge Diagnosis  Falls           Issues Requiring Follow-Up  Pt admitted for falls and has had recent surgery L TKA 7/17/25  Also treated for constipation and urine retntion   Seen by therapy and rec to disharge to snf  She will need voiding trial prior to discharge to home    Discharge Meds     Medication List      START taking these medications     nitrofurantoin (macrocrystal-monohydrate) 100 mg capsule; Commonly known   as: Macrobid; Take 1 capsule (100 mg) by mouth every 12 hours for 14   doses.   tamsulosin 0.4 mg 24 hr capsule; Commonly known as: Flomax; Take 1   capsule (0.4 mg) by mouth once daily for 7 days. Do not crush, chew, or   split.     CHANGE how you take these medications     oxyCODONE 5 mg immediate release tablet; Commonly known as: Roxicodone;   Take 1 tablet (5 mg) by mouth every 8 hours if needed for severe pain (7 -   10) for up to 2 days.; What changed: when to take this     CONTINUE taking these medications     acetaminophen 325 mg tablet; Commonly known as: Tylenol; Take 3 tablets   (975 mg) by mouth every 8 hours if needed for mild pain (1 - 3).   albuterol 90 mcg/actuation inhaler; Commonly known as: Proventil HFA;   Inhale 2 puffs every 4 hours if needed for wheezing or shortness of   breath.   bisoproloL-hydrochlorothiazide 10-6.25 mg tablet; Commonly known as:   Ziac; Take 2 tablets by mouth once daily with breakfast.   budesonide-formoterol 160-4.5 mcg/actuation inhaler; Commonly known as:   Symbicort; Inhale 2 puffs 2 times a day. Rinse mouth with water after use   to reduce aftertaste and incidence of candidiasis. Do not swallow.   citalopram 20 mg tablet; Commonly known as: CeleXA; Take 1 tablet (20   mg) by mouth once daily.   cyclobenzaprine 5 mg tablet; Commonly known as: Flexeril; Take 1 tablet   (5 mg) by mouth 3 times a day as needed for muscle spasms for up to 10   days.   doxycycline 100 mg capsule; Commonly known as: Monodox; Take 1 capsule   (100 mg) by mouth 2  times a day for 7 days. To prevent infection   Eliquis 5 mg tablet; Generic drug: apixaban; Take 1 tablet by mouth 2   times a day.   esomeprazole 20 mg DR capsule; Commonly known as: NexIUM   fluticasone 50 mcg/actuation nasal spray; Commonly known as: Flonase   gabapentin 600 mg tablet; Commonly known as: Neurontin; Take 1 tablet   (600 mg) by mouth 3 times a day.   levothyroxine 25 mcg tablet; Commonly known as: Synthroid, Levoxyl; Take   1 tablet (25 mcg) by mouth once daily.   pantoprazole 40 mg EC tablet; Commonly known as: ProtoNix; Take 1 tablet   (40 mg) by mouth once daily as needed (heartburn). Do not crush, chew, or   split.   senna 8.6 mg tablet; Generic drug: sennosides; Take 1 tablet (8.6 mg) by   mouth 2 times a day. To prevent constipation   Women's Multivitamin 18 mg-400 mcg- 500 mg-50 mcg tablet; Generic drug:   mv-mn-iron-FA-Ca carb-vit K     STOP taking these medications     traMADol 50 mg tablet; Commonly known as: Ultram       Test Results Pending At Discharge  Pending Labs       Order Current Status    Urine Culture Preliminary result            Hospital Course   Admitted for recurrent falls, s/p L TKA   Being treated for UTI  And did need carmona for acute urine retention   Seen therapy and rec for transfer to snf for ongoing therapy     Pertinent Physical Exam At Time of Discharge  Physical Exam    Outpatient Follow-Up  Future Appointments   Date Time Provider Department Center   8/12/2025 12:00 PM Brant Lockhart DO [id number] [facility]   8/15/2025  1:00 PM Madyson Sandoval DO [id number] [facility]   9/2/2025 10:00 AM Kaitlyn M Dunphy, MD [id number] [facility]   9/16/2025 12:30 PM Cristela Norman PA-C [id number] [facility]   10/21/2025 10:30 AM Kaitlyn M Dunphy, MD [id number] [facility]   10/29/2025  1:30 PM Celina Carter MD [id number] [facility]   12/29/2025  1:00 PM Madyson Sandoval DO [id number] [facility]         Gerald Salazar MD

## 2025-07-24 ENCOUNTER — NURSING HOME VISIT (OUTPATIENT)
Dept: POST ACUTE CARE | Facility: EXTERNAL LOCATION | Age: 75
End: 2025-07-24
Payer: MEDICARE

## 2025-07-24 DIAGNOSIS — L03.116 CELLULITIS OF LEFT LOWER EXTREMITY: ICD-10-CM

## 2025-07-24 DIAGNOSIS — E03.9 HYPOTHYROIDISM, UNSPECIFIED TYPE: ICD-10-CM

## 2025-07-24 DIAGNOSIS — D50.8 IRON DEFICIENCY ANEMIA SECONDARY TO INADEQUATE DIETARY IRON INTAKE: ICD-10-CM
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
## 2025-07-24 DIAGNOSIS — J30.9 ALLERGIC RHINITIS, UNSPECIFIED SEASONALITY, UNSPECIFIED TRIGGER: ICD-10-CM

## 2025-07-24 DIAGNOSIS — R12 HEARTBURN: ICD-10-CM

## 2025-07-24 DIAGNOSIS — G62.9 POLYNEUROPATHY: ICD-10-CM

## 2025-07-24 DIAGNOSIS — I82.409 DEEP VEIN THROMBOSIS (DVT) OF LOWER EXTREMITY, UNSPECIFIED CHRONICITY, UNSPECIFIED LATERALITY, UNSPECIFIED VEIN: ICD-10-CM

## 2025-07-24 DIAGNOSIS — I10 BENIGN ESSENTIAL HYPERTENSION: ICD-10-CM

## 2025-07-24 DIAGNOSIS — J43.1 PANLOBULAR EMPHYSEMA (MULTI): ICD-10-CM

## 2025-07-24 DIAGNOSIS — J45.30 MILD PERSISTENT ASTHMA WITHOUT COMPLICATION (HHS-HCC): ICD-10-CM

## 2025-07-24 DIAGNOSIS — F41.9 ANXIETY: ICD-10-CM

## 2025-07-24 DIAGNOSIS — F32.5 MAJOR DEPRESSION IN REMISSION: ICD-10-CM

## 2025-07-24 DIAGNOSIS — Z96.652 S/P TOTAL KNEE ARTHROPLASTY, LEFT: ICD-10-CM

## 2025-07-24 PROCEDURE — 99306 1ST NF CARE HIGH MDM 50: CPT | Performed by: PHYSICIAN ASSISTANT

## 2025-07-24 NOTE — ASSESSMENT & PLAN NOTE
Hemoglobin and hematocrit on July 22 were 9.2 and 29, will monitor weekly.  CBC and BMP here are still pending

## 2025-07-24 NOTE — ASSESSMENT & PLAN NOTE
Patient had a total left knee replacement on July 17.  Patient has a Mepilex dressing in place.  Patient has mild erythema around the sides of the dressing and to the proximal anterior left lower leg.  There is increased warmth.  No induration.  No obvious drainage.  Patient is on doxycycline already which we will continue and I added cephalexin.  Will monitor

## 2025-07-24 NOTE — PROGRESS NOTES
"INITIAL H&P    History Of Present Illness  Subjective   Patient ID: Yaritza Bernal is a 75 y.o. female who is acute skilled care being seen and evaluated for multiple medical problems.    HPI:  75 y.o. female with PMHx of HTN, GERD, hypothyroidism, asthma, DVT on Eliquis, s/p L TKA 7/17/25 who presented to [facility] ED due to recurrent falls. Pt stated that she had a fall at home where she slid from the end of her bed as she was trying to get herself up. Due to recent knee surgery she was unable to get off the floor and had to call EMS who helped her up. She did not come to the hospital. This same exact scenario then occurred a second time and EMS convinced her to come in.   Patient was admitted to [facility] from July 19 through July 22  for falls after left knee replacement that was done on July 17 and patient was discharged July 18.  Patient fell twice at home and was readmitted on the 19th.  She also had constipation and urinary retention.  She was discharged to SNF per therapy for rehab.  Today the patient is seen while sitting in a wheelchair eating lunch.  She is alert and oriented.  She complains of mild intermittent left knee anterior \"soreness.\"  She also also has some left calf pain intermittently since having surgery on July 17.  She complains of constipation and states her last bowel movement was a week ago.  She has not had a bowel movement since he had surgery.  She is passing gas.  Patient has a Bonilla in place for urinary retention.  She has a follow-up with orthopedics scheduled for August 12.    Past medical history: Osteoarthritis of the left knee, repeated falls, left  arthroplasty in July 17, emphysema, morbid obesity, hyperlipidemia, hypertension, iron deficiency anemia, hypothyroidism, wedge compression fracture of T1, neuropathy, constipation, depression and anxiety, asthma, UTI, GERD, DVT, periprosthetic fracture around internal prosthetic left hip joint, cataracts, polyneuropathy, vitamin D deficiency, " essential tremor low back pain, osteoarthritis, hearing loss    Medications: Eliquis, senna, oxycodone, Tylenol, bisoprolol/hydrochlorothiazide, Lexapro, doxycycline, Flonase, pantoprazole, albuterol, levothyroxine, Celexa, gabapentin, multivitamin, fluticasone/salmeterol inhaler    Medical History[1]  Surgical History[2]  Social History[3]     Family History  Family History[4]     Allergies  Amoxicillin, Erythromycin, Lisinopril, and Sulfa (sulfonamide antibiotics)    Review of Systems  Patient denies chest pain, shortness of breath, nausea, vomiting, fever, chills, diarrhea, ,  vision changes, rashes, dysuria, paresthesias, vertigo, headache, cough or cold symptoms, or any other complaints at this time. A complete review of systems was done, and is as stated in the history of present illness, is otherwise negative or not pertinent to the complaint.    Physical Exam  Physical exam: Vital signs and nurses notes were reviewed.  Blood pressure 126/63, temp 97.1, heart rate 72, SpO2 is 94% on room air    General:  no acute distress. Alert and oriented  x 5.     Head: atraumatic and normocephalic    Eyes: EOMs are intact, conjunctivae is not injected.    Oropharynx:  no trismus or drooling, buccal mucosa is moist.    Ears:  normal external exam, no swelling or erythema,     Nasal: normal external exam,     Neck: Supple, full range of motion,     Cardiac: Regular rate and rhythm. No murmurs noted.     Pulmonary: Lungs clear bilaterally with good aeration. No adventitious breath sounds. No wheezes rales or rhonchi. No accessory muscle use no retraction noted.    Abdomen: Soft,  Nontender. No guarding, rigidity, or distention. Normoactive bowel sounds. No pulsatile masses, no bruits.  Patient has a Bonilla catheter in place.    Extremities: Examination of the left knee and lower leg reveals some erythema and increased warmth to the anterior knee and anterior proximal lower leg.  She has 1+ pitting edema to the distal lower  legs bilaterally.  She has decreased range of motion slightly to the knee but she can move all extremities independently.    Skin: No rash seen. Skin is warm and dry     Neuro: Patient is alert and oriented x5. Speech is clear. There is no asymmetry with facial grimaces, and no tongue deviation. Patient moves all extremities independently. Sensation is intact. No obvious neuro deficits are noted.     Assessment/Plan   Problem List Items Addressed This Visit           ICD-10-CM    Accidental fall - Primary W19.XXXA    PT and OT evaluation and treatment, two-person assist, wheelchair use,         Anxiety F41.9    Continue Celexa         Benign essential hypertension I10    Continue bisoprolol/HCTZ         DVT (deep venous thrombosis) (Multi) I82.409    Continue Eliquis         Heartburn R12    Continue pantoprazole         Hypothyroid E03.9    Continue levothyroxine         Major depression in remission F32.5    Continue Lexapro and Celexa         Mild persistent asthma without complication ([facility]) J45.30    Continue fluticasone/salmeterol inhaler, continue albuterol as needed         Polyneuropathy G62.9    Continue gabapentin         Allergic rhinitis J30.9    Continue Flonase         Iron deficiency anemia secondary to inadequate dietary iron intake D50.8    Hemoglobin and hematocrit on July 22 were 9.2 and 29, will monitor weekly.  CBC and BMP here are still pending         Panlobular emphysema (Multi) J43.1    Continue fluticasone/salmeterol inhaler, albuterol as needed         S/P total knee arthroplasty, left Z96.652    PT and OT, wheelchair use, two-person assist, follow-up with orthopedics August 12, pain control, patient has redness and increased warmth to the knee on the sides of the Mepilex dressing and anterior proximal left lower leg,   Patient is already on doxycycline, will start cephalexin and monitor         Cellulitis of left lower extremity L03.116    Patient had a total left knee replacement on  July 17.  Patient has a Mepilex dressing in place.  Patient has mild erythema around the sides of the dressing and to the proximal anterior left lower leg.  There is increased warmth.  No induration.  No obvious drainage.  Patient is on doxycycline already which we will continue and I added cephalexin.  Will monitor               Reviewed recent labs, vitals, progress notes and documents pertaining to this visit.    Diamond Echavarria PA-C       [1]   Past Medical History:  Diagnosis Date    Adverse effect of anesthesia     was told I was throwing T waves and was broght out from under.  Happened about 10 yrs ago    Allergic rhinitis     Anxiety 2011    Arthritis 2010    Asthma     Bladder disorder, unspecified 10/21/2015    Bladder disorder    Breast cyst march    Cataract     Clotting disorder (Multi)     Cutaneous abscess, unspecified 03/16/2017    Abscess    Disorder of the skin and subcutaneous tissue, unspecified 07/14/2016    Hand lesion    Dry eyes     DVT (deep venous thrombosis) (Multi)     after femur fx surgery    Encounter for immunization 02/17/2017    Need for Tdap vaccination    Encounter for immunization 10/04/2016    Need for vaccination with 13-polyvalent pneumococcal conjugate vaccine    Fatigue     Fracture of wrist     Fracture, femur (Multi)     GERD (gastroesophageal reflux disease)     Hernia, internal 2021    HL (hearing loss)     Hyperlipidemia     Hypertension     Hypothyroidism     Impacted cerumen, bilateral 10/21/2015    Impacted cerumen of both ears    Impaired fasting glucose 09/17/2018    Abnormal fasting glucose    Irritable bowel syndrome     Medial epicondylitis, right elbow 10/04/2016    Medial epicondylitis, right    Nephrolithiasis     Other conditions influencing health status 03/16/2017    Cyst    Other specified abnormal findings of blood chemistry 10/04/2018    Abnormal TSH    Other specified abnormal findings of blood chemistry 09/17/2018    Abnormal TSH    Other specified  symptoms and signs involving the circulatory and respiratory systems 09/28/2017    Chest congestion    Pain in left hand 09/28/2017    Pain of left hand    Pain in left hip 10/04/2018    Left hip pain    Pain in right hip 11/01/2017    Bilateral hip pain    Pain in right knee 03/13/2017    Right knee pain    Pain in unspecified knee 03/13/2017    Knee pain    Peripheral neuropathy     Personal history of other diseases of the circulatory system 05/09/2018    History of hypertension    Personal history of other diseases of the circulatory system 06/27/2018    History of hypertension    Personal history of other diseases of the circulatory system 01/18/2018    History of hypertension    Personal history of other diseases of the circulatory system 08/17/2017    History of hypertension    Personal history of other diseases of the nervous system and sense organs 09/17/2018    History of benign essential tremor    Personal history of other diseases of the nervous system and sense organs 10/20/2016    History of acute otitis media    Personal history of other diseases of the respiratory system 03/09/2018    History of acute bacterial sinusitis    Personal history of other drug therapy 05/09/2018    History of pneumococcal vaccination    Personal history of other endocrine, nutritional and metabolic disease 10/30/2017    History of hyperlipidemia    Personal history of other mental and behavioral disorders 07/16/2018    History of anxiety    Personal history of other specified conditions 06/27/2018    History of fatigue    Shortness of breath 2020    Strain of muscle, fascia and tendon of the posterior muscle group at thigh level, unspecified thigh, initial encounter 01/15/2019    Hamstring strain, initial encounter    Strain of unspecified muscles, fascia and tendons at thigh level, left thigh, initial encounter 05/01/2018    Muscle strain of left thigh    Tinnitus     Unspecified injury of unspecified elbow, initial  encounter 01/15/2019    Elbow injury, initial encounter    Venous insufficiency    [2]   Past Surgical History:  Procedure Laterality Date    BLADDER SURGERY  10/21/2015    Bladder Surgery    CARDIAC CATHETERIZATION      CATARACT EXTRACTION W/  INTRAOCULAR LENS IMPLANT Right 06/06/2024    Dr. Rashid    CATARACT EXTRACTION W/  INTRAOCULAR LENS IMPLANT Left 07/20/2023    Dr. Rashid    COLONOSCOPY      EXTRACORPOREAL SHOCK WAVE LITHOTRIPSY      FEMUR FRACTURE SURGERY      KNEE SURGERY      LITHOTRIPSY  2015    VEIN LIGATION AND STRIPPING     [3]   Social History  Tobacco Use    Smoking status: Never    Smokeless tobacco: Never    Tobacco comments:     Denies any illness in past 30 days     Denies personal/family reaction or problems with anesthesia     Denies any SOB with stairs or daily activity     Uses bilat walking sticks to ambulate     Able to lay flat x 30 minutes             Vaping Use    Vaping status: Never Used   Substance Use Topics    Alcohol use: Yes     Alcohol/week: 2.0 standard drinks of alcohol     Types: 1 Glasses of wine, 1 Cans of beer per week     Comment: every once in a while    Drug use: Never   [4]   Family History  Problem Relation Name Age of Onset    Other (cardiac disorder) Mother jose cardenas     Cancer Mother jose cardenas     Hypertension Mother jose cardenas     Migraines Mother jose cardenas     Rashes / Skin problems Mother jose cardenas     Arthritis Mother jose cardenas     Other (emphysema lung) Father monika     Asthma Father monika     Breast cancer Neg Hx

## 2025-07-24 NOTE — LETTER
"Patient: Yaritza Bernal  : 1950    Encounter Date: 2025    INITIAL H&P    History Of Present Illness  Subjective  Patient ID: Yaritza Bernal is a 75 y.o. female who is acute skilled care being seen and evaluated for multiple medical problems.    HPI:  75 y.o. female with PMHx of HTN, GERD, hypothyroidism, asthma, DVT on Eliquis, s/p L TKA 25 who presented to [facility] due to recurrent falls. Pt stated that she had a fall at home where she slid from the end of her bed as she was trying to get herself up. Due to recent knee surgery she was unable to get off the floor and had to call EMS who helped her up. She did not come to the hospital. This same exact scenario then occurred a second time and EMS convinced her to come in.   Patient was admitted to [facility] from  through   for falls after left knee replacement that was done on  and patient was discharged .  Patient fell twice at home and was readmitted on the .  She also had constipation and urinary retention.  She was discharged to SNF per therapy for rehab.  Today the patient is seen while sitting in a wheelchair eating lunch.  She is alert and oriented.  She complains of mild intermittent left knee anterior \"soreness.\"  She also also has some left calf pain intermittently since having surgery on .  She complains of constipation and states her last bowel movement was a week ago.  She has not had a bowel movement since he had surgery.  She is passing gas.  Patient has a Bonilla in place for urinary retention.  She has a follow-up with orthopedics scheduled for .    Past medical history: Osteoarthritis of the left knee, repeated falls, left  arthroplasty in , emphysema, morbid obesity, hyperlipidemia, hypertension, iron deficiency anemia, hypothyroidism, wedge compression fracture of T1, neuropathy, constipation, depression and anxiety, asthma, UTI, GERD, DVT, periprosthetic fracture around internal " prosthetic left hip joint, cataracts, polyneuropathy, vitamin D deficiency, essential tremor low back pain, osteoarthritis, hearing loss    Medications: Eliquis, senna, oxycodone, Tylenol, bisoprolol/hydrochlorothiazide, Lexapro, doxycycline, Flonase, pantoprazole, albuterol, levothyroxine, Celexa, gabapentin, multivitamin, fluticasone/salmeterol inhaler    Medical History[1]  Surgical History[2]  Social History[3]     Family History  Family History[4]     Allergies  Amoxicillin, Erythromycin, Lisinopril, and Sulfa (sulfonamide antibiotics)    Review of Systems  Patient denies chest pain, shortness of breath, nausea, vomiting, fever, chills, diarrhea, ,  vision changes, rashes, dysuria, paresthesias, vertigo, headache, cough or cold symptoms, or any other complaints at this time. A complete review of systems was done, and is as stated in the history of present illness, is otherwise negative or not pertinent to the complaint.    Physical Exam  Physical exam: Vital signs and nurses notes were reviewed.  Blood pressure 126/63, temp 97.1, heart rate 72, SpO2 is 94% on room air    General:  no acute distress. Alert and oriented  x 5.     Head: atraumatic and normocephalic    Eyes: EOMs are intact, conjunctivae is not injected.    Oropharynx:  no trismus or drooling, buccal mucosa is moist.    Ears:  normal external exam, no swelling or erythema,     Nasal: normal external exam,     Neck: Supple, full range of motion,     Cardiac: Regular rate and rhythm. No murmurs noted.     Pulmonary: Lungs clear bilaterally with good aeration. No adventitious breath sounds. No wheezes rales or rhonchi. No accessory muscle use no retraction noted.    Abdomen: Soft,  Nontender. No guarding, rigidity, or distention. Normoactive bowel sounds. No pulsatile masses, no bruits.  Patient has a Bonilla catheter in place.    Extremities: Examination of the left knee and lower leg reveals some erythema and increased warmth to the anterior knee and  anterior proximal lower leg.  She has 1+ pitting edema to the distal lower legs bilaterally.  She has decreased range of motion slightly to the knee but she can move all extremities independently.    Skin: No rash seen. Skin is warm and dry     Neuro: Patient is alert and oriented x5. Speech is clear. There is no asymmetry with facial grimaces, and no tongue deviation. Patient moves all extremities independently. Sensation is intact. No obvious neuro deficits are noted.     Assessment/Plan  Problem List Items Addressed This Visit           ICD-10-CM    Accidental fall - Primary W19.XXXA    PT and OT evaluation and treatment, two-person assist, wheelchair use,         Anxiety F41.9    Continue Celexa         Benign essential hypertension I10    Continue bisoprolol/HCTZ         DVT (deep venous thrombosis) (Multi) I82.409    Continue Eliquis         Heartburn R12    Continue pantoprazole         Hypothyroid E03.9    Continue levothyroxine         Major depression in remission F32.5    Continue Lexapro and Celexa         Mild persistent asthma without complication ([facility]) J45.30    Continue fluticasone/salmeterol inhaler, continue albuterol as needed         Polyneuropathy G62.9    Continue gabapentin         Allergic rhinitis J30.9    Continue Flonase         Iron deficiency anemia secondary to inadequate dietary iron intake D50.8    Hemoglobin and hematocrit on July 22 were 9.2 and 29, will monitor weekly.  CBC and BMP here are still pending         Panlobular emphysema (Multi) J43.1    Continue fluticasone/salmeterol inhaler, albuterol as needed         S/P total knee arthroplasty, left Z96.652    PT and OT, wheelchair use, two-person assist, follow-up with orthopedics August 12, pain control, patient has redness and increased warmth to the knee on the sides of the Mepilex dressing and anterior proximal left lower leg,   Patient is already on doxycycline, will start cephalexin and monitor         Cellulitis of  left lower extremity L03.116    Patient had a total left knee replacement on July 17.  Patient has a Mepilex dressing in place.  Patient has mild erythema around the sides of the dressing and to the proximal anterior left lower leg.  There is increased warmth.  No induration.  No obvious drainage.  Patient is on doxycycline already which we will continue and I added cephalexin.  Will monitor               Reviewed recent labs, vitals, progress notes and documents pertaining to this visit.    Diamond Echavarria PA-C      Electronically Signed By: Diamond Echavarria PA-C   7/24/25  5:10 PM       [1]  Past Medical History:  Diagnosis Date   • Adverse effect of anesthesia     was told I was throwing T waves and was broght out from under.  Happened about 10 yrs ago   • Allergic rhinitis    • Anxiety 2011   • Arthritis 2010   • Asthma    • Bladder disorder, unspecified 10/21/2015    Bladder disorder   • Breast cyst march   • Cataract    • Clotting disorder (Multi)    • Cutaneous abscess, unspecified 03/16/2017    Abscess   • Disorder of the skin and subcutaneous tissue, unspecified 07/14/2016    Hand lesion   • Dry eyes    • DVT (deep venous thrombosis) (Multi)     after femur fx surgery   • Encounter for immunization 02/17/2017    Need for Tdap vaccination   • Encounter for immunization 10/04/2016    Need for vaccination with 13-polyvalent pneumococcal conjugate vaccine   • Fatigue    • Fracture of wrist    • Fracture, femur (Multi)    • GERD (gastroesophageal reflux disease)    • Hernia, internal 2021   • HL (hearing loss)    • Hyperlipidemia    • Hypertension    • Hypothyroidism    • Impacted cerumen, bilateral 10/21/2015    Impacted cerumen of both ears   • Impaired fasting glucose 09/17/2018    Abnormal fasting glucose   • Irritable bowel syndrome    • Medial epicondylitis, right elbow 10/04/2016    Medial epicondylitis, right   • Nephrolithiasis    • Other conditions influencing health status 03/16/2017    Cyst   •  Other specified abnormal findings of blood chemistry 10/04/2018    Abnormal TSH   • Other specified abnormal findings of blood chemistry 09/17/2018    Abnormal TSH   • Other specified symptoms and signs involving the circulatory and respiratory systems 09/28/2017    Chest congestion   • Pain in left hand 09/28/2017    Pain of left hand   • Pain in left hip 10/04/2018    Left hip pain   • Pain in right hip 11/01/2017    Bilateral hip pain   • Pain in right knee 03/13/2017    Right knee pain   • Pain in unspecified knee 03/13/2017    Knee pain   • Peripheral neuropathy    • Personal history of other diseases of the circulatory system 05/09/2018    History of hypertension   • Personal history of other diseases of the circulatory system 06/27/2018    History of hypertension   • Personal history of other diseases of the circulatory system 01/18/2018    History of hypertension   • Personal history of other diseases of the circulatory system 08/17/2017    History of hypertension   • Personal history of other diseases of the nervous system and sense organs 09/17/2018    History of benign essential tremor   • Personal history of other diseases of the nervous system and sense organs 10/20/2016    History of acute otitis media   • Personal history of other diseases of the respiratory system 03/09/2018    History of acute bacterial sinusitis   • Personal history of other drug therapy 05/09/2018    History of pneumococcal vaccination   • Personal history of other endocrine, nutritional and metabolic disease 10/30/2017    History of hyperlipidemia   • Personal history of other mental and behavioral disorders 07/16/2018    History of anxiety   • Personal history of other specified conditions 06/27/2018    History of fatigue   • Shortness of breath 2020   • Strain of muscle, fascia and tendon of the posterior muscle group at thigh level, unspecified thigh, initial encounter 01/15/2019    Hamstring strain, initial encounter   •  Strain of unspecified muscles, fascia and tendons at thigh level, left thigh, initial encounter 05/01/2018    Muscle strain of left thigh   • Tinnitus    • Unspecified injury of unspecified elbow, initial encounter 01/15/2019    Elbow injury, initial encounter   • Venous insufficiency    [2]  Past Surgical History:  Procedure Laterality Date   • BLADDER SURGERY  10/21/2015    Bladder Surgery   • CARDIAC CATHETERIZATION     • CATARACT EXTRACTION W/  INTRAOCULAR LENS IMPLANT Right 06/06/2024    Dr. Rashid   • CATARACT EXTRACTION W/  INTRAOCULAR LENS IMPLANT Left 07/20/2023    Dr. Rashid   • COLONOSCOPY     • EXTRACORPOREAL SHOCK WAVE LITHOTRIPSY     • FEMUR FRACTURE SURGERY     • KNEE SURGERY     • LITHOTRIPSY  2015   • VEIN LIGATION AND STRIPPING     [3]  Social History  Tobacco Use   • Smoking status: Never   • Smokeless tobacco: Never   • Tobacco comments:     Denies any illness in past 30 days     Denies personal/family reaction or problems with anesthesia     Denies any SOB with stairs or daily activity     Uses bilat walking sticks to ambulate     Able to lay flat x 30 minutes             Vaping Use   • Vaping status: Never Used   Substance Use Topics   • Alcohol use: Yes     Alcohol/week: 2.0 standard drinks of alcohol     Types: 1 Glasses of wine, 1 Cans of beer per week     Comment: every once in a while   • Drug use: Never   [4]  Family History  Problem Relation Name Age of Onset   • Other (cardiac disorder) Mother jose cardenas    • Cancer Mother jose cardenas    • Hypertension Mother jose cardenas    • Migraines Mother jose cardenas    • Rashes / Skin problems Mother jose cardenas    • Arthritis Mother jose cardenas    • Other (emphysema lung) Father monika    • Asthma Father monika    • Breast cancer Neg Hx

## 2025-07-24 NOTE — ASSESSMENT & PLAN NOTE
PT and OT, wheelchair use, two-person assist, follow-up with orthopedics August 12, pain control, patient has redness and increased warmth to the knee on the sides of the Mepilex dressing and anterior proximal left lower leg,   Patient is already on doxycycline, will start cephalexin and monitor

## 2025-07-28 ENCOUNTER — NURSING HOME VISIT (OUTPATIENT)
Dept: POST ACUTE CARE | Facility: EXTERNAL LOCATION | Age: 75
End: 2025-07-28
Payer: MEDICARE

## 2025-07-28 DIAGNOSIS — J43.1 PANLOBULAR EMPHYSEMA (MULTI): ICD-10-CM

## 2025-07-28 DIAGNOSIS — F32.5 MAJOR DEPRESSION IN REMISSION: ICD-10-CM

## 2025-07-28 DIAGNOSIS — L03.116 CELLULITIS OF LEFT LOWER EXTREMITY: ICD-10-CM

## 2025-07-28 DIAGNOSIS — F41.9 ANXIETY: ICD-10-CM
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
## 2025-07-28 DIAGNOSIS — J45.30 MILD PERSISTENT ASTHMA WITHOUT COMPLICATION (HHS-HCC): ICD-10-CM

## 2025-07-28 DIAGNOSIS — J30.9 ALLERGIC RHINITIS, UNSPECIFIED SEASONALITY, UNSPECIFIED TRIGGER: ICD-10-CM

## 2025-07-28 DIAGNOSIS — I82.409 DEEP VEIN THROMBOSIS (DVT) OF LOWER EXTREMITY, UNSPECIFIED CHRONICITY, UNSPECIFIED LATERALITY, UNSPECIFIED VEIN: ICD-10-CM

## 2025-07-28 DIAGNOSIS — I10 BENIGN ESSENTIAL HYPERTENSION: ICD-10-CM

## 2025-07-28 DIAGNOSIS — D50.8 IRON DEFICIENCY ANEMIA SECONDARY TO INADEQUATE DIETARY IRON INTAKE: ICD-10-CM

## 2025-07-28 DIAGNOSIS — G62.9 POLYNEUROPATHY: ICD-10-CM

## 2025-07-28 DIAGNOSIS — R12 HEARTBURN: ICD-10-CM

## 2025-07-28 DIAGNOSIS — E87.6 HYPOKALEMIA: ICD-10-CM

## 2025-07-28 DIAGNOSIS — Z96.652 S/P TOTAL KNEE ARTHROPLASTY, LEFT: Primary | ICD-10-CM

## 2025-07-28 DIAGNOSIS — E03.9 HYPOTHYROIDISM, UNSPECIFIED TYPE: ICD-10-CM

## 2025-07-28 PROCEDURE — 99309 SBSQ NF CARE MODERATE MDM 30: CPT | Performed by: PHYSICIAN ASSISTANT

## 2025-07-28 NOTE — PROGRESS NOTES
PROGRESS NOTE    History Of Present Illness  Subjective   Patient ID: Yaritza Bernal is a 75 y.o. female who is acute skilled care being seen and evaluated for multiple medical problems.    HPI:  75 y.o. female with PMHx of HTN, GERD, hypothyroidism, asthma, DVT on Eliquis, s/p L TKA 7/17/25 who presented to [facility] [place] due to recurrent falls. Pt stated that she had a fall at home where she slid from the end of her bed as she was trying to get herself up. Due to recent knee surgery she was unable to get off the floor and had to call EMS who helped her up. She did not come to the hospital. This same exact scenario then occurred a second time and EMS convinced her to come in.   Patient was admitted to [facility] from July 19 through July 22  for falls after left knee replacement that was done on July 17 and patient was discharged July 18.  Patient fell twice at home and was readmitted on the 19th.  She also had constipation and urinary retention.  She was discharged to SNF per therapy for rehab.  Today the patient is seen while sitting in a wheelchair in her room.  Patient complains of some left knee pain only with movement since her knee surgery.  She also has some left ankle pain intermittently since 4 falls after her knee surgery.  She said the pain seems to occur more with flexion of the foot and ankle but is mild.  She has no pain at rest.  He patient states she has swelling in the left leg since the surgery has improved.  She has no other complaints. Pt had a BM yesterday. Patient has a Bonilla in place for urinary retention.  She has a follow-up with orthopedics scheduled for August 12.  Patient did complain of left calf pain at our last visit.  Patient has ultrasound was negative for DVT.     Past medical history: Osteoarthritis of the left knee, repeated falls, left  arthroplasty in July 17, emphysema, morbid obesity, hyperlipidemia, hypertension, iron deficiency anemia, hypothyroidism, wedge compression fracture of T1,  neuropathy, constipation, depression and anxiety, asthma, UTI, GERD, DVT, periprosthetic fracture around internal prosthetic left hip joint, cataracts, polyneuropathy, vitamin D deficiency, essential tremor low back pain, osteoarthritis, hearing loss     Medications: Eliquis, senna, oxycodone, Tylenol, bisoprolol/hydrochlorothiazide, Lexapro, doxycycline, Flonase, pantoprazole, albuterol, levothyroxine, Celexa, gabapentin, multivitamin, fluticasone/salmeterol inhaler     [Medical History]    [Medical History]  Past Medical History       Diagnosis Date    Adverse effect of anesthesia       was told I was throwing T waves and was broght out from under.  Happened about 10 yrs ago    Allergic rhinitis      Anxiety 2011    Arthritis 2010    Asthma      Bladder disorder, unspecified 10/21/2015     Bladder disorder    Breast cyst march    Cataract      Clotting disorder (Multi)      Cutaneous abscess, unspecified 03/16/2017     Abscess    Disorder of the skin and subcutaneous tissue, unspecified 07/14/2016     Hand lesion    Dry eyes      DVT (deep venous thrombosis) (Multi)       after femur fx surgery    Encounter for immunization 02/17/2017     Need for Tdap vaccination    Encounter for immunization 10/04/2016     Need for vaccination with 13-polyvalent pneumococcal conjugate vaccine    Fatigue      Fracture of wrist      Fracture, femur (Multi)      GERD (gastroesophageal reflux disease)      Hernia, internal 2021    HL (hearing loss)      Hyperlipidemia      Hypertension      Hypothyroidism      Impacted cerumen, bilateral 10/21/2015     Impacted cerumen of both ears    Impaired fasting glucose 09/17/2018     Abnormal fasting glucose    Irritable bowel syndrome      Medial epicondylitis, right elbow 10/04/2016     Medial epicondylitis, right    Nephrolithiasis      Other conditions influencing health status 03/16/2017     Cyst    Other specified abnormal findings of blood chemistry 10/04/2018     Abnormal TSH    Other  specified abnormal findings of blood chemistry 09/17/2018     Abnormal TSH    Other specified symptoms and signs involving the circulatory and respiratory systems 09/28/2017     Chest congestion    Pain in left hand 09/28/2017     Pain of left hand    Pain in left hip 10/04/2018     Left hip pain    Pain in right hip 11/01/2017     Bilateral hip pain    Pain in right knee 03/13/2017     Right knee pain    Pain in unspecified knee 03/13/2017     Knee pain    Peripheral neuropathy      Personal history of other diseases of the circulatory system 05/09/2018     History of hypertension    Personal history of other diseases of the circulatory system 06/27/2018     History of hypertension    Personal history of other diseases of the circulatory system 01/18/2018     History of hypertension    Personal history of other diseases of the circulatory system 08/17/2017     History of hypertension    Personal history of other diseases of the nervous system and sense organs 09/17/2018     History of benign essential tremor    Personal history of other diseases of the nervous system and sense organs 10/20/2016     History of acute otitis media    Personal history of other diseases of the respiratory system 03/09/2018     History of acute bacterial sinusitis    Personal history of other drug therapy 05/09/2018     History of pneumococcal vaccination    Personal history of other endocrine, nutritional and metabolic disease 10/30/2017     History of hyperlipidemia    Personal history of other mental and behavioral disorders 07/16/2018     History of anxiety    Personal history of other specified conditions 06/27/2018     History of fatigue    Shortness of breath 2020    Strain of muscle, fascia and tendon of the posterior muscle group at thigh level, unspecified thigh, initial encounter 01/15/2019     Hamstring strain, initial encounter    Strain of unspecified muscles, fascia and tendons at thigh level, left thigh, initial encounter  05/01/2018     Muscle strain of left thigh    Tinnitus      Unspecified injury of unspecified elbow, initial encounter 01/15/2019     Elbow injury, initial encounter    Venous insufficiency       [Surgical History]    [Surgical History]  Past Surgical History        Procedure Laterality Date    BLADDER SURGERY   10/21/2015     Bladder Surgery    CARDIAC CATHETERIZATION        CATARACT EXTRACTION W/  INTRAOCULAR LENS IMPLANT Right 06/06/2024     Dr. Rashid    CATARACT EXTRACTION W/  INTRAOCULAR LENS IMPLANT Left 07/20/2023     Dr. Rashid    COLONOSCOPY        EXTRACORPOREAL SHOCK WAVE LITHOTRIPSY        FEMUR FRACTURE SURGERY        KNEE SURGERY        LITHOTRIPSY   2015    VEIN LIGATION AND STRIPPING         [Social History]     [Social History]        Tobacco Use    Smoking status: Never    Smokeless tobacco: Never    Tobacco comments:       Denies any illness in past 30 days       Denies personal/family reaction or problems with anesthesia       Denies any SOB with stairs or daily activity       Uses bilat walking sticks to ambulate       Able to lay flat x 30 minutes                   Vaping Use    Vaping status: Never Used   Substance Use Topics    Alcohol use: Yes       Alcohol/week: 2.0 standard drinks of alcohol       Types: 1 Glasses of wine, 1 Cans of beer per week       Comment: every once in a while    Drug use: Never        Family History  [Family History]    [Family History]         Problem Relation Name Age of Onset    Other (cardiac disorder) Mother jose cardenas      Cancer Mother jose cardenas      Hypertension Mother jose cardenas      Migraines Mother jose cardenas      Rashes / Skin problems Mother jose cardenas      Arthritis Mother jose cardenas      Other (emphysema lung) Father monika      Asthma Father monika      Breast cancer Neg Hx            Allergies  Amoxicillin, Erythromycin, Lisinopril, and Sulfa (sulfonamide antibiotics)     Review of Systems  Patient denies chest pain, shortness of breath, nausea,  vomiting, fever, chills, diarrhea, ,  vision changes, rashes, dysuria, paresthesias, vertigo, headache, cough or cold symptoms, or any other complaints at this time. A complete review of systems was done, and is as stated in the history of present illness, is otherwise negative or not pertinent to the complaint.     Physical Exam  Physical exam: Vital signs and nurses notes were reviewed.  Blood pressure 139/73, temp 97.3, heart rate 81, SpO2 was 93% on room air     General:  no acute distress. Alert and oriented  x 5.      Head: atraumatic and normocephalic     Eyes: EOMs are intact, conjunctivae is not injected.     Oropharynx:  no trismus or drooling, buccal mucosa is moist.     Ears:  normal external exam, no swelling or erythema,      Nasal: normal external exam,      Neck: Supple, full range of motion,      Cardiac: Regular rate and rhythm. No murmurs noted.      Pulmonary: Lungs clear bilaterally with good aeration. No adventitious breath sounds. No wheezes rales or rhonchi. No accessory muscle use no retraction noted.     Abdomen: Soft,  Nontender. No guarding, rigidity, or distention. Normoactive bowel sounds. No pulsatile masses, no bruits.  Patient has a Bonilla catheter in place.     Extremities: Examination of the left knee and lower leg reveals some erythema and increased warmth to the anterior knee and anterior proximal lower leg.  She has 1+ pitting edema to the distal lower legs bilaterally.  She has decreased range of motion to the left knee but she can move all extremities independently.  Left foot and ankle have no erythema or increased warmth.  Is not tender to palpation or to passive range of motion.     Skin: No rash seen. Skin is warm and dry      Neuro: Patient is alert and oriented x5. Speech is clear. There is no asymmetry with facial grimaces, and no tongue deviation. Patient moves all extremities independently. Sensation is intact. No obvious neuro deficits are noted.    ASSESSMENT AND  PLAN:  Accidental fall  Continue PT and OT, patient is doing self transfers to the wheelchair, use wheelchair to ambulate.    Anxiety  Continue Celexa    Benign essential hypertension  Continue bisoprolol/HCTZ    DVT (deep venous thrombosis) (Multi)  Continue Eliquis    Heartburn  Continue pantoprazole    Hypothyroid  Continue levothyroxine    Major depression in remission  Continue Lexapro and Celexa    Mild persistent asthma without complication ([facility])  Continue fluticasone/salmeterol inhaler, albuterol as needed    Polyneuropathy  Continue gabapentin    Allergic rhinitis    Continue Flonase    Iron deficiency anemia secondary to inadequate dietary iron intake  On July 25 the patient's hemoglobin was 9.4, hematocrit 29.3,   Will continue to monitor weekly    Panlobular emphysema (Multi)  Continue fluticasone/albuterol inhaler, albuterol as needed    S/P total knee arthroplasty, left  PT and OT, wheelchair use, follow-up with orthopedics as scheduled on August 12, pain control, continue doxycycline, wound over the anterior knee is clean and dry, no bleeding no drainage.  She does have some mild erythema and increased warmth to the medial aspect, inferior, and superior to the knee but has a normal white blood cell count and is on doxycycline.  We added cephalexin at the last visit.    Cellulitis of left lower extremity  Patient had a total left knee replacement on July 17.  Patient surgical incision is clean and dry with no drainage or bleeding.  There is no erythema directly surrounding the wound.  Patient has mild erythema with increased warmth around the knee medially, around the sides of the dressing and to the proximal anterior left lower leg, and distal anterior thigh.  There is mild increased warmth.  No induration.  No obvious drainage.  Patient is on doxycycline will be added cephalexin at the last visit.  White blood cell count was within normal limits at 5.9 on 7/25.  Will  monitor    Hypokalemia  Patient's BMP on July 25 showed a potassium of 3.4.  Patient was given 40 mill equivalents of potassium p.o.  Repeat BMP results for today are pending.  Will monitor     Reviewed recent labs, vitals, progress notes and documents pertaining to this visit.    Diamond Echavarria PA-C

## 2025-07-28 NOTE — ASSESSMENT & PLAN NOTE
PT and OT, wheelchair use, follow-up with orthopedics as scheduled on August 12, pain control, continue doxycycline, wound over the anterior knee is clean and dry, no bleeding no drainage.  She does have some mild erythema and increased warmth to the medial aspect, inferior, and superior to the knee but has a normal white blood cell count and is on doxycycline.  We added cephalexin at the last visit.

## 2025-07-28 NOTE — ASSESSMENT & PLAN NOTE
Continue PT and OT, patient is doing self transfers to the wheelchair, use wheelchair to ambulate.

## 2025-07-28 NOTE — LETTER
Patient: Yaritza Bernal  : 1950    Encounter Date: 2025    PROGRESS NOTE    History Of Present Illness  Subjective  Patient ID: Yaritza Bernal is a 75 y.o. female who is acute skilled care being seen and evaluated for multiple medical problems.    HPI:  75 y.o. female with PMHx of HTN, GERD, hypothyroidism, asthma, DVT on Eliquis, s/p L TKA 25 who presented [place] [facility] due to recurrent falls. Pt stated that she had a fall at home where she slid from the end of her bed as she was trying to get herself up. Due to recent knee surgery she was unable to get off the floor and had to call EMS who helped her up. She did not come to the hospital. This same exact scenario then occurred a second time and EMS convinced her to come in.   Patient was admitted to [facility] from  through   for falls after left knee replacement that was done on  and patient was discharged .  Patient fell twice at home and was readmitted on the .  She also had constipation and urinary retention.  She was discharged to SNF per therapy for rehab.  Today the patient is seen while sitting in a wheelchair in her room.  Patient complains of some left knee pain only with movement since her knee surgery.  She also has some left ankle pain intermittently since 4 falls after her knee surgery.  She said the pain seems to occur more with flexion of the foot and ankle but is mild.  She has no pain at rest.  He patient states she has swelling in the left leg since the surgery has improved.  She has no other complaints. Pt had a BM yesterday. Patient has a Boinlla in place for urinary retention.  She has a follow-up with orthopedics scheduled for .  Patient did complain of left calf pain at our last visit.  Patient has ultrasound was negative for DVT.     Past medical history: Osteoarthritis of the left knee, repeated falls, left  arthroplasty in , emphysema, morbid obesity, hyperlipidemia, hypertension, iron  deficiency anemia, hypothyroidism, wedge compression fracture of T1, neuropathy, constipation, depression and anxiety, asthma, UTI, GERD, DVT, periprosthetic fracture around internal prosthetic left hip joint, cataracts, polyneuropathy, vitamin D deficiency, essential tremor low back pain, osteoarthritis, hearing loss     Medications: Eliquis, senna, oxycodone, Tylenol, bisoprolol/hydrochlorothiazide, Lexapro, doxycycline, Flonase, pantoprazole, albuterol, levothyroxine, Celexa, gabapentin, multivitamin, fluticasone/salmeterol inhaler     [Medical History]    [Medical History]  Past Medical History       Diagnosis Date   • Adverse effect of anesthesia       was told I was throwing T waves and was broght out from under.  Happened about 10 yrs ago   • Allergic rhinitis     • Anxiety 2011   • Arthritis 2010   • Asthma     • Bladder disorder, unspecified 10/21/2015     Bladder disorder   • Breast cyst march   • Cataract     • Clotting disorder (Multi)     • Cutaneous abscess, unspecified 03/16/2017     Abscess   • Disorder of the skin and subcutaneous tissue, unspecified 07/14/2016     Hand lesion   • Dry eyes     • DVT (deep venous thrombosis) (Multi)       after femur fx surgery   • Encounter for immunization 02/17/2017     Need for Tdap vaccination   • Encounter for immunization 10/04/2016     Need for vaccination with 13-polyvalent pneumococcal conjugate vaccine   • Fatigue     • Fracture of wrist     • Fracture, femur (Multi)     • GERD (gastroesophageal reflux disease)     • Hernia, internal 2021   • HL (hearing loss)     • Hyperlipidemia     • Hypertension     • Hypothyroidism     • Impacted cerumen, bilateral 10/21/2015     Impacted cerumen of both ears   • Impaired fasting glucose 09/17/2018     Abnormal fasting glucose   • Irritable bowel syndrome     • Medial epicondylitis, right elbow 10/04/2016     Medial epicondylitis, right   • Nephrolithiasis     • Other conditions influencing health status 03/16/2017      Cyst   • Other specified abnormal findings of blood chemistry 10/04/2018     Abnormal TSH   • Other specified abnormal findings of blood chemistry 09/17/2018     Abnormal TSH   • Other specified symptoms and signs involving the circulatory and respiratory systems 09/28/2017     Chest congestion   • Pain in left hand 09/28/2017     Pain of left hand   • Pain in left hip 10/04/2018     Left hip pain   • Pain in right hip 11/01/2017     Bilateral hip pain   • Pain in right knee 03/13/2017     Right knee pain   • Pain in unspecified knee 03/13/2017     Knee pain   • Peripheral neuropathy     • Personal history of other diseases of the circulatory system 05/09/2018     History of hypertension   • Personal history of other diseases of the circulatory system 06/27/2018     History of hypertension   • Personal history of other diseases of the circulatory system 01/18/2018     History of hypertension   • Personal history of other diseases of the circulatory system 08/17/2017     History of hypertension   • Personal history of other diseases of the nervous system and sense organs 09/17/2018     History of benign essential tremor   • Personal history of other diseases of the nervous system and sense organs 10/20/2016     History of acute otitis media   • Personal history of other diseases of the respiratory system 03/09/2018     History of acute bacterial sinusitis   • Personal history of other drug therapy 05/09/2018     History of pneumococcal vaccination   • Personal history of other endocrine, nutritional and metabolic disease 10/30/2017     History of hyperlipidemia   • Personal history of other mental and behavioral disorders 07/16/2018     History of anxiety   • Personal history of other specified conditions 06/27/2018     History of fatigue   • Shortness of breath 2020   • Strain of muscle, fascia and tendon of the posterior muscle group at thigh level, unspecified thigh, initial encounter 01/15/2019     Hamstring  strain, initial encounter   • Strain of unspecified muscles, fascia and tendons at thigh level, left thigh, initial encounter 05/01/2018     Muscle strain of left thigh   • Tinnitus     • Unspecified injury of unspecified elbow, initial encounter 01/15/2019     Elbow injury, initial encounter   • Venous insufficiency       [Surgical History]    [Surgical History]  Past Surgical History        Procedure Laterality Date   • BLADDER SURGERY   10/21/2015     Bladder Surgery   • CARDIAC CATHETERIZATION       • CATARACT EXTRACTION W/  INTRAOCULAR LENS IMPLANT Right 06/06/2024     Dr. Rashid   • CATARACT EXTRACTION W/  INTRAOCULAR LENS IMPLANT Left 07/20/2023     Dr. Rashid   • COLONOSCOPY       • EXTRACORPOREAL SHOCK WAVE LITHOTRIPSY       • FEMUR FRACTURE SURGERY       • KNEE SURGERY       • LITHOTRIPSY   2015   • VEIN LIGATION AND STRIPPING         [Social History]     [Social History]        Tobacco Use   • Smoking status: Never   • Smokeless tobacco: Never   • Tobacco comments:       Denies any illness in past 30 days       Denies personal/family reaction or problems with anesthesia       Denies any SOB with stairs or daily activity       Uses bilat walking sticks to ambulate       Able to lay flat x 30 minutes                   Vaping Use   • Vaping status: Never Used   Substance Use Topics   • Alcohol use: Yes       Alcohol/week: 2.0 standard drinks of alcohol       Types: 1 Glasses of wine, 1 Cans of beer per week       Comment: every once in a while   • Drug use: Never        Family History  [Family History]    [Family History]         Problem Relation Name Age of Onset   • Other (cardiac disorder) Mother jose cardenas     • Cancer Mother jose cardenas     • Hypertension Mother jose cardenas     • Migraines Mother jose cardenas     • Rashes / Skin problems Mother jose cardenas     • Arthritis Mother jose cardenas     • Other (emphysema lung) Father monika     • Asthma Father monika     • Breast cancer Neg Hx             Allergies  Amoxicillin, Erythromycin, Lisinopril, and Sulfa (sulfonamide antibiotics)     Review of Systems  Patient denies chest pain, shortness of breath, nausea, vomiting, fever, chills, diarrhea, ,  vision changes, rashes, dysuria, paresthesias, vertigo, headache, cough or cold symptoms, or any other complaints at this time. A complete review of systems was done, and is as stated in the history of present illness, is otherwise negative or not pertinent to the complaint.     Physical Exam  Physical exam: Vital signs and nurses notes were reviewed.  Blood pressure 139/73, temp 97.3, heart rate 81, SpO2 was 93% on room air     General:  no acute distress. Alert and oriented  x 5.      Head: atraumatic and normocephalic     Eyes: EOMs are intact, conjunctivae is not injected.     Oropharynx:  no trismus or drooling, buccal mucosa is moist.     Ears:  normal external exam, no swelling or erythema,      Nasal: normal external exam,      Neck: Supple, full range of motion,      Cardiac: Regular rate and rhythm. No murmurs noted.      Pulmonary: Lungs clear bilaterally with good aeration. No adventitious breath sounds. No wheezes rales or rhonchi. No accessory muscle use no retraction noted.     Abdomen: Soft,  Nontender. No guarding, rigidity, or distention. Normoactive bowel sounds. No pulsatile masses, no bruits.  Patient has a Bonilla catheter in place.     Extremities: Examination of the left knee and lower leg reveals some erythema and increased warmth to the anterior knee and anterior proximal lower leg.  She has 1+ pitting edema to the distal lower legs bilaterally.  She has decreased range of motion to the left knee but she can move all extremities independently.  Left foot and ankle have no erythema or increased warmth.  Is not tender to palpation or to passive range of motion.     Skin: No rash seen. Skin is warm and dry      Neuro: Patient is alert and oriented x5. Speech is clear. There is no asymmetry with  facial grimaces, and no tongue deviation. Patient moves all extremities independently. Sensation is intact. No obvious neuro deficits are noted.    ASSESSMENT AND PLAN:  Accidental fall  Continue PT and OT, patient is doing self transfers to the wheelchair, use wheelchair to ambulate.    Anxiety  Continue Celexa    Benign essential hypertension  Continue bisoprolol/HCTZ    DVT (deep venous thrombosis) (Multi)  Continue Eliquis    Heartburn  Continue pantoprazole    Hypothyroid  Continue levothyroxine    Major depression in remission  Continue Lexapro and Celexa    Mild persistent asthma without complication ([facility])  Continue fluticasone/salmeterol inhaler, albuterol as needed    Polyneuropathy  Continue gabapentin    Allergic rhinitis    Continue Flonase    Iron deficiency anemia secondary to inadequate dietary iron intake  On July 25 the patient's hemoglobin was 9.4, hematocrit 29.3,   Will continue to monitor weekly    Panlobular emphysema (Multi)  Continue fluticasone/albuterol inhaler, albuterol as needed    S/P total knee arthroplasty, left  PT and OT, wheelchair use, follow-up with orthopedics as scheduled on August 12, pain control, continue doxycycline, wound over the anterior knee is clean and dry, no bleeding no drainage.  She does have some mild erythema and increased warmth to the medial aspect, inferior, and superior to the knee but has a normal white blood cell count and is on doxycycline.  We added cephalexin at the last visit.    Cellulitis of left lower extremity  Patient had a total left knee replacement on July 17.  Patient surgical incision is clean and dry with no drainage or bleeding.  There is no erythema directly surrounding the wound.  Patient has mild erythema with increased warmth around the knee medially, around the sides of the dressing and to the proximal anterior left lower leg, and distal anterior thigh.  There is mild increased warmth.  No induration.  No obvious drainage.  Patient  is on doxycycline will be added cephalexin at the last visit.  White blood cell count was within normal limits at 5.9 on 7/25.  Will monitor    Hypokalemia  Patient's BMP on July 25 showed a potassium of 3.4.  Patient was given 40 mill equivalents of potassium p.o.  Repeat BMP results for today are pending.  Will monitor     Reviewed recent labs, vitals, progress notes and documents pertaining to this visit.    Diamond Echavarria PA-C    Electronically Signed By: Diamond Echavarria PA-C   7/28/25  2:48 PM

## 2025-07-28 NOTE — ASSESSMENT & PLAN NOTE
Patient had a total left knee replacement on July 17.  Patient surgical incision is clean and dry with no drainage or bleeding.  There is no erythema directly surrounding the wound.  Patient has mild erythema with increased warmth around the knee medially, around the sides of the dressing and to the proximal anterior left lower leg, and distal anterior thigh.  There is mild increased warmth.  No induration.  No obvious drainage.  Patient is on doxycycline will be added cephalexin at the last visit.  White blood cell count was within normal limits at 5.9 on 7/25.  Will monitor

## 2025-07-28 NOTE — ASSESSMENT & PLAN NOTE
Patient's BMP on July 25 showed a potassium of 3.4.  Patient was given 40 mill equivalents of potassium p.o.  Repeat BMP results for today are pending.  Will monitor

## 2025-07-31 ENCOUNTER — NURSING HOME VISIT (OUTPATIENT)
Dept: POST ACUTE CARE | Facility: EXTERNAL LOCATION | Age: 75
End: 2025-07-31
Payer: MEDICARE

## 2025-07-31 DIAGNOSIS — F41.9 ANXIETY: ICD-10-CM

## 2025-07-31 DIAGNOSIS — E87.6 HYPOKALEMIA: ICD-10-CM

## 2025-07-31 DIAGNOSIS — I10 BENIGN ESSENTIAL HYPERTENSION: ICD-10-CM

## 2025-07-31 DIAGNOSIS — E03.9 HYPOTHYROIDISM, UNSPECIFIED TYPE: ICD-10-CM

## 2025-07-31 DIAGNOSIS — L03.116 CELLULITIS OF LEFT LOWER EXTREMITY: ICD-10-CM

## 2025-07-31 DIAGNOSIS — G62.9 POLYNEUROPATHY: ICD-10-CM

## 2025-07-31 DIAGNOSIS — Z96.652 S/P TOTAL KNEE ARTHROPLASTY, LEFT: Primary | ICD-10-CM

## 2025-07-31 DIAGNOSIS — J43.1 PANLOBULAR EMPHYSEMA (MULTI): ICD-10-CM

## 2025-07-31 DIAGNOSIS — J45.30 MILD PERSISTENT ASTHMA WITHOUT COMPLICATION (HHS-HCC): ICD-10-CM

## 2025-07-31 DIAGNOSIS — F32.5 MAJOR DEPRESSION IN REMISSION: ICD-10-CM

## 2025-07-31 DIAGNOSIS — I82.409 DEEP VEIN THROMBOSIS (DVT) OF LOWER EXTREMITY, UNSPECIFIED CHRONICITY, UNSPECIFIED LATERALITY, UNSPECIFIED VEIN: ICD-10-CM

## 2025-07-31 DIAGNOSIS — J30.9 ALLERGIC RHINITIS, UNSPECIFIED SEASONALITY, UNSPECIFIED TRIGGER: ICD-10-CM

## 2025-07-31 DIAGNOSIS — D50.8 IRON DEFICIENCY ANEMIA SECONDARY TO INADEQUATE DIETARY IRON INTAKE: ICD-10-CM

## 2025-07-31 DIAGNOSIS — R12 HEARTBURN: ICD-10-CM

## 2025-07-31 PROCEDURE — 99316 NF DSCHRG MGMT 30 MIN+: CPT | Performed by: PHYSICIAN ASSISTANT

## 2025-07-31 NOTE — LETTER
"Patient: Yaritza Bernal  : 1950    Encounter Date: 2025    DISCHARGE SUMMARY    Subjective  Patient ID: Yaritza Bernal is a 75 y.o. female who is acute skilled care being seen and evaluated for multiple medical problems.    History Of Present Illness  75 y.o. female with PMHx of HTN, GERD, hypothyroidism, asthma, DVT on Eliquis, s/p L TKA 25 who presented to [facility] ED due to recurrent falls. Pt stated that she had a fall at home where she slid from the end of her bed as she was trying to get herself up. Due to recent knee surgery she was unable to get off the floor and had to call EMS who helped her up. She did not come to the hospital. This same exact scenario then occurred a second time and EMS convinced her to come in.   Patient was admitted to [facility] from  through   for falls after left knee replacement that was done on  and patient was discharged .  Patient fell twice at home and was readmitted on the .  She also had constipation and urinary retention.  She was discharged to SNF per therapy for rehab.  Today the patient is seen while sitting in a wheelchair in her room.  Patient complains of some left knee \"soreness\" the only with movement since her knee surgery.  She also has some left ankle pain intermittently since 4 falls after her knee surgery.  She said the pain seems to occur more with flexion of the foot and ankle but is mild.  She has no pain at rest.  The patient states she has swelling in the left leg since the surgery, but it has improved.  She has no other complaints. Patient has a Bonilla in place for urinary retention.  She has a follow-up with orthopedics scheduled for .  Patient did complain of left calf pain at our last visit.  Patient has ultrasound was negative for DVT.  X-ray of the left ankle showed no fractures.  Patient will likely be discharged on antibiotics Ceftin and will go home with home health care.  She has a rollator and a " regular walker at home.  She has 3 steps to get into the house but other than that is 1 floor living.  She has been doing well in therapy and using a walker well in therapy.     Past medical history: Osteoarthritis of the left knee, repeated falls, left  arthroplasty in July 17, emphysema, morbid obesity, hyperlipidemia, hypertension, iron deficiency anemia, hypothyroidism, wedge compression fracture of T1, neuropathy, constipation, depression and anxiety, asthma, UTI, GERD, DVT, periprosthetic fracture around internal prosthetic left hip joint, cataracts, polyneuropathy, vitamin D deficiency, essential tremor low back pain, osteoarthritis, hearing loss     Medications: Eliquis, senna, oxycodone, Tylenol, bisoprolol/hydrochlorothiazide, Lexapro, doxycycline, Flonase, pantoprazole, albuterol, levothyroxine, Celexa, gabapentin, multivitamin, fluticasone/salmeterol inhaler     [Medical History]    [Medical History]  Past Medical History          Diagnosis Date   • Adverse effect of anesthesia       was told I was throwing T waves and was broght out from under.  Happened about 10 yrs ago   • Allergic rhinitis     • Anxiety 2011   • Arthritis 2010   • Asthma     • Bladder disorder, unspecified 10/21/2015     Bladder disorder   • Breast cyst march   • Cataract     • Clotting disorder (Multi)     • Cutaneous abscess, unspecified 03/16/2017     Abscess   • Disorder of the skin and subcutaneous tissue, unspecified 07/14/2016     Hand lesion   • Dry eyes     • DVT (deep venous thrombosis) (Multi)       after femur fx surgery   • Encounter for immunization 02/17/2017     Need for Tdap vaccination   • Encounter for immunization 10/04/2016     Need for vaccination with 13-polyvalent pneumococcal conjugate vaccine   • Fatigue     • Fracture of wrist     • Fracture, femur (Multi)     • GERD (gastroesophageal reflux disease)     • Hernia, internal 2021   • HL (hearing loss)     • Hyperlipidemia     • Hypertension     •  Hypothyroidism     • Impacted cerumen, bilateral 10/21/2015     Impacted cerumen of both ears   • Impaired fasting glucose 09/17/2018     Abnormal fasting glucose   • Irritable bowel syndrome     • Medial epicondylitis, right elbow 10/04/2016     Medial epicondylitis, right   • Nephrolithiasis     • Other conditions influencing health status 03/16/2017     Cyst   • Other specified abnormal findings of blood chemistry 10/04/2018     Abnormal TSH   • Other specified abnormal findings of blood chemistry 09/17/2018     Abnormal TSH   • Other specified symptoms and signs involving the circulatory and respiratory systems 09/28/2017     Chest congestion   • Pain in left hand 09/28/2017     Pain of left hand   • Pain in left hip 10/04/2018     Left hip pain   • Pain in right hip 11/01/2017     Bilateral hip pain   • Pain in right knee 03/13/2017     Right knee pain   • Pain in unspecified knee 03/13/2017     Knee pain   • Peripheral neuropathy     • Personal history of other diseases of the circulatory system 05/09/2018     History of hypertension   • Personal history of other diseases of the circulatory system 06/27/2018     History of hypertension   • Personal history of other diseases of the circulatory system 01/18/2018     History of hypertension   • Personal history of other diseases of the circulatory system 08/17/2017     History of hypertension   • Personal history of other diseases of the nervous system and sense organs 09/17/2018     History of benign essential tremor   • Personal history of other diseases of the nervous system and sense organs 10/20/2016     History of acute otitis media   • Personal history of other diseases of the respiratory system 03/09/2018     History of acute bacterial sinusitis   • Personal history of other drug therapy 05/09/2018     History of pneumococcal vaccination   • Personal history of other endocrine, nutritional and metabolic disease 10/30/2017     History of hyperlipidemia   •  Personal history of other mental and behavioral disorders 07/16/2018     History of anxiety   • Personal history of other specified conditions 06/27/2018     History of fatigue   • Shortness of breath 2020   • Strain of muscle, fascia and tendon of the posterior muscle group at thigh level, unspecified thigh, initial encounter 01/15/2019     Hamstring strain, initial encounter   • Strain of unspecified muscles, fascia and tendons at thigh level, left thigh, initial encounter 05/01/2018     Muscle strain of left thigh   • Tinnitus     • Unspecified injury of unspecified elbow, initial encounter 01/15/2019     Elbow injury, initial encounter   • Venous insufficiency        [Surgical History]    [Surgical History]  Past Surgical History            Procedure Laterality Date   • BLADDER SURGERY   10/21/2015     Bladder Surgery   • CARDIAC CATHETERIZATION       • CATARACT EXTRACTION W/  INTRAOCULAR LENS IMPLANT Right 06/06/2024     Dr. Rashid   • CATARACT EXTRACTION W/  INTRAOCULAR LENS IMPLANT Left 07/20/2023     Dr. Rashid   • COLONOSCOPY       • EXTRACORPOREAL SHOCK WAVE LITHOTRIPSY       • FEMUR FRACTURE SURGERY       • KNEE SURGERY       • LITHOTRIPSY   2015   • VEIN LIGATION AND STRIPPING          [Social History]     [Social History]            Tobacco Use   • Smoking status: Never   • Smokeless tobacco: Never   • Tobacco comments:       Denies any illness in past 30 days       Denies personal/family reaction or problems with anesthesia       Denies any SOB with stairs or daily activity       Uses bilat walking sticks to ambulate       Able to lay flat x 30 minutes                   Vaping Use   • Vaping status: Never Used   Substance Use Topics   • Alcohol use: Yes       Alcohol/week: 2.0 standard drinks of alcohol       Types: 1 Glasses of wine, 1 Cans of beer per week       Comment: every once in a while   • Drug use: Never         Family History  [Family History]    [Family History]              Problem Relation  Name Age of Onset   • Other (cardiac disorder) Mother jose cardenas     • Cancer Mother jose cardenas     • Hypertension Mother jose cardenas     • Migraines Mother jose cardenas     • Rashes / Skin problems Mother jose cardenas     • Arthritis Mother jose cardenas     • Other (emphysema lung) Father monika     • Asthma Father monika     • Breast cancer Neg Hx             Allergies  Amoxicillin, Erythromycin, Lisinopril, and Sulfa (sulfonamide antibiotics)     Review of Systems  Patient denies chest pain, shortness of breath, nausea, vomiting, fever, chills, diarrhea, ,  vision changes, rashes, dysuria, paresthesias, vertigo, headache, cough or cold symptoms, or any other complaints at this time. A complete review of systems was done, and is as stated in the history of present illness, is otherwise negative or not pertinent to the complaint.     Physical Exam  Physical exam: Vital signs and nurses notes were reviewed.  Blood pressure on July 29 was 121/64, temp 97.2, heart rate 73, SpO2 95% on room air.     General:  no acute distress. Alert and oriented  x 5.      Head: atraumatic and normocephalic     Eyes: EOMs are intact, conjunctivae is not injected.     Oropharynx:  no trismus or drooling, buccal mucosa is moist.     Ears:  normal external exam, no swelling or erythema,      Nasal: normal external exam,      Neck: Supple, full range of motion,      Cardiac: Regular rate and rhythm. No murmurs noted.      Pulmonary: Lungs clear bilaterally with good aeration. No adventitious breath sounds. No wheezes rales or rhonchi. No accessory muscle use no retraction noted.     Abdomen: Soft,  Nontender. No guarding, rigidity, or distention. Normoactive bowel sounds. No pulsatile masses, no bruits.  Patient has a Bonilla catheter in place.     Extremities: Examination of the left knee and lower leg reveals some faint increased warmth to the anterior knee.  There is some soft tissue swelling to the knee and trace pitting edema to the left  lower leg.  The erythema previously noted to the anterior knee and proximal lower leg has resolved.  Patient's incision was clean and dry with no dehiscence, no bleeding or drainage.  No surrounding erythema.  Peers to be healing well.  She has decreased range of motion to the left knee but she can move all extremities independently.  Left foot and ankle have no erythema or increased warmth.  Is not tender to palpation or to passive range of motion.     Skin: No rash seen. Skin is warm and dry      Neuro: Patient is alert and oriented x5. Speech is clear. There is no asymmetry with facial grimaces, and no tongue deviation. Patient moves all extremities independently. Sensation is intact. No obvious neuro deficits are noted.    ASSESSMENT AND PLAN:  Problem List Items Addressed This Visit           ICD-10-CM    Anxiety F41.9    Continue Celexa         Benign essential hypertension I10    Continue bisoprolol/hydrochlorothiazide         DVT (deep venous thrombosis) (Multi) I82.409    Continue Eliquis         Heartburn R12    Continue pantoprazole         Hypothyroid E03.9    Continue levothyroxine         Major depression in remission F32.5    Continue Lexapro and Celexa         Mild persistent asthma without complication ([facility]) J45.30    Continue fluticasone/salmeterol inhaler, albuterol as needed         Polyneuropathy G62.9    Continue gabapentin         Allergic rhinitis J30.9    Continue Flonase         Iron deficiency anemia secondary to inadequate dietary iron intake D50.8    On July 30 her hemoglobin was 9.6 and hematocrit 30.1, very slightly improved from July 25.  Follow-up with primary care physician         Panlobular emphysema (Multi) J43.1    Continue fluticasone/salmeterol inhaler, albuterol as needed         S/P total knee arthroplasty, left - Primary Z96.652    Continue PT and OT with home health care.  Use a walker.  Avoid stairs.  Follow-up with orthopedics as scheduled on August 12.  Tylenol as  needed for pain.  Complete course of Keflex.  Use rest and elevation.         Cellulitis of left lower extremity L03.116    Patient had a total left knee replacement on July 17.  Patient surgical incision is clean and dry with no drainage or bleeding.  There is no erythema directly surrounding the wound.  Patient has mild  increased warmth to the knee anteriorly.  The previously seen erythema to the knee and lower leg anteriorly has resolved..  No induration.  No obvious drainage.  Patient should complete the cephalexin course.  White blood cell count was within normal limits.  Keep your follow-up appointment on August 12 with orthopedics         Hypokalemia E87.6    BMP on July 25 showed a potassium of 3.4.  Patient was given 40 mEq of potassium.  On 728 her potassium level was normal at 3.7 but on July 30 it was low again at 3.4.  Dr. Murray ordered 20 mill equivalents of potassium supplement daily.  Patient needs to follow-up with her primary care physician for monitoring.          Reviewed recent labs, vitals, progress notes and documents pertaining to this visit.    Total cumulative time spent in preparation of this discharge, including documentation review, coordination of care with the medical team including PT/OT/SW/treating consultants, discussion with patient and/or pertinent family members, finalization of prescriptions, F/U appointments, and this discharge summary was approximately 60 minutes.    Diamond Echavarria PA-C    Electronically Signed By: Diamond Echavarria PA-C   7/31/25  2:32 PM

## 2025-07-31 NOTE — ASSESSMENT & PLAN NOTE
Patient had a total left knee replacement on July 17.  Patient surgical incision is clean and dry with no drainage or bleeding.  There is no erythema directly surrounding the wound.  Patient has mild  increased warmth to the knee anteriorly.  The previously seen erythema to the knee and lower leg anteriorly has resolved..  No induration.  No obvious drainage.  Patient should complete the cephalexin course.  White blood cell count was within normal limits.  Keep your follow-up appointment on August 12 with orthopedics

## 2025-07-31 NOTE — PROGRESS NOTES
"DISCHARGE SUMMARY    Subjective   Patient ID: Yaritza Bernal is a 75 y.o. female who is acute skilled care being seen and evaluated for multiple medical problems.    History Of Present Illness  75 y.o. female with PMHx of HTN, GERD, hypothyroidism, asthma, DVT on Eliquis, s/p L TKA 7/17/25 who [place] [facility] due to recurrent falls. Pt stated that she had a fall at home where she slid from the end of her bed as she was trying to get herself up. Due to recent knee surgery she was unable to get off the floor and had to call EMS who helped her up. She did not come to the hospital. This same exact scenario then occurred a second time and EMS convinced her to come in.   Patient was admitted to [facility] from July 19 through July 22  for falls after left knee replacement that was done on July 17 and patient was discharged July 18.  Patient fell twice at home and was readmitted on the 19th.  She also had constipation and urinary retention.  She was discharged to SNF per therapy for rehab.  Today the patient is seen while sitting in a wheelchair in her room.  Patient complains of some left knee \"soreness\" the only with movement since her knee surgery.  She also has some left ankle pain intermittently since 4 falls after her knee surgery.  She said the pain seems to occur more with flexion of the foot and ankle but is mild.  She has no pain at rest.  The patient states she has swelling in the left leg since the surgery, but it has improved.  She has no other complaints. Patient has a Bonilla in place for urinary retention.  She has a follow-up with orthopedics scheduled for August 12.  Patient did complain of left calf pain at our last visit.  Patient has ultrasound was negative for DVT.  X-ray of the left ankle showed no fractures.  Patient will likely be discharged on antibiotics Ceftin and will go home with home health care.  She has a rollator and a regular walker at home.  She has 3 steps to get into the house but other " than that is 1 floor living.  She has been doing well in therapy and using a walker well in therapy.  Patient's Bonilla catheter was removed yesterday and she had a successful voiding trial.     Past medical history: Osteoarthritis of the left knee, repeated falls, left  arthroplasty in July 17, emphysema, morbid obesity, hyperlipidemia, hypertension, iron deficiency anemia, hypothyroidism, wedge compression fracture of T1, neuropathy, constipation, depression and anxiety, asthma, UTI, GERD, DVT, periprosthetic fracture around internal prosthetic left hip joint, cataracts, polyneuropathy, vitamin D deficiency, essential tremor low back pain, osteoarthritis, hearing loss     Medications: Eliquis, senna, oxycodone, Tylenol, bisoprolol/hydrochlorothiazide, Lexapro, doxycycline, Flonase, pantoprazole, albuterol, levothyroxine, Celexa, gabapentin, multivitamin, fluticasone/salmeterol inhaler     [Medical History]    [Medical History]  Past Medical History          Diagnosis Date    Adverse effect of anesthesia       was told I was throwing T waves and was broght out from under.  Happened about 10 yrs ago    Allergic rhinitis      Anxiety 2011    Arthritis 2010    Asthma      Bladder disorder, unspecified 10/21/2015     Bladder disorder    Breast cyst march    Cataract      Clotting disorder (Multi)      Cutaneous abscess, unspecified 03/16/2017     Abscess    Disorder of the skin and subcutaneous tissue, unspecified 07/14/2016     Hand lesion    Dry eyes      DVT (deep venous thrombosis) (Multi)       after femur fx surgery    Encounter for immunization 02/17/2017     Need for Tdap vaccination    Encounter for immunization 10/04/2016     Need for vaccination with 13-polyvalent pneumococcal conjugate vaccine    Fatigue      Fracture of wrist      Fracture, femur (Multi)      GERD (gastroesophageal reflux disease)      Hernia, internal 2021    HL (hearing loss)      Hyperlipidemia      Hypertension      Hypothyroidism       Impacted cerumen, bilateral 10/21/2015     Impacted cerumen of both ears    Impaired fasting glucose 09/17/2018     Abnormal fasting glucose    Irritable bowel syndrome      Medial epicondylitis, right elbow 10/04/2016     Medial epicondylitis, right    Nephrolithiasis      Other conditions influencing health status 03/16/2017     Cyst    Other specified abnormal findings of blood chemistry 10/04/2018     Abnormal TSH    Other specified abnormal findings of blood chemistry 09/17/2018     Abnormal TSH    Other specified symptoms and signs involving the circulatory and respiratory systems 09/28/2017     Chest congestion    Pain in left hand 09/28/2017     Pain of left hand    Pain in left hip 10/04/2018     Left hip pain    Pain in right hip 11/01/2017     Bilateral hip pain    Pain in right knee 03/13/2017     Right knee pain    Pain in unspecified knee 03/13/2017     Knee pain    Peripheral neuropathy      Personal history of other diseases of the circulatory system 05/09/2018     History of hypertension    Personal history of other diseases of the circulatory system 06/27/2018     History of hypertension    Personal history of other diseases of the circulatory system 01/18/2018     History of hypertension    Personal history of other diseases of the circulatory system 08/17/2017     History of hypertension    Personal history of other diseases of the nervous system and sense organs 09/17/2018     History of benign essential tremor    Personal history of other diseases of the nervous system and sense organs 10/20/2016     History of acute otitis media    Personal history of other diseases of the respiratory system 03/09/2018     History of acute bacterial sinusitis    Personal history of other drug therapy 05/09/2018     History of pneumococcal vaccination    Personal history of other endocrine, nutritional and metabolic disease 10/30/2017     History of hyperlipidemia    Personal history of other mental and  behavioral disorders 07/16/2018     History of anxiety    Personal history of other specified conditions 06/27/2018     History of fatigue    Shortness of breath 2020    Strain of muscle, fascia and tendon of the posterior muscle group at thigh level, unspecified thigh, initial encounter 01/15/2019     Hamstring strain, initial encounter    Strain of unspecified muscles, fascia and tendons at thigh level, left thigh, initial encounter 05/01/2018     Muscle strain of left thigh    Tinnitus      Unspecified injury of unspecified elbow, initial encounter 01/15/2019     Elbow injury, initial encounter    Venous insufficiency        [Surgical History]    [Surgical History]  Past Surgical History            Procedure Laterality Date    BLADDER SURGERY   10/21/2015     Bladder Surgery    CARDIAC CATHETERIZATION        CATARACT EXTRACTION W/  INTRAOCULAR LENS IMPLANT Right 06/06/2024     Dr. Rashid    CATARACT EXTRACTION W/  INTRAOCULAR LENS IMPLANT Left 07/20/2023     Dr. Rashid    COLONOSCOPY        EXTRACORPOREAL SHOCK WAVE LITHOTRIPSY        FEMUR FRACTURE SURGERY        KNEE SURGERY        LITHOTRIPSY   2015    VEIN LIGATION AND STRIPPING          [Social History]     [Social History]            Tobacco Use    Smoking status: Never    Smokeless tobacco: Never    Tobacco comments:       Denies any illness in past 30 days       Denies personal/family reaction or problems with anesthesia       Denies any SOB with stairs or daily activity       Uses bilat walking sticks to ambulate       Able to lay flat x 30 minutes                   Vaping Use    Vaping status: Never Used   Substance Use Topics    Alcohol use: Yes       Alcohol/week: 2.0 standard drinks of alcohol       Types: 1 Glasses of wine, 1 Cans of beer per week       Comment: every once in a while    Drug use: Never         Family History  [Family History]    [Family History]              Problem Relation Name Age of Onset    Other (cardiac disorder) Mother jose  theresa      Cancer Mother jose cardenas      Hypertension Mother jose cardenas      Migraines Mother jose cardenas      Rashes / Skin problems Mother jose cardenas      Arthritis Mother jose cardenas      Other (emphysema lung) Father monika      Asthma Father monika      Breast cancer Neg Hx             Allergies  Amoxicillin, Erythromycin, Lisinopril, and Sulfa (sulfonamide antibiotics)     Review of Systems  Patient denies chest pain, shortness of breath, nausea, vomiting, fever, chills, diarrhea, ,  vision changes, rashes, dysuria, paresthesias, vertigo, headache, cough or cold symptoms, or any other complaints at this time. A complete review of systems was done, and is as stated in the history of present illness, is otherwise negative or not pertinent to the complaint.     Physical Exam  Physical exam: Vital signs and nurses notes were reviewed.  Blood pressure on July 29 was 121/64, temp 97.2, heart rate 73, SpO2 95% on room air.     General:  no acute distress. Alert and oriented  x 5.      Head: atraumatic and normocephalic     Eyes: EOMs are intact, conjunctivae is not injected.     Oropharynx:  no trismus or drooling, buccal mucosa is moist.     Ears:  normal external exam, no swelling or erythema,      Nasal: normal external exam,      Neck: Supple, full range of motion,      Cardiac: Regular rate and rhythm. No murmurs noted.      Pulmonary: Lungs clear bilaterally with good aeration. No adventitious breath sounds. No wheezes rales or rhonchi. No accessory muscle use no retraction noted.     Abdomen: Soft,  Nontender. No guarding, rigidity, or distention. Normoactive bowel sounds. No pulsatile masses, no bruits.  Patient has a Bonilla catheter in place.     Extremities: Examination of the left knee and lower leg reveals some faint increased warmth to the anterior knee.  There is some soft tissue swelling to the knee and trace pitting edema to the left lower leg.  The erythema previously noted to the anterior knee and  proximal lower leg has resolved.  Patient's incision was clean and dry with no dehiscence, no bleeding or drainage.  No surrounding erythema.  Peers to be healing well.  She has decreased range of motion to the left knee but she can move all extremities independently.  Left foot and ankle have no erythema or increased warmth.  Is not tender to palpation or to passive range of motion.     Skin: No rash seen. Skin is warm and dry      Neuro: Patient is alert and oriented x5. Speech is clear. There is no asymmetry with facial grimaces, and no tongue deviation. Patient moves all extremities independently. Sensation is intact. No obvious neuro deficits are noted.    ASSESSMENT AND PLAN:  Problem List Items Addressed This Visit           ICD-10-CM    Anxiety F41.9    Continue Celexa         Benign essential hypertension I10    Continue bisoprolol/hydrochlorothiazide         DVT (deep venous thrombosis) (Multi) I82.409    Continue Eliquis         Heartburn R12    Continue pantoprazole         Hypothyroid E03.9    Continue levothyroxine         Major depression in remission F32.5    Continue Lexapro and Celexa         Mild persistent asthma without complication ([facility]) J45.30    Continue fluticasone/salmeterol inhaler, albuterol as needed         Polyneuropathy G62.9    Continue gabapentin         Allergic rhinitis J30.9    Continue Flonase         Iron deficiency anemia secondary to inadequate dietary iron intake D50.8    On July 30 her hemoglobin was 9.6 and hematocrit 30.1, very slightly improved from July 25.  Follow-up with primary care physician         Panlobular emphysema (Multi) J43.1    Continue fluticasone/salmeterol inhaler, albuterol as needed         S/P total knee arthroplasty, left - Primary Z96.652    Continue PT and OT with home health care.  Use a walker.  Avoid stairs.  Follow-up with orthopedics as scheduled on August 12.  Tylenol as needed for pain.  Complete course of Keflex.  Use rest and  elevation.         Cellulitis of left lower extremity L03.116    Patient had a total left knee replacement on July 17.  Patient surgical incision is clean and dry with no drainage or bleeding.  There is no erythema directly surrounding the wound.  Patient has mild  increased warmth to the knee anteriorly.  The previously seen erythema to the knee and lower leg anteriorly has resolved..  No induration.  No obvious drainage.  Patient should complete the cephalexin course.  White blood cell count was within normal limits.  Keep your follow-up appointment on August 12 with orthopedics         Hypokalemia E87.6    BMP on July 25 showed a potassium of 3.4.  Patient was given 40 mEq of potassium.  On 728 her potassium level was normal at 3.7 but on July 30 it was low again at 3.4.  Dr. Murray ordered 20 mill equivalents of potassium supplement daily.  Patient needs to follow-up with her primary care physician for monitoring.          Reviewed recent labs, vitals, progress notes and documents pertaining to this visit.    Total cumulative time spent in preparation of this discharge, including documentation review, coordination of care with the medical team including PT/OT/SW/treating consultants, discussion with patient and/or pertinent family members, finalization of prescriptions, F/U appointments, and this discharge summary was approximately 60 minutes.    Diamond Echavarria PA-C

## 2025-07-31 NOTE — ASSESSMENT & PLAN NOTE
On July 30 her hemoglobin was 9.6 and hematocrit 30.1, very slightly improved from July 25.  Follow-up with primary care physician

## 2025-07-31 NOTE — ASSESSMENT & PLAN NOTE
Patient is using a walker well in PT.  She is going to be discharged with home health care and she will continue PT and OT at home.  She has a rollator and a regular walker at home.  Follow-up with orthopedics as scheduled on August 12.

## 2025-07-31 NOTE — ASSESSMENT & PLAN NOTE
Continue PT and OT with home health care.  Use a walker.  Avoid stairs.  Follow-up with orthopedics as scheduled on August 12.  Tylenol as needed for pain.  Complete course of Keflex.  Use rest and elevation.

## 2025-07-31 NOTE — ASSESSMENT & PLAN NOTE
BMP on July 25 showed a potassium of 3.4.  Patient was given 40 mEq of potassium.  On 728 her potassium level was normal at 3.7 but on July 30 it was low again at 3.4.  Dr. Murray ordered 20 mill equivalents of potassium supplement daily.  Patient needs to follow-up with her primary care physician for monitoring.

## 2025-08-01 ENCOUNTER — TELEPHONE (OUTPATIENT)
Dept: PRIMARY CARE | Facility: CLINIC | Age: 75
End: 2025-08-01

## 2025-08-01 NOTE — TELEPHONE ENCOUNTER
[facility] wants to know if you will follow up for home health care for this patient ?  Please advise...

## 2025-08-03 PROBLEM — I10 HYPERTENSION: Status: RESOLVED | Noted: 2023-09-14 | Resolved: 2025-08-03

## 2025-08-04 ENCOUNTER — PATIENT OUTREACH (OUTPATIENT)
Dept: PRIMARY CARE | Facility: CLINIC | Age: 75
End: 2025-08-04
Payer: MEDICARE

## 2025-08-04 NOTE — PROGRESS NOTES
Discharge Facility:[facility]  Discharge Diagnosis:Osteoarthritis of left knee  Admission Date:7/22/25  Discharge Date: 8/1/25    PCP Appointment Date:none made sent to pcp office  Specialist Appointment Date:   Hospital Encounter and Summary Linked: No  See discharge assessment below for further details  Two attempts were made to reach patient within two business days after discharge. Left voicemail with contact information for patient to call back with any non-emergent questions or concerns.

## 2025-08-04 NOTE — PROGRESS NOTES
Admission H and P    History Of Present Illness  Subjective   Patient ID: Yaritza Bernal is a 75 y.o. female who is being seen for initial visit.     HPI:  75 y.o. female with PMHx of HTN, GERD, hypothyroidism, asthma, DVT on Eliquis, s/p L TKA 7/17/25 who [place] [facility] [place] to recurrent [place]. Pt stated that she had a fall at home where she slid from the end of her bed as she was trying to get herself up. Due to recent knee surgery she was unable to get off the floor and had to call EMS who helped her up. She did not come to the hospital. This same exact scenario then occurred a second time and EMS convinced her to come in.   Patient was admitted to [facility] from July 19 through July 22  for falls after left knee replacement that was done on July 17 and patient was discharged July 18.  Patient fell twice at home and was readmitted on the 19th.  She also had constipation and urinary retention.  She was discharged to SNF per therapy for rehab.  Today the patient is seen  while sitting in bed  She is sleeping while sitting in bed.  Difficult to arouse.  S She also also has some left calf pain intermittently since having surgery on July 17.  .  She has a follow-up with orthopedics scheduled for August 12.    Past medical history: Osteoarthritis of the left knee, repeated falls, left  arthroplasty in July 17, emphysema, morbid obesity, hyperlipidemia, hypertension, iron deficiency anemia, hypothyroidism, wedge compression fracture of T1, neuropathy, constipation, depression and anxiety, asthma, UTI, GERD, DVT, periprosthetic fracture around internal prosthetic left hip joint, cataracts, polyneuropathy, vitamin D deficiency, essential tremor low back pain, osteoarthritis, hearing loss    Medications: Eliquis, senna, oxycodone, Tylenol, bisoprolol/hydrochlorothiazide, Lexapro, doxycycline, Flonase, pantoprazole, albuterol, levothyroxine, Celexa, gabapentin, multivitamin, fluticasone/salmeterol inhaler    Medical  History[1]  Surgical History[2]  Social History[3]     Family History  Family History[4]     Allergies  Amoxicillin, Erythromycin, Lisinopril, and Sulfa (sulfonamide antibiotics)    Review of Systems  Patient denies chest pain, shortness of breath, nausea, vomiting, fever, chills, diarrhea, ,  vision changes, rashes, dysuria, paresthesias, vertigo, headache, cough or cold symptoms, or any other complaints at this time. A complete review of systems was done, and is as stated in the history of present illness, is otherwise negative or not pertinent to the complaint.    Physical Exam  Physical exam: Vital signs and nurses notes were reviewed.  Blood pressure 116/68, temp 97.1, heart rate 72, SpO2 is 94% on room air    General:  no acute distress. Alert and oriented  x 2-3.     Head: atraumatic and normocephalic    Eyes: EOMs are intact, conjunctivae is not injected.    Oropharynx:  no trismus or drooling, buccal mucosa is moist.    Ears:  normal external exam, no swelling or erythema,     Nasal: normal external exam,     Neck: Supple, full range of motion,     Cardiac: Regular rate and rhythm. No murmurs noted.     Pulmonary: Lungs clear bilaterally with good aeration. No adventitious breath sounds. No wheezes rales or rhonchi. No accessory muscle use no retraction noted.    Abdomen: Soft, Obese  Nontender. No guarding, rigidity, or distention. Normoactive bowel sounds. No pulsatile masses, no bruits.  Patient has a Bonilla catheter in place.    Extremities: Examination of the left knee and lower leg reveals some erythema and increased warmth to the anterior knee and anterior proximal lower leg.  She has 1+ pitting edema to the distal lower legs bilaterally.  She has decreased range of motion slightly to the knee but she can move all extremities independently.    Skin: No rash seen. Skin is warm and dry     Neuro: Patient is alert and oriented x5. Speech is clear. There is no asymmetry with facial grimaces, and no tongue  deviation. Patient moves all extremities independently. Sensation is intact. No obvious neuro deficits are noted.     Assessment/Plan   Problem List Items Addressed This Visit           ICD-10-CM    Anxiety F41.9    Benign essential hypertension I10    DVT (deep venous thrombosis) (Multi) I82.409    Hyperlipidemia E78.5    Hypothyroid E03.9    MDD (major depressive disorder), single episode F32.9    Iron deficiency anemia secondary to inadequate dietary iron intake D50.8    Anticoagulant long-term use Z79.01    S/P total knee arthroplasty, left - Primary Z96.652    Hypokalemia E87.6          Yaritza Bernal is a 75 y.o. female with PMHx of HTN, GERD, hypothyroidism, asthma, DVT on Eliquis, s/p L TKA 7/17/25 who [place] [facility] [place] due to recurrent falls. Pt stated that she had a fall at home where she slid from the end of her bed as she was trying to get herself up. Due to recent knee surgery she was unable to get off the floor and had to call EMS who helped her up.     Recurrent falls   S/p L TKA 7/17/25   - XR L knee - No acute fracture or malalignment  - XR pelvis - Suspected acute periprosthetic fracture of the L femoral neck with mild varus impaction   - Consulted ortho with above findings -> recommended CT L femur and pelvis (orders placed)  - PT/OT evals with Ortho clearance   - PRN pain meds         Urinary retention  Consitiaton  See herman Bonilla in place         HTN   - BP variable with intermittent HTN noted   - Continue home bisoprolol, hydrochlorothiazide      GERD   - Protonix daily      Hypothyroidism   - Continue home levothyroxine      Asthma   - Scheduled and PRN nebs per RT     Physical Deconditioning OT/PT     PLAN:Reviewed orders, medications, records, and pertinent labs/x-rays from   hospital. Monitor VS, BS, O2, etc as per protocol. See written orders. PT/OT   will evaluate and start appropriate rehabilitation program. Reviewed and signed   off orders, medications,Labs, x-rays, and current  diagnoses. Reviewed and   updated CPR status and any changes in Advanced directives. Continue Rehab Will   see 1-2 times weekly for next 30 days then reassess. Will always see at   resident, family , or nursing request. Discharge Planning   Time   Time Spent With Patient: 55 minutes of which greater than 50 percent was spent   counseling and or coordinating care.      Edison Murray MD         [1]   Past Medical History:  Diagnosis Date    Adverse effect of anesthesia     was told I was throwing T waves and was broght out from under.  Happened about 10 yrs ago    Allergic rhinitis     Anxiety 2011    Arthritis 2010    Asthma     Bladder disorder, unspecified 10/21/2015    Bladder disorder    Breast cyst march    Cataract     Clotting disorder (Multi)     Cutaneous abscess, unspecified 03/16/2017    Abscess    Disorder of the skin and subcutaneous tissue, unspecified 07/14/2016    Hand lesion    Dry eyes     DVT (deep venous thrombosis) (Multi)     after femur fx surgery    Encounter for immunization 02/17/2017    Need for Tdap vaccination    Encounter for immunization 10/04/2016    Need for vaccination with 13-polyvalent pneumococcal conjugate vaccine    Fatigue     Fracture of wrist     Fracture, femur (Multi)     GERD (gastroesophageal reflux disease)     Hernia, internal 2021    HL (hearing loss)     Hyperlipidemia     Hypertension     Hypothyroidism     Impacted cerumen, bilateral 10/21/2015    Impacted cerumen of both ears    Impaired fasting glucose 09/17/2018    Abnormal fasting glucose    Irritable bowel syndrome     Medial epicondylitis, right elbow 10/04/2016    Medial epicondylitis, right    Nephrolithiasis     Other conditions influencing health status 03/16/2017    Cyst    Other specified abnormal findings of blood chemistry 10/04/2018    Abnormal TSH    Other specified abnormal findings of blood chemistry 09/17/2018    Abnormal TSH    Other specified symptoms and signs involving the circulatory and  respiratory systems 09/28/2017    Chest congestion    Pain in left hand 09/28/2017    Pain of left hand    Pain in left hip 10/04/2018    Left hip pain    Pain in right hip 11/01/2017    Bilateral hip pain    Pain in right knee 03/13/2017    Right knee pain    Pain in unspecified knee 03/13/2017    Knee pain    Peripheral neuropathy     Personal history of other diseases of the circulatory system 05/09/2018    History of hypertension    Personal history of other diseases of the circulatory system 06/27/2018    History of hypertension    Personal history of other diseases of the circulatory system 01/18/2018    History of hypertension    Personal history of other diseases of the circulatory system 08/17/2017    History of hypertension    Personal history of other diseases of the nervous system and sense organs 09/17/2018    History of benign essential tremor    Personal history of other diseases of the nervous system and sense organs 10/20/2016    History of acute otitis media    Personal history of other diseases of the respiratory system 03/09/2018    History of acute bacterial sinusitis    Personal history of other drug therapy 05/09/2018    History of pneumococcal vaccination    Personal history of other endocrine, nutritional and metabolic disease 10/30/2017    History of hyperlipidemia    Personal history of other mental and behavioral disorders 07/16/2018    History of anxiety    Personal history of other specified conditions 06/27/2018    History of fatigue    Shortness of breath 2020    Strain of muscle, fascia and tendon of the posterior muscle group at thigh level, unspecified thigh, initial encounter 01/15/2019    Hamstring strain, initial encounter    Strain of unspecified muscles, fascia and tendons at thigh level, left thigh, initial encounter 05/01/2018    Muscle strain of left thigh    Tinnitus     Unspecified injury of unspecified elbow, initial encounter 01/15/2019    Elbow injury, initial  encounter    Venous insufficiency    [2]   Past Surgical History:  Procedure Laterality Date    BLADDER SURGERY  10/21/2015    Bladder Surgery    CARDIAC CATHETERIZATION      CATARACT EXTRACTION W/  INTRAOCULAR LENS IMPLANT Right 06/06/2024    Dr. Rashid    CATARACT EXTRACTION W/  INTRAOCULAR LENS IMPLANT Left 07/20/2023    Dr. Rashid    COLONOSCOPY      EXTRACORPOREAL SHOCK WAVE LITHOTRIPSY      FEMUR FRACTURE SURGERY      KNEE SURGERY      LITHOTRIPSY  2015    VEIN LIGATION AND STRIPPING     [3]   Social History  Tobacco Use    Smoking status: Never    Smokeless tobacco: Never    Tobacco comments:     Denies any illness in past 30 days     Denies personal/family reaction or problems with anesthesia     Denies any SOB with stairs or daily activity     Uses bilat walking sticks to ambulate     Able to lay flat x 30 minutes             Vaping Use    Vaping status: Never Used   Substance Use Topics    Alcohol use: Yes     Alcohol/week: 2.0 standard drinks of alcohol     Types: 1 Glasses of wine, 1 Cans of beer per week     Comment: every once in a while    Drug use: Never   [4]   Family History  Problem Relation Name Age of Onset    Other (cardiac disorder) Mother jose cardenas     Cancer Mother jose cardenas     Hypertension Mother jose cardenas     Migraines Mother jose cardenas     Rashes / Skin problems Mother jose cardenas     Arthritis Mother jose cardenas     Other (emphysema lung) Father monika     Asthma Father monika     Breast cancer Neg Hx

## 2025-08-05 ENCOUNTER — APPOINTMENT (OUTPATIENT)
Dept: ORTHOPEDIC SURGERY | Facility: CLINIC | Age: 75
End: 2025-08-05
Payer: MEDICARE

## 2025-08-12 ENCOUNTER — APPOINTMENT (OUTPATIENT)
Dept: ORTHOPEDIC SURGERY | Facility: CLINIC | Age: 75
End: 2025-08-12
Payer: MEDICARE

## 2025-08-12 VITALS — WEIGHT: 207 LBS | HEIGHT: 70 IN | BODY MASS INDEX: 29.63 KG/M2

## 2025-08-12 DIAGNOSIS — M17.0 PRIMARY OSTEOARTHRITIS OF BOTH KNEES: ICD-10-CM

## 2025-08-12 PROCEDURE — 99024 POSTOP FOLLOW-UP VISIT: CPT | Performed by: ORTHOPAEDIC SURGERY

## 2025-08-12 PROCEDURE — RXMED WILLOW AMBULATORY MEDICATION CHARGE

## 2025-08-12 PROCEDURE — 1159F MED LIST DOCD IN RCRD: CPT | Performed by: ORTHOPAEDIC SURGERY

## 2025-08-12 PROCEDURE — 1036F TOBACCO NON-USER: CPT | Performed by: ORTHOPAEDIC SURGERY

## 2025-08-14 ENCOUNTER — PHARMACY VISIT (OUTPATIENT)
Dept: PHARMACY | Facility: CLINIC | Age: 75
End: 2025-08-14
Payer: COMMERCIAL

## 2025-08-15 ENCOUNTER — APPOINTMENT (OUTPATIENT)
Dept: PRIMARY CARE | Facility: CLINIC | Age: 75
End: 2025-08-15
Payer: MEDICARE

## 2025-08-15 VITALS
BODY MASS INDEX: 29.13 KG/M2 | OXYGEN SATURATION: 92 % | WEIGHT: 203 LBS | HEART RATE: 90 BPM | DIASTOLIC BLOOD PRESSURE: 66 MMHG | SYSTOLIC BLOOD PRESSURE: 119 MMHG | TEMPERATURE: 98 F

## 2025-08-15 DIAGNOSIS — I10 ESSENTIAL HYPERTENSION: ICD-10-CM

## 2025-08-15 DIAGNOSIS — R73.03 PREDIABETES: ICD-10-CM

## 2025-08-15 DIAGNOSIS — J45.30 MILD PERSISTENT ASTHMA WITHOUT COMPLICATION (HHS-HCC): ICD-10-CM

## 2025-08-15 DIAGNOSIS — E78.5 HYPERLIPIDEMIA, UNSPECIFIED HYPERLIPIDEMIA TYPE: ICD-10-CM

## 2025-08-15 DIAGNOSIS — G25.0 BENIGN ESSENTIAL TREMOR: ICD-10-CM

## 2025-08-15 DIAGNOSIS — G62.9 POLYNEUROPATHY: Primary | ICD-10-CM

## 2025-08-15 DIAGNOSIS — E03.9 HYPOTHYROIDISM, UNSPECIFIED TYPE: ICD-10-CM

## 2025-08-15 PROCEDURE — 1159F MED LIST DOCD IN RCRD: CPT | Performed by: FAMILY MEDICINE

## 2025-08-15 PROCEDURE — G2211 COMPLEX E/M VISIT ADD ON: HCPCS | Performed by: FAMILY MEDICINE

## 2025-08-15 PROCEDURE — 3078F DIAST BP <80 MM HG: CPT | Performed by: FAMILY MEDICINE

## 2025-08-15 PROCEDURE — 3074F SYST BP LT 130 MM HG: CPT | Performed by: FAMILY MEDICINE

## 2025-08-15 PROCEDURE — 99214 OFFICE O/P EST MOD 30 MIN: CPT | Performed by: FAMILY MEDICINE

## 2025-08-15 PROCEDURE — 1036F TOBACCO NON-USER: CPT | Performed by: FAMILY MEDICINE

## 2025-08-15 PROCEDURE — 1160F RVW MEDS BY RX/DR IN RCRD: CPT | Performed by: FAMILY MEDICINE

## 2025-08-15 RX ORDER — GABAPENTIN 600 MG/1
600 TABLET ORAL 3 TIMES DAILY
Qty: 270 TABLET | Refills: 0 | Status: SHIPPED | OUTPATIENT
Start: 2025-08-15

## 2025-08-16 LAB
ALBUMIN SERPL-MCNC: 3.7 G/DL (ref 3.6–5.1)
ALP SERPL-CCNC: 109 U/L (ref 37–153)
ALT SERPL-CCNC: 9 U/L (ref 6–29)
ANION GAP SERPL CALCULATED.4IONS-SCNC: 11 MMOL/L (CALC) (ref 7–17)
AST SERPL-CCNC: 14 U/L (ref 10–35)
BASOPHILS # BLD AUTO: 49 CELLS/UL (ref 0–200)
BASOPHILS NFR BLD AUTO: 0.8 %
BILIRUB SERPL-MCNC: 1 MG/DL (ref 0.2–1.2)
BUN SERPL-MCNC: 13 MG/DL (ref 7–25)
CALCIUM SERPL-MCNC: 9 MG/DL (ref 8.6–10.4)
CHLORIDE SERPL-SCNC: 102 MMOL/L (ref 98–110)
CO2 SERPL-SCNC: 32 MMOL/L (ref 20–32)
CREAT SERPL-MCNC: 0.65 MG/DL (ref 0.6–1)
EGFRCR SERPLBLD CKD-EPI 2021: 91 ML/MIN/1.73M2
EOSINOPHIL # BLD AUTO: 519 CELLS/UL (ref 15–500)
EOSINOPHIL NFR BLD AUTO: 8.5 %
ERYTHROCYTE [DISTWIDTH] IN BLOOD BY AUTOMATED COUNT: 14 % (ref 11–15)
EST. AVERAGE GLUCOSE BLD GHB EST-MCNC: 114 MG/DL
EST. AVERAGE GLUCOSE BLD GHB EST-SCNC: 6.3 MMOL/L
GLUCOSE SERPL-MCNC: 98 MG/DL (ref 65–99)
HBA1C MFR BLD: 5.6 %
HCT VFR BLD AUTO: 35.4 % (ref 35–45)
HGB BLD-MCNC: 10.9 G/DL (ref 11.7–15.5)
LYMPHOCYTES # BLD AUTO: 1007 CELLS/UL (ref 850–3900)
LYMPHOCYTES NFR BLD AUTO: 16.5 %
MCH RBC QN AUTO: 26.7 PG (ref 27–33)
MCHC RBC AUTO-ENTMCNC: 30.8 G/DL (ref 32–36)
MCV RBC AUTO: 86.8 FL (ref 80–100)
MONOCYTES # BLD AUTO: 531 CELLS/UL (ref 200–950)
MONOCYTES NFR BLD AUTO: 8.7 %
NEUTROPHILS # BLD AUTO: 3996 CELLS/UL (ref 1500–7800)
NEUTROPHILS NFR BLD AUTO: 65.5 %
PLATELET # BLD AUTO: 267 THOUSAND/UL (ref 140–400)
PMV BLD REES-ECKER: 9.4 FL (ref 7.5–12.5)
POTASSIUM SERPL-SCNC: 3.4 MMOL/L (ref 3.5–5.3)
PROT SERPL-MCNC: 6.1 G/DL (ref 6.1–8.1)
RBC # BLD AUTO: 4.08 MILLION/UL (ref 3.8–5.1)
SODIUM SERPL-SCNC: 145 MMOL/L (ref 135–146)
TSH SERPL-ACNC: 2.7 MIU/L (ref 0.4–4.5)
WBC # BLD AUTO: 6.1 THOUSAND/UL (ref 3.8–10.8)

## 2025-08-18 ENCOUNTER — PATIENT OUTREACH (OUTPATIENT)
Dept: PRIMARY CARE | Facility: CLINIC | Age: 75
End: 2025-08-18
Payer: MEDICARE

## 2025-08-18 DIAGNOSIS — E87.6 HYPOKALEMIA: Primary | ICD-10-CM

## 2025-08-18 RX ORDER — POTASSIUM CHLORIDE 750 MG/1
10 TABLET, FILM COATED, EXTENDED RELEASE ORAL DAILY
Qty: 90 TABLET | Refills: 1 | Status: SHIPPED | OUTPATIENT
Start: 2025-08-18

## 2025-08-29 ENCOUNTER — HOSPITAL ENCOUNTER (INPATIENT)
Facility: HOSPITAL | Age: 75
End: 2025-08-29
Attending: EMERGENCY MEDICINE | Admitting: INTERNAL MEDICINE
Payer: MEDICARE

## 2025-08-29 ENCOUNTER — APPOINTMENT (OUTPATIENT)
Dept: CARDIOLOGY | Facility: HOSPITAL | Age: 75
End: 2025-08-29
Payer: MEDICARE

## 2025-08-29 ENCOUNTER — APPOINTMENT (OUTPATIENT)
Dept: RADIOLOGY | Facility: HOSPITAL | Age: 75
End: 2025-08-29
Payer: MEDICARE

## 2025-08-29 PROCEDURE — 73610 X-RAY EXAM OF ANKLE: CPT | Mod: RT

## 2025-08-29 PROCEDURE — 93005 ELECTROCARDIOGRAM TRACING: CPT

## 2025-08-29 ASSESSMENT — PAIN DESCRIPTION - DESCRIPTORS: DESCRIPTORS: ACHING

## 2025-08-29 ASSESSMENT — PAIN - FUNCTIONAL ASSESSMENT
PAIN_FUNCTIONAL_ASSESSMENT: 0-10
PAIN_FUNCTIONAL_ASSESSMENT: 0-10

## 2025-08-29 ASSESSMENT — PAIN SCALES - GENERAL
PAINLEVEL_OUTOF10: 5 - MODERATE PAIN
PAINLEVEL_OUTOF10: 2
PAINLEVEL_OUTOF10: 2

## 2025-08-29 ASSESSMENT — PAIN DESCRIPTION - PAIN TYPE: TYPE: ACUTE PAIN

## 2025-08-29 ASSESSMENT — PAIN DESCRIPTION - LOCATION: LOCATION: ANKLE

## 2025-08-29 ASSESSMENT — PAIN DESCRIPTION - ORIENTATION: ORIENTATION: RIGHT

## 2025-08-30 PROBLEM — S82.851A CLOSED TRIMALLEOLAR FRACTURE OF RIGHT ANKLE, INITIAL ENCOUNTER: Status: ACTIVE | Noted: 2025-08-30

## 2025-08-30 SDOH — ECONOMIC STABILITY: FOOD INSECURITY: WITHIN THE PAST 12 MONTHS, YOU WORRIED THAT YOUR FOOD WOULD RUN OUT BEFORE YOU GOT THE MONEY TO BUY MORE.: NEVER TRUE

## 2025-08-30 SDOH — ECONOMIC STABILITY: HOUSING INSECURITY: IN THE PAST 12 MONTHS, HOW MANY TIMES HAVE YOU MOVED WHERE YOU WERE LIVING?: 0

## 2025-08-30 SDOH — SOCIAL STABILITY: SOCIAL INSECURITY: DO YOU FEEL ANYONE HAS EXPLOITED OR TAKEN ADVANTAGE OF YOU FINANCIALLY OR OF YOUR PERSONAL PROPERTY?: NO

## 2025-08-30 SDOH — ECONOMIC STABILITY: FOOD INSECURITY: HOW HARD IS IT FOR YOU TO PAY FOR THE VERY BASICS LIKE FOOD, HOUSING, MEDICAL CARE, AND HEATING?: NOT VERY HARD

## 2025-08-30 SDOH — ECONOMIC STABILITY: HOUSING INSECURITY: AT ANY TIME IN THE PAST 12 MONTHS, WERE YOU HOMELESS OR LIVING IN A SHELTER (INCLUDING NOW)?: NO

## 2025-08-30 SDOH — SOCIAL STABILITY: SOCIAL INSECURITY: WITHIN THE LAST YEAR, HAVE YOU BEEN AFRAID OF YOUR PARTNER OR EX-PARTNER?: NO

## 2025-08-30 SDOH — SOCIAL STABILITY: SOCIAL INSECURITY: ARE YOU OR HAVE YOU BEEN THREATENED OR ABUSED PHYSICALLY, EMOTIONALLY, OR SEXUALLY BY ANYONE?: NO

## 2025-08-30 SDOH — SOCIAL STABILITY: SOCIAL INSECURITY: DOES ANYONE TRY TO KEEP YOU FROM HAVING/CONTACTING OTHER FRIENDS OR DOING THINGS OUTSIDE YOUR HOME?: NO

## 2025-08-30 SDOH — HEALTH STABILITY: PHYSICAL HEALTH: ON AVERAGE, HOW MANY MINUTES DO YOU ENGAGE IN EXERCISE AT THIS LEVEL?: PATIENT DECLINED

## 2025-08-30 SDOH — SOCIAL STABILITY: SOCIAL INSECURITY: WITHIN THE LAST YEAR, HAVE YOU BEEN HUMILIATED OR EMOTIONALLY ABUSED IN OTHER WAYS BY YOUR PARTNER OR EX-PARTNER?: NO

## 2025-08-30 SDOH — SOCIAL STABILITY: SOCIAL INSECURITY: DO YOU FEEL UNSAFE GOING BACK TO THE PLACE WHERE YOU ARE LIVING?: NO

## 2025-08-30 SDOH — ECONOMIC STABILITY: HOUSING INSECURITY: IN THE LAST 12 MONTHS, WAS THERE A TIME WHEN YOU WERE NOT ABLE TO PAY THE MORTGAGE OR RENT ON TIME?: NO

## 2025-08-30 SDOH — ECONOMIC STABILITY: INCOME INSECURITY: IN THE PAST 12 MONTHS HAS THE ELECTRIC, GAS, OIL, OR WATER COMPANY THREATENED TO SHUT OFF SERVICES IN YOUR HOME?: NO

## 2025-08-30 SDOH — HEALTH STABILITY: PHYSICAL HEALTH
ON AVERAGE, HOW MANY DAYS PER WEEK DO YOU ENGAGE IN MODERATE TO STRENUOUS EXERCISE (LIKE A BRISK WALK)?: PATIENT DECLINED

## 2025-08-30 SDOH — ECONOMIC STABILITY: FOOD INSECURITY: WITHIN THE PAST 12 MONTHS, THE FOOD YOU BOUGHT JUST DIDN'T LAST AND YOU DIDN'T HAVE MONEY TO GET MORE.: NEVER TRUE

## 2025-08-30 SDOH — SOCIAL STABILITY: SOCIAL INSECURITY: HAVE YOU HAD ANY THOUGHTS OF HARMING ANYONE ELSE?: NO

## 2025-08-30 SDOH — ECONOMIC STABILITY: TRANSPORTATION INSECURITY: IN THE PAST 12 MONTHS, HAS LACK OF TRANSPORTATION KEPT YOU FROM MEDICAL APPOINTMENTS OR FROM GETTING MEDICATIONS?: NO

## 2025-08-30 SDOH — SOCIAL STABILITY: SOCIAL INSECURITY: ABUSE: ADULT

## 2025-08-30 SDOH — SOCIAL STABILITY: SOCIAL INSECURITY: WERE YOU ABLE TO COMPLETE ALL THE BEHAVIORAL HEALTH SCREENINGS?: YES

## 2025-08-30 SDOH — SOCIAL STABILITY: SOCIAL INSECURITY: ARE THERE ANY APPARENT SIGNS OF INJURIES/BEHAVIORS THAT COULD BE RELATED TO ABUSE/NEGLECT?: NO

## 2025-08-30 SDOH — SOCIAL STABILITY: SOCIAL INSECURITY: HAVE YOU HAD THOUGHTS OF HARMING ANYONE ELSE?: NO

## 2025-08-30 SDOH — SOCIAL STABILITY: SOCIAL INSECURITY: HAS ANYONE EVER THREATENED TO HURT YOUR FAMILY OR YOUR PETS?: NO

## 2025-08-30 ASSESSMENT — LIFESTYLE VARIABLES
HOW OFTEN DO YOU HAVE A DRINK CONTAINING ALCOHOL: NEVER
AUDIT-C TOTAL SCORE: 0
AUDIT-C TOTAL SCORE: 0
HOW MANY STANDARD DRINKS CONTAINING ALCOHOL DO YOU HAVE ON A TYPICAL DAY: PATIENT DOES NOT DRINK
SKIP TO QUESTIONS 9-10: 1
HOW OFTEN DO YOU HAVE 6 OR MORE DRINKS ON ONE OCCASION: NEVER

## 2025-08-30 ASSESSMENT — COGNITIVE AND FUNCTIONAL STATUS - GENERAL
MOVING FROM LYING ON BACK TO SITTING ON SIDE OF FLAT BED WITH BEDRAILS: A LITTLE
TOILETING: A LOT
DAILY ACTIVITIY SCORE: 17
MOVING TO AND FROM BED TO CHAIR: A LITTLE
MOVING FROM LYING ON BACK TO SITTING ON SIDE OF FLAT BED WITH BEDRAILS: A LITTLE
TURNING FROM BACK TO SIDE WHILE IN FLAT BAD: A LITTLE
DRESSING REGULAR LOWER BODY CLOTHING: A LITTLE
HELP NEEDED FOR BATHING: A LITTLE
HELP NEEDED FOR BATHING: A LITTLE
MOBILITY SCORE: 16
PERSONAL GROOMING: A LITTLE
DRESSING REGULAR UPPER BODY CLOTHING: A LITTLE
TOILETING: A LITTLE
STANDING UP FROM CHAIR USING ARMS: A LITTLE
STANDING UP FROM CHAIR USING ARMS: A LOT
MOVING FROM LYING ON BACK TO SITTING ON SIDE OF FLAT BED WITH BEDRAILS: A LITTLE
DAILY ACTIVITIY SCORE: 20
WALKING IN HOSPITAL ROOM: A LOT
MOVING TO AND FROM BED TO CHAIR: A LITTLE
PATIENT BASELINE BEDBOUND: NO
HELP NEEDED FOR BATHING: A LITTLE
CLIMB 3 TO 5 STEPS WITH RAILING: A LOT
PERSONAL GROOMING: A LOT
MOBILITY SCORE: 13
MOBILITY SCORE: 16
STANDING UP FROM CHAIR USING ARMS: A LITTLE
DRESSING REGULAR UPPER BODY CLOTHING: A LITTLE
CLIMB 3 TO 5 STEPS WITH RAILING: A LOT
WALKING IN HOSPITAL ROOM: TOTAL
TURNING FROM BACK TO SIDE WHILE IN FLAT BAD: A LITTLE
TURNING FROM BACK TO SIDE WHILE IN FLAT BAD: A LITTLE
CLIMB 3 TO 5 STEPS WITH RAILING: TOTAL
DRESSING REGULAR UPPER BODY CLOTHING: A LITTLE
MOVING TO AND FROM BED TO CHAIR: A LITTLE
TOILETING: A LITTLE
DRESSING REGULAR LOWER BODY CLOTHING: A LITTLE
DAILY ACTIVITIY SCORE: 19
WALKING IN HOSPITAL ROOM: A LOT
DRESSING REGULAR LOWER BODY CLOTHING: A LITTLE

## 2025-08-30 ASSESSMENT — ACTIVITIES OF DAILY LIVING (ADL)
LACK_OF_TRANSPORTATION: NO
JUDGMENT_ADEQUATE_SAFELY_COMPLETE_DAILY_ACTIVITIES: YES
WALKS IN HOME: NEEDS ASSISTANCE
GROOMING: NEEDS ASSISTANCE
PATIENT'S MEMORY ADEQUATE TO SAFELY COMPLETE DAILY ACTIVITIES?: YES
ADEQUATE_TO_COMPLETE_ADL: YES
HEARING - RIGHT EAR: FUNCTIONAL
TOILETING: NEEDS ASSISTANCE
ASSISTIVE_DEVICE: WALKER
DRESSING YOURSELF: NEEDS ASSISTANCE
LACK_OF_TRANSPORTATION: NO
HEARING - LEFT EAR: FUNCTIONAL
BATHING: NEEDS ASSISTANCE
FEEDING YOURSELF: NEEDS ASSISTANCE

## 2025-08-30 ASSESSMENT — PAIN DESCRIPTION - DESCRIPTORS: DESCRIPTORS: ACHING

## 2025-08-30 ASSESSMENT — PATIENT HEALTH QUESTIONNAIRE - PHQ9
2. FEELING DOWN, DEPRESSED OR HOPELESS: NOT AT ALL
SUM OF ALL RESPONSES TO PHQ9 QUESTIONS 1 & 2: 0
1. LITTLE INTEREST OR PLEASURE IN DOING THINGS: NOT AT ALL

## 2025-08-30 ASSESSMENT — PAIN SCALES - GENERAL
PAINLEVEL_OUTOF10: 0 - NO PAIN
PAINLEVEL_OUTOF10: 4
PAINLEVEL_OUTOF10: 7
PAINLEVEL_OUTOF10: 1
PAINLEVEL_OUTOF10: 3
PAINLEVEL_OUTOF10: 3
PAINLEVEL_OUTOF10: 4
PAINLEVEL_OUTOF10: 8
PAINLEVEL_OUTOF10: 10 - WORST POSSIBLE PAIN
PAINLEVEL_OUTOF10: 0 - NO PAIN

## 2025-08-30 ASSESSMENT — PAIN - FUNCTIONAL ASSESSMENT
PAIN_FUNCTIONAL_ASSESSMENT: 0-10

## 2025-08-30 ASSESSMENT — PAIN DESCRIPTION - PROGRESSION: CLINICAL_PROGRESSION: GRADUALLY IMPROVING

## 2025-08-30 ASSESSMENT — PAIN DESCRIPTION - LOCATION
LOCATION: ANKLE
LOCATION: ANKLE

## 2025-08-31 ENCOUNTER — APPOINTMENT (OUTPATIENT)
Dept: RADIOLOGY | Facility: HOSPITAL | Age: 75
End: 2025-08-31
Payer: MEDICARE

## 2025-08-31 PROCEDURE — 71045 X-RAY EXAM CHEST 1 VIEW: CPT

## 2025-08-31 PROCEDURE — 73564 X-RAY EXAM KNEE 4 OR MORE: CPT | Mod: LT

## 2025-08-31 ASSESSMENT — COGNITIVE AND FUNCTIONAL STATUS - GENERAL
TURNING FROM BACK TO SIDE WHILE IN FLAT BAD: A LITTLE
TURNING FROM BACK TO SIDE WHILE IN FLAT BAD: A LOT
DAILY ACTIVITIY SCORE: 19
STANDING UP FROM CHAIR USING ARMS: TOTAL
TOILETING: A LITTLE
MOBILITY SCORE: 8
MOBILITY SCORE: 15
MOVING TO AND FROM BED TO CHAIR: A LITTLE
MOVING TO AND FROM BED TO CHAIR: A LITTLE
HELP NEEDED FOR BATHING: A LITTLE
MOBILITY SCORE: 15
STANDING UP FROM CHAIR USING ARMS: A LOT
WALKING IN HOSPITAL ROOM: A LOT
TOILETING: A LITTLE
WALKING IN HOSPITAL ROOM: TOTAL
MOVING TO AND FROM BED TO CHAIR: TOTAL
CLIMB 3 TO 5 STEPS WITH RAILING: A LOT
WALKING IN HOSPITAL ROOM: A LOT
MOVING FROM LYING ON BACK TO SITTING ON SIDE OF FLAT BED WITH BEDRAILS: A LOT
DAILY ACTIVITIY SCORE: 19
TURNING FROM BACK TO SIDE WHILE IN FLAT BAD: A LITTLE
DRESSING REGULAR LOWER BODY CLOTHING: A LITTLE
DRESSING REGULAR UPPER BODY CLOTHING: A LITTLE
PERSONAL GROOMING: A LITTLE
CLIMB 3 TO 5 STEPS WITH RAILING: A LOT
DRESSING REGULAR LOWER BODY CLOTHING: A LITTLE
STANDING UP FROM CHAIR USING ARMS: A LOT
DRESSING REGULAR UPPER BODY CLOTHING: A LITTLE
PERSONAL GROOMING: A LITTLE
MOVING FROM LYING ON BACK TO SITTING ON SIDE OF FLAT BED WITH BEDRAILS: A LITTLE
CLIMB 3 TO 5 STEPS WITH RAILING: TOTAL
HELP NEEDED FOR BATHING: A LITTLE
MOVING FROM LYING ON BACK TO SITTING ON SIDE OF FLAT BED WITH BEDRAILS: A LITTLE

## 2025-08-31 ASSESSMENT — PAIN SCALES - GENERAL
PAINLEVEL_OUTOF10: 7
PAINLEVEL_OUTOF10: 7
PAINLEVEL_OUTOF10: 1
PAINLEVEL_OUTOF10: 2

## 2025-08-31 ASSESSMENT — PAIN - FUNCTIONAL ASSESSMENT
PAIN_FUNCTIONAL_ASSESSMENT: 0-10

## 2025-08-31 ASSESSMENT — ACTIVITIES OF DAILY LIVING (ADL): ADL_ASSISTANCE: INDEPENDENT

## 2025-08-31 ASSESSMENT — PAIN DESCRIPTION - LOCATION: LOCATION: ANKLE

## 2025-08-31 ASSESSMENT — PAIN DESCRIPTION - DESCRIPTORS: DESCRIPTORS: ACHING

## 2025-09-01 ASSESSMENT — PAIN DESCRIPTION - ORIENTATION
ORIENTATION: RIGHT

## 2025-09-01 ASSESSMENT — PAIN - FUNCTIONAL ASSESSMENT
PAIN_FUNCTIONAL_ASSESSMENT: 0-10
PAIN_FUNCTIONAL_ASSESSMENT: UNABLE TO SELF-REPORT
PAIN_FUNCTIONAL_ASSESSMENT: 0-10
PAIN_FUNCTIONAL_ASSESSMENT: 0-10

## 2025-09-01 ASSESSMENT — PAIN SCALES - GENERAL
PAINLEVEL_OUTOF10: 2
PAINLEVEL_OUTOF10: 0 - NO PAIN
PAINLEVEL_OUTOF10: 0 - NO PAIN
PAINLEVEL_OUTOF10: 4
PAINLEVEL_OUTOF10: 4
PAINLEVEL_OUTOF10: 5 - MODERATE PAIN

## 2025-09-01 ASSESSMENT — COGNITIVE AND FUNCTIONAL STATUS - GENERAL
TOILETING: A LITTLE
TOILETING: TOTAL
TOILETING: A LITTLE
WALKING IN HOSPITAL ROOM: A LOT
STANDING UP FROM CHAIR USING ARMS: TOTAL
MOVING FROM LYING ON BACK TO SITTING ON SIDE OF FLAT BED WITH BEDRAILS: A LITTLE
STANDING UP FROM CHAIR USING ARMS: A LOT
MOBILITY SCORE: 14
WALKING IN HOSPITAL ROOM: TOTAL
TURNING FROM BACK TO SIDE WHILE IN FLAT BAD: A LITTLE
TURNING FROM BACK TO SIDE WHILE IN FLAT BAD: A LITTLE
MOVING FROM LYING ON BACK TO SITTING ON SIDE OF FLAT BED WITH BEDRAILS: A LITTLE
HELP NEEDED FOR BATHING: A LITTLE
DAILY ACTIVITIY SCORE: 14
PERSONAL GROOMING: A LITTLE
DRESSING REGULAR UPPER BODY CLOTHING: A LITTLE
DAILY ACTIVITIY SCORE: 19
MOVING TO AND FROM BED TO CHAIR: A LITTLE
MOBILITY SCORE: 14
WALKING IN HOSPITAL ROOM: A LOT
TURNING FROM BACK TO SIDE WHILE IN FLAT BAD: A LOT
DRESSING REGULAR LOWER BODY CLOTHING: A LITTLE
DRESSING REGULAR UPPER BODY CLOTHING: A LITTLE
DRESSING REGULAR UPPER BODY CLOTHING: A LITTLE
MOVING FROM LYING ON BACK TO SITTING ON SIDE OF FLAT BED WITH BEDRAILS: A LITTLE
MOBILITY SCORE: 9
HELP NEEDED FOR BATHING: A LOT
PERSONAL GROOMING: A LITTLE
DRESSING REGULAR LOWER BODY CLOTHING: TOTAL
CLIMB 3 TO 5 STEPS WITH RAILING: TOTAL
MOVING TO AND FROM BED TO CHAIR: TOTAL
CLIMB 3 TO 5 STEPS WITH RAILING: TOTAL
CLIMB 3 TO 5 STEPS WITH RAILING: TOTAL
HELP NEEDED FOR BATHING: A LITTLE
PERSONAL GROOMING: A LITTLE
STANDING UP FROM CHAIR USING ARMS: A LOT
DAILY ACTIVITIY SCORE: 19
DRESSING REGULAR LOWER BODY CLOTHING: A LITTLE
MOVING TO AND FROM BED TO CHAIR: A LITTLE

## 2025-09-01 ASSESSMENT — PAIN DESCRIPTION - LOCATION
LOCATION: ANKLE

## 2025-09-01 ASSESSMENT — ACTIVITIES OF DAILY LIVING (ADL)
BATHING_ASSISTANCE: MODERATE
ADL_ASSISTANCE: INDEPENDENT

## 2025-09-02 ENCOUNTER — APPOINTMENT (OUTPATIENT)
Dept: VASCULAR SURGERY | Facility: HOSPITAL | Age: 75
End: 2025-09-02
Payer: MEDICARE

## 2025-09-02 ASSESSMENT — PAIN DESCRIPTION - LOCATION
LOCATION: ANKLE
LOCATION: LEG

## 2025-09-02 ASSESSMENT — COGNITIVE AND FUNCTIONAL STATUS - GENERAL
STANDING UP FROM CHAIR USING ARMS: TOTAL
MOVING FROM LYING ON BACK TO SITTING ON SIDE OF FLAT BED WITH BEDRAILS: A LOT
PERSONAL GROOMING: A LITTLE
WALKING IN HOSPITAL ROOM: TOTAL
TOILETING: TOTAL
WALKING IN HOSPITAL ROOM: A LOT
STANDING UP FROM CHAIR USING ARMS: TOTAL
MOBILITY SCORE: 7
DAILY ACTIVITIY SCORE: 12
HELP NEEDED FOR BATHING: A LOT
TOILETING: A LOT
CLIMB 3 TO 5 STEPS WITH RAILING: TOTAL
STANDING UP FROM CHAIR USING ARMS: A LOT
EATING MEALS: A LOT
DAILY ACTIVITIY SCORE: 13
MOVING FROM LYING ON BACK TO SITTING ON SIDE OF FLAT BED WITH BEDRAILS: A LITTLE
CLIMB 3 TO 5 STEPS WITH RAILING: A LOT
HELP NEEDED FOR BATHING: A LOT
DRESSING REGULAR LOWER BODY CLOTHING: TOTAL
DRESSING REGULAR LOWER BODY CLOTHING: A LOT
PERSONAL GROOMING: A LOT
DRESSING REGULAR UPPER BODY CLOTHING: A LOT
MOVING TO AND FROM BED TO CHAIR: A LITTLE
CLIMB 3 TO 5 STEPS WITH RAILING: TOTAL
PERSONAL GROOMING: A LITTLE
WALKING IN HOSPITAL ROOM: TOTAL
MOVING TO AND FROM BED TO CHAIR: A LOT
TOILETING: A LOT
MOBILITY SCORE: 16
MOBILITY SCORE: 12
TURNING FROM BACK TO SIDE WHILE IN FLAT BAD: TOTAL
DAILY ACTIVITIY SCORE: 18
TURNING FROM BACK TO SIDE WHILE IN FLAT BAD: A LITTLE
HELP NEEDED FOR BATHING: A LOT
EATING MEALS: A LITTLE
MOVING TO AND FROM BED TO CHAIR: TOTAL
DRESSING REGULAR UPPER BODY CLOTHING: A LITTLE
DRESSING REGULAR LOWER BODY CLOTHING: A LITTLE

## 2025-09-02 ASSESSMENT — PAIN - FUNCTIONAL ASSESSMENT
PAIN_FUNCTIONAL_ASSESSMENT: 0-10

## 2025-09-02 ASSESSMENT — PAIN SCALES - GENERAL
PAINLEVEL_OUTOF10: 0 - NO PAIN
PAINLEVEL_OUTOF10: 5 - MODERATE PAIN
PAINLEVEL_OUTOF10: 3
PAINLEVEL_OUTOF10: 2
PAINLEVEL_OUTOF10: 0 - NO PAIN
PAINLEVEL_OUTOF10: 0 - NO PAIN

## 2025-09-02 ASSESSMENT — PAIN DESCRIPTION - ORIENTATION
ORIENTATION: RIGHT
ORIENTATION: LEFT

## 2025-09-02 ASSESSMENT — ACTIVITIES OF DAILY LIVING (ADL)
LACK_OF_TRANSPORTATION: NO
HOME_MANAGEMENT_TIME_ENTRY: 8

## 2025-09-03 ASSESSMENT — COGNITIVE AND FUNCTIONAL STATUS - GENERAL
DRESSING REGULAR UPPER BODY CLOTHING: A LOT
MOVING FROM LYING ON BACK TO SITTING ON SIDE OF FLAT BED WITH BEDRAILS: A LOT
DRESSING REGULAR LOWER BODY CLOTHING: TOTAL
HELP NEEDED FOR BATHING: A LOT
DAILY ACTIVITIY SCORE: 13
DAILY ACTIVITIY SCORE: 14
MOVING TO AND FROM BED TO CHAIR: A LOT
TURNING FROM BACK TO SIDE WHILE IN FLAT BAD: A LOT
WALKING IN HOSPITAL ROOM: A LOT
PERSONAL GROOMING: A LOT
DRESSING REGULAR UPPER BODY CLOTHING: A LITTLE
TOILETING: TOTAL
HELP NEEDED FOR BATHING: A LOT
MOBILITY SCORE: 12
TOILETING: A LOT
DRESSING REGULAR LOWER BODY CLOTHING: A LOT
CLIMB 3 TO 5 STEPS WITH RAILING: A LOT
STANDING UP FROM CHAIR USING ARMS: A LOT
PERSONAL GROOMING: A LITTLE
EATING MEALS: A LITTLE

## 2025-09-03 ASSESSMENT — PAIN - FUNCTIONAL ASSESSMENT
PAIN_FUNCTIONAL_ASSESSMENT: 0-10

## 2025-09-03 ASSESSMENT — ACTIVITIES OF DAILY LIVING (ADL): HOME_MANAGEMENT_TIME_ENTRY: 13

## 2025-09-03 ASSESSMENT — PAIN SCALES - GENERAL
PAINLEVEL_OUTOF10: 0 - NO PAIN

## 2025-09-05 ENCOUNTER — NURSING HOME VISIT (OUTPATIENT)
Dept: POST ACUTE CARE | Facility: EXTERNAL LOCATION | Age: 75
End: 2025-09-05
Payer: MEDICARE

## 2025-09-05 DIAGNOSIS — G62.9 POLYNEUROPATHY: ICD-10-CM

## 2025-09-05 DIAGNOSIS — E87.6 HYPOKALEMIA: ICD-10-CM

## 2025-09-05 DIAGNOSIS — F32.5 MAJOR DEPRESSION IN REMISSION: ICD-10-CM

## 2025-09-05 DIAGNOSIS — L03.116 CELLULITIS OF LEFT LOWER EXTREMITY: ICD-10-CM

## 2025-09-05 DIAGNOSIS — E03.9 HYPOTHYROIDISM, UNSPECIFIED TYPE: ICD-10-CM

## 2025-09-05 DIAGNOSIS — J30.9 ALLERGIC RHINITIS, UNSPECIFIED SEASONALITY, UNSPECIFIED TRIGGER: ICD-10-CM

## 2025-09-05 DIAGNOSIS — I10 BENIGN ESSENTIAL HYPERTENSION: ICD-10-CM

## 2025-09-05 DIAGNOSIS — I82.409 DEEP VEIN THROMBOSIS (DVT) OF LOWER EXTREMITY, UNSPECIFIED CHRONICITY, UNSPECIFIED LATERALITY, UNSPECIFIED VEIN: ICD-10-CM

## 2025-09-05 DIAGNOSIS — D64.9 ANEMIA, UNSPECIFIED TYPE: ICD-10-CM
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
## 2025-09-05 DIAGNOSIS — J45.30 MILD PERSISTENT ASTHMA WITHOUT COMPLICATION (HHS-HCC): ICD-10-CM

## 2025-09-05 DIAGNOSIS — S82.851A CLOSED TRIMALLEOLAR FRACTURE OF RIGHT ANKLE, INITIAL ENCOUNTER: Primary | ICD-10-CM

## 2025-09-05 DIAGNOSIS — R12 HEARTBURN: ICD-10-CM

## 2025-09-05 PROCEDURE — 99306 1ST NF CARE HIGH MDM 50: CPT | Performed by: PHYSICIAN ASSISTANT

## 2025-09-29 ENCOUNTER — APPOINTMENT (OUTPATIENT)
Facility: CLINIC | Age: 75
End: 2025-09-29
Payer: MEDICARE

## 2025-09-30 ENCOUNTER — APPOINTMENT (OUTPATIENT)
Dept: ORTHOPEDIC SURGERY | Facility: CLINIC | Age: 75
End: 2025-09-30
Payer: MEDICARE

## 2025-10-29 ENCOUNTER — APPOINTMENT (OUTPATIENT)
Dept: OPHTHALMOLOGY | Age: 75
End: 2025-10-29
Payer: MEDICARE

## 2025-11-24 ENCOUNTER — APPOINTMENT (OUTPATIENT)
Dept: PRIMARY CARE | Facility: CLINIC | Age: 75
End: 2025-11-24
Payer: MEDICARE

## 2025-12-29 ENCOUNTER — APPOINTMENT (OUTPATIENT)
Dept: PRIMARY CARE | Facility: CLINIC | Age: 75
End: 2025-12-29
Payer: MEDICARE

## (undated) DEVICE — HOOD, SURGICAL, FLYTE HYBRID

## (undated) DEVICE — RESTRAINT, WRIST, XLONG, DISPOSABLE

## (undated) DEVICE — SUTURE, VICRYL, 2-0, 18 IN CP-2, UNDYED

## (undated) DEVICE — NEEDLE, BLUNT, 20 G

## (undated) DEVICE — TUBING, IRRIGATION, HIGH FLOW, HAND PIECE SET

## (undated) DEVICE — SOLUTION, OPHTHALMIC, TETRACAINE HCL 0.5%, 2 ML, VIAL

## (undated) DEVICE — CLOSURE SYSTEM, DERMABOND, PRINEO, 22CM, STERILE

## (undated) DEVICE — SUTURE, STRATAFIX PDS PLUS, 1, OS-6, SYMMETRIC 60CM

## (undated) DEVICE — Device

## (undated) DEVICE — STRYKER RIO DRAPE KIT (FORMERLY MFR'D BY MAKO)

## (undated) DEVICE — DRAPE, INSTRUMENT, W/POUCH, STERI DRAPE, 7 X 11 IN, DISPOSABLE, STERILE

## (undated) DEVICE — CHECKPOINT KIT, FEMORAL/ TIBIAL

## (undated) DEVICE — CATHETER, SUCTION, CATH-N-GLOVE, PEEL POUCH, 18 FR

## (undated) DEVICE — BONE PIN, 3.2MM X 110MM, STERILE

## (undated) DEVICE — APPLICATOR, COTTON TIP, 6 IN, 2PK, STERILE

## (undated) DEVICE — SOLUTION, INJECTION, USP, LACTATED RINGERS, LIFECARE, 1000ML

## (undated) DEVICE — PROTECTIVE, FOOT, FOAM PAD & COHESIVE WRAP, STERILE

## (undated) DEVICE — CUFF, TOURNIQUET, 30 X 4, DUAL PORT/SNGL BLADDER, DISP, LF

## (undated) DEVICE — GLOVE, SURGICAL, PI ORTHO PRO, BIOGEL, SZ 8.0

## (undated) DEVICE — GLOVE, SURGICAL, PROTEXIS PI , 7.5, PF, LF

## (undated) DEVICE — BLADE, MAKO, SAGITTAL, NARROW

## (undated) DEVICE — SUTURE, VICRYL, 10-0, 12 IN, CS1606

## (undated) DEVICE — TRACKING KIT, TM KNEE PROCEDURES, VIZADISC